# Patient Record
Sex: FEMALE | Race: WHITE | NOT HISPANIC OR LATINO | Employment: OTHER | URBAN - METROPOLITAN AREA
[De-identification: names, ages, dates, MRNs, and addresses within clinical notes are randomized per-mention and may not be internally consistent; named-entity substitution may affect disease eponyms.]

---

## 2018-10-09 ENCOUNTER — CONVERSION ENCOUNTER (OUTPATIENT)
Dept: OTHER | Facility: HOSPITAL | Age: 63
End: 2018-10-09

## 2019-02-11 ENCOUNTER — HOSPITAL ENCOUNTER (OUTPATIENT)
Dept: OTHER | Facility: HOSPITAL | Age: 64
Discharge: HOME | End: 2019-02-11
Attending: FAMILY MEDICINE

## 2020-03-04 ENCOUNTER — OFFICE VISIT CONVERTED (OUTPATIENT)
Dept: SURGERY | Facility: CLINIC | Age: 65
End: 2020-03-04
Attending: NURSE PRACTITIONER

## 2020-03-04 ENCOUNTER — CONVERSION ENCOUNTER (OUTPATIENT)
Dept: SURGERY | Facility: CLINIC | Age: 65
End: 2020-03-04

## 2020-03-13 ENCOUNTER — PROCEDURE VISIT CONVERTED (OUTPATIENT)
Dept: UROLOGY | Facility: CLINIC | Age: 65
End: 2020-03-13
Attending: UROLOGY

## 2020-04-22 ENCOUNTER — OFFICE VISIT CONVERTED (OUTPATIENT)
Dept: PULMONOLOGY | Facility: CLINIC | Age: 65
End: 2020-04-22
Attending: INTERNAL MEDICINE

## 2020-06-03 ENCOUNTER — HOSPITAL ENCOUNTER (OUTPATIENT)
Dept: OTHER | Facility: HOSPITAL | Age: 65
Discharge: HOME | End: 2020-06-03
Attending: INTERNAL MEDICINE

## 2020-06-19 ENCOUNTER — OFFICE VISIT CONVERTED (OUTPATIENT)
Dept: PULMONOLOGY | Facility: CLINIC | Age: 65
End: 2020-06-19
Attending: INTERNAL MEDICINE

## 2020-06-30 ENCOUNTER — HOSPITAL ENCOUNTER (OUTPATIENT)
Dept: PREADMISSION TESTING | Facility: HOSPITAL | Age: 65
Discharge: HOME | End: 2020-06-30
Attending: INTERNAL MEDICINE

## 2020-06-30 ENCOUNTER — HOSPITAL ENCOUNTER (OUTPATIENT)
Dept: OTHER | Facility: HOSPITAL | Age: 65
Discharge: HOME | End: 2020-06-30
Attending: INTERNAL MEDICINE

## 2020-07-01 LAB — SARS-COV-2 RNA SPEC QL NAA+PROBE: NOT DETECTED

## 2020-07-02 ENCOUNTER — HOSPITAL ENCOUNTER (OUTPATIENT)
Dept: CARDIOLOGY | Facility: HOSPITAL | Age: 65
Discharge: HOME | End: 2020-07-02
Attending: INTERNAL MEDICINE

## 2020-07-03 LAB — IGE SERPL-ACNC: 55 K[IU]/ML (ref 0–24)

## 2020-10-27 ENCOUNTER — OFFICE VISIT CONVERTED (OUTPATIENT)
Dept: PULMONOLOGY | Facility: CLINIC | Age: 65
End: 2020-10-27
Attending: NURSE PRACTITIONER

## 2020-11-11 ENCOUNTER — HOSPITAL ENCOUNTER (OUTPATIENT)
Dept: OTHER | Facility: HOSPITAL | Age: 65
Discharge: HOME | End: 2020-11-11
Attending: FAMILY MEDICINE

## 2020-12-17 ENCOUNTER — OFFICE VISIT CONVERTED (OUTPATIENT)
Dept: NEUROSURGERY | Facility: CLINIC | Age: 65
End: 2020-12-17
Attending: PHYSICIAN ASSISTANT

## 2021-01-28 ENCOUNTER — OFFICE VISIT CONVERTED (OUTPATIENT)
Dept: NEUROSURGERY | Facility: CLINIC | Age: 66
End: 2021-01-28
Attending: NEUROLOGICAL SURGERY

## 2021-01-28 ENCOUNTER — CONVERSION ENCOUNTER (OUTPATIENT)
Dept: NEUROLOGY | Facility: CLINIC | Age: 66
End: 2021-01-28

## 2021-02-16 ENCOUNTER — OFFICE VISIT CONVERTED (OUTPATIENT)
Dept: PULMONOLOGY | Facility: CLINIC | Age: 66
End: 2021-02-16
Attending: NURSE PRACTITIONER

## 2021-02-25 ENCOUNTER — HOSPITAL ENCOUNTER (OUTPATIENT)
Dept: MRI IMAGING | Facility: HOSPITAL | Age: 66
Discharge: HOME | End: 2021-02-25
Attending: NEUROLOGICAL SURGERY

## 2021-03-09 ENCOUNTER — HOSPITAL ENCOUNTER (OUTPATIENT)
Dept: PREADMISSION TESTING | Facility: HOSPITAL | Age: 66
Discharge: HOME | End: 2021-03-09
Attending: NURSE PRACTITIONER

## 2021-03-10 ENCOUNTER — OFFICE VISIT CONVERTED (OUTPATIENT)
Dept: ORTHOPEDIC SURGERY | Facility: CLINIC | Age: 66
End: 2021-03-10
Attending: ORTHOPAEDIC SURGERY

## 2021-03-10 LAB — SARS-COV-2 RNA SPEC QL NAA+PROBE: NOT DETECTED

## 2021-05-10 NOTE — H&P
History and Physical      Patient Name: Jennifer Shelton   Patient ID: [id number]   Sex: Female   YOB: 1955    Primary Care Provider: Oswaldo Colorado MD   Referring Provider: Oswaldo Colorado MD    Visit Date: March 10, 2021    Provider: Jet Salinas MD   Location: [facility]   Location Address: [address] [place] [address]  [id number]   Location Phone: [phone]          Chief Complaint  · Left Shoulder Pain      History Of Present Illness  Jennifer Shelton is a 65 year old /White female who presents today to [facility].      Patient presents today for an evaluation of left shoulder pain. She states she has been having on and off shoulder pain for about a year and a half. She states she has some ruptured discs in her neck and that may be the cause of her left shoulder pain. She states she does have some tingling in her fingers. She has a hard time telling what is her neck pain and what is her shoulder pain. She states shoulder pain on the anterior aspect that radiates down her upper arm with no known injury or trauma. She states that the last 2-3 weeks her shoulder pain has improved. Before, when the pain started, she had limited range of motion.       Past Medical History  Allergic rhinitis, chronic; Arthritis; Cancer; Cervical radiculopathy; Cervical spinal stenosis; Cervical spondylosis without myelopathy; Cervicalgia; Chest pain; Chronic Obstructive Pulmonary Disease; Depression; Fibromyalgia; GERD; Hematuria; Herniated disc, C4-5; Herniated disc, C5-6; Herniated disc, C6-7; High cholesterol; Lung nodule; Shortness Of Air         Past Surgical History  Cesarian Section; Cystoscopy; Pilonidal cystectomy         Medication List  albuterol sulfate 90 mcg/actuation inhalation HFA aerosol inhaler; Arnuity Ellipta 50 mcg/actuation inhalation blister with device; azelastine-fluticasone 137-50 mcg/spray nasal spray,non-aerosol; Chantix 1 mg oral tablet;  "ipratropium bromide 0.02 % inhalation solution; prednisone 20 mg oral tablet; Singulair 10 mg oral tablet         Allergy List  ASA-acetamin-caffein-potassium; Keflex; NSAIDS; PENICILLINS       Allergies Reconciled  Family Medical History  Cancer, Unspecified; Heart Attack (MI); Family history of colon cancer; Family history of breast cancer         Social History  Tobacco (Former)         Review of Systems  · Constitutional  o Denies  o : fever, chills, weight loss  · Cardiovascular  o Denies  o : chest pain, shortness of breath  · Gastrointestinal  o Denies  o : liver disease, heartburn, nausea, blood in stools  · Genitourinary  o Denies  o : painful urination, blood in urine  · Integument  o Denies  o : rash, itching  · Neurologic  o Denies  o : headache, weakness, loss of consciousness  · Musculoskeletal  o Denies  o : painful, swollen joints  · Psychiatric  o Denies  o : drug/alcohol addiction, anxiety, depression      Vitals  Date Time BP Position Site L\R Cuff Size HR RR TEMP (F) WT  HT  BMI kg/m2 BSA m2 O2 Sat FR L/min FiO2        03/10/2021 09:37 AM      90 - R   184lbs 0oz 5'  9\" 27.17 2.02 95 %            Physical Examination  · Constitutional  o Appearance  o : well developed, well-nourished, no obvious deformities present  · Head and Face  o Head  o :   § Inspection  § : normocephalic  o Face  o :   § Inspection  § : no facial lesions  · Eyes  o Conjunctivae  o : conjunctivae normal  o Sclerae  o : sclerae white  · Ears, Nose, Mouth and Throat  o Ears  o :   § External Ears  § : appearance within normal limits  § Hearing  § : intact  o Nose  o :   § External Nose  § : appearance normal  · Neck  o Inspection/Palpation  o : normal appearance  o Range of Motion  o : full range of motion  · Respiratory  o Respiratory Effort  o : breathing unlabored  o Inspection of Chest  o : normal appearance  o Auscultation of Lungs  o : no audible wheezing or rales  · Cardiovascular  o Heart  o : regular " rate  · Gastrointestinal  o Abdominal Examination  o : soft and non-tender  · Skin and Subcutaneous Tissue  o General Inspection  o : intact, no rashes  · Psychiatric  o General  o : Alert and oriented x3  o Judgement and Insight  o : judgment and insight intact  o Mood and Affect  o : mood normal, affect appropriate  · Left Shoulder  o Inspection  o : Full forward flexion with pain. 4 out of 5 RTC strength. Abduction to 130. IR to back pocket. Tender biceps tendon. Tender anterior shoulder. No swelling, skin discoloration or atrophy. Skin intact. Sensation grossly intact. Neurovascular intact. Tender AC joint. Good tone of deltoid, biceps, triceps, wrist extensors, and wrist flexors. Non-tender elbow. Full cross body adduction. Pain with empty can testing.   · In Office Procedures  o View  o : AP/LATERAL  o Site  o : left, scapula  o Indication  o : Left Shoulder Pain  o Study  o : X-rays ordered, taken in the office, and reviewed today.  o Xray  o : Mild AC joint arthritis. No fracture or dislocation.   · Imaging  o Imaging  o : 2/5/21 LEFT SHOULDER MRI: 1. Moderate supraspinatus tendinopathy with mild partial-thickness articular sided fraying at the footplate. 2. Mild intra-articular long head biceps tendinopathy. 3. Small glenohumeral joint effusion with mild to moderate chondromalacia, diffuse synovial thickening and/or intra-articular debris, possible body or labral tissue in the axillary recess, and synovial thickening suggesting capsulitis. 4. Minimal age-appropriate DJD at the AC joint with chronic appearing deformity of the clavicle and partial ossification of the cortical clavicular ligament suggesting sequela of remote trauma.           Assessment  · Left shoulder pain, unspecified chronicity     719.41/M25.512  · Left Shoulder Rotator Cuff Tendinitis     726.90/M77.9  · Shoulder impingement, left     726.2/M75.42      Plan  · Orders  o Scapula (Left) [facility] Preferred View ([id number]) - 719.41/M25.512 -  03/10/2021  · Medications  o Medications have been Reconciled  o Transition of Care or Provider Policy  · Instructions  o Dr. Salinas saw and examined the patient and agrees with plan.   o MRI reviewed by Dr. Salinas.  o Reviewed the patient's Past Medical, Social, and Family history as well as the ROS at today's visit, no changes.  o Call or return if worsening symptoms.  o Exercise handout given.  o Follow Up PRN.  o The above service was scribed by Michelle Muniz on my behalf and I attest to the accuracy of the note. [email or web address]   o Discussed diagnosis and treatment plans with the patient. Discussed operative vs non-operative measures. Patient denies any injection at this time since she has noticed improvement in her shoulder pain and range of motion. If her symptoms worsens or doesn't improve she will follow-up with us and request an injection. She was given some at home exercises for RTC tendinitis.            Electronically Signed by: Michelle Muniz-, Other -Author on March 11, 2021 10:29:58 AM  Electronically Co-signed by: Jet Salinas MD -Reviewer on March 14, 2021 05:43:54 PM

## 2021-05-10 NOTE — H&P
History and Physical      Patient Name: Jennifer Shelton   Patient ID: [id number]   Sex: Female   YOB: 1955    Primary Care Provider: Oswaldo Colorado MD   Referring Provider: Oswaldo Colorado MD    Visit Date: December 17, 2020    Provider: Bette Abad PA-C   Location: [facility]   Location Address: [address] [place] [address]  [id number]   Location Phone: [phone]          Chief Complaint  · Neck and left arm pain      History Of Present Illness  The patient is a 65 year old /White female, who presents on referral from Oswaldo Colordao MD, for a neurosurgical evaluation for a history of neck pain and left arm pain.   The left arm pain is currently moderate and localized to the down to the hand distribution. The neck pain is localized to the posterior and left lateral cervical region and has been present for several years but worse over the past few months. It is moderate (3-6/10) in severity and radiates into the left C5 and C6 distribution. The pain is described as being constant and it is generally following no specific pattern. The patient states the pain is aggravated by lifting and head turning. She has not identified any alleviating factors.   The onset of the symptoms was not associated with any specific event or activity.   She also reports left arm feels weak. The patient denies difficulty walking. The patient has no prior history of neck or back surgery.   RECENT INTERVENTIONS:  She reports undergoing failed PT in few years ago for the same problem..   INFORMATION REVIEWED:  The following information was reviewed: radiology reports and radiographic images. The MRI of the cervical spine revealed left C4/5 and C5/6 disc protrusions with small central disc protrusions at C3/4 and C6/7. No signal change in the cord. These were the most notable findings.      She quit smoking 2 weeks ago and taking Chantix.       Past Medical  History  Allergic rhinitis, chronic; Arthritis; Cancer; Cervical radiculopathy; Cervical spinal stenosis; Cervical spondylosis without myelopathy; Cervicalgia; Chest pain; Chronic Obstructive Pulmonary Disease; Depression; Fibromyalgia; GERD; Hematuria; Herniated disc, C4-5; Herniated disc, C5-6; Herniated disc, C6-7; High cholesterol; Lung nodule; Shortness Of Air         Past Surgical History  Cesarian Section; Cystoscopy; Pilonidal cystectomy         Medication List  albuterol sulfate 90 mcg/actuation inhalation HFA aerosol inhaler; Arnuity Ellipta 50 mcg/actuation inhalation blister with device; azelastine-fluticasone 137-50 mcg/spray nasal spray,non-aerosol; Chantix 1 mg oral tablet; ipratropium bromide 0.02 % inhalation solution; prednisone 20 mg oral tablet; Singulair 10 mg oral tablet         Allergy List  ASA-acetamin-caffein-potassium; Keflex; NSAIDS; PENICILLINS       Allergies Reconciled  Family Medical History  Cancer, Unspecified; Heart Attack (MI); Family history of colon cancer; Family history of breast cancer         Social History  Tobacco (Current every day)         Review of Systems  · Constitutional  o Admits  o : fatigue, weight gain  o Denies  o : chills, excessive sweating, fever, sycope/passing out, weight loss  · Eyes  o Denies  o : changes in vision, blurry vision, double vision  · HENT  o Admits  o : ringing in the ears, nasal congestion, seasonal allergies  o Denies  o : loss of hearing, ear aches, sore throat, sinus pain, nose bleeds  · Cardiovascular  o Denies  o : blood clots, swollen legs, anemia, easy burising or bleeding, transfusions  · Respiratory  o Admits  o : shortness of breath, dry cough, productive cough, COPD  o Denies  o : pneumonia  · Gastrointestinal  o Admits  o : reflux  o Denies  o : difficulty swallowing  · Genitourinary  o Denies  o : incontinence  · Neurologic  o Admits  o : headache, dizziness/vertigo, difficulty with sleep, numbness/tingling/paresthesia ,  "weakness  o Denies  o : seizure, stroke, tremor, loss of balance, falls, difficulty with coordination, difficulty with dexterity  · Musculoskeletal  o Admits  o : neck stiffness/pain, muscle aches, joint pain, weakness, spasms, pain radiating in arm, low back pain  o Denies  o : swollen lymph nodes, sciatica, pain radiating in leg  · Endocrine  o Denies  o : diabetes, thyroid disorder  · Psychiatric  o Admits  o : depression  o Denies  o : anxiety  · All Others Negative      Vitals  Date Time BP Position Site L\R Cuff Size HR RR TEMP (F) WT  HT  BMI kg/m2 BSA m2 O2 Sat FR L/min FiO2 HC       12/17/2020 10:08 AM        97.1 183lbs 1oz 5'  9\" 27.03 2.01             Physical Examination  · Constitutional  o Appearance  o : well-nourished, well developed, alert, in no acute distress  · Neck  o Inspection/Palpation  o : normal appearance, no masses or tenderness, trachea midline  o Range of Motion  o : cervical range of motion within normal limits  · Respiratory  o Respiratory Effort  o : breathing unlabored  · Cardiovascular  o Peripheral Vascular System  o :   § Extremities  § : no edema or cyanosis  · Musculoskeletal  o Spine  o :   § Stability  § : no subluxations present  § Muscle Strength/Tone  § : paraspinal muscle strength within normal limits, paraspinal muscle tone within normal limits  o Right Upper Extremity  o :   § Inspection/Palpation  § : no tenderness to palpation  § Joint Stability  § : shoulder, elbow and wrist joint stability normal  § Range of Motion  § : range of motion normal, no joint crepitus or pain with motion present,shoulder negative  o Left Upper Extremity  o :   § Inspection/Palpation  § : no tenderness to palpation  § Joint Stability  § : shoulder, elbow and wrist joint stability normal  § Range of Motion  § : range of motion normal, no joint crepitus present, no pain with joint motion, shoulder negative  · Skin and Subcutaneous Tissue  o Neck  o : no lesions or areas of " discoloration  · Neurologic  o Mental Status Examination  o :   § Orientation  § : alert and oriented to person, place, time and events  o Motor Examination  o :   § RUE Strength  § : strength normal  § RUE Motor Function  § : tone normal, muscle bulk normal  § LUE Strength  § : strength normal except deltoid, , and bicep were 4+/5  § LUE Motor Function  § : tone normal, muscle bulk normal  o Reflexes  o :   § RUE  § : 1/4 in biceps/triceps/brachioradialis, Howard sign negative  § LUE  § : 1/4 in biceps/triceps/brachioradialis, Howard sign negative  o Sensation  o :   § Light Touch  § : sensation intact to light touch in extremities  o Gait and Station  o :   § Gait Screening  § : normal gait, able to stand without difficulty  · Psychiatric  o Mood and Affect  o : mood normal, affect appropriate          Assessment  · Cervicalgia     723.1/M54.2  · Cervical radiculopathy     723.4/M54.12  · Herniated disc, C4-5     722.91  · Herniated disc, C5-6     722.91      Plan  · Medications  o Medications have been Reconciled  o Transition of Care or Provider Policy  · Instructions  o Encouraged to follow-up with Primary Care Provider for preventative care.  o The ROS and the PFSH were reviewed at today's visit.  o I have discussed the risks and benefits of surgery versus physical therapy and other conservative forms of treatment.  o Call or return if symptoms worsen or persist.  o She has a left C4/5 and C5/6 disc protrusions with left arm pain. I will have her f/u with Dr. Ghotra to discuss an ACDF further. She quite smoking two weeks ago and is on Chantix.             Electronically Signed by: Bette Abad PA-C -Author on December 17, 2020 11:19:40 AM

## 2021-05-14 VITALS — BODY MASS INDEX: 27.25 KG/M2 | HEART RATE: 90 BPM | OXYGEN SATURATION: 95 % | WEIGHT: 184 LBS | HEIGHT: 69 IN

## 2021-05-14 VITALS — BODY MASS INDEX: 27.11 KG/M2 | WEIGHT: 183.06 LBS | HEIGHT: 69 IN | TEMPERATURE: 97.1 F

## 2021-05-14 VITALS — HEIGHT: 69 IN | TEMPERATURE: 97.1 F | BODY MASS INDEX: 27.31 KG/M2 | WEIGHT: 184.37 LBS

## 2021-05-14 NOTE — PROGRESS NOTES
Progress Note      Patient Name: Jennifer Shelton   Patient ID: [id number]   Sex: Female   YOB: 1955    Primary Care Provider: Oswaldo Colorado MD   Referring Provider: Oswaldo Colorado MD    Visit Date: January 28, 2021    Provider: Willi Ghotra MD   Location: [facility]   Location Address: [address] [place] [address]  [id number]   Location Phone: [phone]          Chief Complaint  · Follow-up     Here with complaints of neck and left shoulder pain and left arm weakness.       History Of Present Illness  The patient is a 65 year old /White female who is in the office for followup appointment. She has pain in the left shoulder and into the arm. It does go down the whole arm. She has trouble raising the arm on the left. She has C4-5 and C5-6 foraminal stenosis most notably. She has some pain in the rest of the arm. She can't sleep on the left side.       Past Medical History  Allergic rhinitis, chronic; Arthritis; Cancer; Cervical radiculopathy; Cervical spinal stenosis; Cervical spondylosis without myelopathy; Cervicalgia; Chest pain; Chronic Obstructive Pulmonary Disease; Depression; Fibromyalgia; GERD; Hematuria; Herniated disc, C4-5; Herniated disc, C5-6; Herniated disc, C6-7; High cholesterol; Lung nodule; Shortness Of Air         Past Surgical History  Cesarian Section; Cystoscopy; Pilonidal cystectomy         Medication List  albuterol sulfate 90 mcg/actuation inhalation HFA aerosol inhaler; Arnuity Ellipta 50 mcg/actuation inhalation blister with device; azelastine-fluticasone 137-50 mcg/spray nasal spray,non-aerosol; Chantix 1 mg oral tablet; ipratropium bromide 0.02 % inhalation solution; prednisone 20 mg oral tablet; Singulair 10 mg oral tablet         Allergy List  ASA-acetamin-caffein-potassium; Keflex; NSAIDS; PENICILLINS       Allergies Reconciled  Family Medical History  Cancer, Unspecified; Heart Attack (MI); Family history of  "colon cancer; Family history of breast cancer         Social History  Tobacco (Former)         Review of Systems  · Constitutional  o Admits  o : fatigue  o Denies  o : chills, excessive sweating, fever, sycope/passing out, weight gain, weight loss  · Eyes  o Denies  o : changes in vision, blurry vision, double vision  · HENT  o Admits  o : ringing in the ears  o Denies  o : loss of hearing, ear aches, sore throat, nasal congestion, sinus pain, nose bleeds, seasonal allergies  · Cardiovascular  o Denies  o : blood clots, swollen legs, anemia, easy burising or bleeding, transfusions  · Respiratory  o Admits  o : shortness of breath, COPD  o Denies  o : dry cough, productive cough, pneumonia  · Gastrointestinal  o Admits  o : reflux  o Denies  o : difficulty swallowing  · Genitourinary  o Denies  o : incontinence  · Neurologic  o Admits  o : headache, numbness/tingling/paresthesia , difficulty with dexterity, weakness  o Denies  o : seizure, stroke, tremor, loss of balance, falls, dizziness/vertigo, difficulty with sleep, difficulty with coordination  · Musculoskeletal  o Admits  o : neck stiffness/pain, muscle aches, joint pain, weakness, spasms, pain radiating in arm, low back pain  o Denies  o : swollen lymph nodes, sciatica, pain radiating in leg  · Endocrine  o Denies  o : diabetes, thyroid disorder  · Psychiatric  o Admits  o : depression  o Denies  o : anxiety  · All Others Negative      Vitals  Date Time BP Position Site L\R Cuff Size HR RR TEMP (F) WT  HT  BMI kg/m2 BSA m2 O2 Sat FR L/min FiO2        01/28/2021 11:07 AM        97.1 184lbs 6oz 5'  9\" 27.23 2.02             Physical Examination  · Constitutional  o Appearance  o : well-nourished, well developed, alert, in no acute distress  · Respiratory  o Respiratory Effort  o : breathing unlabored  · Cardiovascular  o Peripheral Vascular System  o :   § Extremities  § : no edema or cyanosis  · Skin and Subcutaneous Tissue  o Neck  o : no lesions or areas " of discoloration  · Psychiatric  o Mood and Affect  o : mood normal, affect appropriate     She has reduced ROM of the left shoulder, particularly external rotation               Assessment  · Cervicalgia     723.1/M54.2  · Cervical radiculopathy     723.4/M54.12  · Herniated disc, C4-5     722.91  · Herniated disc, C5-6     722.91  · Shoulder pain, left     719.41/M25.512      Plan  · Orders  o MRI shoulder left wo contrast ([id number]) - - 01/28/2021  · Medications  o Medications have been Reconciled  o Transition of Care or Provider Policy  · Instructions  o The ROS and the PFSH were reviewed at today's visit.  o I am concerned that a notable portion of her pain is from her shoulder. I have recommended an MRI of the left shoulder. If this is not showing any notable abnormality, we will proceed with C4-5 and C5-6 ACDF.            Electronically Signed by: Willi Ghotra MD -Author on January 28, 2021 11:41:00 AM

## 2021-05-15 VITALS — HEIGHT: 68 IN | RESPIRATION RATE: 12 BRPM | WEIGHT: 168.37 LBS | BODY MASS INDEX: 25.52 KG/M2

## 2021-05-28 VITALS
OXYGEN SATURATION: 95 % | HEIGHT: 67 IN | HEART RATE: 98 BPM | TEMPERATURE: 98 F | BODY MASS INDEX: 25.9 KG/M2 | RESPIRATION RATE: 15 BRPM | SYSTOLIC BLOOD PRESSURE: 110 MMHG | WEIGHT: 165 LBS | DIASTOLIC BLOOD PRESSURE: 72 MMHG

## 2021-05-28 VITALS
HEIGHT: 69 IN | BODY MASS INDEX: 27.4 KG/M2 | SYSTOLIC BLOOD PRESSURE: 110 MMHG | OXYGEN SATURATION: 95 % | HEART RATE: 90 BPM | RESPIRATION RATE: 15 BRPM | DIASTOLIC BLOOD PRESSURE: 66 MMHG | TEMPERATURE: 98.7 F | WEIGHT: 185 LBS

## 2021-05-28 NOTE — PROGRESS NOTES
Patient: ZARINA FERREIRA     Acct: [id number]     Report: #[id number]  UNIT #: [id number]     : 1955    Encounter Date:10/27/2020  PRIMARY CARE: EVE LEMOS  ***Signed***  --------------------------------------------------------------------------------------------------------------------  Chief Complaint      Encounter Date      Oct 27, 2020            Primary Care Provider      KE LEMOS devon            Patient Complaint      Patient is complaining of      PT here today for F/U, [facility] F/U, COPD            VITALS      Height 5 ft 9 in / 175.26 cm      Weight 185 lbs  / 83.945330 kg      BSA 2.00 m2      BMI 27.3 kg/m2      Temperature 98.7 F / 37.06 C - Temporal      Pulse 90      Respirations 15      Blood Pressure 110/66 Sitting, Right Arm      Pulse Oximetry 95%, room air            HPI      The patient is a 65 year old female patient of Dr. Singh who was recently     hospitalized at [facility] from 10/09/2020 to 10/12/2020 for    severe worsening dyspnea. The patient had presented to the ED with several days     of ongoing worsening dyspnea.  Exertion was making it worse. It was also ass    ociated with a nonproductive cough. Due to the patient requiring oxygen, she was    admitted to the hospital for further management and care. The patient had     recently stopped smoking that resumed about a month ago.  The patient had     reported that she had been using her home inhalers with diminishing improvement.     The patient does have a history of COPD.  The patient was treated as an i    npatient for COPD exacerbations and steroids with Levaquin and scheduled     breathing treatments.  The patient was weaned off oxygen and was discharged     home. The patient was advised to continue five days of additional prednisone and    Levaquin upon discharge.  The patient states that she did finish it and she is     feeling better since hospital stay. The patient states that she does  have i    ntermittent cough at times with clear sputum and wheezing. The patient states     she is currently taking Stiolto everyday as prescribed and uses albuterol     inhaler as needed. The patient states she is also scheduled to have a follow up     chest CT scan on 12/10/2020 at [facility].  The patient states that she     is still smoking 5-6 cigarettes a day and she does have Chantix which she is     going to be starting. The patient denies any fever, chills, night sweats,     hemoptysis, purulent sputum production, chest pain, chest tightness, swollen     glands in the head and neck, abdominal pain, nausea, vomiting or diarrhea.  The     patient denies any headaches, myalgias, sore throat, changes in sense of taste     and/or smell or any other coronavirus or flu-like symptoms.  The patient did     have a serum IgE level drawn back in 06/2020 that came back elevated at a level     of 55. The patient's PFTs that were completed back in 07/2020 did show mild     obstructive airway disease, likely emphysema.  The patient states she is able to    perform ADLs without difficulty.            I have personally reviewed the review of systems, past family, social, surgical     and medical histories and I agree with those as entered in the chart.      Copies To:   Glenn Kelley      Constitutional:  Denies: Fatigue, Fever, Weight gain, Weight loss, Chills,     Insomnia, Other      Respiratory/Breathing:  Complains of: Shortness of air, Wheezing, Cough; Denies:    Hemoptysis, Pleuritic pain, Other      Endocrine:  Denies: Polydipsia, Polyuria, Heat/cold intolerance, Abnorml     menstrual pattern, Diabetes, Other      Eyes:  Denies: Blurred vision, Vision Changes, Other      Ears, nose, mouth, throat:  Denies: Congestion, Dysphagia, Hearing Changes, Nose    Bleeding, Nasal Discharge, Throat pain, Tinnitus, Other      Cardiovascular:  Denies: Chest Pain, Exertional dyspnea, Peripheral Edema,      Palpitations, Syncope, Wake up Gasping for air, Orthopnea, Tachycardia, Other      Gastrointestinal:  Denies: Abdominal pain/cramping, Bloody stools, Constipation,    Diarrhea, Melena, Nausea, Vomiting, Other      Genitourinary:  Denies: Dysuria, Urinary frequency, Incontinence, Hematuria,     Urgency, Other      Musculoskeletal:  Denies: Joint Pain, Joint Stiffness, Joint Swelling, Myalgias,    Other      Hematologic/lymphatic:  DENIES: Lymphadenopathy, Bruising, Bleeding tendencies,     Other      Neurologic:  Denies: Headache, Numbness, Weakness, Seizures, Other      Psychiatric:  Denies: Anxiety, Appropriate Effect, Depression, Other      Sleep:  No: Excessive daytime sleep, Morning Headache?, Snoring, Insomnia?, Stop    breathing at sleep?, Other      Integumentary:  Denies: Rash, Dry skin, Skin Warm to Touch, Other            FAMILY/SOCIAL/MEDICAL HX      Surgical History:  No: Abdominal Surgery, Appendectomy, Bladder Surgery, Bowel     Surgery, CABG, Cholecystectomy, Head Surgery, Oral Surgery, Orthopedic Surgery,     Vascular Surgery      Smoking status:  Current every day smoker ((.5 ppd started smoking age 23 ) now     smokes 5-6 ciggs per day)      Anticoagulation Therapy:  No      Antibiotic Prophylaxis:  No      Medical History:  Yes: Arthritis, Chemotherapy/Cancer (SKIN CANCER ON CHEST     REMOVED THIS YEAR. SQUAMOUS CELL. ), Chronic Bronchitis/COPD (EMPHYSEMA),     Seizures (2013), Shortness Of Breath; No: Asthma, Blood Disease, Congestive     Heart Failu, Deafness or Ringing Ears, Heart Attack, High Blood Pressure, Sinus     Trouble, Miscellaneous Medical/oth      Psychiatric History      none            PREVENTION      Hx Influenza Vaccination:  Yes      Date Influenza Vaccine Given:  Oct 1, 2020      Influenza Vaccine Declined:  No      2 or More Falls in Past Year?:  No      Fall Past Year with Injury?:  No      Hx Pneumococcal Vaccination:  No      Encouraged to follow-up with:  PCP regarding  preventative exams.      Chart initiated by      Wen Serrano [facility]            ALLERGIES/MEDICATIONS      Allergies:        Coded Allergies:             ASPIRIN (Verified  Allergy, Unknown, 10/27/20)           CEPHALEXIN (Verified  Allergy, Unknown, 10/27/20)           PENICILLINS (Verified  Allergy, Unknown, 10/27/20)      Medications    Last Reconciled on 10/27/20 09:06 by Davin BAH-Fluticasone (Fluticasone 50 mcg) 16 Gm Spray.susp      1 PUFFS NARE EACH QDAY, #1 BOTTLE 4 Refills         Prov: VICKY GUARDADO [facility]         10/27/20       Azelastine Hcl (Azelastine Nasal) 137 Mcg/0.137 Ml Spray.pump      1 PUFFS NARE EACH BID, #1 BOTTLE 5 Refills         Prov: VICKY GUARDADO [facility]         10/27/20       Albuterol/Ipratropium (Duoneb) 3 Ml Ampul.neb      3 ML INH Q4H PRN for SHORTNESS OF BREATH, #120 NEB 5 Refills         Prov: VICKY GUARDADO [facility]         10/27/20       Beclomethasone Dipropionate (Qvar 80 Redihaler 10.6 GM) 10.6 Gm Hfa.aeroba      1 PUFF INH RTBID, #1 INH 5 Refills         Prov: VICKY GUARDADO [facility]         10/27/20       Tiotropium Br/Olodaterol HCl (Stiolto Respimat Inhal Spray) 4 Gm Mist.inhal      2 PUFFS INH BID, #1 INH 0 Refills         Prov: Oberst,[place]         10/12/20       MDI-Albuterol (Ventolin HFA) 8 Gm Hfa.aer.ad      2 PUFFS INH Q4-6H PRN for DYSPNEA/WHEEZING, #1 INH 6 Refills         Prov: Oberst,[place]         10/12/20       Ibuprofen (Motrin) 200 Mg Tablet      800 MG PO Q6H PRN for MILD PAIN(1-3)/TEMP OVER 100.5, #60 TAB 0 Refills         Reported         10/10/20       Montelukast Sodium (Singulair*) 10 Mg Tab      10 MG PO HS, #30 TAB 9 Refills         Prov: Glenn Kelley         6/19/20       Varenicline Tartrate (Chantix) 1 Mg Tablet      1 MG PO BID for 30 Days, #60 TAB 4 Refills         Prov: Glenn Kelley         6/19/20      Current Medications      Current Medications Reviewed 10/27/20            EXAM      Vital Signs Reviewed.      General:  WDWN,  Alert, NAD.      HEENT: PERRL, EOMI.  OP, nares clear, no sinus tenderness.      Neck: Supple, no JVD, no thyromegaly.      Lymph: No axillary, cervical, supraclavicular lymphadenopathy noted bilaterally.      Chest: Lungs clear to auscultation bilaterally, no wheezes, rales or rhonchi,     normal work of breathing noted, patient able to speak full sentences without     difficulty.       CV: RRR, no MGR, pulses 2+, equal.        Abd: Soft, NT, ND, +BS, no HSM.      EXT: No clubbing, no cyanosis, no edema, no joint tenderness.        Neuro:  A  Skin: No rashes or lesions.      Vitals      Vitals:             Height 5 ft 9 in / 175.26 cm           Weight 185 lbs  / 83.590934 kg           BSA 2.00 m2           BMI 27.3 kg/m2           Temperature 98.7 F / 37.06 C - Temporal           Pulse 90           Respirations 15           Blood Pressure 110/66 Sitting, Right Arm           Pulse Oximetry 95%, room air            REVIEW      Results Reviewed      [facility] Results Reviewed?:  Yes Prev Lab Results, Yes Prev Radiology Results, Yes     [facility]      Lab Results      I reviewed patient's discharge summary.  I also reviewed patient's pulmonary     function test dated for 07/02/2020.  The patient's IgE level from 06/30/2020     elevated at 55.  I reviewed Dr. Kelley's last office visit note.            Assessment      Emphysema lung - J43.9            Notes      New Medications      * Beclomethasone Dipropionate (Qvar 80 Redihaler 10.6 GM) 10.6 GM HFA.AEROBA: 1       PUFF INH RTBID #1         Instructions: rinse mouth out after each use      * Albuterol/Ipratropium (Duoneb) 3 ML AMPUL.NEB: 3 ML INH Q4H PRN SHORTNESS OF       BREATH #120         Instructions: J43.9         Dx: Emphysema lung - J43.9      * AZELASTINE HCL (Azelastine Nasal) 137 MCG/0.137 ML SPRAY.PUMP: 1 PUFFS NARE       EACH BID #1      * Davin-Fluticasone (Fluticasone 50 mcg) 16 GM SPRAY.SUSP: 1 PUFFS NARE EACH QDAY       #1      * Tiotropium  Br/Olodaterol HCl (Stiolto Respimat Inhal Spray) 4 GM MIST.INHAL          Sample - Qty 2      Discontinued Medications      * levoFLOXacin 750 MG TABLET: 750 MG PO QDAY 5 Days #5      * predniSONE 20 MG TABLET: 40 MG PO QDAY 5 Days #10      New Office Procedures      * Pneumovax-23, As Soon As Possible         Pneumococcal Vaccine Polyvalen (Pneumovax-23) 25 MCG/0.5 ML VIAL: 25        MICROGRAM INTRAMUSCULARLY Qty 25 MCG      IMPRESSION:      1.  Acute COPD exacerbation with recent hospital stay.      2. CAP, Moraxella in sputum.      3. Noncalcified pulmonary nodules, nodular infiltrate in the right lower lobe, q    uestionable mucus plug.      4. Hypoxia on room air.      5. COVID19 rule out.      6. Tobacco abuse with cigarettes, ongoing.        7.  Elevated IgE level of 55.        8.  Allergic rhinitis/seasonal allergies.              PLAN:      1.  The patient to continue Stiolto everyday as prescribed.      2.  I will start patient on Qvar 80 mcg one puff twice a day. The patient is     advised to rinse her mouth out after each use.        3. The patient to continue Singulair everyday as prescribed.      4. I spent five minutes today counseling the patient on smoking cessation.  I     counseled the patient on the risks of continued smoking including the risk of     lung cancer, head and neck cancer, renal cancer, heart disease, stroke, and     early death. The patient refuses nicotine therapy and pharmacotherapy at this     time.  The patient is advised to decrease the number of cigarettes she is     smoking up to the point to where she can quit.        5. I will start patient on Astelin nasal spray and Flonase nasal spray.      6. I will write patient for a nebulizer machine and prescribe patient DuoNebs to    use up to four times a day as needed.      7. Pneumovax given to patient in the office today.  The patient reports she is     up-to-date with her flu vaccine.      8. Patient is advised to call the  office, 911 or go to the ER with any new or     worsening symptoms.      9.  The patient is already scheduled to have a follow up chest CT scan at     [facility] on 12/01/2020. The patient is advised to have test completed     as scheduled to ensure resolution of pneumonia.      10. Follow up with Dr. Kelley in December after CT scan is completed, sooner if     needed.            Patient Education      Tobacco Cessation Counseling:  for 3 - 10 minutes      Patient Education Provided:  COPD, Smoking Cessation      Time Spent:  > 50% /Coord Care            Electronically signed by VICKY GUARDADO [facility]  10/28/2020 16:31       Disclaimer: Converted document may not contain table formatting or lab diagrams. Please see [email or web address] for the authenticated document.

## 2021-05-28 NOTE — PROGRESS NOTES
Patient: JENNIFER SHELTON     Acct: [id number]     Report: #[id number]  UNIT #: [id number]     : 1955    Encounter Date:2021  PRIMARY CARE: EVE LEMOS  ***Signed***  --------------------------------------------------------------------------------------------------------------------  History of Present Illness      Chief Complaint: [facility] F/U COPD, Flare up, Emphysema             Jennifer Shelton is presenting for evaluation via Video and Audio conferencing.     Verbal consent obtained via Video and Audio before beginning the visit.            The following staff were present during the visit: Mini Orantes MA, Beth Omer             The patient is a 65 year old female patient of Dr. Kelley's recently hospitalized    at [facility] on 21 to 21 for acute hypoxic     respiratory failure requiring noninvasive positive pressure ventilation and     chronic obstructive pulmonary disease exacerbation. Of note, our group was not     consulted during this patient's hospital stay. The patient has a history of     significant for chronic obstructive pulmonary disease and she presented to the     ER with complaints of worsening shortness of breath that began the morning of     presentation. When the patient was evaluated in the ER, she was hypoxic at 86%      on 4 liters of oxygen via nasal cannula. The patient was then transitioned over     to BiPAP and there was improvement in the patient's oxygen saturation and     comfort. The patient had a chest x-ray that did not demonstrate any acute     infiltrates. Hospitalist service was contacted for further evaluation and     management. During the patient's hospital stay she was started on nebulizer,     breathing treatments and steroid therapy. The patient's respiratory status     continued to improve throughout admission. The patient had a walk test performed    for home oxygen evaluation prior to discharge however she  passed and did not     qualify for home oxygen. The patient states she is feeling better since hospital    stay. The patient states at times she does have a hard time coughing up     secretions as they are thick. The patient denies any fever or chills, night     sweats, hemoptysis,  purulent sputum production, swollen glands in head and     neck, unintentional weight loss, chest pain or chest tightness, abdominal pain,     nausea or vomiting or diarrhea. The patient denies  any headaches, myalgias,     sore throat, changes in sense of taste and smell any coronavirus or flu like     symptoms.  The patient denies any leg swelling, orthopnea or paroxysmal     nocturnal dyspnea. The patient states she does have excessive daytime sleepiness    and snoring and would like to be evaluated for sleep apnea she believes she may     have had a  sleep study several years ago but she is not sure if she was     positive for sleep apnea at that time. The patient states she is taking qvar and    Stiolto everyday unfortunately qvar is too expensive and would like a cheaper     inhaler sent to the pharmacy. The patient states she is also needing a prior     authorization for his Singulair to be able to continue that medication. The     patient states she is able to perform her activities of daily living without dif    ficulty.            I reviewed the Review of Systems, medical, surgical and family history and agree    with those as entered.               Exam was done via ZOOM conference and it is entered in the chart.            I personally reviewed the patient's recent discharge summary from recent     hospitalization and the patient's chest x-ray report from 01/31/21.                           Past Med History      Vaccines:       Flu-UTD      Pneum-UTD      Overview of Symptoms      Wheezing, SOA, Very little cough            Allergies and Medications      Allergies:        Coded Allergies:             ASPIRIN (Verified   Allergy, Unknown, 2/16/21)           CEPHALEXIN (Verified  Allergy, Unknown, 2/16/21)           PENICILLINS (Verified  Allergy, Unknown, 2/16/21)      Medications    Last Reconciled on 2/16/21 11:43 by VICKY GUARDADO       Montelukast Sodium (Singulair*) 10 Mg Tab      10 MG PO HS, #30 TAB 11 Refills         Prov: VICKY GUARDADO [facility]         2/16/21       Tiotropium Br/Olodaterol HCl (Stiolto Respimat Inhal Spray) 4 Gm Mist.inhal      2 PUFFS INH BID, #1 INH 11 Refills         Prov: VICKY GUARDADO [facility]         2/16/21       Fluticasone (Flovent HFA) 110 Mcg Inhaler      2 PUFFS INH RTBID, #1 INH 5 Refills         Prov: VICKY GUARDADO [facility]         2/16/21       Neb-NaCl 3% (Sodium Chloride 3% Neb) 4 Ml Vial.neb      4 ML INH RTBID for 30 Days, #60 NEB 3 Refills         Prov: VICKY GUARDADO [facility]         2/16/21       diphenhydrAMINE HCl (BENADRYL) 25 Mg Capsule      25 MG PO HS, #100 TAB         Reported         1/31/21       guaiFENesin LA (Humibid La) 600 Mg Tab.er.12h      600 MG PO QDAY, TAB         Reported         1/31/21       Tiotropium Br/Olodaterol HCl (Stiolto Respimat Inhal Spray) 4 Gm Mist.inhal               Prov: Glenn Kelley         1/5/21       Davin-Fluticasone (Fluticasone 50 mcg) 16 Gm Spray.susp      1 PUFFS NARE EACH QDAY, #1 BOTTLE 4 Refills         Prov: VICKY GUARDADO [facility]         10/27/20       Azelastine Hcl (Azelastine Nasal) 137 Mcg/0.137 Ml Spray.pump      1 PUFFS NARE EACH BID, #1 BOTTLE 5 Refills         Prov: VICKY GUARDADO [facility]         10/27/20       Albuterol/Ipratropium (Duoneb) 3 Ml Ampul.neb      3 ML INH Q4H PRN for SHORTNESS OF BREATH, #120 NEB 5 Refills         Prov: VICKY GUARDADO [facility]         10/27/20       MDI-Albuterol (Ventolin HFA) 8 Gm Hfa.aer.ad      2 PUFFS INH Q4-6H PRN for DYSPNEA/WHEEZING, #1 INH 6 Refills         Prov: Maldonado Anderson         10/12/20            Past Medical,Surg,Family Hx      Past Medical History:  None      Past Surgical History:   None            Social History      Smoking status:  Current every day smoker, Former smoker (quit Nov 2020 ((.5 ppd    started smoking age 23, 5-6 ciggs per day))            Review of Systems      Respiratory:  Admits: Cough, Shortness of Air;          Denies: Wheezing            Exam      Constitutional/Appearance:  Well Nourished, No Acute Distress      Head/Face:  Atraumatic      Eyes:  Extracocular move intact, No Scleral Icterus      Respiratory:  Breathing comfortably, No Cough      Skin: General Appearance:  No Visable Rashes on face, No Lesions on face      Neurologic Orientation:  Grossly orientated to Person, Place, No Facial Drop      Psychiatric:  Normal Mood, Normal Affect            Plan and Patient Instructions      Ambulatory Assessment/Plan:        NICOTINE DEPENDENCE, CIGARETTES, IN REMISSION - F17.211            Excessive daytime sleepiness - G47.19            Snoring - R06.83            Notes      New Medications      * Neb-NaCl 3% (Sodium Chloride 3% Neb) 4 ML VIAL.NEB: 4 ML INH RTBID 30 Days #60      * Fluticasone (Flovent HFA) 110 MCG INHALER: 2 PUFFS INH RTBID #1         Instructions: rinse mouth out after each use      Renewed Medications      * Tiotropium Br/Olodaterol HCl (Stiolto Respimat Inhal [place]) 4 GM MIST.INHAL: 2      PUFFS INH BID #1         Instructions: 2 PUFFS      * Montelukast Sodium (Singulair*) 10 MG TAB: 10 MG PO HS #30      Discontinued Medications      * Beclomethasone Dipropionate (Qvar 80 Redihaler 10.6 GM) 10.6 GM HFA.AEROBA: 1       PUFF INH RTBID #1         Instructions: rinse mouth out after each use      * predniSONE 20 MG TABLET: 40 MG PO QDAY 4 Days #8      New Diagnostics      * LDCT for lung ca screening, 5 Months         Dx: NICOTINE DEPENDENCE, CIGARETTES, IN REMISSION - F17.211      *  Discuss Need Ldct, Routine         Dx: NICOTINE DEPENDENCE, CIGARETTES, IN REMISSION - F17.211      * Split Night Sleep Study, 1 DAY         Dx: Excessive daytime  sleepiness - G47.19      Plan      ASSESSMENT:      1.  Acute hypoxic respiratory failure requiring noninvasive positive pressure     ventilation with recent hospitalization.      2. Chronic obstructive pulmonary disease exacerbation clinically improved.       3. Tobacco abuse of cigarettes in remission. The patient reports she started     smoking at age 23 2 pack per day and quit at age 64 in November 2020.      4. History of community acquired pneumonia with moraxella in sputum.       5. Noncalcified pulmonary nodule, nodular infiltrate in right lower lobe     question of mucous plug.       6. Hypoxia on room air       7. Allergic rhinitis.       8. Seasonal allergies.       9. Excessive daytime sleepiness.      10. Snoring.       11. Morning headaches.       12. Difficulty with airway clearance.              PLAN:      1. The patient is complaining of excessive daytime sleepiness, snoring and     morning headaches. I will order a split night  sleep study to evaluate the patie    nt for obstructive sleep apnea.       2. The patient states at times her secretions are thick and hard to cough up. I     will start the patient on a flutter valve and sodium chloride nebulizer to use     twice daily. The patient is advised to use DuoNeb followed by sodium chloride     nebulizer and then use the flutter valve at least twice daily to assist with     airway clearance.       3. The patient needs a prior authorization for Singulair, I will have our office    work on this.      4. I will check a low dose CT scan of the chest in June 2021. Shared decision     making regarding lung cancer screening was performed via ZOOM visit today. The     patient understands this is a process and if there are any abnormal findings it     needs to be followed with further imaging or invasive procedures.        5. The patient is advised to call the office, call 911 or go to the ER for any     new or worsening symptoms.       6. Qvar is too  expensive per the patient report. I will start the patient on     flovent 110 mcg 2 puffs twice daily. The patient is advised to rinse her mouth     after each use.         7. Continue Stiolto everyday as prescribed.       8. Continue albuterol inhaler and DuoNeb as needed.       9. The patient reports she is up to date with flu and pneumonia vaccines. The     patient is advised to receive the COVID-19 vaccine when available.  The patient     is advised to follow CDC recommendations such as social distancing, wearing a     mask and washing hand for at least 20 seconds.      10. Follow up with Dr. Kelley in 2-3 months, sooner if needed.      Instructions      * Chronic conditions reviewed and taken into consideration for today's treatment      plan.      * Patient instructed to seek medical attention urgently for new or worsening       symptoms.      * Patient was educated/instructed on their diagnosis, treatment and medications       prior to discharge from the Video and Audio visit today.            LDCT      Assessment      Nicotine dependence, cigarettes, in remission V15.82/F17.211            Plan      LDCT Orders:   to discuss lung ca screen, LDCT for lung ca screeing            Determine need to perform LDCT      Determined ne to perform LDC:  Pt between ages of 55-77 years old., Absence of     sign/symptoms of lung ca      Smoking history:  25-50 pack years            Time Spent/Education      Greater than 50% For Edcuation:  Yes      LDCT Patient Education            * Patient was presented with the benefits and harms of screening to include:         The possible need for follow-up diagnostic testing         Over Diagnosis         False Positive Rate         Total Radiation Exposure            * Counseled on the importance of:         Adherence to annual lung cancer LDCT screening         Impact of co-morbidities         The ability or willingness to undergo diagnosis of treatment               *  Counseled on the importance of cigarette cessation.      * Counseled on the importance of maintaining cigarette smoking abstinence.      * Handout provided regarding tobacco cessation interventions.      * Order for lung cancer screening with LDCT was given to the patient.            Electronically signed by VICKY [facility]O [facility]  02/18/2021 16:03       Disclaimer: Converted document may not contain table formatting or lab diagrams. Please see [facility] for the authenticated document.

## 2021-05-28 NOTE — PROGRESS NOTES
Patient: ZARINA FERREIRA     Acct: [id number]     Report: #[id number]  UNIT #: [id number]     : 1955    Encounter Date:2020  PRIMARY CARE: EVE LEMOS  ***Signed***  --------------------------------------------------------------------------------------------------------------------  Chief Complaint      Encounter Date      2020            Primary Care Provider      KE LEMOS            Patient Complaint      Patient is complaining of      Patient here today for F/U            VITALS      Height 5 ft 7 in / 170.18 cm      Weight 165 lbs  / 74.973702 kg      BSA 1.86 m2      BMI 25.8 kg/m2      Temperature 98.0 F / 36.67 C - Temporal      Pulse 98      Respirations 15      Blood Pressure 110/72 Sitting, Left Arm      Pulse Oximetry 95%, room air            HPI      The patient is a 65 year old female with chronic smoking history, chronic     obstructive pulmonary disease, gastroesophageal reflux disease, hiatal hernia     and lung nodules.             Since her last ZOOM visit she had CT scan of the chest done on 2020 and I     reviewed the results with her today. She was written for a script of Stiolto but    the cost was high and she never filled it. She used albuterol for rescue which     she was using it 1-2 times a day but she is still short of breath with exertion.    She has some cough but no significant phlegm. She has no changes in weight or     appetite. She has not done pulmonary function test or 6 minute walk test.  She     has a lot of allergies, she does not have any allergy pills. She is willing to     try chantix, she does not want to try nicotine patches. She has no fever or     chills, no nausea or vomiting. CT scan of the chest shows stable lung nodule and    nodular density in the right lower lobe which is a stable findings.            ROS      Constitutional:  Denies: Fatigue, Fever, Weight gain, Weight loss, Chills,     Insomnia, Other       Respiratory/Breathing:  Complains of: Shortness of air, Wheezing, Cough; Denies:    Hemoptysis, Pleuritic pain, Other      Endocrine:  Denies: Polydipsia, Polyuria, Heat/cold intolerance, Abnorml     menstrual pattern, Diabetes, Other      Eyes:  Denies: Blurred vision, Vision Changes, Other      Ears, nose, mouth, throat:  Denies: Mouth lesions, Thrush, Throat pain,     Hoarseness, Allergies/Hay Fever, Post Nasal Drip, Headaches, Recent Head Injury,    Nose Bleeding, Neck Stiffness, Thyroid Mass, Hearing Loss, Ear Fullness, Dry     Mouth, Nasal or Sinus Pain, Dry Lips, Nasal discharge, Nasal congestion, Other      Cardiovascular:  Denies: Palpitations, Syncope, Claudication, Chest Pain, Wake     up Gasping for air, Leg Swelling, Irregular Heart Rate, Cyanosis, Dyspnea on     Exertion, Other      Gastrointestinal:  Denies: Nausea, Constipation, Diarrhea, Abdominal pain,     Vomiting, Difficulty Swallowing, Reflux/Heartburn, Dysphagia, Jaundice, Bloa    ting, Melena, Bloody stools, Other      Genitourinary:  Denies: Urinary frequency, Incontinence, Hematuria, Urgency,     Nocturia, Dysuria, Testicular problems, Other      Musculoskeletal:  Denies: Joint Pain, Joint Stiffness, Joint Swelling, Myalgias,    Other      Hematologic/lymphatic:  DENIES: Lymphadenopathy, Bruising, Bleeding tendencies,     Other      Neurological:  Denies: Headache, Numbness, Weakness, Seizures, Other      Psychiatric:  Denies: Anxiety, Appropriate Effect, Depression, Other      Sleep:  No: Excessive daytime sleep, Morning Headache?, Snoring, Insomnia?, Stop    breathing at sleep?, Other      Integumentary:  Denies: Rash, Dry skin, Skin Warm to Touch, Other      Immunologic/Allergic:  Denies: Latex allergy, Seasonal allergies, Asthma,     Urticaria, Eczema, Other      Immunization status:  No: Up to date            FAMILY/SOCIAL/MEDICAL HX      Smoking status:  Current every day smoker (.5 ppd started smoking age 23 )      Anticoagulation  Therapy:  No      Antibiotic Prophylaxis:  No      Psychiatric History      Wen Serrano [facility]            PREVENTION      Hx Influenza Vaccination:  Yes      Influenza Vaccine Declined:  Yes      2 or More Falls Past Year?:  No      Fall Past Year with Injury?:  No      Hx Pneumococcal Vaccination:  No      Encouraged to follow-up with:  PCP regarding preventative exams.      Chart initiated by      Wen Serrano CMA            ALLERGIES/MEDICATIONS      Allergies:        Coded Allergies:             ASPIRIN (Verified  Allergy, Unknown, 4/22/20)           CEPHALEXIN (Verified  Allergy, Unknown, 4/22/20)           PENICILLINS (Verified  Allergy, Unknown, 4/22/20)      Medications    Last Reconciled on 6/19/20 13:40 by GLENN KELLEY MD      Montelukast Sodium (Singulair*) 10 Mg Tab      10 MG PO HS, #30 TAB 9 Refills         Prov: Glenn Kelley         6/19/20       Tiotropium Br/Olodaterol HCl (Stiolto Respimat Inhal Spray) 4 Gm Mist.inhal      2 PUFFS INH RTQDAY, #1 INH 9 Refills         Prov: Glenn Kelley         6/19/20       Varenicline Tartrate (Chantix) 1 Mg Tablet      1 MG PO BID for 30 Days, #60 TAB 4 Refills         Prov: Glenn Kelley         6/19/20       MDI-Albuterol (Ventolin HFA) 8 Gm Hfa.aer.ad      2 PUFFS INH Q4-6H PRN for DYSPNEA/WHEEZING, #1 INH 6 Refills         Prov: Glenn Kelley         4/22/20       Tiotropium Br/Olodaterol HCl (Stiolto Respimat Inhal Spray) 4 Gm Mist.inhal      2 PUFFS INH RTQDAY, #1 INH 9 Refills         Prov: Glenn Kelley         4/22/20      Current Medications      Current Medications Reviewed 6/19/20            EXAM      CONSTITUTIONAL: Pleasant female in no acute distress,  normal conversant.       EYES : Pink conjunctive, no ptosis, PERRL.       ENMT : Nose and ears appear normal, normal dentition, mild posterior pharyngeal     wall erythema, no sinus tenderness. Mallampati classification II      Neck: Nontender, no masses, no thyromegaly, no nodules.      Resp : Bilateral  diminished breath sounds, no wheezing, crackles or rhonchi.      Resonant to percussion bilaterally.      CVS  : No carotid bruits, s1s2 nl, RRR, no murmur, rubs or gallop, no peripheral    edema       Chest wall: Normal rise with inspiration, nontender on palpation.      GI   : Abdomen soft, with no masses, no hepatosplenomegaly, no hernias, BS+      MSK  : Normal gait and station, no digital cyanosis or clubbing       Skin : No rashes, ulcerations or lesions, normal turgor and temperature      Neuro: CN II - XII intact, no sensory deficits, DTRs intact and symmetrical, no     motor weakness      Psych: Appropriate affect, A   Vtials      Vitals:             Height 5 ft 7 in / 170.18 cm           Weight 165 lbs  / 74.809532 kg           BSA 1.86 m2           BMI 25.8 kg/m2           Temperature 98.0 F / 36.67 C - Temporal           Pulse 98           Respirations 15           Blood Pressure 110/72 Sitting, Left Arm           Pulse Oximetry 95%, room air            REVIEW      Results Reviewed      PCCS Results Reviewed?:  Yes Prev Lab Results, Yes Prev Radiology Results, Yes     Previous Mecial Records      PFT Results               [facility]                PACS RADIOLOGY REPORT            Patient: ZARINA FERREIRA   Acct: #[id number]   Report: #[id number]            UNIT #: [id number]    DOS: 20 1300      INSURANCE:[employer]   ORDER #:[id number]      LOCATION:[facility]     : 1955            PROVIDERS      ADMITTING:     ATTENDING: Glenn Kelley      FAMILY:  NONE,MD   ORDERING:  Glenn Kelley         OTHER:    DICTATING:  ENZO COUCH MD            REQ #:[id number]   EXAM:WO - CT CHEST without CONTRAST      REASON FOR EXAM:  SOL PULM NODULE      REASON FOR VISIT:  SOL PULM NODULE            *******Signed with Addenda******                  ADDENDUM            This report includes an Addendum and supersedes previous  reports for this exam.             PROCEDURE:   CT CHEST WITHOUT CONTRAST             COMPARISON:   None.             INDICATIONS:   SOITARYL PULMONARY NODULE             TECHNIQUE:   CT images were created without the administration of contrast     material.               PROTOCOL:     Standard imaging protocol performed                RADIATION:     DLP: 363mGy*cm          Automated exposure control was utilized to minimize radiation dose.              FINDINGS:         Mild to moderate centrilobular and paraseptal emphysema.  There are few     scattered areas of       tree-in-bud micro nodularity in the left lower lobe.  There is some branching     nodularity seen       posteriorly in the right lower lobe with a few scattered sub cm nodules     elsewhere in the right       lower lobe.  An anterior right lower lobe nodule measures 6 mm (image 45).  No     adenopathy in the       chest.  No dense consolidation.  No acute findings in the included upper     abdomen.  No aggressive       appearing bone change.             CONCLUSION:   Emphysema.             Scattered micro nodules in the left lower lobe, and branching nodularity in the     right lower lobe.        Findings are favored to reflect infectious or inflammatory change.  Recommend     continued attention       on a short interval follow-up chest CT after therapy to document improvement.              ENZO COUCH MD             Electronically Signed and Approved By: ENZO COUCH MD on 6/03/2020 at 14:33                ADDENDUM:              COMPARISON:   Other, CT, CHEST W/ CONTRAST, 3/19/2020, 0:00.             Outside chest CT performed on 3/19/2020 at [facility] and open MRI is made     available.             Micro nodularity in the left lower lobe is not significantly changed, and     favored to represent       infectious or inflammatory small airways disease.             Branching nodularity in the posterior right lower lobe is stable.  There are few     areas of high       density throughout this opacity, suggesting calcification or inspissated     secretions.  This may       represent chronic endobronchial impaction.  Surrounding micro nodularity, fav    ored to reflect       infectious or inflammatory small airways disease.  There are few other pleural-    based nodules in the       right lower lobe measuring up to 6 mm that are stable.             Overall, benign process is favored.  Consider follow-up chest CT in 6 months to     document persistent       stability.              ENZO COUCH MD             Electronically Signed and Approved By: ENZO COUCH MD on 6/05/2020 at 9:08                              06/05/20 0911            ENZO CORTÉSER:      D:06/05/20 0908      T:              PROCEDURE:   CT CHEST WITHOUT CONTRAST             COMPARISON:   None.             INDICATIONS:   SOITARYL PULMONARY NODULE             TECHNIQUE:   CT images were created without the administration of contrast     material.               PROTOCOL:     Standard imaging protocol performed                RADIATION:     DLP: 363mGy*cm          Automated exposure control was utilized to minimize radiation dose.              FINDINGS:         Mild to moderate centrilobular and paraseptal emphysema.  There are few     scattered areas of       tree-in-bud micro nodularity in the left lower lobe.  There is some branching     nodularity seen       posteriorly in the right lower lobe with a few scattered sub cm nodules     elsewhere in the right       lower lobe.  An anterior right lower lobe nodule measures 6 mm (image 45).  No     adenopathy in the       chest.  No dense consolidation.  No acute findings in the included upper     abdomen.  No aggressive       appearing bone change.             CONCLUSION:   Emphysema.             Scattered micro nodules in the left lower lobe, and branching nodularity in the     right lower lobe.        Findings are favored to  reflect infectious or inflammatory change.  Recommend     continued attention       on a short interval follow-up chest CT after therapy to document improvement.              ENZO COUCH MD             Electronically Signed and Approved By: ENZO COUCH MD on 6/03/2020 at 14:33                        Until signed, this is an unconfirmed preliminary report that may contain      errors and is subject to change.                                              MOOER:      D:06/03/20 1433            Assessment      Lung nodule - R91.1            COPD (chronic obstructive pulmonary disease)         Centrilobular emphysema - J43.2         Emphysema type: centrilobular            Notes      New Medications      * VARENICLINE TARTRATE (Chantix) 1 MG TABLET: 1 MG PO BID 30 Days #60         Instructions: FOR SMOKING CESSATION      * Tiotropium Br/Olodaterol HCl (Stiolto Respimat Inhal [place]) 4 GM MIST.INHAL: 2      PUFFS INH RTQDAY #1      * Montelukast Sodium (Singulair*) 10 MG TAB: 10 MG PO HS #30      * Tiotropium Br/Olodaterol HCl (Stiolto Respimat Inhal Spray) 4 GM MIST.INHAL          Sample - Qty 2      Discontinued Medications      * Nicotine 14 Mg Patch (Nicoderm 14 Mg Patch) 1 EACH PATCH.TD24: 14 MG TRANSDERM      QDAY 30 Days #30      * Nicotine Polacrilex (Nicotine) 2 MG LOZENGE: 2 MG MM Q4-6H PRN SMOKING CESSA      TION #180         Instructions: 1 LOZENGE      New Diagnostics      * PFT-Comp, PrePost,DLCO,BodyBox, Week         Dx: COPD (chronic obstructive pulmonary disease) - J44.9      * 6 Min Walk w O2 Titration Test, Routine         Dx: COPD (chronic obstructive pulmonary disease) - J44.9      * Immunoglobulin  E (I, Week         Dx: COPD (chronic obstructive pulmonary disease) - J44.9      * Chest W/O Cont CT, 6 Months         Dx: Lung nodule - R91.1      * [facility] Pre-Op Covid Screening, Routine         Dx: ENCOUNTER FOR SCREENING FOR OTHER VIRAL DISEASES - Z11.59      PLAN:      The patient is a 65 year old   female with chronic heavy smoking history, has     chronic obstructive pulmonary disease, hiatal hernia and lung nodules.             1. Chronic obstructive pulmonary disease. I will check pulmonary function test     and 6 minute walk test. I will give her a sample of Stiolto and start her on     Stiolto and albuterol as needed. She needs to stop smoking.       2. Smoking cessation counseling was done for more than 5 minutes and she is     willing to try nicotine patches. I have sent a script for  nicotine patches     today.       3. Significant allergy and allergic rhinitis. I will start her on Singulair. I     will check serum IgE level.       4. I will order repeat low dose CT scan of the chest in 6 month.       5. She has nasal spray and I advised her to use it for now.       6. Follow up in 3 months after repeat lung function tests.            Patient Education      Education resources provided:  Yes      Patient Education Provided:  Acute Bronchitis            Patient Education:        Chronic Obstructive Pulmonary Disease            Electronically signed by Glenn Kelley  07/08/2020 15:44       Disclaimer: Converted document may not contain table formatting or lab diagrams. Please see [employer] for the authenticated document.

## 2021-05-28 NOTE — PROGRESS NOTES
Patient: JENNIFER FERREIRA     Acct: [id number]     Report: #[id number]  UNIT #: [id number]     : 1955    Encounter Date:2020  PRIMARY CARE: EVE LEMOS  ***Signed***  --------------------------------------------------------------------------------------------------------------------  History of Present Illness      Chief Complaint: new pt lung nodule)            Jennifer Ferreira is presenting for evaluation via Video and Audio conferencing.     Verbal consent obtained via Video and Audio before beginning the visit.            Provider spent (number of mins) minutes with the patient during telehealth     visit.            The following staff were present during the visit: (matthew/ paola norris / md)            The patient is a 64 year old female with history of chronic smoking up to 1 pack    per day for more than 40 years. She recently had a CT scan of  abdomen and     pelvis for asymptomatic hematuria. CT scan of the  abdomen and pelvis showed     some lung nodules so she subsequently had CT scan of the chest at [facility].    Because of the lung nodules and CT scan abnormality the patient was referred to     us for ZOOM visit today. I discussed the CT scan report with her today. She does    have shortness of breath with activities, she has to pace herself at home. She     is short of breath and not able to move around much. She has cough with mucoid     sputum production intermittent, she has wheezing at times. In the past she had     pulmonary function test and was told she has some component of chronic     obstructive pulmonary disease but does not know how bad it was. She also has     history of gastroesophageal reflux disease, she says she has a diagnosis of it     but is not taking any medications and she is not bothered clinically most of the    time. She has no significant changes in weight or appetite, no family history of    chronic lung disease. She used to have  recurrent bronchitis with need for     antibiotics and steroids almost every year but has not needed it this year. I     reviewed the CT scan findings with her today.             Exam was done via ZOOM conference and it is entered in the chart.  The patient     has normal conversation, she does not look pale, no increased work of breathing     and overall looks stable.             The patient's CT scan of the chest from [facility] was done on 03/19/2020    was reviewed and it showed emphysematous changes, hiatal hernia, gaseous     distention of esophagus. There were peripheral lung nodules measuring 5.5 mm in     right lower lobe, small 3 mm average nodule in right lower lobe, focal opacity     and nodules in clusters in posterior aspect of right lower lobe. Linear opacity     2.6 X 0.6 cm in size encompasses a smaller 6 mm originally in the right lower     lobe. There was peripheral right lower lobe 4.6 mm nodule, mild bronchiectasis     in right middle lobe and both lower lobes.                          Overview of Symptoms      pt had ct scan showed lung nodules/ soa/ seasonal allergies            Allergies and Medications      Medications    Last Reconciled on 4/22/20 11:53 by GLENN KELLEY MD      Nicotine Polacrilex (Nicotine) 2 Mg Lozenge      2 MG MM Q4-6H PRN for SMOKING CESSATION, #180 GODWIN 2 Refills         Prov: Glenn Kelley         4/22/20       Nicotine 14 Mg Patch (Nicoderm 14 Mg Patch) 1 Each Patch.td24      14 MG TRANSDERM QDAY for 30 Days, #30 PATCH 4 Refills         Prov: Glenn Kelley         4/22/20       MDI-Albuterol (Ventolin HFA) 8 Gm Hfa.aer.ad      2 PUFFS INH Q4-6H PRN for DYSPNEA/WHEEZING, #1 INH 6 Refills         Prov: Glenn Kelley         4/22/20       Tiotropium Br/Olodaterol HCl (Stiolto Respimat Inhal Spray) 4 Gm Mist.inhal      2 PUFFS INH RTQDAY, #1 INH 9 Refills         Prov: Glenn Kelley         4/22/20            Past Medical,Surg,Family Hx      Past Medical History:  None  (cervical spine issues)      Past Surgical History:  None      Other Family History      stroke-0      heart disease-father      diabetes-0      cancer- mother/ sister      parents are       pnuemovax-0 flu-0 prevnar-0            Social History      Smoking status:  Current every day smoker (.5 ppd started smoking age 23 )            Review of Systems      General:  Denies: Appetite Change, Fatigue, Fever, Night Sweats, Weight Gain,     Weight Loss      ENT:  Denies Headache, Denies Hearing Loss, Denies Hoarseness, Denies Sore     Throat      Eye:  Denies Blurred Vision, Denies Corrective Lenses, Denies Diplopia, Denies     Vision Changes      Cardiovascular:  Denies Chest Pain, Denies Palpitations      Respiratory:  Admits: Shortness of Air;          Denies: Cough, Coughing Blood, Productive Cough, Wheezing      Gastrointestinal:  Denies Bloody Stools, Denies Constipation, Denies Diarrhea,     Denies Nausea/Vomiting, Denies Problem Swallowing, Denies Unable to Control     Bowels      Genitourinary:  Denies Blood in Urine, Denies Incontinence, Denies Painful     Urination      Musculoskeletal:  Denies Back Pain, Denies Muscle Pain, Denies Painful Joints      Integumentary:  Denies Itching, Denies Lesions, Denies Rash      Neurologic:  Denies Dizziness, Denies Numbness\Tingling, Denies Seizures      Psychiatric:  Denies Anxiety, Denies Depression      Endocrine:  Denies Cold Intolerance, Denies Heat Intolerance      Hematologic/Lymphatic:  Denies Bruising, Denies Bleeding, Denies Enlarged Lymph     Nodes            Plan and Patient Instructions      Plan      PLAN:      The patient is a 64 year old female with chronic heavy smoking, likely has     chronic obstructive pulmonary disease, emphysema, gastroesophageal reflux     disease, hiatal hernia and lung nodules.             1. Lung nodules. She has lung nodules up to 5 mm in size but there was linear     scarring 2.6 X 0.6 cm in the right lower lobe. Given  these findings I will     repeat CT scan of the chest in 6 weeks.  She has multiple less than 5 mm lung     nodules and she is a chronic smoker.       2. Chronic obstructive pulmonary disease. I will check pulmonary function test     and 6 minute walk test after her next office visit. I will start her on Stiolto     and albuterol inhalers. She needs to quit smoking.       3. Smoking cessation counseling was done for more than 5 minutes. She is willing    to try to go down on cigarettes, she is willing to try nicotine patches and     lozenges. I have sent a script for those today.       4. I will follow up with her in 6-8 weeks earlier if needed.      Instructions      * Chronic conditions reviewed and taken into consideration for today's treatment      plan.      * Patient instructed to seek medical attention urgently for new or worsening       symptoms.      * Patient was educated/instructed on their diagnosis, treatment and medications       prior to discharge from the Video and Audio visit today.            Electronically signed by Glenn Kelley  05/01/2020 12:07       Disclaimer: Converted document may not contain table formatting or lab diagrams. Please see [employer] for the authenticated document.

## 2021-07-16 RX ORDER — FLUTICASONE PROPIONATE 50 MCG
2 SPRAY, SUSPENSION (ML) NASAL DAILY
Qty: 1 G | Refills: 11 | Status: SHIPPED | OUTPATIENT
Start: 2021-07-16 | End: 2021-12-09 | Stop reason: SDUPTHER

## 2021-08-24 ENCOUNTER — TELEPHONE (OUTPATIENT)
Dept: PULMONOLOGY | Facility: CLINIC | Age: 66
End: 2021-08-24

## 2021-08-24 NOTE — TELEPHONE ENCOUNTER
FYI-I called this patient she is having cough, congestion and soa. I informed her that you recommended that she needs to see her PCP or urgent care first to be evaluated for covid. If that is negative we can get her an appointment with you. Patient understood.

## 2021-10-01 ENCOUNTER — OFFICE VISIT (OUTPATIENT)
Dept: FAMILY MEDICINE CLINIC | Facility: CLINIC | Age: 66
End: 2021-10-01

## 2021-10-01 VITALS
HEART RATE: 83 BPM | WEIGHT: 180 LBS | BODY MASS INDEX: 26.66 KG/M2 | OXYGEN SATURATION: 96 % | TEMPERATURE: 96.6 F | HEIGHT: 69 IN | DIASTOLIC BLOOD PRESSURE: 82 MMHG | SYSTOLIC BLOOD PRESSURE: 118 MMHG

## 2021-10-01 DIAGNOSIS — J44.9 CHRONIC OBSTRUCTIVE PULMONARY DISEASE, UNSPECIFIED COPD TYPE (HCC): ICD-10-CM

## 2021-10-01 DIAGNOSIS — M54.12 CERVICAL RADICULOPATHY: ICD-10-CM

## 2021-10-01 DIAGNOSIS — E78.5 HYPERLIPIDEMIA, UNSPECIFIED HYPERLIPIDEMIA TYPE: ICD-10-CM

## 2021-10-01 DIAGNOSIS — M47.812 CERVICAL SPONDYLOSIS WITHOUT MYELOPATHY: ICD-10-CM

## 2021-10-01 DIAGNOSIS — C44.92 SQUAMOUS CELL SKIN CANCER: ICD-10-CM

## 2021-10-01 DIAGNOSIS — R91.1 PULMONARY NODULE: ICD-10-CM

## 2021-10-01 DIAGNOSIS — R31.9 HEMATURIA, UNSPECIFIED TYPE: ICD-10-CM

## 2021-10-01 DIAGNOSIS — K21.9 GASTROESOPHAGEAL REFLUX DISEASE, UNSPECIFIED WHETHER ESOPHAGITIS PRESENT: ICD-10-CM

## 2021-10-01 DIAGNOSIS — M25.512 LEFT SHOULDER PAIN, UNSPECIFIED CHRONICITY: ICD-10-CM

## 2021-10-01 DIAGNOSIS — M79.7 FIBROMYALGIA: ICD-10-CM

## 2021-10-01 DIAGNOSIS — F33.1 MAJOR DEPRESSIVE DISORDER, RECURRENT EPISODE, MODERATE DEGREE (HCC): Primary | ICD-10-CM

## 2021-10-01 PROCEDURE — 99213 OFFICE O/P EST LOW 20 MIN: CPT | Performed by: PHYSICIAN ASSISTANT

## 2021-10-01 RX ORDER — DEXTROMETHORPHAN HYDROBROMIDE AND PROMETHAZINE HYDROCHLORIDE 15; 6.25 MG/5ML; MG/5ML
SYRUP ORAL
COMMUNITY
Start: 2021-07-21 | End: 2021-10-01

## 2021-10-01 RX ORDER — SODIUM CHLORIDE FOR INHALATION 3 %
VIAL, NEBULIZER (ML) INHALATION
COMMUNITY
Start: 2021-07-15

## 2021-10-01 RX ORDER — AMITRIPTYLINE HYDROCHLORIDE 100 MG/1
TABLET, FILM COATED ORAL
COMMUNITY
End: 2021-10-01

## 2021-10-01 RX ORDER — ATORVASTATIN CALCIUM 20 MG/1
TABLET, FILM COATED ORAL
COMMUNITY
End: 2022-01-03 | Stop reason: SDUPTHER

## 2021-10-01 RX ORDER — DULOXETIN HYDROCHLORIDE 60 MG/1
CAPSULE, DELAYED RELEASE ORAL
COMMUNITY
End: 2021-10-01

## 2021-10-01 RX ORDER — FLUTICASONE FUROATE 50 UG/1
POWDER RESPIRATORY (INHALATION)
COMMUNITY
End: 2021-10-26

## 2021-10-01 RX ORDER — MONTELUKAST SODIUM 10 MG/1
10 TABLET ORAL
COMMUNITY
Start: 2021-09-21 | End: 2022-03-11 | Stop reason: SDUPTHER

## 2021-10-01 RX ORDER — GUAIFENESIN 600 MG/1
1200 TABLET, EXTENDED RELEASE ORAL DAILY
COMMUNITY
End: 2022-08-31

## 2021-10-01 RX ORDER — IBUPROFEN 800 MG/1
TABLET ORAL
COMMUNITY
End: 2021-10-01

## 2021-10-01 RX ORDER — PREDNISONE 20 MG/1
TABLET ORAL
COMMUNITY
Start: 2021-09-07 | End: 2021-10-01

## 2021-10-01 RX ORDER — MONTELUKAST SODIUM 10 MG/1
TABLET ORAL
COMMUNITY
End: 2021-10-01

## 2021-10-01 RX ORDER — METOCLOPRAMIDE 10 MG/1
TABLET ORAL
COMMUNITY
End: 2021-10-01

## 2021-10-01 RX ORDER — BECLOMETHASONE DIPROPIONATE HFA 80 UG/1
AEROSOL, METERED RESPIRATORY (INHALATION)
COMMUNITY
Start: 2021-09-21 | End: 2021-12-09 | Stop reason: SDUPTHER

## 2021-10-01 RX ORDER — VARENICLINE TARTRATE 1 MG/1
TABLET, FILM COATED ORAL
COMMUNITY
End: 2021-10-01

## 2021-10-01 RX ORDER — TIOTROPIUM BROMIDE AND OLODATEROL 3.124; 2.736 UG/1; UG/1
SPRAY, METERED RESPIRATORY (INHALATION)
COMMUNITY
Start: 2021-09-21 | End: 2022-01-03 | Stop reason: SDUPTHER

## 2021-10-01 RX ORDER — ALBUTEROL SULFATE 90 UG/1
AEROSOL, METERED RESPIRATORY (INHALATION)
COMMUNITY
Start: 2021-09-07 | End: 2022-04-08 | Stop reason: SDUPTHER

## 2021-10-01 RX ORDER — FLUOXETINE HYDROCHLORIDE 20 MG/1
20 CAPSULE ORAL DAILY
Qty: 30 CAPSULE | Refills: 0 | Status: SHIPPED | OUTPATIENT
Start: 2021-10-01 | End: 2021-10-25 | Stop reason: SDUPTHER

## 2021-10-01 RX ORDER — AZELASTINE HYDROCHLORIDE, FLUTICASONE PROPIONATE 137; 50 UG/1; UG/1
SPRAY, METERED NASAL
COMMUNITY
End: 2021-10-01

## 2021-10-01 RX ORDER — IPRATROPIUM BROMIDE AND ALBUTEROL SULFATE 2.5; .5 MG/3ML; MG/3ML
SOLUTION RESPIRATORY (INHALATION)
COMMUNITY
Start: 2021-07-15 | End: 2022-04-08 | Stop reason: SDUPTHER

## 2021-10-01 RX ORDER — PREDNISONE 20 MG/1
0.5 TABLET ORAL
COMMUNITY
End: 2021-10-01

## 2021-10-01 RX ORDER — DIPHENHYDRAMINE HCL 25 MG
50 CAPSULE ORAL NIGHTLY PRN
COMMUNITY

## 2021-10-01 RX ORDER — FAMOTIDINE 20 MG/1
20 TABLET, FILM COATED ORAL 2 TIMES DAILY PRN
COMMUNITY
End: 2022-04-08 | Stop reason: SDUPTHER

## 2021-10-01 NOTE — PROGRESS NOTES
Subjective   Jennifer Shelton is a 66 y.o. female who presents today as a new patient to establish care and follow up of moods, COPD, pulmonary nodule, GERD, fibromyalgia, Cervical spine DDD, and history of squamous cell skin cancer.     History of Present Illness     Previous PCP Dr Colorado in [place].     Stopped all medication years ago. Last year was full of specialists and let go of routine things.     Moods- Prozac in the past- she was up to 80 mg once daily. She had depression since childhood and started treatment in 30s. Increased depression following her son's death. She stopped all medications following his death.   · Son's suicide 3/11/2021- she has not been to the doctor for CT chest, scans, other testing and treatment. She has not been doing anything since her son's death.  · Previous medications- Cymbalta- helped but Prozac helped more. Last Cymbalta 2016.  Taking benadryl to sleep but not helping much. Elavil- helped with depression but made her very tired. She also would forget things when she was taking it and talking to people.     COPD- Still using inhalers. Only taking nebulizer as needed- was to take twice daily but she has taken only as needed. Taking her daily inhaler and Singulair. Medications- Qvar, Stiolto, Monteleukast, Flonase, Mucinex, and has duoneb  As needed.   Pulmonary nodule- Nodule on right lung- they were scanning and following with Dr Kelley.   Pulmonology- RESHMA Taylor- missed appt for follow up of pulmonary nodule. Hospitalized 2/2/21 and 10/2021- she had been coughing for 2 months then went to doctor and was given steroid. She also had poison ivy and that helped too. Seen by someone in [place]- urgent care.     GERD- RESHMA Sharma- She has pain in her chest- if she does not take Pepcid, she has pain in her chest and cannot eat. When she takes the medication, she does ok. She passes blood in stool that is bright red at times. She has appt with GI this month.      Fibromyalgia- has been on no medication in a while. Previously Cymbalta and Elavil.   DDD cervical spine-     Squamous cell skin cancer- patient had SCC 1/2021.    Mother and sister with pancreatic cancer. Mother with history of colon and breast cancer. MAunt and MGM with colon cancer.     Patient's Specialists:  Cardiology- Dr Lb Boyer- last appt 4/2020 for preop exam, CP, SOA, abnormal EKG. Advised smoking cessation, follow up with pulmonology, take Lipitor, hold off on colonoscopy pending cardiac workup- stress test, echo, and holter monitor. Advised AAA screening. Negative cardiac testing. Advised follow up 1 year. She did not return for follow up.   General surgery- RESHMA Arias- last appt 3/2020 for hematuria. CT abd/pelvis, cystoscopy, and UA.   Neurosurgery- Dr Willi Ghotra- last appt 1/2021 for cervicalgia, cervical radiculopathy, herniated C4-5, C5-6, left shoulder pain. Ordered MRI left shoulder. If no abnormality, recommended C4-5, C5-6 ACDF.   Orthopedic surgery- Dr Jet Salinas- last appt 3/2021 for left shoulder pain. Xray, MRI 2/2021 with moderate supraspinatus tendonopathy, partial thickness fraying, intra-articular long head biceps tendinopathy, small glenohummeral effusion, mild to moderate chondromalacia, synovial thickening, or intr-articular debris vs labral tissue in axillary recess- suggested capsulitis. DJD AC joint with chronic deformity of clavicle. Exercises for tendonitis given and advised to return for consideration of injection if no improvement.   Pulmonology- RESHMA Taylor- last appt 2/2021 in follow up of hospitalization for acute hypoxic respiratory failure requiring noninvasince positive pressure ventilation and COPD exacerbation. Hypoxia to 86% on 4 L O2. Transitioned to BIPAP with improvement. She did not need home oxygen. Treated with steroid, nebulizer. Advised Stiolto, Qvar, and Singulair. Ordered sleep study, started flutter valve, DuoNeb followed by  sodium chloride nebulizer then flutter valve twice daily. Low dose CT 2021. Had CT abd/pelvis with pulm nodule. Continued Stiolto and PA Singulair. Changed Qvar to Flovent due to cost. Follow up in 2-3 months.   Urology- Dr Chelsea Lundy- last appt 3/2020 for cystoscopy for hematuria. Negative and negative CT abd/pelvis. Follow up in 1 year.     Past Medical History:   Diagnosis Date   • Cervical radiculopathy 2015   • Cervicalgia 2015   • Herniated disc, cervical 2015    C4-5, C5-6, C6-7   • Cervical spinal stenosis 2016   • Hematuria 2020   • Lung nodule 2020   • Arthritis    • Cancer (HCC)    • Cervical spondylosis without myelopathy    • Chronic allergic rhinitis    • COPD (chronic obstructive pulmonary disease) (HCC)    • Depression    • Fibromyalgia    • Gastroesophageal reflux    • GERD (gastroesophageal reflux disease)    • High cholesterol    • Hyperlipemia    • Rectal bleeding    • Seasonal allergies    • Shortness of breath      Past Surgical History:   Procedure Laterality Date   •  SECTION     • COLONOSCOPY     • CYSTOSCOPY  2020    Cystoscopy w/ Dr. Lundy   • PILONIDAL CYSTECTOMY     • SQUAMOUS CELL CARCINOMA EXCISION      chest     Social History     Socioeconomic History   • Marital status:    Tobacco Use   • Smoking status: Former Smoker     Packs/day: 1.50     Quit date: 2020     Years since quittin.9   • Smokeless tobacco: Never Used   • Tobacco comment: Quit x 2 months   Substance and Sexual Activity   • Alcohol use: Never   • Drug use: Never      Family History   Problem Relation Age of Onset   • Cancer Mother    • Diabetes Mother    • Colon cancer Mother         70s   • Breast cancer Mother         60s   • Arthritis Mother    • Heart disease Father    • Heart attack Father    • Arthritis Father    • Cancer Sister    • Colon cancer Maternal Grandmother    • Colon cancer Other         60s        The following portions of  the patient's history were reviewed and updated as appropriate: allergies, current medications, past family history, past medical history, past social history, past surgical history and problem list.    Review of Systems    Objective   Vitals:    10/01/21 1034   BP: 118/82   Pulse: 83   Temp: 96.6 °F (35.9 °C)   SpO2: 96%     Body mass index is 26.66 kg/m².    Physical Exam  Vitals and nursing note reviewed.   Constitutional:       General: She is not in acute distress.     Appearance: Normal appearance. She is well-developed.   HENT:      Head: Normocephalic and atraumatic.      Right Ear: External ear normal.      Left Ear: External ear normal.      Nose: Nose normal.   Eyes:      General: Lids are normal.      Conjunctiva/sclera: Conjunctivae normal.   Neck:      Vascular: No carotid bruit.   Cardiovascular:      Rate and Rhythm: Normal rate and regular rhythm.      Pulses: Normal pulses.      Heart sounds: Normal heart sounds. No murmur heard.  No friction rub. No gallop.    Pulmonary:      Effort: Pulmonary effort is normal. No respiratory distress.      Breath sounds: Normal breath sounds. No wheezing, rhonchi or rales.   Musculoskeletal:         General: No deformity.      Cervical back: Neck supple.   Skin:     General: Skin is warm and dry.   Neurological:      Mental Status: She is alert and oriented to person, place, and time.      Gait: Gait normal.   Psychiatric:         Mood and Affect: Mood is depressed.         Speech: Speech normal.         Behavior: Behavior normal.         Thought Content: Thought content normal.         Judgment: Judgment normal.         Assessment/Plan   Diagnoses and all orders for this visit:    1. Major depressive disorder, recurrent episode, moderate degree (HCC) (Primary)  -     FLUoxetine (PROzac) 20 MG capsule; Take 1 capsule by mouth Daily.  Dispense: 30 capsule; Refill: 0  -     Vitamin D 25 Hydroxy  -     TSH  -     T4, free  -     T3, Free    2. Chronic obstructive  pulmonary disease, unspecified COPD type (HCC)  -     CBC & Differential    3. Pulmonary nodule  -     CT Chest Without Contrast; Future    4. Gastroesophageal reflux disease, unspecified whether esophagitis present    5. Fibromyalgia    6. Cervical radiculopathy    7. Cervical spondylosis without myelopathy    8. Left shoulder pain, unspecified chronicity    9. Squamous cell skin cancer    10. Hyperlipidemia, unspecified hyperlipidemia type  -     Comprehensive Metabolic Panel  -     CK  -     Lipid Panel With LDL / HDL Ratio  -     Vitamin D 25 Hydroxy  -     TSH  -     T4, free  -     T3, Free    11. Hematuria, unspecified type  -     Urinalysis With Culture If Indicated -        Assessment and Plan  Patient has not been following with PCP or taking medication in a couple years. She has not kept up with medical issues following the death of her son. She reports last year she went to numerous specialists then stopped taking everything. She will need updated fasting labs. I asked that she sign a release for medical records and will restart Prozac. Follow up in 1 month.     · Moods- She has had longstanding depression that has worsened with her son's death. She previously did well on Prozac. I will restart Prozac 20 mg once daily. If no improvement but no AE with medication, she can increase to 40 mg once daily in 2 weeks. Follow up in 4 weeks for re-evaluation of moods. She should be seen ASAP if worsening moods or 9-1-1 to ER if she develops SI/HI. No SI/HI today. She should also ensure regular counseling.   · COPD- Continue Qvar or Flovent, Stiolto, Monteleukast, Flonase, Mucinex, and DuoNeb as needed. Ensure she follows up with pulmonology as directed by them.    · Pulmonary nodule- I will order CT chest, as this was not performed 6/2021 as noted in pulmonology note. She should follow up with pulmonology after CT for review and recommendations. .   · GERD- Continue Pepcid 20 mg up to twice daily and follow up  with GI this month.   · Fibromyalgia- Patient was previously on Cymbalta and Elavil for pain. However, moods were better controlled on Prozac. We will re-evaluated at follow up. We could consider Elavil in addition to Prozac if needed or change back to Cymbalta if moods or pain is not well controlled.    · DDD cervical spine- Follow up with neurosurgery if persistent pain.   · Squamous cell skin cancer- Patient to ensure she follows with dermatology regularly.     She does have family history of breast and colon cancer as well as pancreatic cancer. She needs to ensure she stays up to date on colonoscopy and mammogram. Consideration of genetics consultation to determine most appropriate screening recommendations. I will discuss this further at follow up.     I spent 35 minutes caring for Jennifer Shelton on this date of service. This time includes time spent by me in the following activities as necessary: preparing for the visit, reviewing tests, specialists records and previous visits, obtaining and/or reviewing a separately obtained history, performing a medically appropriate exam and/or evaluation, counseling and educating the patient, family, caregiver, referring and/or communicating with other healthcare professionals, documenting information in the medical record, independently interpreting results and communicating that information with the patient, family, caregiver, and developing a medically appropriate treatment plan with consideration of other conditions, medications, and treatments.

## 2021-10-16 PROBLEM — J44.9 CHRONIC OBSTRUCTIVE PULMONARY DISEASE (HCC): Status: ACTIVE | Noted: 2021-10-16

## 2021-10-16 PROBLEM — J30.2 SEASONAL ALLERGIC RHINITIS: Status: ACTIVE | Noted: 2021-10-16

## 2021-10-16 PROBLEM — I49.8 ACCELERATED JUNCTIONAL RHYTHM: Status: ACTIVE | Noted: 2021-10-16

## 2021-10-16 PROBLEM — M79.7 FIBROMYALGIA: Status: ACTIVE | Noted: 2021-10-16

## 2021-10-16 PROBLEM — M19.90 ARTHRITIS: Status: ACTIVE | Noted: 2021-10-16

## 2021-10-16 PROBLEM — Z88.8 ALLERGIC TO ASPIRIN: Status: ACTIVE | Noted: 2021-10-16

## 2021-10-16 PROBLEM — R06.02 SHORTNESS OF BREATH: Status: ACTIVE | Noted: 2021-10-16

## 2021-10-16 PROBLEM — E78.5 HYPERLIPEMIA: Status: ACTIVE | Noted: 2021-10-16

## 2021-10-16 PROBLEM — R31.9 HEMATURIA: Status: ACTIVE | Noted: 2020-03-13

## 2021-10-16 PROBLEM — R07.9 CHEST PAIN: Status: ACTIVE | Noted: 2021-10-16

## 2021-10-16 PROBLEM — R91.8 LUNG MASS: Status: ACTIVE | Noted: 2020-03-13

## 2021-10-16 PROBLEM — I49.1 ECTOPIC ATRIAL RHYTHM: Status: ACTIVE | Noted: 2021-10-16

## 2021-10-16 PROBLEM — F32.A DEPRESSION: Status: ACTIVE | Noted: 2021-10-16

## 2021-10-16 PROBLEM — M47.812 CERVICAL SPONDYLOSIS WITHOUT MYELOPATHY: Status: ACTIVE | Noted: 2021-10-16

## 2021-10-16 PROBLEM — K62.5 RECTAL BLEEDING: Status: ACTIVE | Noted: 2021-10-16

## 2021-10-17 PROBLEM — C44.92 SQUAMOUS CELL SKIN CANCER: Status: ACTIVE | Noted: 2021-10-17

## 2021-10-25 DIAGNOSIS — F33.1 MAJOR DEPRESSIVE DISORDER, RECURRENT EPISODE, MODERATE DEGREE (HCC): ICD-10-CM

## 2021-10-25 RX ORDER — FLUOXETINE HYDROCHLORIDE 20 MG/1
20 CAPSULE ORAL DAILY
Qty: 30 CAPSULE | Refills: 0 | Status: SHIPPED | OUTPATIENT
Start: 2021-10-25 | End: 2021-12-09 | Stop reason: SDUPTHER

## 2021-10-25 NOTE — TELEPHONE ENCOUNTER
Caller: Jennifer Shelton    Relationship: Self    Requested Prescriptions:   Requested Prescriptions     Pending Prescriptions Disp Refills   • FLUoxetine (PROzac) 20 MG capsule 30 capsule 0     Sig: Take 1 capsule by mouth Daily.        Pharmacy where request should be sent: [facility] - [place], [place] - [address] - [phone]  - [phone] FX        Additional details provided by patient: PATIENT IS NEEDING A REFILL BEFORE SHE GOES OUT OF TOWN NEXT WEEK. PATIENT MENTIONED INCREASING HER FLUOXETINE TO 40 MG, AS DISCUSSED BY JUAN MERCADO. PLEASE ADJUST NEW DOSAGE AND REFILL.     Best call back number: [phone]    Does the patient have less than a 3 day supply:  [] Yes  [x] No    Jeramy Duff Rep   10/25/21 13:00 EDT

## 2021-10-25 NOTE — PROGRESS NOTES
Chief Complaint        Blood in stool, irritable bowel syndrome, and heart burn    History of Present Illness      Jennifer Shelton is a 66 y.o. female who presents to [facility] as a new patient with a history of IBS predominant constipation, hematochezia, GERD and hiatal hernia.  Patient reports that she has had IBS flares occasionally she reports 1 back in February and one 2 months ago where she had abdominal pain and cramps with multiple bowel movements a day.  She reports bowel movements 6-7 times during those times in the pain last about 2 hours.  She reports the blood has since resolved since 2 months ago.  She has a strong family history of colon cancer including her mother in her 60s, her grandmother in her 70s and her aunt in her 60s.  Patient reports that in 2017 she lost 53 pounds and her heartburn resolved.  She does experience reflux and is taking famotidine 2-3 times a day for burning in her chest, acidic taste and heartburn.  She reports she has gained 25 pounds back since 2017 and her reflux has returned.  She reports constipation and not hydrating well.  She reports 2 to 3 days between her bowel movements that are firm and difficult to pass.  Patient denies fever, nausea, vomiting, weight loss, night sweats, melena, hematemesis.    Patient states she had a colonoscopy in [place] in June 2020, results were normal per patient.   Labs performed on 10/22/2021    Results       Result Review :   The following data was reviewed by: Brianna Martinez NP on 10/26/2021     CMP    CMP 1/30/21 2/1/21 10/22/21   Glucose 115 (A) 112 (A) 88   BUN 17 22 17   Creatinine 0.73 0.50 0.63   eGFR Non  Am   95   eGFR African Am   110   Sodium 135 135 138   Potassium 4.1 4.3 5.1   Chloride 100 102 101   Calcium 9.2 8.9 9.1   Total Protein   6.7   Albumin 3.9  3.9   Globulin   2.8   Total Bilirubin 0.32  0.4   Alkaline Phosphatase 73  91   AST (SGOT) 13 (A)  14   ALT (SGPT) 9 (A)  7    (A) Abnormal value       Comments are available for some flowsheets but are not being displayed.           CBC    CBC 1/30/21 2/1/21 10/22/21   WBC 8.91 11.13 (A) 6.1   RBC 4.34 4.05 (A) 4.57   Hemoglobin 13.0 12.1 13.4   Hematocrit 38.3 36.9 (A) 40.9   MCV 88.2 91.1 90   MCH 30.0 29.9 29.3   MCHC 33.9 32.8 (A) 32.8   RDW 12.3 13.0 12.9   Platelets 393 382 476 (A)   (A) Abnormal value                   Past Medical History       Past Medical History:   Diagnosis Date   • Arthritis    • Cancer (Formerly [facility])    • Cervical radiculopathy 2015   • Cervical spinal stenosis 2016   • Cervical spondylosis without myelopathy    • Cervicalgia 2015   • Chronic allergic rhinitis    • COPD (chronic obstructive pulmonary disease) (Former[facility])    • Depression    • Fibromyalgia    • Gastroesophageal reflux    • GERD (gastroesophageal reflux disease)    • Hematuria 2020   • Herniated disc, cervical 2015    C4-5, C5-6, C6-7   • High cholesterol    • Hyperlipemia    • Lung nodule 2020   • Rectal bleeding    • Seasonal allergies    • Shortness of breath        Past Surgical History:   Procedure Laterality Date   •  SECTION     • COLONOSCOPY     • CYSTOSCOPY  2020    Cystoscopy w/ Dr. Lundy   • PILONIDAL CYSTECTOMY     • SQUAMOUS CELL CARCINOMA EXCISION      chest         Current Outpatient Medications:   •  albuterol sulfate  (90 Base) MCG/ACT inhaler, INHALE 1 TO 2 PUFFS INTO LUNGS EVERY 4 TO 6 HOURS, Disp: , Rfl:   •  atorvastatin (LIPITOR) 20 MG tablet, atorvastatin 20 mg oral tablet take 1 tablet (20 mg) by oral route once daily   Suspended, Disp: , Rfl:   •  diphenhydrAMINE (BENADRYL) 25 mg capsule, Take 50 mg by mouth At Night As Needed., Disp: , Rfl:   •  famotidine (PEPCID) 20 MG tablet, Take 20 mg by mouth 2 (Two) Times a Day As Needed for Heartburn., Disp: , Rfl:   •  FLUoxetine (PROzac) 20 MG capsule, Take 1 capsule by mouth Daily. Patient due for follow up  for future  refills, Disp: 30 capsule, Rfl: 0  •  fluticasone (Flonase) 50 MCG/ACT nasal spray, 2 sprays into the nostril(s) as directed by provider Daily., Disp: 1 g, Rfl: 11  •  guaiFENesin (MUCINEX) 600 MG 12 hr tablet, Take 1,200 mg by mouth 2 (Two) Times a Day., Disp: , Rfl:   •  ipratropium (ATROVENT) 0.02 % nebulizer solution, 1.25 mL., Disp: , Rfl:   •  ipratropium-albuterol (DUO-NEB) 0.5-2.5 mg/3 ml nebulizer, INHALE CONTENTS OF 1 VIAL VIA NEBULIZER EVERY 4 HOURS AS NEEDED FOR SHORTNESS OF BREATH, Disp: , Rfl:   •  montelukast (SINGULAIR) 10 MG tablet, Take 10 mg by mouth every night at bedtime., Disp: , Rfl:   •  Qvar RediHaler 80 MCG/ACT inhaler, INHALE 1 PUFF TWICE DAILY RINSE MOUTH OUT AFTER EACH USE, Disp: , Rfl:   •  sodium chloride 3 % nebulizer solution, inhale contents of 1 vial via nebulizer TWICE DAILY, Disp: , Rfl:   •  Stiolto Respimat 2.5-2.5 MCG/ACT aerosol solution inhaler, inhale 2 puffs once daily, Disp: , Rfl:   •  pantoprazole (PROTONIX) 40 MG EC tablet, Take 1 tablet by mouth Daily., Disp: 30 tablet, Rfl: 5  •  polyethylene glycol (GoLYTELY) 236 g solution, Instructions provided by the office. Colon prep., Disp: 4000 mL, Rfl: 0  •  polyethylene glycol (MiraLax) 17 g packet, Take 17 g by mouth Daily., Disp: 30 each, Rfl: 6     Allergies   Allergen Reactions   • Aspirin Hives and Arrhythmia     Chest pain   • Cephalexin Hives   • Nsaids Hives   • Penicillins Hives   • Contrast Dye Rash       Family History   Problem Relation Age of Onset   • Cancer Mother    • Diabetes Mother    • Colon cancer Mother         70s   • Breast cancer Mother         60s   • Arthritis Mother    • Pancreatic cancer Mother    • Heart disease Father    • Heart attack Father    • Arthritis Father    • Cancer Sister    • Pancreatic cancer Sister    • Colon cancer Maternal Grandmother    • Colon cancer Other         60s        Social History     Social History Narrative   • Not on file       Objective       Objective     Vital  "Signs:   /80 (BP Location: Right arm, Patient Position: Sitting, Cuff Size: Adult)   Pulse 88   Ht 172.7 cm (68\")   Wt 80.2 kg (176 lb 12.8 oz)   SpO2 99%   BMI 26.88 kg/m²     Body mass index is 26.88 kg/m².    Physical Exam  Constitutional:       General: She is not in acute distress.     Appearance: Normal appearance. She is well-developed and normal weight.   Eyes:      Conjunctiva/sclera: Conjunctivae normal.      Pupils: Pupils are equal, round, and reactive to light.      Visual Fields: Right eye visual fields normal and left eye visual fields normal.   Cardiovascular:      Rate and Rhythm: Normal rate and regular rhythm.      Heart sounds: Normal heart sounds.   Pulmonary:      Effort: Pulmonary effort is normal. No retractions.      Breath sounds: Normal breath sounds and air entry.      Comments: Inspection of chest: normal appearance  Abdominal:      General: Bowel sounds are normal.      Palpations: Abdomen is soft.      Tenderness: There is no abdominal tenderness.      Comments: No appreciable hepatosplenomegaly   Musculoskeletal:      Cervical back: Neck supple.      Right lower leg: No edema.      Left lower leg: No edema.   Lymphadenopathy:      Cervical: No cervical adenopathy.   Skin:     Findings: No lesion.      Comments: Turgor normal   Neurological:      Mental Status: She is alert and oriented to person, place, and time.   Psychiatric:         Mood and Affect: Mood and affect normal.              Assessment & Plan          Assessment and Plan    Diagnoses and all orders for this visit:    1. Irritable bowel syndrome with constipation (Primary)    2. Hematochezia    3. Gastroesophageal reflux disease, unspecified whether esophagitis present    4. Hiatal hernia    Other orders  -     polyethylene glycol (MiraLax) 17 g packet; Take 17 g by mouth Daily.  Dispense: 30 each; Refill: 6  -     pantoprazole (PROTONIX) 40 MG EC tablet; Take 1 tablet by mouth Daily.  Dispense: 30 tablet; " Refill: 5    66-year-old female presenting the office today as a new patient with a history of IBS constipation, hematochezia, GERD, family history of colon cancer in her mother, grandmother and aunt, and hiatal hernia.  I have recommended that the patient undergo further evaluation with an EGD and colonoscopy.  I have discussed this procedure in detail with the patient.  I have discussed the risks, benefits and alternatives.  I have discussed the risk of anesthesia, bleeding and perforation.  Patient understands these risks, benefits and alternatives and wishes to proceed.  I will schedule her at her earliest convenience.  I have started the patient on Protonix 40 mg a day and she will discontinue famotidine.  I have educated patient to take MiraLAX daily 1 scoop and hydrate with plenty of fluids.  Patient is agreeable to this plan will follow up in the office after endoscopy.Patient to call with any questions or concerns.              Follow Up       Follow Up   Return for Follow up after endoscopy in office.  Patient was given instructions and counseling regarding her condition or for health maintenance advice. Please see specific information pulled into the AVS if appropriate.

## 2021-10-26 ENCOUNTER — PREP FOR SURGERY (OUTPATIENT)
Dept: OTHER | Facility: HOSPITAL | Age: 66
End: 2021-10-26

## 2021-10-26 ENCOUNTER — OFFICE VISIT (OUTPATIENT)
Dept: GASTROENTEROLOGY | Facility: CLINIC | Age: 66
End: 2021-10-26

## 2021-10-26 VITALS
HEIGHT: 68 IN | HEART RATE: 88 BPM | WEIGHT: 176.8 LBS | SYSTOLIC BLOOD PRESSURE: 151 MMHG | DIASTOLIC BLOOD PRESSURE: 80 MMHG | OXYGEN SATURATION: 99 % | BODY MASS INDEX: 26.8 KG/M2

## 2021-10-26 DIAGNOSIS — K21.9 GASTROESOPHAGEAL REFLUX DISEASE, UNSPECIFIED WHETHER ESOPHAGITIS PRESENT: ICD-10-CM

## 2021-10-26 DIAGNOSIS — K44.9 HIATAL HERNIA: ICD-10-CM

## 2021-10-26 DIAGNOSIS — K58.1 IRRITABLE BOWEL SYNDROME WITH CONSTIPATION: Primary | ICD-10-CM

## 2021-10-26 DIAGNOSIS — K92.1 HEMATOCHEZIA: ICD-10-CM

## 2021-10-26 PROCEDURE — 99214 OFFICE O/P EST MOD 30 MIN: CPT | Performed by: NURSE PRACTITIONER

## 2021-10-26 RX ORDER — PANTOPRAZOLE SODIUM 40 MG/1
40 TABLET, DELAYED RELEASE ORAL DAILY
Qty: 30 TABLET | Refills: 5 | Status: SHIPPED | OUTPATIENT
Start: 2021-10-26 | End: 2022-04-08 | Stop reason: SDUPTHER

## 2021-10-26 RX ORDER — POLYETHYLENE GLYCOL 3350 17 G/17G
17 POWDER, FOR SOLUTION ORAL DAILY
Qty: 30 EACH | Refills: 6 | Status: SHIPPED | OUTPATIENT
Start: 2021-10-26 | End: 2022-08-05

## 2021-10-26 RX ORDER — PANTOPRAZOLE SODIUM 40 MG/1
40 TABLET, DELAYED RELEASE ORAL DAILY
Qty: 30 TABLET | Refills: 5 | Status: SHIPPED | OUTPATIENT
Start: 2021-10-26 | End: 2021-10-26

## 2021-10-26 RX ORDER — POLYETHYLENE GLYCOL 3350 17 G/17G
17 POWDER, FOR SOLUTION ORAL DAILY
Qty: 30 EACH | Refills: 6 | Status: SHIPPED | OUTPATIENT
Start: 2021-10-26 | End: 2021-10-26

## 2021-10-26 NOTE — PATIENT INSTRUCTIONS
Food Choices for Gastroesophageal Reflux Disease, Adult  When you have gastroesophageal reflux disease (GERD), the foods you eat and your eating habits are very important. Choosing the right foods can help ease your discomfort. Think about working with a food expert (dietitian) to help you make good choices.  What are tips for following this plan?  Reading food labels  · Look for foods that are low in saturated fat. Foods that may help with your symptoms include:  ? Foods that have less than 5% of daily value (DV) of fat.  ? Foods that have 0 grams of trans fat.  Cooking  · Do not burroughs your food.  · Cook your food by baking, steaming, grilling, or broiling. These are all methods that do not need a lot of fat for cooking.  · To add flavor, try to use herbs that are low in spice and acidity.  Meal planning    · Choose healthy foods that are low in fat, such as:  ? Fruits and vegetables.  ? Whole grains.  ? Low-fat dairy products.  ? Lean meats, fish, and poultry.  · Eat small meals often instead of eating 3 large meals each day. Eat your meals slowly in a place where you are relaxed. Avoid bending over or lying down until 2-3 hours after eating.  · Limit high-fat foods such as fatty meats or fried foods.  · Limit your intake of fatty foods, such as oils, butter, and shortening.  · Avoid the following as told by your doctor:  ? Foods that cause symptoms. These may be different for different people. Keep a food diary to keep track of foods that cause symptoms.  ? Alcohol.  ? Drinking a lot of liquid with meals.  ? Eating meals during the 2-3 hours before bed.    Lifestyle  · Stay at a healthy weight. Ask your doctor what weight is healthy for you. If you need to lose weight, work with your doctor to do so safely.  · Exercise for at least 30 minutes on 5 or more days each week, or as told by your doctor.  · Wear loose-fitting clothes.  · Do not smoke or use any products that contain nicotine or tobacco. If you need help  quitting, ask your doctor.  · Sleep with the head of your bed higher than your feet. Use a wedge under the mattress or blocks under the bed frame to raise the head of the bed.  · Chew sugar-free gum after meals.  What foods should eat?    Eat a healthy, well-balanced diet of fruits, vegetables, whole grains, low-fat dairy products, lean meats, fish, and poultry. Each person is different.  Foods that may cause symptoms in one person may not cause any symptoms in another person. Work with your doctor to find foods that are safe for you.  The items listed above may not be a complete list of what you can eat and drink. Contact a food expert for more options.  What foods should I avoid?  Limiting some of these foods may help in managing the symptoms of GERD. Everyone is different. Talk with a food expert or your doctor to help you find the exact foods to avoid, if any.  Fruits  Any fruits prepared with added fat. Any fruits that cause symptoms. For some people, this may include citrus fruits, such as oranges, grapefruit, pineapple, and bridgett.  Vegetables  Deep-fried vegetables. French fries. Any vegetables prepared with added fat. Any vegetables that cause symptoms. For some people, this may include tomatoes and tomato products, chili peppers, onions and garlic, and horseradish.  Grains  Pastries or quick breads with added fat.  Meats and other proteins  High-fat meats, such as fatty beef or pork, hot dogs, ribs, ham, sausage, salami, and smart. Fried meat or protein, including fried fish and fried chicken. Nuts and nut butters, in large amounts.  Dairy  Whole milk and chocolate milk. Sour cream. Cream. Ice cream. Cream cheese. Milkshakes.  Fats and oils  Butter. Margarine. Shortening. Ghee.  Beverages  Coffee and tea, with or without caffeine. Carbonated beverages. Sodas. Energy drinks. Fruit juice made with acidic fruits, such as orange or grapefruit. Tomato juice. Alcoholic drinks.  Sweets and desserts  Chocolate and  cocoa. Donuts.  Seasonings and condiments  Pepper. Peppermint and spearmint. Added salt. Any condiments, herbs, or seasonings that cause symptoms. For some people, this may include garcia, hot sauce, or vinegar-based salad dressings.  The items listed above may not be a complete list of what you should not eat and drink. Contact a food expert for more options.  Questions to ask your doctor  Diet and lifestyle changes are often the first steps that are taken to manage symptoms of GERD. If diet and lifestyle changes do not help, talk with your doctor about taking medicines.  Where to find more information  · International [employer] Gastrointestinal Disorders: aboutgerd.org  Summary  · When you have GERD, food and lifestyle choices are very important in easing your symptoms.  · Eat small meals often instead of 3 large meals a day. Eat your meals slowly and in a place where you are relaxed.  · Avoid bending over or lying down until 2-3 hours after eating.  · Limit high-fat foods such as fatty meats or fried foods.  This information is not intended to replace advice given to you by your health care provider. Make sure you discuss any questions you have with your health care provider.  Document Revised: 06/28/2021 Document Reviewed: 06/28/2021  [employer] Patient Education © 2021 [employer]      Constipation, Adult  Constipation is when a person has fewer than three bowel movements in a week, has difficulty having a bowel movement, or has stools (feces) that are dry, hard, or larger than normal. Constipation may be caused by an underlying condition. It may become worse with age if a person takes certain medicines and does not take in enough fluids.  Follow these instructions at home:  Eating and drinking    · Eat foods that have a lot of fiber, such as beans, whole grains, and fresh fruits and vegetables.  · Limit foods that are low in fiber and high in fat and processed sugars, such as fried or sweet foods. These  include french fries, hamburgers, cookies, candies, and soda.  · Drink enough fluid to keep your urine pale yellow.    General instructions  · Exercise regularly or as told by your health care provider. Try to do 150 minutes of moderate exercise each week.  · Use the bathroom when you have the urge to go. Do not hold it in.  · Take over-the-counter and prescription medicines only as told by your health care provider. This includes any fiber supplements.  · During bowel movements:  ? Practice deep breathing while relaxing the lower abdomen.  ? Practice pelvic floor relaxation.  · Watch your condition for any changes. Let your health care provider know about them.  · Keep all follow-up visits as told by your health care provider. This is important.  Contact a health care provider if:  · You have pain that gets worse.  · You have a fever.  · You do not have a bowel movement after 4 days.  · You vomit.  · You are not hungry or you lose weight.  · You are bleeding from the opening between the buttocks (anus).  · You have thin, pencil-like stools.  Get help right away if:  · You have a fever and your symptoms suddenly get worse.  · You leak stool or have blood in your stool.  · Your abdomen is bloated.  · You have severe pain in your abdomen.  · You feel dizzy or you faint.  Summary  · Constipation is when a person has fewer than three bowel movements in a week, has difficulty having a bowel movement, or has stools (feces) that are dry, hard, or larger than normal.  · Eat foods that have a lot of fiber, such as beans, whole grains, and fresh fruits and vegetables.  · Drink enough fluid to keep your urine pale yellow.  · Take over-the-counter and prescription medicines only as told by your health care provider. This includes any fiber supplements.  This information is not intended to replace advice given to you by your health care provider. Make sure you discuss any questions you have with your health care  provider.  Document Revised: 11/04/2020 Document Reviewed: 11/04/2020  [employer] Patient Education © 2021 [employer]

## 2021-11-02 ENCOUNTER — PATIENT ROUNDING (BHMG ONLY) (OUTPATIENT)
Dept: GASTROENTEROLOGY | Facility: CLINIC | Age: 66
End: 2021-11-02

## 2021-11-02 NOTE — PROGRESS NOTES
November 2, 2021    Hello, may I speak with Jennifer Shelton?    My name is Angelica Stephens    I am  with [facility]  [address] [place] [address] [phone].    Before we get started may I verify your date of birth? 1955    I am calling to officially welcome you to our practice and ask about your recent visit. Is this a good time to talk? Yes    Tell me about your visit with us. What things went well?  Everything went well       We're always looking for ways to make our patients' experiences even better. Do you have recommendations on ways we may improve?  No I was very satisfied    Overall were you satisfied with your first visit to our practice? Yes       I appreciate you taking the time to speak with me today. Is there anything else I can do for you? No       Thank you, and have a great day.

## 2021-11-08 ENCOUNTER — HOSPITAL ENCOUNTER (OUTPATIENT)
Dept: CT IMAGING | Facility: HOSPITAL | Age: 66
Discharge: HOME | End: 2021-11-08
Admitting: PHYSICIAN ASSISTANT

## 2021-11-08 DIAGNOSIS — R91.1 PULMONARY NODULE: ICD-10-CM

## 2021-11-08 PROCEDURE — 71250 CT THORAX DX C-: CPT

## 2021-12-06 ENCOUNTER — PATIENT MESSAGE (OUTPATIENT)
Dept: FAMILY MEDICINE CLINIC | Facility: CLINIC | Age: 66
End: 2021-12-06

## 2021-12-06 DIAGNOSIS — F33.1 MAJOR DEPRESSIVE DISORDER, RECURRENT EPISODE, MODERATE DEGREE (HCC): ICD-10-CM

## 2021-12-09 DIAGNOSIS — F33.1 MAJOR DEPRESSIVE DISORDER, RECURRENT EPISODE, MODERATE DEGREE (HCC): ICD-10-CM

## 2021-12-09 RX ORDER — FLUTICASONE PROPIONATE 50 MCG
2 SPRAY, SUSPENSION (ML) NASAL DAILY
Qty: 1 G | Refills: 1 | Status: CANCELLED | OUTPATIENT
Start: 2021-12-09

## 2021-12-09 RX ORDER — FLUOXETINE HYDROCHLORIDE 20 MG/1
20 CAPSULE ORAL DAILY
Qty: 30 CAPSULE | Refills: 1 | Status: CANCELLED | OUTPATIENT
Start: 2021-12-09

## 2021-12-09 RX ORDER — FLUTICASONE PROPIONATE 50 MCG
2 SPRAY, SUSPENSION (ML) NASAL DAILY
Qty: 1 G | Refills: 2 | Status: SHIPPED | OUTPATIENT
Start: 2021-12-09 | End: 2021-12-10 | Stop reason: SDUPTHER

## 2021-12-09 RX ORDER — BECLOMETHASONE DIPROPIONATE HFA 80 UG/1
2 AEROSOL, METERED RESPIRATORY (INHALATION)
Qty: 10.6 G | Refills: 1 | Status: CANCELLED | OUTPATIENT
Start: 2021-12-09

## 2021-12-09 RX ORDER — BECLOMETHASONE DIPROPIONATE HFA 80 UG/1
1 AEROSOL, METERED RESPIRATORY (INHALATION)
Qty: 10.6 G | Refills: 2 | Status: SHIPPED | OUTPATIENT
Start: 2021-12-09 | End: 2021-12-10 | Stop reason: SDUPTHER

## 2021-12-09 RX ORDER — FLUOXETINE HYDROCHLORIDE 20 MG/1
20 CAPSULE ORAL DAILY
Qty: 90 CAPSULE | Refills: 0 | Status: SHIPPED | OUTPATIENT
Start: 2021-12-09 | End: 2021-12-10 | Stop reason: SDUPTHER

## 2021-12-09 NOTE — TELEPHONE ENCOUNTER
Dr. Serrano, will you be able to call these prescriptions in this week? I am almost out of the inhaler and I will be in trouble without it. Thank you.

## 2021-12-10 ENCOUNTER — TELEPHONE (OUTPATIENT)
Dept: FAMILY MEDICINE CLINIC | Facility: CLINIC | Age: 66
End: 2021-12-10

## 2021-12-10 DIAGNOSIS — F33.1 MAJOR DEPRESSIVE DISORDER, RECURRENT EPISODE, MODERATE DEGREE (HCC): ICD-10-CM

## 2021-12-10 RX ORDER — BECLOMETHASONE DIPROPIONATE HFA 80 UG/1
1 AEROSOL, METERED RESPIRATORY (INHALATION)
Qty: 10.6 G | Refills: 2 | Status: SHIPPED | OUTPATIENT
Start: 2021-12-10 | End: 2022-03-14 | Stop reason: SDUPTHER

## 2021-12-10 RX ORDER — FLUOXETINE HYDROCHLORIDE 20 MG/1
20 CAPSULE ORAL DAILY
Qty: 90 CAPSULE | Refills: 0 | Status: SHIPPED | OUTPATIENT
Start: 2021-12-10 | End: 2022-01-03 | Stop reason: SDUPTHER

## 2021-12-10 RX ORDER — FLUTICASONE PROPIONATE 50 MCG
2 SPRAY, SUSPENSION (ML) NASAL DAILY
Qty: 1 G | Refills: 2 | Status: SHIPPED | OUTPATIENT
Start: 2021-12-10 | End: 2022-03-21

## 2021-12-10 NOTE — TELEPHONE ENCOUNTER
Pt called about her meds being sent to wrong pharmacy,they need to [facility] [place], [place] please     Please call pt when meds are resent        Thank you    Tessy

## 2022-01-03 ENCOUNTER — OFFICE VISIT (OUTPATIENT)
Dept: FAMILY MEDICINE CLINIC | Facility: CLINIC | Age: 67
End: 2022-01-03

## 2022-01-03 VITALS
RESPIRATION RATE: 16 BRPM | WEIGHT: 176 LBS | TEMPERATURE: 97.1 F | SYSTOLIC BLOOD PRESSURE: 104 MMHG | OXYGEN SATURATION: 97 % | HEART RATE: 80 BPM | HEIGHT: 68 IN | DIASTOLIC BLOOD PRESSURE: 62 MMHG | BODY MASS INDEX: 26.67 KG/M2

## 2022-01-03 DIAGNOSIS — Z23 FLU VACCINE NEED: ICD-10-CM

## 2022-01-03 DIAGNOSIS — Z12.31 ENCOUNTER FOR SCREENING MAMMOGRAM FOR MALIGNANT NEOPLASM OF BREAST: ICD-10-CM

## 2022-01-03 DIAGNOSIS — E55.9 VITAMIN D DEFICIENCY: ICD-10-CM

## 2022-01-03 DIAGNOSIS — J44.9 CHRONIC OBSTRUCTIVE PULMONARY DISEASE, UNSPECIFIED COPD TYPE: ICD-10-CM

## 2022-01-03 DIAGNOSIS — E78.5 HYPERLIPIDEMIA, UNSPECIFIED HYPERLIPIDEMIA TYPE: Primary | ICD-10-CM

## 2022-01-03 DIAGNOSIS — D75.839 THROMBOCYTOSIS: ICD-10-CM

## 2022-01-03 DIAGNOSIS — F33.1 MAJOR DEPRESSIVE DISORDER, RECURRENT EPISODE, MODERATE DEGREE: ICD-10-CM

## 2022-01-03 DIAGNOSIS — M85.89 OSTEOPENIA OF MULTIPLE SITES: ICD-10-CM

## 2022-01-03 DIAGNOSIS — Z78.0 POSTMENOPAUSAL: ICD-10-CM

## 2022-01-03 PROCEDURE — 90686 IIV4 VACC NO PRSV 0.5 ML IM: CPT | Performed by: PHYSICIAN ASSISTANT

## 2022-01-03 PROCEDURE — G0008 ADMIN INFLUENZA VIRUS VAC: HCPCS | Performed by: PHYSICIAN ASSISTANT

## 2022-01-03 PROCEDURE — 99214 OFFICE O/P EST MOD 30 MIN: CPT | Performed by: PHYSICIAN ASSISTANT

## 2022-01-03 RX ORDER — ATORVASTATIN CALCIUM 20 MG/1
20 TABLET, FILM COATED ORAL NIGHTLY
Qty: 90 TABLET | Refills: 0 | Status: SHIPPED | OUTPATIENT
Start: 2022-01-03 | End: 2022-04-08 | Stop reason: SDUPTHER

## 2022-01-03 RX ORDER — TIOTROPIUM BROMIDE AND OLODATEROL 3.124; 2.736 UG/1; UG/1
1 SPRAY, METERED RESPIRATORY (INHALATION) 2 TIMES DAILY
Qty: 4 G | Refills: 5 | Status: SHIPPED | OUTPATIENT
Start: 2022-01-03 | End: 2022-02-28 | Stop reason: SDUPTHER

## 2022-01-03 RX ORDER — FLUOXETINE HYDROCHLORIDE 40 MG/1
40 CAPSULE ORAL DAILY
Qty: 90 CAPSULE | Refills: 0 | Status: SHIPPED | OUTPATIENT
Start: 2022-01-03 | End: 2022-04-08 | Stop reason: SDUPTHER

## 2022-01-03 RX ORDER — CHOLECALCIFEROL (VITAMIN D3) 50 MCG
2000 TABLET ORAL DAILY
Qty: 90 EACH | Refills: 0 | Status: SHIPPED | OUTPATIENT
Start: 2022-01-03 | End: 2022-04-08 | Stop reason: SDUPTHER

## 2022-01-03 NOTE — PROGRESS NOTES
Subjective   Jennifer Shelton is a 66 y.o. female who presents today in follow up of initial appt, moods, COPD, pulmonary nodule, GERD, fibromyalgia, Cervical spine DDD, and history of squamous cell skin cancer.     History of Present Illness     Previous PCP Dr Colorado in [place].     Hyperlipidemia- last medication 2016- Lipitor- did well on medication. She has not been on other medications in the past.   Stopped all medication years ago. Last year was full of specialists and let go of routine things.   Vitamin D deficiency- not taking vitamin D.   Thrombocytosis- no signs of clotting or any concerns.     Moods- she has noticed improvement in moods but not as good as it was when she initially restarted. Decreased motivation, not wanting to get up and do anything. She has had mind racing and depressed feeling.   Prozac in the past- she was up to 80 mg once daily. She had depression since childhood and started treatment in 30s. Increased depression following her son's death. She stopped all medications following his death.   · Son's suicide 3/11/2021- she has not been to the doctor for CT chest, scans, other testing and treatment. She has not been doing anything since her son's death.  · Previous medications- Cymbalta- helped but Prozac helped more. Last Cymbalta 2016.  Taking benadryl to sleep but not helping much. Elavil- helped with depression but made her very tired. She also would forget things when she was taking it and talking to people.     COPD- Still using inhalers. Only taking nebulizer as needed- was to take twice daily but she has taken only as needed. Taking her daily inhaler and Singulair. Medications- Qvar, Stiolto, Monteleukast, Flonase, Mucinex, and has duoneb  As needed.   Pulmonary nodule-CT chest 11/8/2021- Bilateral apical pleural thickening, mild emphysema, calcified pulmonary nodules right lower lobe, other subpleural nodules and areas of scarring with nodularity-all reported without change.   Small hiatal hernia.  Per radiology, no suspicious pulmonary nodules.  Recheck in 1 year.  · Nodule on right lung- they were scanning and following with Dr Kleley.   · Pulmonology- RESHMA Taylor- missed appt for follow up of pulmonary nodule. Hospitalized 2/2/21 and 10/2021- she had been coughing for 2 months then went to doctor and was given steroid. She also had poison ivy and that helped too. Seen by someone in [place]- urgent care.     GERD- RESHMA Sharma- She has pain in her chest- if she does not take Pepcid, she has pain in her chest and cannot eat. When she takes the medication, she does ok. She passes blood in stool that is bright red at times. She has appt with GI this month.     Fibromyalgia- has been on no medication in a while. Previously Cymbalta and Elavil.   DDD cervical spine-     Squamous cell skin cancer- patient had SCC 1/2021.    Mother and sister with pancreatic cancer. Mother with history of colon and breast cancer. MAunt and MGM with colon cancer.     No Hx abnormal PAP- last PAP 3 years ago.     Patient's Specialists:  Cardiology- Dr Lb Boyer- last appt 4/2020 for preop exam, CP, SOA, abnormal EKG. Advised smoking cessation, follow up with pulmonology, take Lipitor, hold off on colonoscopy pending cardiac workup- stress test, echo, and holter monitor. Advised AAA screening. Negative cardiac testing. Advised follow up 1 year. She did not return for follow up.   General surgery- RSEHMA Arias- last appt 3/2020 for hematuria. CT abd/pelvis, cystoscopy, and UA.   Neurosurgery- Dr Willi Ghotra- last appt 1/2021 for cervicalgia, cervical radiculopathy, herniated C4-5, C5-6, left shoulder pain. Ordered MRI left shoulder. If no abnormality, recommended C4-5, C5-6 ACDF.   Orthopedic surgery- Dr Jet Salinas- last appt 3/2021 for left shoulder pain. Xray, MRI 2/2021 with moderate supraspinatus tendonopathy, partial thickness fraying, intra-articular long head biceps  tendinopathy, small glenohummeral effusion, mild to moderate chondromalacia, synovial thickening, or intr-articular debris vs labral tissue in axillary recess- suggested capsulitis. DJD AC joint with chronic deformity of clavicle. Exercises for tendonitis given and advised to return for consideration of injection if no improvement.   Pulmonology- RESHMA Taylor- last appt 2/2021 in follow up of hospitalization for acute hypoxic respiratory failure requiring noninvasince positive pressure ventilation and COPD exacerbation. Hypoxia to 86% on 4 L O2. Transitioned to BIPAP with improvement. She did not need home oxygen. Treated with steroid, nebulizer. Advised Stiolto, Qvar, and Singulair. Ordered sleep study, started flutter valve, DuoNeb followed by sodium chloride nebulizer then flutter valve twice daily. Low dose CT 6/2021. Had CT abd/pelvis with pulm nodule. Continued Stiolto and PA Singulair. Changed Qvar to Flovent due to cost. Follow up in 2-3 months.   Urology- Dr Chelsea Lundy- last appt 3/2020 for cystoscopy for hematuria. Negative and negative CT abd/pelvis. Follow up in 1 year.                 The following portions of the patient's history were reviewed and updated as appropriate: allergies, current medications, past family history, past medical history, past social history, past surgical history and problem list.    Review of Systems   Constitutional: Positive for fatigue. Negative for fever.   Respiratory: Positive for shortness of breath.    Cardiovascular: Positive for chest pain.   Gastrointestinal: Positive for abdominal pain.   Musculoskeletal: Positive for arthralgias, back pain, myalgias and neck stiffness.   Neurological: Positive for weakness and numbness.   Psychiatric/Behavioral: Positive for agitation and dysphoric mood. The patient is nervous/anxious.        Objective   Vitals:    01/03/22 1528   BP: 104/62   Pulse: 80   Resp: 16   Temp: 97.1 °F (36.2 °C)   SpO2: 97%     Body mass  index is 26.76 kg/m².    Physical Exam  Vitals and nursing note reviewed.   Constitutional:       General: She is not in acute distress.     Appearance: Normal appearance. She is well-developed.   HENT:      Head: Normocephalic and atraumatic.      Right Ear: External ear normal.      Left Ear: External ear normal.      Nose: Nose normal.   Eyes:      General: Lids are normal.      Conjunctiva/sclera: Conjunctivae normal.   Neck:      Vascular: No carotid bruit.   Cardiovascular:      Rate and Rhythm: Normal rate and regular rhythm.      Pulses: Normal pulses.      Heart sounds: Normal heart sounds. No murmur heard.  No friction rub. No gallop.    Pulmonary:      Effort: Pulmonary effort is normal. No respiratory distress.      Breath sounds: Normal breath sounds. No wheezing, rhonchi or rales.   Musculoskeletal:         General: No deformity.      Cervical back: Neck supple.   Skin:     General: Skin is warm and dry.   Neurological:      Mental Status: She is alert and oriented to person, place, and time. Mental status is at baseline.      Gait: Gait normal.   Psychiatric:         Mood and Affect: Mood is depressed.         Speech: Speech normal.         Behavior: Behavior normal.         Thought Content: Thought content normal.         Judgment: Judgment normal.         Assessment/Plan   Diagnoses and all orders for this visit:    1. Hyperlipidemia, unspecified hyperlipidemia type (Primary)  -     atorvastatin (LIPITOR) 20 MG tablet; Take 1 tablet by mouth Every Night.  Dispense: 90 tablet; Refill: 0    2. Vitamin D deficiency  -     Cholecalciferol (Vitamin D3 Super Strength) 50 MCG (2000 UT) tablet; Take 1 tablet by mouth Daily.  Dispense: 90 each; Refill: 0    3. Thrombocytosis    4. Major depressive disorder, recurrent episode, moderate degree (HCC)  -     FLUoxetine (PROzac) 40 MG capsule; Take 1 capsule by mouth Daily. Patient due for follow up Nov 1 for future refills  Dispense: 90 capsule; Refill: 0    5.  Chronic obstructive pulmonary disease, unspecified COPD type (HCC)  -     Stiolto Respimat 2.5-2.5 MCG/ACT aerosol solution inhaler; Inhale 1 puff 2 (Two) Times a Day.  Dispense: 4 g; Refill: 5    6. Osteopenia of multiple sites  -     DEXA Bone Density Axial    7. Postmenopausal  -     DEXA Bone Density Axial    8. Encounter for screening mammogram for malignant neoplasm of breast  -     Mammo screening digital tomosynthesis bilateral w CAD    9. Flu vaccine need  -     FluLaval/Fluarix/Fluzone >6 Months ([id number])        Assessment and Plan  Patient had not been following with PCP or taking medication in a couple years following the death of her son. She went to numerous specialists last year then stopped taking everything. I reviewed all labs from 10/2021 today. Follow up in 3 months, fasting.     · Moods- She has had longstanding depression that has worsened with her son's death. She previously did well on Prozac. I restarted Prozac 20 mg once daily, and she has had some improvement but continues with significant depressive symptoms. Increase Prozac to 40 mg once daily. We can continue to increase if needed. If no improvement but no AE with medication, she can increase to 40 mg once daily in 2 weeks. Follow up in 4 weeks for re-evaluation of moods- can do through Mychart, video visit, or in person. Follow up in 3 months. She should be seen ASAP if worsening moods or 9-1-1 to ER if she develops SI/HI. No SI/HI today. She should also ensure regular counseling.   · COPD- Continue Qvar or Flovent, Stiolto, Monteleukast, Flonase, Mucinex, and DuoNeb as needed. Ensure she follows up with pulmonology as directed by them.    · Pulmonary nodule- CT chest 11/2021 with bilateral apical pleural thickening, mild emphysema, calcified pulmonary nodules right lower lobe, other subpleural nodules and areas of scarring with nodularity-all reported without change.  Small hiatal hernia.  Per radiology, no suspicious pulmonary  nodules.  Recheck in 1 year..   · GERD- Continue Pepcid 20 mg up to twice daily and follow up with GI this month.   · Fibromyalgia- Patient was previously on Cymbalta and Elavil for pain. However, moods were better controlled on Prozac. We will re-evaluated at follow up. We could consider Elavil in addition to Prozac if needed or change back to Cymbalta if moods or pain is not well controlled.    · DDD cervical spine- Follow up with neurosurgery if persistent pain.   · Squamous cell skin cancer- Patient to ensure she follows with dermatology regularly.     She does have family history of breast and colon cancer as well as pancreatic cancer. She needs to ensure she stays up to date on colonoscopy and mammogram. I will refer to mammogram and DEXA scan today. Consideration of genetics consultation to determine most appropriate screening recommendations. I will discuss this further at follow up.     I spent 30 minutes caring for Jennifer Shelton on this date of service. This time includes time spent by me in the following activities as necessary: preparing for the visit, reviewing tests, specialists records and previous visits, obtaining and/or reviewing a separately obtained history, performing a medically appropriate exam and/or evaluation, counseling and educating the patient, family, caregiver, referring and/or communicating with other healthcare professionals, documenting information in the medical record, independently interpreting results and communicating that information with the patient, family, caregiver, and developing a medically appropriate treatment plan with consideration of other conditions, medications, and treatments.

## 2022-02-11 NOTE — PRE-PROCEDURE INSTRUCTIONS
Pt. Instructed on NPO after midnight, pre-op meds. Ok to take all meds except Vitamin D, a.m. of procedure.     Fully vaccinated

## 2022-02-14 ENCOUNTER — ANESTHESIA (OUTPATIENT)
Dept: GASTROENTEROLOGY | Facility: HOSPITAL | Age: 67
End: 2022-02-14

## 2022-02-14 ENCOUNTER — ANESTHESIA EVENT (OUTPATIENT)
Dept: GASTROENTEROLOGY | Facility: HOSPITAL | Age: 67
End: 2022-02-14

## 2022-02-14 ENCOUNTER — HOSPITAL ENCOUNTER (OUTPATIENT)
Facility: HOSPITAL | Age: 67
Setting detail: HOSPITAL OUTPATIENT SURGERY
Discharge: HOME | End: 2022-02-14
Attending: INTERNAL MEDICINE | Admitting: INTERNAL MEDICINE

## 2022-02-14 VITALS
WEIGHT: 191.36 LBS | BODY MASS INDEX: 29 KG/M2 | SYSTOLIC BLOOD PRESSURE: 123 MMHG | OXYGEN SATURATION: 97 % | HEIGHT: 68 IN | RESPIRATION RATE: 16 BRPM | HEART RATE: 77 BPM | DIASTOLIC BLOOD PRESSURE: 73 MMHG | TEMPERATURE: 97.3 F

## 2022-02-14 DIAGNOSIS — K21.9 GASTROESOPHAGEAL REFLUX DISEASE, UNSPECIFIED WHETHER ESOPHAGITIS PRESENT: ICD-10-CM

## 2022-02-14 DIAGNOSIS — K92.1 HEMATOCHEZIA: ICD-10-CM

## 2022-02-14 DIAGNOSIS — K44.9 HIATAL HERNIA: ICD-10-CM

## 2022-02-14 DIAGNOSIS — K58.1 IRRITABLE BOWEL SYNDROME WITH CONSTIPATION: ICD-10-CM

## 2022-02-14 PROCEDURE — 88305 TISSUE EXAM BY PATHOLOGIST: CPT | Performed by: INTERNAL MEDICINE

## 2022-02-14 PROCEDURE — 25010000002 PROPOFOL 10 MG/ML EMULSION: Performed by: NURSE ANESTHETIST, CERTIFIED REGISTERED

## 2022-02-14 PROCEDURE — 43239 EGD BIOPSY SINGLE/MULTIPLE: CPT | Performed by: INTERNAL MEDICINE

## 2022-02-14 RX ORDER — PROPOFOL 10 MG/ML
VIAL (ML) INTRAVENOUS AS NEEDED
Status: DISCONTINUED | OUTPATIENT
Start: 2022-02-14 | End: 2022-02-14 | Stop reason: SURG

## 2022-02-14 RX ORDER — LIDOCAINE HYDROCHLORIDE 20 MG/ML
INJECTION, SOLUTION INFILTRATION; PERINEURAL AS NEEDED
Status: DISCONTINUED | OUTPATIENT
Start: 2022-02-14 | End: 2022-02-14 | Stop reason: SURG

## 2022-02-14 RX ORDER — SODIUM CHLORIDE, SODIUM LACTATE, POTASSIUM CHLORIDE, CALCIUM CHLORIDE 600; 310; 30; 20 MG/100ML; MG/100ML; MG/100ML; MG/100ML
INJECTION, SOLUTION INTRAVENOUS CONTINUOUS PRN
Status: DISCONTINUED | OUTPATIENT
Start: 2022-02-14 | End: 2022-02-14 | Stop reason: SURG

## 2022-02-14 RX ADMIN — PROPOFOL 200 MCG/KG/MIN: 10 INJECTION, EMULSION INTRAVENOUS at 11:33

## 2022-02-14 RX ADMIN — SODIUM CHLORIDE, POTASSIUM CHLORIDE, SODIUM LACTATE AND CALCIUM CHLORIDE: 600; 310; 30; 20 INJECTION, SOLUTION INTRAVENOUS at 11:31

## 2022-02-14 RX ADMIN — LIDOCAINE HYDROCHLORIDE 100 MG: 20 INJECTION, SOLUTION INFILTRATION; PERINEURAL at 11:32

## 2022-02-14 RX ADMIN — PROPOFOL 100 MG: 10 INJECTION, EMULSION INTRAVENOUS at 11:33

## 2022-02-14 NOTE — ANESTHESIA POSTPROCEDURE EVALUATION
Patient: Jennifer Shelton    Procedure Summary     Date: 02/14/22 Room / Location: formerly [facility]COPY 2 / formerly [facility]    Anesthesia Start: 1133 Anesthesia Stop: 1141    Procedure: ESOPHAGOGASTRODUODENOSCOPY WITH BIOPSIES (N/A ) Diagnosis:       Irritable bowel syndrome with constipation      Hematochezia      Gastroesophageal reflux disease, unspecified whether esophagitis present      Hiatal hernia      (Irritable bowel syndrome with constipation [K58.1])      (Hematochezia [K92.1])      (Gastroesophageal reflux disease, unspecified whether esophagitis present [K21.9])      (Hiatal hernia [K44.9])    Surgeons: Oneal Raymond MD Provider: Bryant Ramirez MD    Anesthesia Type: general ASA Status: 2          Anesthesia Type: general    Vitals  Vitals Value Taken Time   /72 02/14/22 1155   Temp 36.2 °C (97.1 °F) 02/14/22 1145   Pulse 79 02/14/22 1155   Resp 19 02/14/22 1155   SpO2 96 % 02/14/22 1155           Post Anesthesia Care and Evaluation    Patient location during evaluation: bedside  Patient participation: complete - patient participated  Level of consciousness: awake  Pain management: adequate  Airway patency: patent  Anesthetic complications: No anesthetic complications  PONV Status: none  Cardiovascular status: acceptable and stable  Respiratory status: acceptable  Hydration status: acceptable    Comments: An Anesthesiologist personally participated in the most demanding procedures (including induction and emergence if applicable) in the anesthesia plan, monitored the course of anesthesia administration at frequent intervals and remained physically present and available for immediate diagnosis and treatment of emergencies.

## 2022-02-14 NOTE — ANESTHESIA PREPROCEDURE EVALUATION
Anesthesia Evaluation     Patient summary reviewed and Nursing notes reviewed   no history of anesthetic complications:  NPO Solid Status: > 8 hours  NPO Liquid Status: > 2 hours           Airway   Mallampati: II  TM distance: >3 FB  Neck ROM: limited  No difficulty expected  Dental      Pulmonary - normal exam    breath sounds clear to auscultation  (+) a smoker Former, COPD, shortness of breath, sleep apnea,   Cardiovascular - normal exam  Exercise tolerance: good (4-7 METS)    Rhythm: regular  Rate: normal    (+) hyperlipidemia,       Neuro/Psych- negative ROS  GI/Hepatic/Renal/Endo    (+)  hiatal hernia, GERD,      Musculoskeletal     (+) neck pain,   Abdominal    Substance History - negative use     OB/GYN negative ob/gyn ROS         Other   arthritis,                      Anesthesia Plan    ASA 2     general   total IV anesthesia    Anesthetic plan, all risks, benefits, and alternatives have been provided, discussed and informed consent has been obtained with: patient.        CODE STATUS:

## 2022-02-14 NOTE — H&P
Pre Procedure History & Physical    Chief Complaint:   gerd  IBS    Subjective     HPI:   gerd  IBS    Past Medical History:   Past Medical History:   Diagnosis Date   • Anesthesia complication     heart rate slowed   • Arthritis    • Cancer (HCC)     skin cancer   • Cervical radiculopathy 2015   • Cervical spinal stenosis 2016   • Cervical spondylosis without myelopathy    • Cervicalgia 2015   • COPD (chronic obstructive pulmonary disease) (Formerly [facility])    • Depression    • Fibromyalgia    • Gastroesophageal reflux    • GERD (gastroesophageal reflux disease)    • Hematuria 2020   • Herniated disc, cervical 2015    C4-5, C5-6, C6-7   • High cholesterol    • Hyperlipemia    • Lung nodule 2020   • Rectal bleeding    • Seasonal allergies    • Shortness of breath    • Sleep apnea        Past Surgical History:  Past Surgical History:   Procedure Laterality Date   •  SECTION     • COLONOSCOPY     • CYSTOSCOPY  2020    Cystoscopy w/ Dr. Lundy   • PILONIDAL CYSTECTOMY     • SQUAMOUS CELL CARCINOMA EXCISION      chest       Family History:  Family History   Problem Relation Age of Onset   • Cancer Mother    • Diabetes Mother    • Colon cancer Mother         70s   • Breast cancer Mother         60s   • Arthritis Mother    • Pancreatic cancer Mother    • Heart disease Father    • Heart attack Father    • Arthritis Father    • Cancer Sister    • Pancreatic cancer Sister    • Colon cancer Maternal Grandmother    • Colon cancer Other         60s   • Malig Hyperthermia Neg Hx        Social History:   reports that she quit smoking about 15 months ago. She smoked 1.50 packs per day. She has never used smokeless tobacco. She reports that she does not drink alcohol and does not use drugs.    Medications:   Medications Prior to Admission   Medication Sig Dispense Refill Last Dose   • albuterol sulfate  (90 Base) MCG/ACT inhaler INHALE 1 TO 2 PUFFS INTO LUNGS EVERY 4 TO 6 HOURS    2/14/2022 at 0600   • atorvastatin (LIPITOR) 20 MG tablet Take 1 tablet by mouth Every Night. 90 tablet 0 2/13/2022   • Cholecalciferol (Vitamin D3 Super Strength) 50 MCG (2000 UT) tablet Take 1 tablet by mouth Daily. 90 each 0 2/13/2022   • diphenhydrAMINE (BENADRYL) 25 mg capsule Take 50 mg by mouth At Night As Needed.   2/13/2022   • famotidine (PEPCID) 20 MG tablet Take 20 mg by mouth 2 (Two) Times a Day As Needed for Heartburn.   2/13/2022   • FLUoxetine (PROzac) 40 MG capsule Take 1 capsule by mouth Daily. Patient due for follow up Nov 1 for future refills 90 capsule 0 2/14/2022 at 0600   • fluticasone (Flonase) 50 MCG/ACT nasal spray 2 sprays into the nostril(s) as directed by provider Daily. 1 g 2 2/13/2022   • guaiFENesin (MUCINEX) 600 MG 12 hr tablet Take 1,200 mg by mouth 2 (Two) Times a Day.   2/13/2022   • ipratropium-albuterol (DUO-NEB) 0.5-2.5 mg/3 ml nebulizer INHALE CONTENTS OF 1 VIAL VIA NEBULIZER EVERY 4 HOURS AS NEEDED FOR SHORTNESS OF BREATH   2/13/2022   • montelukast (SINGULAIR) 10 MG tablet Take 10 mg by mouth every night at bedtime.   Past Month at Unknown time   • pantoprazole (PROTONIX) 40 MG EC tablet Take 1 tablet by mouth Daily. 30 tablet 5 2/14/2022 at 0600   • Qvar RediHaler 80 MCG/ACT inhaler Inhale 1 puff 2 (Two) Times a Day. Then rinse mouth 10.6 g 2 2/13/2022   • sodium chloride 3 % nebulizer solution inhale contents of 1 vial via nebulizer TWICE DAILY   2/14/2022 at 060   • Stiolto Respimat 2.5-2.5 MCG/ACT aerosol solution inhaler Inhale 1 puff 2 (Two) Times a Day. 4 g 5 2/14/2022 at 0600   • polyethylene glycol (GoLYTELY) 236 g solution Instructions provided by the office. Colon prep. 4000 mL 0 Unknown at Unknown time   • polyethylene glycol (MiraLax) 17 g packet Take 17 g by mouth Daily. 30 each 6 Unknown at Unknown time       Allergies:  Aspirin, Cephalexin, Nsaids, Penicillins, and Contrast dye        Objective     Blood pressure 123/82, pulse 87, temperature 97.2 °F (36.2  "°C), temperature source Temporal, resp. rate 16, height 172.7 cm (67.99\"), weight 86.8 kg (191 lb 5.8 oz), SpO2 95 %, not currently breastfeeding.    Physical Exam   Constitutional: Pt is oriented to person, place, and time and well-developed, well-nourished, and in no distress.   Mouth/Throat: Oropharynx is clear and moist.   Neck: Normal range of motion.   Cardiovascular: Normal rate, regular rhythm and normal heart sounds.    Pulmonary/Chest: Effort normal and breath sounds normal.   Abdominal: Soft. Nontender  Skin: Skin is warm and dry.   Psychiatric: Mood, memory, affect and judgment normal.     Assessment/Plan     Diagnosis:  gerd    IBS    Anticipated Surgical Procedure:  egd    The risks, benefits, and alternatives of this procedure have been discussed with the patient or the responsible party- the patient understands and agrees to proceed.            "

## 2022-02-15 LAB
CYTO UR: NORMAL
LAB AP CASE REPORT: NORMAL
LAB AP CLINICAL INFORMATION: NORMAL
PATH REPORT.FINAL DX SPEC: NORMAL
PATH REPORT.GROSS SPEC: NORMAL

## 2022-02-26 ENCOUNTER — PATIENT MESSAGE (OUTPATIENT)
Dept: FAMILY MEDICINE CLINIC | Facility: CLINIC | Age: 67
End: 2022-02-26

## 2022-02-28 DIAGNOSIS — J44.9 CHRONIC OBSTRUCTIVE PULMONARY DISEASE, UNSPECIFIED COPD TYPE: ICD-10-CM

## 2022-02-28 RX ORDER — TIOTROPIUM BROMIDE AND OLODATEROL 3.124; 2.736 UG/1; UG/1
2 SPRAY, METERED RESPIRATORY (INHALATION) DAILY
Qty: 4 G | Refills: 5 | Status: SHIPPED | OUTPATIENT
Start: 2022-02-28 | End: 2022-04-08 | Stop reason: SDUPTHER

## 2022-03-11 DIAGNOSIS — J30.2 SEASONAL ALLERGIC RHINITIS, UNSPECIFIED TRIGGER: Primary | ICD-10-CM

## 2022-03-11 RX ORDER — MONTELUKAST SODIUM 10 MG/1
10 TABLET ORAL
Qty: 30 TABLET | Refills: 5 | Status: SHIPPED | OUTPATIENT
Start: 2022-03-11 | End: 2022-04-08 | Stop reason: SDUPTHER

## 2022-03-14 RX ORDER — BECLOMETHASONE DIPROPIONATE HFA 80 UG/1
2 AEROSOL, METERED RESPIRATORY (INHALATION)
Qty: 10.6 G | Refills: 2 | Status: SHIPPED | OUTPATIENT
Start: 2022-03-14 | End: 2022-03-16 | Stop reason: SDUPTHER

## 2022-03-14 NOTE — TELEPHONE ENCOUNTER
I changed the directions on the inhaler prescription.  Please see if patient is able to get the prescription.

## 2022-03-16 NOTE — TELEPHONE ENCOUNTER
Caller: Jennifer Shelton    Relationship: Self    Best call back number: [phone]    Requested Prescriptions:   Requested Prescriptions     Pending Prescriptions Disp Refills   • Qvar RediHaler 80 MCG/ACT inhaler 10.6 g 2     Sig: Inhale 2 puffs 2 (Two) Times a Day. Then rinse mouth        Pharmacy where request should be sent: [facility] [address] - [phone]  - [phone] FX     Additional details provided by patient: PATIENT IS COMPLETELY OUT OF THIS MEDICATION     Does the patient have less than a 3 day supply:  [x] Yes  [] No    Jeramy GU Rep   03/16/22 16:04 EDT

## 2022-03-17 RX ORDER — BECLOMETHASONE DIPROPIONATE HFA 80 UG/1
2 AEROSOL, METERED RESPIRATORY (INHALATION)
Qty: 10.6 G | Refills: 2 | Status: SHIPPED | OUTPATIENT
Start: 2022-03-17 | End: 2022-04-08 | Stop reason: SDUPTHER

## 2022-03-21 RX ORDER — FLUTICASONE PROPIONATE 50 MCG
SPRAY, SUSPENSION (ML) NASAL
Qty: 16 G | Refills: 2 | Status: SHIPPED | OUTPATIENT
Start: 2022-03-21 | End: 2022-08-17 | Stop reason: SDUPTHER

## 2022-03-30 LAB
25(OH)D3+25(OH)D2 SERPL-MCNC: 30.9 NG/ML (ref 30–100)
ALBUMIN SERPL-MCNC: 3.8 G/DL (ref 3.8–4.8)
ALBUMIN/GLOB SERPL: 1.5 {RATIO} (ref 1.2–2.2)
ALP SERPL-CCNC: 78 IU/L (ref 44–121)
ALT SERPL-CCNC: 11 IU/L (ref 0–32)
APPEARANCE UR: CLEAR
AST SERPL-CCNC: 16 IU/L (ref 0–40)
BACTERIA #/AREA URNS HPF: ABNORMAL /[HPF]
BACTERIA UR CULT: ABNORMAL
BACTERIA UR CULT: ABNORMAL
BASOPHILS # BLD AUTO: 0.1 X10E3/UL (ref 0–0.2)
BASOPHILS NFR BLD AUTO: 1 %
BILIRUB SERPL-MCNC: 0.4 MG/DL (ref 0–1.2)
BILIRUB UR QL STRIP: NEGATIVE
BUN SERPL-MCNC: 17 MG/DL (ref 8–27)
BUN/CREAT SERPL: 23 (ref 12–28)
CALCIUM SERPL-MCNC: 8.8 MG/DL (ref 8.7–10.3)
CASTS URNS QL MICRO: ABNORMAL /LPF
CHLORIDE SERPL-SCNC: 101 MMOL/L (ref 96–106)
CHOLEST SERPL-MCNC: 187 MG/DL (ref 100–199)
CK SERPL-CCNC: 64 U/L (ref 32–182)
CO2 SERPL-SCNC: 24 MMOL/L (ref 20–29)
COLOR UR: YELLOW
CREAT SERPL-MCNC: 0.73 MG/DL (ref 0.57–1)
EGFRCR SERPLBLD CKD-EPI 2021: 92 ML/MIN/1.73
EOSINOPHIL # BLD AUTO: 0.2 X10E3/UL (ref 0–0.4)
EOSINOPHIL NFR BLD AUTO: 5 %
EPI CELLS #/AREA URNS HPF: ABNORMAL /HPF (ref 0–10)
ERYTHROCYTE [DISTWIDTH] IN BLOOD BY AUTOMATED COUNT: 13 % (ref 11.7–15.4)
GLOBULIN SER CALC-MCNC: 2.6 G/DL (ref 1.5–4.5)
GLUCOSE SERPL-MCNC: 88 MG/DL (ref 65–99)
GLUCOSE UR QL STRIP: NEGATIVE
HCT VFR BLD AUTO: 37.7 % (ref 34–46.6)
HDLC SERPL-MCNC: 66 MG/DL
HGB BLD-MCNC: 12.6 G/DL (ref 11.1–15.9)
HGB UR QL STRIP: ABNORMAL
IMM GRANULOCYTES # BLD AUTO: 0 X10E3/UL (ref 0–0.1)
IMM GRANULOCYTES NFR BLD AUTO: 0 %
KETONES UR QL STRIP: NEGATIVE
LDLC SERPL CALC-MCNC: 109 MG/DL (ref 0–99)
LDLC/HDLC SERPL: 1.7 RATIO (ref 0–3.2)
LEUKOCYTE ESTERASE UR QL STRIP: ABNORMAL
LYMPHOCYTES # BLD AUTO: 1.4 X10E3/UL (ref 0.7–3.1)
LYMPHOCYTES NFR BLD AUTO: 31 %
MCH RBC QN AUTO: 29.3 PG (ref 26.6–33)
MCHC RBC AUTO-ENTMCNC: 33.4 G/DL (ref 31.5–35.7)
MCV RBC AUTO: 88 FL (ref 79–97)
MICRO URNS: ABNORMAL
MONOCYTES # BLD AUTO: 0.5 X10E3/UL (ref 0.1–0.9)
MONOCYTES NFR BLD AUTO: 10 %
NEUTROPHILS # BLD AUTO: 2.5 X10E3/UL (ref 1.4–7)
NEUTROPHILS NFR BLD AUTO: 53 %
NITRITE UR QL STRIP: NEGATIVE
PH UR STRIP: 7 [PH] (ref 5–7.5)
PLATELET # BLD AUTO: 359 X10E3/UL (ref 150–450)
POTASSIUM SERPL-SCNC: 4.3 MMOL/L (ref 3.5–5.2)
PROT SERPL-MCNC: 6.4 G/DL (ref 6–8.5)
PROT UR QL STRIP: ABNORMAL
RBC # BLD AUTO: 4.3 X10E6/UL (ref 3.77–5.28)
RBC #/AREA URNS HPF: ABNORMAL /HPF (ref 0–2)
SODIUM SERPL-SCNC: 138 MMOL/L (ref 134–144)
SP GR UR STRIP: 1.02 (ref 1–1.03)
T3FREE SERPL-MCNC: 3.1 PG/ML (ref 2–4.4)
T4 FREE SERPL-MCNC: 1.2 NG/DL (ref 0.82–1.77)
TRIGL SERPL-MCNC: 67 MG/DL (ref 0–149)
TSH SERPL DL<=0.005 MIU/L-ACNC: 1.52 UIU/ML (ref 0.45–4.5)
URINALYSIS REFLEX: ABNORMAL
UROBILINOGEN UR STRIP-MCNC: 0.2 MG/DL (ref 0.2–1)
VLDLC SERPL CALC-MCNC: 12 MG/DL (ref 5–40)
WBC # BLD AUTO: 4.7 X10E3/UL (ref 3.4–10.8)
WBC #/AREA URNS HPF: ABNORMAL /HPF (ref 0–5)

## 2022-04-08 ENCOUNTER — OFFICE VISIT (OUTPATIENT)
Dept: FAMILY MEDICINE CLINIC | Facility: CLINIC | Age: 67
End: 2022-04-08

## 2022-04-08 VITALS
HEIGHT: 68 IN | TEMPERATURE: 98 F | OXYGEN SATURATION: 98 % | WEIGHT: 198 LBS | SYSTOLIC BLOOD PRESSURE: 138 MMHG | HEART RATE: 95 BPM | BODY MASS INDEX: 30.01 KG/M2 | DIASTOLIC BLOOD PRESSURE: 80 MMHG

## 2022-04-08 DIAGNOSIS — M79.7 FIBROMYALGIA: ICD-10-CM

## 2022-04-08 DIAGNOSIS — E78.5 HYPERLIPIDEMIA, UNSPECIFIED HYPERLIPIDEMIA TYPE: Primary | ICD-10-CM

## 2022-04-08 DIAGNOSIS — M47.812 CERVICAL SPONDYLOSIS WITHOUT MYELOPATHY: ICD-10-CM

## 2022-04-08 DIAGNOSIS — J44.9 CHRONIC OBSTRUCTIVE PULMONARY DISEASE, UNSPECIFIED COPD TYPE: ICD-10-CM

## 2022-04-08 DIAGNOSIS — M25.512 LEFT SHOULDER PAIN, UNSPECIFIED CHRONICITY: ICD-10-CM

## 2022-04-08 DIAGNOSIS — K44.9 HIATAL HERNIA: ICD-10-CM

## 2022-04-08 DIAGNOSIS — J30.2 SEASONAL ALLERGIC RHINITIS, UNSPECIFIED TRIGGER: ICD-10-CM

## 2022-04-08 DIAGNOSIS — K21.9 GASTROESOPHAGEAL REFLUX DISEASE, UNSPECIFIED WHETHER ESOPHAGITIS PRESENT: ICD-10-CM

## 2022-04-08 DIAGNOSIS — R91.1 PULMONARY NODULE: ICD-10-CM

## 2022-04-08 DIAGNOSIS — F33.1 MAJOR DEPRESSIVE DISORDER, RECURRENT EPISODE, MODERATE DEGREE: ICD-10-CM

## 2022-04-08 DIAGNOSIS — M54.12 CERVICAL RADICULOPATHY: ICD-10-CM

## 2022-04-08 DIAGNOSIS — D75.839 THROMBOCYTOSIS: ICD-10-CM

## 2022-04-08 DIAGNOSIS — M85.89 OSTEOPENIA OF MULTIPLE SITES: ICD-10-CM

## 2022-04-08 DIAGNOSIS — E55.9 VITAMIN D DEFICIENCY: ICD-10-CM

## 2022-04-08 PROCEDURE — 99214 OFFICE O/P EST MOD 30 MIN: CPT | Performed by: PHYSICIAN ASSISTANT

## 2022-04-08 RX ORDER — TIOTROPIUM BROMIDE AND OLODATEROL 3.124; 2.736 UG/1; UG/1
2 SPRAY, METERED RESPIRATORY (INHALATION) DAILY
Qty: 4 G | Refills: 5 | Status: SHIPPED | OUTPATIENT
Start: 2022-04-08 | End: 2022-08-30 | Stop reason: SDUPTHER

## 2022-04-08 RX ORDER — IPRATROPIUM BROMIDE AND ALBUTEROL SULFATE 2.5; .5 MG/3ML; MG/3ML
3 SOLUTION RESPIRATORY (INHALATION)
Qty: 360 ML | Refills: 1 | Status: SHIPPED | OUTPATIENT
Start: 2022-04-08

## 2022-04-08 RX ORDER — FLUOXETINE HYDROCHLORIDE 40 MG/1
80 CAPSULE ORAL DAILY
Qty: 180 CAPSULE | Refills: 1 | Status: SHIPPED | OUTPATIENT
Start: 2022-04-08 | End: 2022-12-21 | Stop reason: SDUPTHER

## 2022-04-08 RX ORDER — CHOLECALCIFEROL (VITAMIN D3) 50 MCG
2000 TABLET ORAL DAILY
Qty: 90 EACH | Refills: 0 | Status: SHIPPED | OUTPATIENT
Start: 2022-04-08 | End: 2022-08-05

## 2022-04-08 RX ORDER — ALBUTEROL SULFATE 90 UG/1
2 AEROSOL, METERED RESPIRATORY (INHALATION) EVERY 6 HOURS PRN
Qty: 18 G | Refills: 3 | Status: SHIPPED | OUTPATIENT
Start: 2022-04-08 | End: 2022-10-07 | Stop reason: SDUPTHER

## 2022-04-08 RX ORDER — FAMOTIDINE 20 MG/1
20 TABLET, FILM COATED ORAL 2 TIMES DAILY PRN
Qty: 180 TABLET | Refills: 1 | Status: SHIPPED | OUTPATIENT
Start: 2022-04-08 | End: 2022-07-19 | Stop reason: SDUPTHER

## 2022-04-08 RX ORDER — MONTELUKAST SODIUM 10 MG/1
10 TABLET ORAL
Qty: 30 TABLET | Refills: 5 | Status: SHIPPED | OUTPATIENT
Start: 2022-04-08 | End: 2022-12-21 | Stop reason: SDUPTHER

## 2022-04-08 RX ORDER — PANTOPRAZOLE SODIUM 40 MG/1
40 TABLET, DELAYED RELEASE ORAL DAILY
Qty: 90 TABLET | Refills: 1 | Status: SHIPPED | OUTPATIENT
Start: 2022-04-08 | End: 2022-07-19 | Stop reason: SDUPTHER

## 2022-04-08 RX ORDER — ATORVASTATIN CALCIUM 20 MG/1
20 TABLET, FILM COATED ORAL NIGHTLY
Qty: 90 TABLET | Refills: 1 | Status: SHIPPED | OUTPATIENT
Start: 2022-04-08 | End: 2022-12-21

## 2022-04-08 RX ORDER — BECLOMETHASONE DIPROPIONATE HFA 80 UG/1
2 AEROSOL, METERED RESPIRATORY (INHALATION)
Qty: 10.6 G | Refills: 2 | Status: SHIPPED | OUTPATIENT
Start: 2022-04-08 | End: 2022-12-13

## 2022-07-08 ENCOUNTER — TELEMEDICINE (OUTPATIENT)
Dept: FAMILY MEDICINE CLINIC | Facility: CLINIC | Age: 67
End: 2022-07-08

## 2022-07-08 DIAGNOSIS — R91.1 PULMONARY NODULE: ICD-10-CM

## 2022-07-08 DIAGNOSIS — E78.5 HYPERLIPIDEMIA, UNSPECIFIED HYPERLIPIDEMIA TYPE: Primary | ICD-10-CM

## 2022-07-08 DIAGNOSIS — M79.7 FIBROMYALGIA: ICD-10-CM

## 2022-07-08 DIAGNOSIS — J30.2 SEASONAL ALLERGIC RHINITIS, UNSPECIFIED TRIGGER: ICD-10-CM

## 2022-07-08 DIAGNOSIS — E55.9 VITAMIN D DEFICIENCY: ICD-10-CM

## 2022-07-08 DIAGNOSIS — M54.12 CERVICAL RADICULOPATHY: ICD-10-CM

## 2022-07-08 DIAGNOSIS — K21.9 GASTROESOPHAGEAL REFLUX DISEASE, UNSPECIFIED WHETHER ESOPHAGITIS PRESENT: ICD-10-CM

## 2022-07-08 DIAGNOSIS — M85.89 OSTEOPENIA OF MULTIPLE SITES: ICD-10-CM

## 2022-07-08 DIAGNOSIS — M47.812 CERVICAL SPONDYLOSIS WITHOUT MYELOPATHY: ICD-10-CM

## 2022-07-08 DIAGNOSIS — K44.9 HIATAL HERNIA: ICD-10-CM

## 2022-07-08 DIAGNOSIS — F33.1 MAJOR DEPRESSIVE DISORDER, RECURRENT EPISODE, MODERATE DEGREE: ICD-10-CM

## 2022-07-08 DIAGNOSIS — D75.839 THROMBOCYTOSIS: ICD-10-CM

## 2022-07-08 DIAGNOSIS — J44.9 CHRONIC OBSTRUCTIVE PULMONARY DISEASE, UNSPECIFIED COPD TYPE: ICD-10-CM

## 2022-07-08 DIAGNOSIS — F43.81 GRIEF REACTION WITH PROLONGED BEREAVEMENT: ICD-10-CM

## 2022-07-08 PROCEDURE — 99214 OFFICE O/P EST MOD 30 MIN: CPT | Performed by: PHYSICIAN ASSISTANT

## 2022-07-08 NOTE — PROGRESS NOTES
Subjective   Jennifer Shelton is a 67 y.o. female who is being evaluated by video visit today in follow up of hyperlipidemia, vitamin D deficiency, thrombocytosis, moods, COPD, pulmonary nodule, GERD, fibromyalgia, Cervical spine DDD, and history of squamous cell skin cancer.     HPI  You have chosen to receive care through a telehealth visit.  Do you consent to use a video/audio connection for your medical care today? Yes    Hyperlipidemia- taking Lipitor 20 mg nightly  · Last medication 2016- Lipitor- did well on medication. She has not been on other medications in the past.   · Stopped all medication years ago. Last year was full of specialists and let go of routine things.   Vitamin D deficiency-taking vitamin D 2000 IU daily.   Thrombocytosis- no signs of clotting or any concerns.     Moods- Prozac 40 mg twice daily. Moods have not improved. She has had continued depression. Thinks it may be time to see specialist.   · Prozac in the past- she was up to 80 mg once daily. She had depression since childhood and started treatment in 30s. Increased depression following her son's death. She stopped all medications following his death.   · Son's suicide 3/11/2021- she has not been to the doctor for CT chest, scans, other testing and treatment. She has not been doing anything since her son's death.  · She noticed improvement in moods with restarting Prozac 20 mg once daily but not as good as it was when she initially restarted. Decreased motivation, not wanting to get up and do anything. She has had mind racing and depressed feeling.   · On Prozac 40 mg daily, she had no motivation. When she was out, she was ok but came back home and did not want to do anything.   · Previous medications- Cymbalta- helped but Prozac helped more. Last Cymbalta 2016.  Taking benadryl to sleep but not helping much. Elavil- helped with depression but made her very tired. She also would forget things when she was taking it and talking to  people.     She is tired all the time- reports she will sleep a few hours at night wake up and go back to sleep. She is always tired and does not feel rested. Reports she was to have sleep study right after her son's death but cancelled. Has not followed up with pulmonology in some time.   COPD- Still using inhalers. Only taking nebulizer as needed- was to take twice daily but she has taken only as needed. Taking her daily inhaler and Singulair. Medications- Qvar, Stiolto, Monteleukast, Flonase, Mucinex, and has duoneb  As needed.   Pulmonary nodule-CT chest 11/8/2021- Bilateral apical pleural thickening, mild emphysema, calcified pulmonary nodules right lower lobe, other subpleural nodules and areas of scarring with nodularity-all reported without change.  Small hiatal hernia.  Per radiology, no suspicious pulmonary nodules.  Recheck in 1 year.  · Nodule on right lung- they were scanning and following with Dr Kelley.   · Pulmonology- RES[facility]MA Taylor- missed appt for follow up of pulmonary nodule. Hospitalized 2/2/21 and 10/2021- she had been coughing for 2 months then went to doctor and was given steroid. She also had poison ivy and that helped too. Seen by someone in [place]- urgent care.     GERD, hiatal hernia, IBS- Dr Oneal Raymond, [facility] Sharma-  Taking Protonix 40 mg once daily and Pepcid 20 mg 2-3 x daily. She is still having heartburn symptoms- sometimes wakes up with it and sometimes has after taking the medication in the day. She is also having increased IBS symptoms. Patient with frequent, urgent BM. Has never taken anything for it but she has continued with symptoms. She would like to consider medication.   · She had pain in her chest- if she does not take Pepcid, she has pain in her chest and cannot eat. When she takes the medication, she does ok. She passes blood in stool that is bright red at times. Seeing GI.   · EGD 2/2022 with large hiatal hernia, esophagitis.  Fibromyalgia- has  been on no medication in a while. Previously Cymbalta and Elavil.   DDD cervical spine- always hurting- having headaches. Thinks from neck pain. 1/2021 for cervicalgia, cervical radiculopathy, herniated C4-5, C5-6, left shoulder pain. Ordered MRI left shoulder. If no abnormality, recommended C4-5, C5-6 ACDF  · Went to orthopedist for rotator cuff as well 3/2021- Xray, MRI 2/2021 with moderate supraspinatus tendonopathy, partial thickness fraying, intra-articular long head biceps tendinopathy, small glenohummeral effusion, mild to moderate chondromalacia, synovial thickening, or intr-articular debris vs labral tissue in axillary recess- suggested capsulitis. DJD AC joint with chronic deformity of clavicle. Exercises for tendonitis given and advised to return for consideration of injection if no improvement    Squamous cell skin cancer- patient had SCC 1/2021.    Mother and sister with pancreatic cancer. Mother with history of colon and breast cancer. MAunt and MGM with colon cancer.     No Hx abnormal PAP- last PAP 3 years ago.     Patient's Specialists:  Cardiology- Dr Lb Boyer- last appt 4/2020 for preop exam, CP, SOA, abnormal EKG. Advised smoking cessation, follow up with pulmonology, take Lipitor, hold off on colonoscopy pending cardiac workup- stress test, echo, and holter monitor. Advised AAA screening. Negative cardiac testing. Advised follow up 1 year. She did not return for follow up.   GI- Brianna Deal, APRN- last appt 10/2021 for blood in stool, IBS, and GERD. Advised Miralax, Protonix 40 mg once daily, and EGD, Colonoscopy. Endoscopy 2/2022 with large hitala hernia. No colonoscopy performed. Benign pathology.   General surgery- RESHMA Arias- last appt 3/2020 for hematuria. CT abd/pelvis, cystoscopy, and UA.   Neurosurgery- Dr Willi Ghotra- last appt 1/2021 for cervicalgia, cervical radiculopathy, herniated C4-5, C5-6, left shoulder pain. Ordered MRI left shoulder. If no abnormality,  recommended C4-5, C5-6 ACDF.   Orthopedic surgery- Dr Jet Salinas- last appt 3/2021 for left shoulder pain. Xray, MRI 2/2021 with moderate supraspinatus tendonopathy, partial thickness fraying, intra-articular long head biceps tendinopathy, small glenohummeral effusion, mild to moderate chondromalacia, synovial thickening, or intr-articular debris vs labral tissue in axillary recess- suggested capsulitis. DJD AC joint with chronic deformity of clavicle. Exercises for tendonitis given and advised to return for consideration of injection if no improvement.   Pulmonology- RESHMA Taylor- last appt 2/2021 in follow up of hospitalization for acute hypoxic respiratory failure requiring noninvasince positive pressure ventilation and COPD exacerbation. Hypoxia to 86% on 4 L O2. Transitioned to BIPAP with improvement. She did not need home oxygen. Treated with steroid, nebulizer. Advised Stiolto, Qvar, and Singulair. Ordered sleep study, started flutter valve, DuoNeb followed by sodium chloride nebulizer then flutter valve twice daily. Low dose CT 6/2021. Had CT abd/pelvis with pulm nodule. Continued Stiolto and PA Singulair. Changed Qvar to Flovent due to cost. Follow up in 2-3 months.   Urology- Dr Chelsea Lundy- last appt 3/2020 for cystoscopy for hematuria. Negative and negative CT abd/pelvis. Follow up in 1 year.     The following portions of the patient's history were reviewed and updated as appropriate: allergies, current medications, past family history, past medical history, past social history, past surgical history and problem list.    Review of Systems   Constitutional: Positive for fatigue. Negative for fever.   Respiratory: Positive for shortness of breath.    Cardiovascular: Positive for chest pain.   Gastrointestinal: Positive for abdominal pain.        GERD, heartburn   Musculoskeletal: Positive for arthralgias, back pain, myalgias and neck stiffness.   Neurological: Positive for weakness and  numbness.   Psychiatric/Behavioral: Positive for agitation, decreased concentration, dysphoric mood and sleep disturbance. The patient is nervous/anxious.        Objective   There were no vitals filed for this visit.  There is no height or weight on file to calculate BMI.    Physical Exam  Constitutional:       General: She is not in acute distress.     Appearance: She is well-developed.   HENT:      Head: Normocephalic and atraumatic.   Eyes:      General:         Right eye: No discharge.         Left eye: No discharge.   Pulmonary:      Effort: Pulmonary effort is normal. No respiratory distress.   Skin:     General: Skin is dry.   Neurological:      Mental Status: She is alert.   Psychiatric:         Attention and Perception: She is attentive.         Mood and Affect: Mood is depressed.         Speech: Speech normal.         Behavior: Behavior normal.         Assessment & Plan   Diagnoses and all orders for this visit:    1. Hyperlipidemia, unspecified hyperlipidemia type (Primary)  -     Comprehensive Metabolic Panel  -     CK  -     Lipid Panel With LDL / HDL Ratio  -     Urinalysis With Culture If Indicated -    2. Vitamin D deficiency  -     Comprehensive Metabolic Panel  -     Vitamin D 25 Hydroxy  -     Urinalysis With Culture If Indicated -    3. Osteopenia of multiple sites  -     Comprehensive Metabolic Panel  -     Vitamin D 25 Hydroxy  -     Urinalysis With Culture If Indicated -    4. Thrombocytosis  -     CBC & Differential  -     Urinalysis With Culture If Indicated -    5. Major depressive disorder, recurrent episode, moderate degree (HCC)  -     Ambulatory Referral to Psychiatry    6. Grief reaction with prolonged bereavement  -     Ambulatory Referral to Psychiatry    7. Chronic obstructive pulmonary disease, unspecified COPD type (HCC)    8. Pulmonary nodule    9. Gastroesophageal reflux disease, unspecified whether esophagitis present    10. Hiatal hernia    11. Seasonal allergic rhinitis,  unspecified trigger    12. Fibromyalgia    13. Cervical radiculopathy    14. Cervical spondylosis without myelopathy        Assessment and Plan  Patient will have fasting labs. Call if no results in 1 week. Stability of conditions, plan, follow up, and further recommendations pending labs. Follow up in 3 months.     · Hyperlipidemia- Lipids improved but LDL remained uncontrolled. Continue Lipitor 20 mg once daily and continue to work on diet and exercise. Recheck labs at follow up.   · Vitamin D deficiency- Improved vitamin D. Continue Vitamin D 2000 IU daily. Dosing recommendations pending labs.   · Osteopenia- Patient to have DEXA scan as referred.   · Thrombocytosis- I will continue to monitor and make further recommendations.   · Moods- She has had longstanding depression that has worsened with her son's death. She previously did well on Prozac. I restarted Prozac 20 mg once daily with some improvement but continued with significant depressive symptoms. Increased Prozac to 40 mg once daily then twice daily with improvement but persistent symptoms. She has complicated mood symptoms with grief reaction. She is now willing to see psychiatry. I will refer today. She should be seen ASAP if worsening moods or 9-1-1 to ER if she develops SI/HI. No SI/HI today. She should also ensure regular counseling.   · COPD- Continue Qvar or Flovent, Stiolto, Monteleukast, Flonase, Mucinex, and DuoNeb as needed. Ensure she follows up with pulmonology as directed by them.    · Pulmonary nodule- CT chest 11/2021 with bilateral apical pleural thickening, mild emphysema, calcified pulmonary nodules right lower lobe, other subpleural nodules and areas of scarring with nodularity-all reported without change.  Small hiatal hernia.  Per radiology, no suspicious pulmonary nodules.  Recheck in 1 year.  · GERD, hiatal hernia- Last EGD 2/2022 with large hiatal hernia and reflux esophagitis with benign pathology. Continue Protonix 40 mg once  daily and Pepcid 20 mg up to twice daily. She need follow up with GI with consideration of colonoscopy, as this was not performed with EGD. She should make appt with uncontrolled GERD symptoms.   · Fibromyalgia- Patient was previously on Cymbalta and Elavil for pain. However, moods were better controlled on Prozac. We will re-evaluated at follow up. We could consider Elavil in addition to Prozac if needed or change back to Cymbalta if moods or pain is not well controlled pending evaluation and treatment per psychiatry.    · DDD cervical spine- Follow up with neurosurgery if persistent pain.   · Squamous cell skin cancer- Patient to ensure she follows with dermatology regularly.     She does have family history of breast and colon cancer as well as pancreatic cancer. She needs to ensure she stays up to date on colonoscopy and mammogram. She has been referred for mammogram and DEXA scan and needs to schedule. Consideration of genetics consultation to determine most appropriate screening recommendations. I will discuss this further at follow up.     I spent 30 minutes for video visit for Jennifer Shelton on this date of service. This time includes time spent by me in the following activities as necessary: preparing for the visit, reviewing tests, specialists records and previous visits, obtaining and/or reviewing a separately obtained history, counseling and educating the patient, referring and/or communicating with other healthcare professionals, documenting information in the medical record, independently interpreting results and communicating that information with the patient, family, caregiver, and developing a medically appropriate treatment plan with consideration of other conditions, medications, and treatments.

## 2022-07-18 ENCOUNTER — TELEPHONE (OUTPATIENT)
Dept: FAMILY MEDICINE CLINIC | Facility: CLINIC | Age: 67
End: 2022-07-18

## 2022-07-19 ENCOUNTER — OFFICE VISIT (OUTPATIENT)
Dept: GASTROENTEROLOGY | Facility: CLINIC | Age: 67
End: 2022-07-19

## 2022-07-19 ENCOUNTER — PREP FOR SURGERY (OUTPATIENT)
Dept: OTHER | Facility: HOSPITAL | Age: 67
End: 2022-07-19

## 2022-07-19 VITALS
WEIGHT: 195 LBS | HEIGHT: 68 IN | HEART RATE: 89 BPM | SYSTOLIC BLOOD PRESSURE: 123 MMHG | BODY MASS INDEX: 29.55 KG/M2 | DIASTOLIC BLOOD PRESSURE: 75 MMHG | OXYGEN SATURATION: 95 %

## 2022-07-19 DIAGNOSIS — K44.9 HIATAL HERNIA: ICD-10-CM

## 2022-07-19 DIAGNOSIS — K92.1 HEMATOCHEZIA: ICD-10-CM

## 2022-07-19 DIAGNOSIS — Z86.010 HISTORY OF COLON POLYPS: ICD-10-CM

## 2022-07-19 DIAGNOSIS — Z80.0 FAMILY HISTORY OF PANCREATIC CANCER: ICD-10-CM

## 2022-07-19 DIAGNOSIS — K58.1 IRRITABLE BOWEL SYNDROME WITH CONSTIPATION: ICD-10-CM

## 2022-07-19 DIAGNOSIS — Z80.3 FAMILY HISTORY OF BREAST CANCER: ICD-10-CM

## 2022-07-19 DIAGNOSIS — Z80.0 FAMILY HISTORY OF COLON CANCER: Primary | ICD-10-CM

## 2022-07-19 DIAGNOSIS — Z80.0 FAMILY HISTORY OF COLON CANCER: ICD-10-CM

## 2022-07-19 DIAGNOSIS — K21.9 GASTROESOPHAGEAL REFLUX DISEASE, UNSPECIFIED WHETHER ESOPHAGITIS PRESENT: Primary | ICD-10-CM

## 2022-07-19 PROCEDURE — 99214 OFFICE O/P EST MOD 30 MIN: CPT

## 2022-07-19 RX ORDER — POLYETHYLENE GLYCOL 3350, SODIUM SULFATE ANHYDROUS, SODIUM BICARBONATE, SODIUM CHLORIDE, POTASSIUM CHLORIDE 227.1; 21.5; 6.36; 5.53; .754 G/L; G/L; G/L; G/L; G/L
4000 POWDER, FOR SOLUTION ORAL ONCE
Qty: 1 EACH | Refills: 0 | Status: SHIPPED | OUTPATIENT
Start: 2022-07-19 | End: 2022-07-19

## 2022-07-19 RX ORDER — SUCRALFATE 1 G/1
1 TABLET ORAL 4 TIMES DAILY
Qty: 120 TABLET | Refills: 2 | Status: SHIPPED | OUTPATIENT
Start: 2022-07-19 | End: 2022-08-18

## 2022-07-19 RX ORDER — FAMOTIDINE 20 MG/1
20 TABLET, FILM COATED ORAL 2 TIMES DAILY PRN
Qty: 180 TABLET | Refills: 1 | Status: SHIPPED | OUTPATIENT
Start: 2022-07-19 | End: 2022-12-03 | Stop reason: HOSPADM

## 2022-07-19 RX ORDER — PANTOPRAZOLE SODIUM 40 MG/1
40 TABLET, DELAYED RELEASE ORAL DAILY
Qty: 90 TABLET | Refills: 1 | Status: SHIPPED | OUTPATIENT
Start: 2022-07-19 | End: 2022-10-07 | Stop reason: SDUPTHER

## 2022-07-19 NOTE — PROGRESS NOTES
"Chief Complaint  Heartburn    Jennifer Shelton is a 67 y.o. female who presents to [facility] for increased heartburn.    History of present Illness  Patient presents to the office for increased heartburn. At previous appointment in October 2021 patient reported increased heartburn, hematochezia, and IBS symptoms. She was scheduled for EGD and colonoscopy at that time but had to cancel colonoscopy. EGD showed a large hiatal hernia and reflux esophagitis. She takes pantoprazole 40 mg daily in the morning and Pepcid BID. She still has breakthrough heartburn every day, all day long. She has gained 25 pounds since stopping smoking in Novemeber 2020 and reports that is when her reflux has gotten worse. She has struggled with IBS - constipation for many years. She has a bowel movement every 3 days, admits to straining and feels constantly bloated. She notices blood in her stool intermittently. After severe constipation episodes she can have an \"IBS flare\" with abdominal pain and diarrhea.She has a strong family history of colon cancer including her mother in her 60s, her grandmother in her 70s and her maternal aunt in her 60s. Pancreatic cancer in your mother at age 81 and sister at age 54. Breast cancer in her mother in 60s.     EGD 2/14/2022 by Dr. Raymond large hiatal hernia, grade a esophagitis, normal stomach, and normal duodenum.  Biopsies consistent with reflux esophagitis, negative for H. pylori, intestinal metaplasia, dysplasia, and malignancy.     Past Medical History:   Diagnosis Date   • Anesthesia complication     heart rate slowed   • Arthritis    • Cancer (HCC)     skin cancer   • Cervical radiculopathy 09/22/2015   • Cervical spinal stenosis 09/22/2016   • Cervical spondylosis without myelopathy    • Cervicalgia 09/22/2015   • COPD (chronic obstructive pulmonary disease) (HCC)    • Depression    • Fibromyalgia    • Gastroesophageal reflux    • GERD " (gastroesophageal reflux disease)    • Hematuria 2020   • Herniated disc, cervical 2015    C4-5, C5-6, C6-7   • High cholesterol    • Hyperlipemia    • Lung nodule 2020   • Rectal bleeding    • Seasonal allergies    • Shortness of breath    • Sleep apnea        Past Surgical History:   Procedure Laterality Date   • ABDOMINAL SURGERY  ,     C section   •  SECTION     • COLONOSCOPY     • CYSTOSCOPY  2020    Cystoscopy w/ Dr. Lundy   • ENDOSCOPY N/A 2022    Procedure: ESOPHAGOGASTRODUODENOSCOPY WITH BIOPSIES;  Surgeon: Oneal Raymond MD;  [facility] ENDOSCOPY;  Service: Gastroenterology;  Laterality: N/A;  HIATAL HERNIA, ESOPHAGITIS   • PILONIDAL CYSTECTOMY     • SQUAMOUS CELL CARCINOMA EXCISION      chest   • TUBAL ABDOMINAL LIGATION     • UPPER GASTROINTESTINAL ENDOSCOPY  2021         Current Outpatient Medications:   •  albuterol sulfate  (90 Base) MCG/ACT inhaler, Inhale 2 puffs Every 6 (Six) Hours As Needed for Wheezing., Disp: 18 g, Rfl: 3  •  atorvastatin (LIPITOR) 20 MG tablet, Take 1 tablet by mouth Every Night., Disp: 90 tablet, Rfl: 1  •  Cholecalciferol (Vitamin D3 Super Strength) 50 MCG (2000 UT) tablet, Take 1 tablet by mouth Daily., Disp: 90 each, Rfl: 0  •  diphenhydrAMINE (BENADRYL) 25 mg capsule, Take 50 mg by mouth At Night As Needed., Disp: , Rfl:   •  famotidine (PEPCID) 20 MG tablet, Take 1 tablet by mouth 2 (Two) Times a Day As Needed for Heartburn., Disp: 180 tablet, Rfl: 1  •  FLUoxetine (PROzac) 40 MG capsule, Take 2 capsules by mouth Daily. Patient due for follow up Nov  for future refills, Disp: 180 capsule, Rfl: 1  •  fluticasone (FLONASE) 50 MCG/ACT nasal spray, use 2 sprays in each nostril once daily, Disp: 16 g, Rfl: 2  •  guaiFENesin (MUCINEX) 600 MG 12 hr tablet, Take 1,200 mg by mouth 2 (Two) Times a Day., Disp: , Rfl:   •  ipratropium-albuterol (DUO-NEB) 0.5-2.5 mg/3 ml nebulizer, Take 3 mL by  nebulization 4 (Four) Times a Day., Disp: 360 mL, Rfl: 1  •  montelukast (SINGULAIR) 10 MG tablet, Take 1 tablet by mouth every night at bedtime., Disp: 30 tablet, Rfl: 5  •  pantoprazole (PROTONIX) 40 MG EC tablet, Take 1 tablet by mouth Daily., Disp: 90 tablet, Rfl: 1  •  Qvar RediHaler 80 MCG/ACT inhaler, Inhale 2 puffs 2 (Two) Times a Day. Then rinse mouth, Disp: 10.6 g, Rfl: 2  •  sodium chloride 3 % nebulizer solution, inhale contents of 1 vial via nebulizer TWICE DAILY, Disp: , Rfl:   •  Stiolto Respimat 2.5-2.5 MCG/ACT aerosol solution inhaler, Inhale 2 puffs Daily., Disp: 4 g, Rfl: 5  •  linaclotide (Linzess) 72 MCG capsule capsule, Take 1 capsule by mouth Every Morning Before Breakfast for 90 days., Disp: 90 capsule, Rfl: 1  •  PEG 3350-KCl-NaBcb-NaCl-NaSulf (Golytely) 227.1 g pack, Take 4,000 mL by mouth 1 (One) Time for 1 dose. Take as directed by the office for colon prep, Disp: 1 each, Rfl: 0  •  polyethylene glycol (MiraLax) 17 g packet, Take 17 g by mouth Daily., Disp: 30 each, Rfl: 6  •  sucralfate (Carafate) 1 g tablet, Take 1 tablet by mouth 4 (Four) Times a Day for 30 days., Disp: 120 tablet, Rfl: 2     Allergies   Allergen Reactions   • Aspirin Hives and Arrhythmia     Chest pain   • Cephalexin Hives   • Nsaids Hives   • Penicillins Hives   • Contrast Dye Rash       Family History   Problem Relation Age of Onset   • Colon cancer Mother 60        Pancreatic, breast   • Cancer Mother    • Diabetes Mother    • Breast cancer Mother         60s   • Arthritis Mother    • Pancreatic cancer Mother    • Irritable bowel syndrome Mother    • Heart disease Father    • Heart attack Father    • Arthritis Father    • Ulcerative colitis Father    • Cancer Sister    • Pancreatic cancer Sister    • Irritable bowel syndrome Sister    • Colon cancer Maternal Grandmother 70   • Colon cancer Other         60s   • Malig Hyperthermia Neg Hx         Social History     Social History Narrative   • Not on file  "      Objective       Vital Signs:   /75 (BP Location: Left arm, Patient Position: Sitting, Cuff Size: Adult)   Pulse 89   Ht 172.7 cm (67.99\")   Wt 88.5 kg (195 lb)   SpO2 95%   BMI 29.66 kg/m²       Physical Exam  Constitutional:       Appearance: Normal appearance.   HENT:      Head: Normocephalic.   Cardiovascular:      Rate and Rhythm: Normal rate and regular rhythm.      Heart sounds: Normal heart sounds.   Pulmonary:      Effort: Pulmonary effort is normal.      Breath sounds: Normal breath sounds.   Abdominal:      General: Bowel sounds are normal.      Palpations: Abdomen is soft.   Skin:     General: Skin is warm and dry.   Neurological:      Mental Status: She is alert and oriented to person, place, and time. Mental status is at baseline.   Psychiatric:         Mood and Affect: Mood normal.         Behavior: Behavior normal.         Thought Content: Thought content normal.         Judgment: Judgment normal.         Result Review :       CBC w/diff    CBC w/Diff 3/28/22   WBC 4.7   RBC 4.30   Hemoglobin 12.6   Hematocrit 37.7   MCV 88   MCH 29.3   MCHC 33.4   RDW 13.0   Platelets 359   Neutrophil Rel % 53   Lymphocyte Rel % 31   Monocyte Rel % 10   Eosinophil Rel % 5   Basophil Rel % 1           CMP    CMP 3/28/22   Glucose 88   BUN 17   Creatinine 0.73   Sodium 138   Potassium 4.3   Chloride 101   Calcium 8.8   Total Protein 6.4   Albumin 3.8   Globulin 2.6   Total Bilirubin 0.4   Alkaline Phosphatase 78   AST (SGOT) 16   ALT (SGPT) 11                   Assessment and Plan    Diagnoses and all orders for this visit:    1. Gastroesophageal reflux disease, unspecified whether esophagitis present (Primary)  -     famotidine (PEPCID) 20 MG tablet; Take 1 tablet by mouth 2 (Two) Times a Day As Needed for Heartburn.  Dispense: 180 tablet; Refill: 1  -     pantoprazole (PROTONIX) 40 MG EC tablet; Take 1 tablet by mouth Daily.  Dispense: 90 tablet; Refill: 1    2. Hiatal hernia  Comments:  Large hiatal " hernia noted on EGD 2/14/2022    3. Hematochezia    4. History of colon polyps    5. Family history of colon cancer  Comments:  mother in her 60s, maternal grandmother 70s, and maternal aunt 60s  Orders:  -     Genetic Testing - Blood,; Future    6. Family history of pancreatic cancer  Comments:  mother at 81 and sister at 54  Orders:  -     Genetic Testing - Blood,; Future    7. Family history of breast cancer  Comments:  mother in her 60s  Orders:  -     Genetic Testing - Blood,; Future    Other orders  -     sucralfate (Carafate) 1 g tablet; Take 1 tablet by mouth 4 (Four) Times a Day for 30 days.  Dispense: 120 tablet; Refill: 2  -     linaclotide (Linzess) 72 MCG capsule capsule; Take 1 capsule by mouth Every Morning Before Breakfast for 90 days.  Dispense: 90 capsule; Refill: 1  -     PEG 3350-KCl-NaBcb-NaCl-NaSulf (Golytely) 227.1 g pack; Take 4,000 mL by mouth 1 (One) Time for 1 dose. Take as directed by the office for colon prep  Dispense: 1 each; Refill: 0    67-year-old patient presents to the office for endoscopy follow-up regarding worsening heartburn.  Recent EGD showed large hiatal hernia and reflux esophagitis.  Patient reported daily heartburn even when taking pantoprazole 40 mg and Pepcid 20 mg twice daily.  Discussed Nissen fundoplication option, patient wishes to try weight loss and Carafate first before considering surgery.  Prescription for Carafate sent to pharmacy. Patient also reports significant constipation and blood in her stool.  Linzess 72 trial sent to pharmacy.  Advised patient proceed with colonoscopy, we will schedule at her earliest convenience.  Patient has a strong family history of colon cancer, pancreatic cancer, and breast cancer (ages and relatives listed above).  We discussed genetic testing and patient wishes to proceed, order for Ambry testing placed.  Patient is agreeable plan will call the office any questions or concerns.  I will follow-up with the patient in 2 months  to see how heartburn is managed if still persistent may consider referral for Nissen fundoplication.    COLONOSCOPY Surgical Risk and Benefits: Possible risk/complications, benefits, and alternatives to surgical or invasive procedure have been explained to patient and/or legal guardian. Risks include bleeding, infection, and perforation. Patient has been evaluated and can tolerate anesthesia and/or sedation. Risk, benefits, and alternatives to anesthesia and sedation have been explained to patient and/or legal guardian.     Follow Up   Return in about 2 months (around 9/19/2022) for GERD.  Patient was given instructions and counseling regarding her condition or for health maintenance advice. Please see specific information pulled into the AVS if appropriate.

## 2022-07-22 LAB
25(OH)D3+25(OH)D2 SERPL-MCNC: 33.6 NG/ML (ref 30–100)
ALBUMIN SERPL-MCNC: 4.2 G/DL (ref 3.8–4.8)
ALBUMIN/GLOB SERPL: 1.7 {RATIO} (ref 1.2–2.2)
ALP SERPL-CCNC: 86 IU/L (ref 44–121)
ALT SERPL-CCNC: 11 IU/L (ref 0–32)
APPEARANCE UR: CLEAR
AST SERPL-CCNC: 19 IU/L (ref 0–40)
BACTERIA #/AREA URNS HPF: NORMAL /[HPF]
BACTERIA UR CULT: ABNORMAL
BASOPHILS # BLD AUTO: 0.1 X10E3/UL (ref 0–0.2)
BASOPHILS NFR BLD AUTO: 1 %
BILIRUB SERPL-MCNC: 0.3 MG/DL (ref 0–1.2)
BILIRUB UR QL STRIP: NEGATIVE
BUN SERPL-MCNC: 16 MG/DL (ref 8–27)
BUN/CREAT SERPL: 21 (ref 12–28)
CALCIUM SERPL-MCNC: 9.2 MG/DL (ref 8.7–10.3)
CASTS URNS QL MICRO: NORMAL /LPF
CHLORIDE SERPL-SCNC: 100 MMOL/L (ref 96–106)
CHOLEST SERPL-MCNC: 187 MG/DL (ref 100–199)
CK SERPL-CCNC: 66 U/L (ref 32–182)
CO2 SERPL-SCNC: 26 MMOL/L (ref 20–29)
COLOR UR: YELLOW
CREAT SERPL-MCNC: 0.76 MG/DL (ref 0.57–1)
EGFRCR SERPLBLD CKD-EPI 2021: 87 ML/MIN/1.73
EOSINOPHIL # BLD AUTO: 0.3 X10E3/UL (ref 0–0.4)
EOSINOPHIL NFR BLD AUTO: 6 %
EPI CELLS #/AREA URNS HPF: NORMAL /HPF (ref 0–10)
ERYTHROCYTE [DISTWIDTH] IN BLOOD BY AUTOMATED COUNT: 12.3 % (ref 11.7–15.4)
GLOBULIN SER CALC-MCNC: 2.5 G/DL (ref 1.5–4.5)
GLUCOSE SERPL-MCNC: 89 MG/DL (ref 65–99)
GLUCOSE UR QL STRIP: NEGATIVE
HCT VFR BLD AUTO: 39.7 % (ref 34–46.6)
HDLC SERPL-MCNC: 62 MG/DL
HGB BLD-MCNC: 12.9 G/DL (ref 11.1–15.9)
HGB UR QL STRIP: NEGATIVE
IMM GRANULOCYTES # BLD AUTO: 0 X10E3/UL (ref 0–0.1)
IMM GRANULOCYTES NFR BLD AUTO: 0 %
KETONES UR QL STRIP: NEGATIVE
LDLC SERPL CALC-MCNC: 113 MG/DL (ref 0–99)
LDLC/HDLC SERPL: 1.8 RATIO (ref 0–3.2)
LEUKOCYTE ESTERASE UR QL STRIP: ABNORMAL
LYMPHOCYTES # BLD AUTO: 1.3 X10E3/UL (ref 0.7–3.1)
LYMPHOCYTES NFR BLD AUTO: 30 %
MCH RBC QN AUTO: 28.7 PG (ref 26.6–33)
MCHC RBC AUTO-ENTMCNC: 32.5 G/DL (ref 31.5–35.7)
MCV RBC AUTO: 88 FL (ref 79–97)
MICRO URNS: ABNORMAL
MONOCYTES # BLD AUTO: 0.5 X10E3/UL (ref 0.1–0.9)
MONOCYTES NFR BLD AUTO: 11 %
NEUTROPHILS # BLD AUTO: 2.4 X10E3/UL (ref 1.4–7)
NEUTROPHILS NFR BLD AUTO: 52 %
NITRITE UR QL STRIP: NEGATIVE
OTHER ANTIBIOTIC SUSC ISLT: ABNORMAL
PH UR STRIP: 6.5 [PH] (ref 5–7.5)
PLATELET # BLD AUTO: 398 X10E3/UL (ref 150–450)
POTASSIUM SERPL-SCNC: 4.5 MMOL/L (ref 3.5–5.2)
PROT SERPL-MCNC: 6.7 G/DL (ref 6–8.5)
PROT UR QL STRIP: NEGATIVE
RBC # BLD AUTO: 4.49 X10E6/UL (ref 3.77–5.28)
RBC #/AREA URNS HPF: NORMAL /HPF (ref 0–2)
SODIUM SERPL-SCNC: 139 MMOL/L (ref 134–144)
SP GR UR STRIP: 1.01 (ref 1–1.03)
TRIGL SERPL-MCNC: 65 MG/DL (ref 0–149)
URINALYSIS REFLEX: ABNORMAL
UROBILINOGEN UR STRIP-MCNC: 0.2 MG/DL (ref 0.2–1)
VLDLC SERPL CALC-MCNC: 12 MG/DL (ref 5–40)
WBC # BLD AUTO: 4.5 X10E3/UL (ref 3.4–10.8)
WBC #/AREA URNS HPF: NORMAL /HPF (ref 0–5)

## 2022-08-04 DIAGNOSIS — E55.9 VITAMIN D DEFICIENCY: ICD-10-CM

## 2022-08-05 ENCOUNTER — OFFICE VISIT (OUTPATIENT)
Dept: BEHAVIORAL HEALTH | Facility: CLINIC | Age: 67
End: 2022-08-05

## 2022-08-05 VITALS
OXYGEN SATURATION: 98 % | DIASTOLIC BLOOD PRESSURE: 60 MMHG | HEIGHT: 68 IN | WEIGHT: 193.4 LBS | BODY MASS INDEX: 29.31 KG/M2 | SYSTOLIC BLOOD PRESSURE: 110 MMHG | HEART RATE: 92 BPM

## 2022-08-05 DIAGNOSIS — F41.1 GENERALIZED ANXIETY DISORDER: ICD-10-CM

## 2022-08-05 DIAGNOSIS — F33.1 MAJOR DEPRESSIVE DISORDER, RECURRENT EPISODE, MODERATE: Primary | ICD-10-CM

## 2022-08-05 DIAGNOSIS — F43.21 GRIEF AT LOSS OF CHILD: ICD-10-CM

## 2022-08-05 DIAGNOSIS — Z63.4 GRIEF AT LOSS OF CHILD: ICD-10-CM

## 2022-08-05 PROCEDURE — 90792 PSYCH DIAG EVAL W/MED SRVCS: CPT | Performed by: NURSE PRACTITIONER

## 2022-08-05 RX ORDER — CHOLECALCIFEROL (VITAMIN D3) 125 MCG
CAPSULE ORAL
Qty: 30 TABLET | Refills: 0 | Status: SHIPPED | OUTPATIENT
Start: 2022-08-05 | End: 2022-10-07 | Stop reason: SDUPTHER

## 2022-08-05 SDOH — SOCIAL STABILITY - SOCIAL INSECURITY: DISSAPEARANCE AND DEATH OF FAMILY MEMBER: Z63.4

## 2022-08-05 NOTE — PROGRESS NOTES
"Subjective   Jennifer Shelton is a 67 y.o. female who presents today for initial evaluation     Referring Provider:  Tessy Serrano, PA  [address] [place] [address]    Chief Complaint:  Depression, prolonged grief    History of Present Illness: Patient presents today in office with a history of depression and anxiety. Patient began treatment in her 30's.  Patient suffers from several comorbidity's including Fibromyalgia, COPD, C-spine stenosis, DDD, and heart arrhythmias.  Patient son committed suicide 3/11/21 and patient has not been doing well mentally since death.  Patient stopped medications for a few years due to lose of job in 2016 and filed for disability which was approved quickly, and due to not having insurance for 2 yrs while awaiting medicare did not take medications.  Patient has been taking Prozac 80 mg for the last 3-4 months, as patient had been on dose prior to 2016 and took Prozac for at least 20 yrs, although restarted Prozac last year after son's death.  Patient reports losing 55 lbs from 2211-1710 and is slowing regaining weight.  Patient has now picked up 40 lbs.     Patient admits to having fleeting suicidal thoughts and denies further action plan nor rumination of thoughts.     Depression: Patient complains of depression. She complains of anhedonia, depressed mood, fatigue, feelings of worthlessness/guilt, hopelessness, insomnia and suicidal thoughts without plan     Patient endorses to \"I don't care about anything anymore\", difficulty with motivation, unable to complete some tasks due to back pain, no longer is concerned of how she looks.  Patient does get out of bed every day, will sit on couch, and take a 2 hr nap in the bed, then return to couch, and goes to bed early. \"I always feel at peace when I am asleep.\"  Patient support system includes daughter though has been ill with hospital care, and spouse.   Patient is dependent upon Benadryl 2-3 caps (50-75 mg) which is " taken nightly, minimal night time awakenings.  Admits to taking for several years. Denies other trials of sleep medications.      Patient currently taking 40 mg of Prozac every morning and night, does not take 80 mg together. Patient denies taking 80 mg all at once since dose increased a few months ago.  Admits to having sleep issues for many years.   Reports the 80 mg was working initially though has now plateau.  Confirmed patient was switched from 40 to 80 mg, did not increase to 60 mg.  Patient uncertain for reasoning of splitting the 80 mg dose.     Patient does have a large hiatal hernia, and believes chest discomfort is due to this and is currently trying to lose weight to avoid surgery.  Reports heart problems were ruled out in the past.         PHQ-9 Depression Screening  PHQ-9 Total Score: 17    Little interest or pleasure in doing things? 3-->nearly every day   Feeling down, depressed, or hopeless? 3-->nearly every day   Trouble falling or staying asleep, or sleeping too much? 3-->nearly every day   Feeling tired or having little energy? 3-->nearly every day   Poor appetite or overeating? 1-->several days   Feeling bad about yourself - or that you are a failure or have let yourself or your family down? 2-->more than half the days   Trouble concentrating on things, such as reading the newspaper or watching television? 1-->several days   Moving or speaking so slowly that other people could have noticed? Or the opposite - being so fidgety or restless that you have been moving around a lot more than usual? 0-->not at all   Thoughts that you would be better off dead, or of hurting yourself in some way? 1-->several days   PHQ-9 Total Score 17     JOVANNI-7  Feeling nervous, anxious or on edge: More than half the days  Not being able to stop or control worrying: Nearly every day  Worrying too much about different things: Nearly every day  Trouble Relaxing: Nearly every day  Being so restless that it is hard to sit  still: Several days  Feeling afraid as if something awful might happen: Several days  Becoming easily annoyed or irritable: Several days  JOVANNI 7 Total Score: 14  If you checked any problems, how difficult have these problems made it for you to do your work, take care of things at home, or get along with other people: Somewhat difficult    Past Surgical History:  Past Surgical History:   Procedure Laterality Date   • ABDOMINAL SURGERY  ,     C section   •  SECTION     • COLONOSCOPY     • CYSTOSCOPY  2020    Cystoscopy w/ Dr. Lundy   • ENDOSCOPY N/A 2022    Procedure: ESOPHAGOGASTRODUODENOSCOPY WITH BIOPSIES;  Surgeon: Oneal Raymond MD;  Location: [facility] ENDOSCOPY;  Service: Gastroenterology;  Laterality: N/A;  HIATAL HERNIA, ESOPHAGITIS   • PILONIDAL CYSTECTOMY     • SQUAMOUS CELL CARCINOMA EXCISION      chest   • TUBAL ABDOMINAL LIGATION     • UPPER GASTROINTESTINAL ENDOSCOPY  2021       Problem List:  Patient Active Problem List   Diagnosis   • Accelerated junctional rhythm   • Allergic to aspirin   • Arthritis   • Cervical spondylosis without myelopathy   • Chest pain   • Chronic obstructive pulmonary disease (HCC)   • Depression   • Ectopic atrial rhythm   • Hematuria   • Hyperlipemia   • Neck pain   • Fibromyalgia   • Other and unspecified disc disorder of cervical region   • Cervical radiculopathy   • Cervical spinal stenosis   • Other dyspnea and respiratory abnormality   • Rectal bleeding   • Seasonal allergic rhinitis   • Shortness of breath   • Lung mass   • Squamous cell skin cancer   • Irritable bowel syndrome with constipation   • Hematochezia   • Gastroesophageal reflux disease   • Hiatal hernia   • Family history of colon cancer       Allergy:   Allergies   Allergen Reactions   • Aspirin Hives and Arrhythmia     Chest pain   • Cephalexin Hives   • Nsaids Hives   • Penicillins Hives   • Contrast Dye Rash        Discontinued Medications:  There are no  discontinued medications.    Current Medications:   Current Outpatient Medications   Medication Sig Dispense Refill   • albuterol sulfate  (90 Base) MCG/ACT inhaler Inhale 2 puffs Every 6 (Six) Hours As Needed for Wheezing. 18 g 3   • atorvastatin (LIPITOR) 20 MG tablet Take 1 tablet by mouth Every Night. 90 tablet 1   • Cholecalciferol (Vitamin D3) 50 MCG (2000 UT) tablet TAKE 1 TABLET BY MOUTH DAILY. 30 tablet 0   • diphenhydrAMINE (BENADRYL) 25 mg capsule Take 50 mg by mouth At Night As Needed.     • famotidine (PEPCID) 20 MG tablet Take 1 tablet by mouth 2 (Two) Times a Day As Needed for Heartburn. 180 tablet 1   • FLUoxetine (PROzac) 40 MG capsule Take 2 capsules by mouth Daily. Patient due for follow up Nov 1 for future refills 180 capsule 1   • fluticasone (FLONASE) 50 MCG/ACT nasal spray use 2 sprays in each nostril once daily 16 g 2   • guaiFENesin (MUCINEX) 600 MG 12 hr tablet Take 1,200 mg by mouth Daily.     • ipratropium-albuterol (DUO-NEB) 0.5-2.5 mg/3 ml nebulizer Take 3 mL by nebulization 4 (Four) Times a Day. 360 mL 1   • linaclotide (Linzess) 72 MCG capsule capsule Take 1 capsule by mouth Every Morning Before Breakfast for 90 days. 90 capsule 1   • montelukast (SINGULAIR) 10 MG tablet Take 1 tablet by mouth every night at bedtime. 30 tablet 5   • pantoprazole (PROTONIX) 40 MG EC tablet Take 1 tablet by mouth Daily. 90 tablet 1   • Qvar RediHaler 80 MCG/ACT inhaler Inhale 2 puffs 2 (Two) Times a Day. Then rinse mouth 10.6 g 2   • sodium chloride 3 % nebulizer solution inhale contents of 1 vial via nebulizer TWICE DAILY     • Stiolto Respimat 2.5-2.5 MCG/ACT aerosol solution inhaler Inhale 2 puffs Daily. 4 g 5   • sucralfate (Carafate) 1 g tablet Take 1 tablet by mouth 4 (Four) Times a Day for 30 days. 120 tablet 2     No current facility-administered medications for this visit.       Past Medical History:  Past Medical History:   Diagnosis Date   • Anesthesia complication     heart rate  slowed   • Anxiety    • Arthritis    • Cancer ([facility])     skin cancer   • Cervical spinal stenosis 2016   • Cervical spondylosis without myelopathy    • Cervicalgia 2015   • Chronic pain disorder    • COPD (chronic obstructive pulmonary disease) ([facility])    • Depression    • Fibromyalgia    • Gastroesophageal reflux    • GERD (gastroesophageal reflux disease)    • Hematuria 2020   • Herniated disc, cervical 2015    C4-5, C5-6, C6-7   • High cholesterol    • Hyperlipemia    • Lung nodule 2020   • Rectal bleeding    • Seasonal allergies    • Seizures ([facility])    • Shortness of breath    • Sleep apnea        Past Psychiatric History:  Began Treatment:while in her 30's  Diagnoses:Depression and Anxiety  Psychiatrist:not since early on while in   Therapist:not in years  Admission History:Denies  Medication Trials:Amitriptyline 100 qhs-stopped 10/2021 helped with depression,tired,forgetful (for fibro),Cymbalta (for fibro) 60 qd,stopped 10/2021 helped but Prozac was better & copayment high on Cymbalta; years ago took Buspar-unable to recall why stopped taking; Zoloft before start of Prozac (taking Prozac for 20 yrs with a few yr of no medications due to loss of insurance, restarted )   Self Harm: Denies  Suicide Attempts:Denies   Psychosis, Anxiety, Depression: Denies    Substance Abuse History:   Types:Denies all, including illicit  Withdrawal Symptoms:Denies  Longest Period Sober:Not Applicable   AA: Not applicable     Social History:  Martial Status:  Employed:No retired, was disabled  Kids:Yes or If so, how many SonOlivier,  age 24(SA 3/11/21); and younger son Brian age 24, older dtr [address] [place]  House:Lives in a house   History: Denies  Access to Guns:  Yes, not locked    Social History     Socioeconomic History   • Marital status:    Tobacco Use   • Smoking status: Former Smoker     Packs/day: 1.50     Quit date: 2020     Years since quitting:  1.7   • Smokeless tobacco: Never Used   • Tobacco comment: Quit 2020   Vaping Use   • Vaping Use: Never used   Substance and Sexual Activity   • Alcohol use: Not Currently   • Drug use: Never   • Sexual activity: Yes     Partners: Male       Family History:   Suicide Attempts: son 3/11/21  Suicide Completions:son 3/11/21      Family History   Problem Relation Age of Onset   • Colon cancer Mother 60        Pancreatic, breast   • Cancer Mother    • Diabetes Mother    • Breast cancer Mother         60s   • Arthritis Mother    • Pancreatic cancer Mother    • Irritable bowel syndrome Mother    • Heart disease Father    • Heart attack Father    • Arthritis Father    • Ulcerative colitis Father    • Depression Sister    • Anxiety disorder Sister    • Cancer Sister    • Pancreatic cancer Sister    • Irritable bowel syndrome Sister    • Bipolar disorder Maternal Aunt    • Colon cancer Maternal Grandmother 70   • Seizures Son    • Depression Son    • Suicide Attempts Son    • Depression Son    • ADD / ADHD Son    • Colon cancer Other         60s   • Depression Daughter    • Malig Hyperthermia Neg Hx        Developmental History:   Born: [place]  Siblings:1 sister ()  Childhood: Denies Abuse  High School:Completed  College:some college    Mental Status Exam:   Hygiene:   good  Cooperation:  Cooperative  Eye Contact:  Good  Psychomotor Behavior:  Appropriate  Affect:  Appropriate  Mood: depressed and anxious  Speech:  Normal  Thought Process:  Goal directed  Thought Content:  Mood congruent  Suicidal:  None  Homicidal:  None  Hallucinations:  None  Delusion:  None  Memory:  Intact  Orientation:  Grossly intact  Reliability:  good  Insight:  Good  Judgement:  Good  Impulse Control:  Good  Physical/Medical Issues:  Yes seasonal allergies,HLD,Heart arrthymias,COPD,GERD,Hiatal Hernia lg., Fibromyalgia, C-spine stenosis, DDD, Arthritis     Review of Systems:  Review of Systems   Constitutional: Positive for fatigue. Negative  "for diaphoresis.   HENT: Negative for drooling.    Eyes: Negative for visual disturbance.   Respiratory: Negative for cough and shortness of breath.    Cardiovascular: Negative for chest pain, palpitations and leg swelling.   Gastrointestinal: Negative for nausea and vomiting.   Endocrine: Negative for cold intolerance and heat intolerance.   Genitourinary: Negative for difficulty urinating.   Musculoskeletal: Negative for joint swelling.   Allergic/Immunologic: Negative for immunocompromised state.   Neurological: Negative for dizziness, seizures, speech difficulty and numbness.   Psychiatric/Behavioral: Positive for decreased concentration and sleep disturbance. Negative for hallucinations, self-injury and suicidal ideas. The patient is nervous/anxious. The patient is not hyperactive.          Physical Exam:  Physical Exam  Psychiatric:         Attention and Perception: Attention and perception normal.         Mood and Affect: Mood is anxious and depressed. Affect is tearful.         Speech: Speech normal.         Behavior: Behavior normal. Behavior is cooperative.         Thought Content: Thought content normal. Thought content does not include suicidal ideation. Thought content does not include suicidal plan.         Cognition and Memory: Cognition and memory normal.         Judgment: Judgment normal.         Vital Signs:   /60   Pulse 92   Ht 172.7 cm (67.99\")   Wt 87.7 kg (193 lb 6.4 oz)   SpO2 98%   BMI 29.42 kg/m²      Lab Results:   Telemedicine on 07/08/2022   Component Date Value Ref Range Status   • WBC 07/18/2022 4.5  3.4 - 10.8 x10E3/uL Final   • RBC 07/18/2022 4.49  3.77 - 5.28 x10E6/uL Final   • Hemoglobin 07/18/2022 12.9  11.1 - 15.9 g/dL Final   • Hematocrit 07/18/2022 39.7  34.0 - 46.6 % Final   • MCV 07/18/2022 88  79 - 97 fL Final   • MCH 07/18/2022 28.7  26.6 - 33.0 pg Final   • MCHC 07/18/2022 32.5  31.5 - 35.7 g/dL Final   • RDW 07/18/2022 12.3  11.7 - 15.4 % Final   • Platelets " 07/18/2022 398  150 - 450 x10E3/uL Final   • Neutrophil Rel % 07/18/2022 52  Not Estab. % Final   • Lymphocyte Rel % 07/18/2022 30  Not Estab. % Final   • Monocyte Rel % 07/18/2022 11  Not Estab. % Final   • Eosinophil Rel % 07/18/2022 6  Not Estab. % Final   • Basophil Rel % 07/18/2022 1  Not Estab. % Final   • Neutrophils Absolute 07/18/2022 2.4  1.4 - 7.0 x10E3/uL Final   • Lymphocytes Absolute 07/18/2022 1.3  0.7 - 3.1 x10E3/uL Final   • Monocytes Absolute 07/18/2022 0.5  0.1 - 0.9 x10E3/uL Final   • Eosinophils Absolute 07/18/2022 0.3  0.0 - 0.4 x10E3/uL Final   • Basophils Absolute 07/18/2022 0.1  0.0 - 0.2 x10E3/uL Final   • Immature Granulocyte Rel % 07/18/2022 0  Not Estab. % Final   • Immature Grans Absolute 07/18/2022 0.0  0.0 - 0.1 x10E3/uL Final   • Glucose 07/18/2022 89  65 - 99 mg/dL Final   • BUN 07/18/2022 16  8 - 27 mg/dL Final   • Creatinine 07/18/2022 0.76  0.57 - 1.00 mg/dL Final   • EGFR Result 07/18/2022 87  >59 mL/min/1.73 Final   • BUN/Creatinine Ratio 07/18/2022 21  12 - 28 Final   • Sodium 07/18/2022 139  134 - 144 mmol/L Final   • Potassium 07/18/2022 4.5  3.5 - 5.2 mmol/L Final   • Chloride 07/18/2022 100  96 - 106 mmol/L Final   • Total CO2 07/18/2022 26  20 - 29 mmol/L Final   • Calcium 07/18/2022 9.2  8.7 - 10.3 mg/dL Final   • Total Protein 07/18/2022 6.7  6.0 - 8.5 g/dL Final   • Albumin 07/18/2022 4.2  3.8 - 4.8 g/dL Final   • Globulin 07/18/2022 2.5  1.5 - 4.5 g/dL Final   • A/G Ratio 07/18/2022 1.7  1.2 - 2.2 Final   • Total Bilirubin 07/18/2022 0.3  0.0 - 1.2 mg/dL Final   • Alkaline Phosphatase 07/18/2022 86  44 - 121 IU/L Final   • AST (SGOT) 07/18/2022 19  0 - 40 IU/L Final   • ALT (SGPT) 07/18/2022 11  0 - 32 IU/L Final   • Creatine Kinase 07/18/2022 66  32 - 182 U/L Final   • Total Cholesterol 07/18/2022 187  100 - 199 mg/dL Final   • Triglycerides 07/18/2022 65  0 - 149 mg/dL Final   • HDL Cholesterol 07/18/2022 62  >39 mg/dL Final   • VLDL Cholesterol Ryan 07/18/2022 12  5  - 40 mg/dL Final   • LDL Chol Calc ([facility]) 07/18/2022 113 (A) 0 - 99 mg/dL Final   • LDL/HDL RATIO 07/18/2022 1.8  0.0 - 3.2 ratio Final    Comment:                                     LDL/HDL Ratio                                              Men  Women                                1/2 Avg.Risk  1.0    1.5                                    Avg.Risk  3.6    3.2                                 2X Avg.Risk  6.2    5.0                                 3X Avg.Risk  8.0    6.1     • 25 Hydroxy, Vitamin D 07/18/2022 33.6  30.0 - 100.0 ng/mL Final    Comment: Vitamin D deficiency has been defined by the [place] of  Medicine and an Endocrine Society practice guideline as a  level of serum 25-OH vitamin D less than 20 ng/mL (1,2).  The Endocrine Society went on to further define vitamin D  insufficiency as a level between 21 and 29 ng/mL (2).  1. IOM ([place] of Medicine). 2010. Dietary reference     intakes for calcium and D. [place]: The     National Academies Press.  2. Edgardo MF, Boy KRISHNA, Keon ERWIN, et al.     Evaluation, treatment, and prevention of vitamin D     deficiency: an Endocrine Society clinical practice     guideline. JCEM. 2011 Jul; 96(7):1911-30.     • Specific [place], UA 07/18/2022 1.015  1.005 - 1.030 Final   • pH, UA 07/18/2022 6.5  5.0 - 7.5 Final   • Color, UA 07/18/2022 Yellow  Yellow Final   • Appearance, UA 07/18/2022 Clear  Clear Final   • Leukocytes, UA 07/18/2022 2+ (A) Negative Final   • Protein 07/18/2022 Negative  Negative/Trace Final   • Glucose, UA 07/18/2022 Negative  Negative Final   • Ketones 07/18/2022 Negative  Negative Final   • Blood, UA 07/18/2022 Negative  Negative Final   • Bilirubin, UA 07/18/2022 Negative  Negative Final   • Urobilinogen, UA 07/18/2022 0.2  0.2 - 1.0 mg/dL Final   • Nitrite, UA 07/18/2022 Negative  Negative Final   • Microscopic Examination 07/18/2022 See below:   Final    Microscopic was indicated and was performed.   • Urinalysis Reflex  07/18/2022 Comment   Final    This specimen has reflexed to a Urine Culture.   • WBC, UA 07/18/2022 0-5  0 - 5 /hpf Final   • RBC, UA 07/18/2022 0-2  0 - 2 /hpf Final   • Epithelial Cells (non renal) 07/18/2022 0-10  0 - 10 /hpf Final   • Casts 07/18/2022 None seen  None seen /lpf Final   • Bacteria, UA 07/18/2022 Few  None seen/Few Final   • Urine Culture 07/18/2022 Final report (A)  Final   • Result 1 07/18/2022 Raoultella planticola (A)  Final    600  Colonies/mL   • Result 2 07/18/2022 Comment   Final    Comment: Mixed urogenital norma  25,000-50,000 colony forming units per mL     • Susceptibility Testing 07/18/2022 Comment   Final    Comment:       ** S = Susceptible; I = Intermediate; R = Resistant **                     P = Positive; N = Negative              MICS are expressed in micrograms per mL     Antibiotic                 RSLT#1    RSLT#2    RSLT#3    RSLT#4  Amoxicillin/Clavulanic Acid    S<=2  Ampicillin                     R>=32  Cefepime                       S<=0.12  Ceftriaxone                    S<=0.25  Cefuroxime                     S<=1  Ciprofloxacin                  S<=0.25  Gentamicin                     S<=1  Imipenem                       S<=0.25  Meropenem                      S<=0.25  Nitrofurantoin                 S<=16  Tetracycline                   S<=1  Tobramycin                     S<=1  Trimethoprim/Sulfa             S<=20     Admission on 02/14/2022, Discharged on 02/14/2022   Component Date Value Ref Range Status   • Case Report 02/14/2022    Final                    Value:Surgical Pathology Report                         Case: [id number]                                  Authorizing Provider:  Oneal Raymond MD  Collected:           02/14/2022 11:37 AM          Ordering Location:     [facility] ANGY Received:            02/14/2022 12:01 PM                                 SUITES                                                                        Pathologist:           Jalen Fernandez MD                                                            Specimens:   1) - Gastric, Antrum, ANTRUM BX                                                                     2) - GE Junction, GE JUNCTION BX                                                          • Clinical Information 02/14/2022    Final    This result contains rich text formatting which cannot be displayed here.   • Final Diagnosis 02/14/2022    Final                    Value:This result contains rich text formatting which cannot be displayed here.   • Gross Description 02/14/2022    Final                    Value:This result contains rich text formatting which cannot be displayed here.   • Microscopic Description 02/14/2022    Final    This result contains rich text formatting which cannot be displayed here.       EKG Results:  No orders to display       Imaging Results:  No Images in the past 120 days found..      Assessment & Plan   Diagnoses and all orders for this visit:    1. Major depressive disorder, recurrent episode, moderate (HCC) (Primary)    2. Generalized anxiety disorder    3. Grief at loss of child        Visit Diagnoses:    ICD-10-CM ICD-9-CM   1. Major depressive disorder, recurrent episode, moderate (HCC)  F33.1 296.32   2. Generalized anxiety disorder  F41.1 300.02   3. Grief at loss of child  F43.21 309.0    Z63.4        PLAN:  1. Safety: No acute safety concerns  2. Therapy: agreeable, however, due to insurance and not wishing to travel to [place] or wait several months; advised pt to contact insurance company for therapist in area that accept insurance, pt to contact provider if a referral order is needed with the name of facility or therapist   3. Risk Assessment: Risk of self-harm acutely is moderate.  Risk factors include anxiety disorder, mood disorder, family history, access to guns/weaponsand recent psychosocial stressors (pandemic). Protective factors include no present  SI, no history of suicide attempts or self-harm in the past, minimal AODA, healthcare seeking, future orientation, willingness to engage in care.  Risk of self-harm chronically is also moderate, but could be further elevated in the event of treatment noncompliance and/or AODA.  4. Meds: Change Prozac from 40 mg twice daily (patient reported taking, not prescribed for split dosing) TO 80 mg by mouth daily in the morning to target anxiety and depression.  Discussed all risks, benefits, alternatives, and side effects of Fluoxetine including but not limited to GI upset, decreased appetite, sexual dysfunction, bleeding risk, seizure risk, insomnia, anxiety, drowsiness, headache, tremor, nervousness, activation of lizz or hypomania, increased fragility fracture risk, hyponatremia, ocular effects, serotonin syndrome, hypersensitivity reaction, and activation of suicidal ideation and behavior.  Patient educated on the need to practice safe sex while taking this medication. Discussed the need for patient to immediately call the office for any new or worsening symptoms, such as worsening depression; feeling nervous or restless; suicidal thoughts or actions; or other changes in mood or behavior, and all other concerns. Patient educated on medication compliance.  Patient verbalized understanding and is agreeable to taking Fluoxetine. Addressed all questions and concerns. Advised patient to contact provider if experiences worsen mood, increased heart rate, irritability, and provider will change to 60 mg.    5. Labs: n/a      Patient presentation seems most consistent with depression and anxiety with prolonged grief from death of son last year in 3/2022 per suicide.  Patient was increased to 80 mg of Prozac a few months ago, which had been effective.  Discussed various options for treatment, though due to potential D2D interactions and failure of 2 SSRI's including Prozac if changed to another medication the insurance may  "require step therapy, which patient was advised to check while speaking with representative from insurance or online site. Patient instructed to check coverage for Trintellix and Viibryd and ask if \"step therapy\" was a requirement.  Will see patient back in 4 weeks.   Patient to contact provider if symptoms worsen or fail to improve.     Patient screened positive for depression based on a PHQ-9 score of 17 on 8/5/2022. Follow-up recommendations include: Prescribed antidepressant medication treatment and Suicide Risk Assessment performed.          TREATMENT PLAN/GOALS: Continue supportive psychotherapy efforts and medications as indicated. Treatment and medication options discussed during today's visit. Patient acknowledged and verbally consented to continue with current treatment plan and was educated on the importance of compliance with treatment and follow-up appointments.    MEDICATION ISSUES:  EMILIA reviewed as expected. MANUAL EMILIA 8/5/22 AT 0735  #[id number] RECORDS FOUND.   Discussed medication options and treatment plan of prescribed medication as well as the risks, benefits, and side effects including potential falls, possible impaired driving and metabolic adversities among others. Patient is agreeable to call the office with any worsening of symptoms or onset of side effects. Patient is agreeable to call 911 or go to the nearest ER should he/she begin having SI/HI. No medication side effects or related complaints today.     MEDS ORDERED DURING VISIT:  No orders of the defined types were placed in this encounter.      Return in about 4 weeks (around 9/2/2022) for Next scheduled follow up.         I spent 75 minutes caring for Jennifer on this date of service. This time includes time spent by me in the following activities: preparing for the visit, reviewing tests, obtaining and/or reviewing a separately obtained history, performing a medically appropriate examination and/or evaluation, counseling and " educating the patient/family/caregiver, referring and communicating with other health care professionals, documenting information in the medical record and care coordination.      This document has been electronically signed by RESHMA Farias  August 5, 2022 16:50 EDT      Part of this note may be an electronic transcription/translation of spoken language to printed text using the Dragon Dictation System.

## 2022-08-05 NOTE — PATIENT INSTRUCTIONS
"1.  Please return to clinic at your next scheduled visit.  Contact the clinic ([phone]) at least 24 hours prior in the event you need to cancel.  2.  Do no harm to yourself or others.    3.  Avoid alcohol and drugs.    4.  Take all medications as prescribed.  Please contact the clinic with any concerns. If you are in need of medication refills, please call the clinic at [phone].    5. Should you want to get in touch with your provider, RESHMA Farias, please utilize [employer] or contact the office ([phone]), and staff will be able to page the provider on call directly.  6.  In the event you have personal crisis, contact the following crisis numbers: Suicide Prevention Hotline 1-274.445.6527; [facility] Helpline 1-113-603-RESZ; [facility] Emergency Room [phone]; text HELLO to 647764; or 660.  7.  Please feel free to contact my Medical Assistant, Ting, directly at [phone], please leave a voice mail if you do not get an answer, and she will return your call within 24 hrs.      SPECIFIC RECOMMENDATIONS:     1.      Medications discussed at this encounter:                   - Change Prozac to 80 mg by mouth every morning, if you experience increased irritabilty, heart racing, feeling shaky, please contact me on Monday and then we will plan on switching to only 60 mg     Look on insurance website for coverage of other medications such as: Trintellix, Viibryd, also see if there is a \"step therapy\" requirement for any of these medications.      2.      Psychotherapy recommendations: Contact insurance company to determine list of therapist in area that accept your insurance, then call to set up appointment. If a referral is needed please call Ting, my medical assistant, to inform and to include name of therapist so we can send.      3.     Return to clinic: 4 weeks    Please arrive at least 15 minutes before your scheduled appointment time to complete check in process.      "

## 2022-08-10 ENCOUNTER — PATIENT ROUNDING (BHMG ONLY) (OUTPATIENT)
Dept: PSYCHIATRY | Facility: CLINIC | Age: 67
End: 2022-08-10

## 2022-08-10 NOTE — PROGRESS NOTES
"August 10, 2022    Hello, may I speak with Jennifer Shelton?    My name is Ting Funes      I am  with [facility]  [facility]  [address]  [place] [address] [phone].    Before we get started may I verify your date of birth? 1955    I am calling to officially welcome you to our practice and ask about your recent visit. Is this a good time to talk? yes    Tell me about your visit with us. What things went well?  \"everything\"       We're always looking for ways to make our patients' experiences even better. Do you have recommendations on ways we may improve? no    Overall were you satisfied with your first visit to our practice? yes       I appreciate you taking the time to speak with me today. Is there anything else I can do for you? no      Thank you, and have a great day.      "

## 2022-08-18 RX ORDER — FLUTICASONE PROPIONATE 50 MCG
2 SPRAY, SUSPENSION (ML) NASAL DAILY
Qty: 16 G | Refills: 2 | Status: SHIPPED | OUTPATIENT
Start: 2022-08-18 | End: 2022-08-31 | Stop reason: SDUPTHER

## 2022-08-29 DIAGNOSIS — J44.9 CHRONIC OBSTRUCTIVE PULMONARY DISEASE, UNSPECIFIED COPD TYPE: ICD-10-CM

## 2022-08-29 RX ORDER — TIOTROPIUM BROMIDE AND OLODATEROL 3.124; 2.736 UG/1; UG/1
2 SPRAY, METERED RESPIRATORY (INHALATION) DAILY
Qty: 4 G | Refills: 5 | Status: CANCELLED | OUTPATIENT
Start: 2022-08-29

## 2022-08-30 ENCOUNTER — TELEPHONE (OUTPATIENT)
Dept: FAMILY MEDICINE CLINIC | Facility: CLINIC | Age: 67
End: 2022-08-30

## 2022-08-30 ENCOUNTER — OFFICE VISIT (OUTPATIENT)
Dept: FAMILY MEDICINE CLINIC | Facility: CLINIC | Age: 67
End: 2022-08-30

## 2022-08-30 VITALS
TEMPERATURE: 98 F | DIASTOLIC BLOOD PRESSURE: 80 MMHG | SYSTOLIC BLOOD PRESSURE: 130 MMHG | HEART RATE: 78 BPM | OXYGEN SATURATION: 95 % | RESPIRATION RATE: 18 BRPM

## 2022-08-30 DIAGNOSIS — U07.1 COVID-19 VIRUS INFECTION: ICD-10-CM

## 2022-08-30 DIAGNOSIS — R82.90 ABNORMAL URINALYSIS: ICD-10-CM

## 2022-08-30 DIAGNOSIS — J44.9 CHRONIC OBSTRUCTIVE PULMONARY DISEASE, UNSPECIFIED COPD TYPE: ICD-10-CM

## 2022-08-30 DIAGNOSIS — R31.9 HEMATURIA, UNSPECIFIED TYPE: Primary | ICD-10-CM

## 2022-08-30 LAB
BILIRUB BLD-MCNC: NEGATIVE MG/DL
CLARITY, POC: ABNORMAL
COLOR UR: ABNORMAL
EXPIRATION DATE: ABNORMAL
GLUCOSE UR STRIP-MCNC: NEGATIVE MG/DL
KETONES UR QL: NEGATIVE
LEUKOCYTE EST, POC: ABNORMAL
Lab: ABNORMAL
NITRITE UR-MCNC: POSITIVE MG/ML
PH UR: 6 [PH] (ref 5–8)
PROT UR STRIP-MCNC: ABNORMAL MG/DL
RBC # UR STRIP: ABNORMAL /UL
SP GR UR: 1.03 (ref 1–1.03)
UROBILINOGEN UR QL: ABNORMAL

## 2022-08-30 PROCEDURE — 81003 URINALYSIS AUTO W/O SCOPE: CPT | Performed by: PHYSICIAN ASSISTANT

## 2022-08-30 PROCEDURE — 99214 OFFICE O/P EST MOD 30 MIN: CPT | Performed by: PHYSICIAN ASSISTANT

## 2022-08-30 NOTE — TELEPHONE ENCOUNTER
----- Message from Jovana Liang MA sent at 8/29/2022 12:12 PM EDT -----  Regarding: UTI and sinus infection   Please advise     ----- Message -----  From: Jennifer Shelton  Sent: 8/29/2022   2:05 AM EDT  To: Pako Chaudhari [facility]  Subject: UTI and sinus infection                          Dr. Serrano, can you please prescribe me something for my UTI and sinus infection? If not, when can I come in?    Thank you

## 2022-08-31 ENCOUNTER — PATIENT MESSAGE (OUTPATIENT)
Dept: FAMILY MEDICINE CLINIC | Facility: CLINIC | Age: 67
End: 2022-08-31

## 2022-08-31 ENCOUNTER — TELEPHONE (OUTPATIENT)
Dept: FAMILY MEDICINE CLINIC | Facility: CLINIC | Age: 67
End: 2022-08-31

## 2022-08-31 DIAGNOSIS — J44.9 CHRONIC OBSTRUCTIVE PULMONARY DISEASE, UNSPECIFIED COPD TYPE: ICD-10-CM

## 2022-08-31 DIAGNOSIS — U07.1 COVID-19 VIRUS INFECTION: Primary | ICD-10-CM

## 2022-08-31 RX ORDER — FLUTICASONE PROPIONATE 50 MCG
2 SPRAY, SUSPENSION (ML) NASAL DAILY
Qty: 16 G | Refills: 2 | Status: SHIPPED | OUTPATIENT
Start: 2022-08-31

## 2022-08-31 RX ORDER — LORATADINE 10 MG/1
10 TABLET ORAL DAILY
Qty: 30 TABLET | Refills: 2 | Status: SHIPPED | OUTPATIENT
Start: 2022-08-31 | End: 2023-01-11 | Stop reason: SDUPTHER

## 2022-08-31 RX ORDER — DOXYCYCLINE HYCLATE 100 MG/1
100 CAPSULE ORAL 2 TIMES DAILY
Qty: 20 CAPSULE | Refills: 0 | Status: SHIPPED | OUTPATIENT
Start: 2022-08-31 | End: 2022-10-12

## 2022-08-31 RX ORDER — TIOTROPIUM BROMIDE AND OLODATEROL 3.124; 2.736 UG/1; UG/1
2 SPRAY, METERED RESPIRATORY (INHALATION) 2 TIMES DAILY
Qty: 1 EACH | Refills: 3 | Status: SHIPPED | OUTPATIENT
Start: 2022-08-31 | End: 2023-03-10

## 2022-08-31 RX ORDER — GUAIFENESIN AND DEXTROMETHORPHAN HYDROBROMIDE 600; 30 MG/1; MG/1
1 TABLET, EXTENDED RELEASE ORAL 2 TIMES DAILY
Qty: 45 TABLET | Refills: 0 | Status: SHIPPED | OUTPATIENT
Start: 2022-08-31 | End: 2022-12-13

## 2022-08-31 NOTE — TELEPHONE ENCOUNTER
Caller: Zar[place] Shelton    Relationship: Self    Best call back number: [phone]       What is the best time to reach you:     Who are you requesting to speak with (clinical staff, provider,  specific staff member): MARA MERCADO    Do you know the name of the person who called: ZARINA    What was the call regarding: PATIENT HAD AN APPOINTMENT YESTERDAY. SHE WAS TOLD BY JUAN THAT SHE WOULD SEND OVER MEDICATION TO THE PHARMACY FOR HER COVID SYMPTOM AND FOR UTI.   [facility] - [address] - [phone]  - [phone]      Do you require a callback: YES

## 2022-09-01 NOTE — TELEPHONE ENCOUNTER
Medications were sent. I have communicated with patient through [employer] with Paxlovid medications interactions to determine if she will be eligible for medication.

## 2022-09-03 LAB
APPEARANCE UR: ABNORMAL
BACTERIA #/AREA URNS HPF: ABNORMAL /[HPF]
BACTERIA UR CULT: ABNORMAL
BACTERIA UR CULT: ABNORMAL
BILIRUB UR QL STRIP: NEGATIVE
CASTS URNS QL MICRO: ABNORMAL /LPF
COLOR UR: YELLOW
EPI CELLS #/AREA URNS HPF: ABNORMAL /HPF (ref 0–10)
GLUCOSE UR QL STRIP: NEGATIVE
HGB UR QL STRIP: ABNORMAL
KETONES UR QL STRIP: NEGATIVE
LEUKOCYTE ESTERASE UR QL STRIP: ABNORMAL
MICRO URNS: ABNORMAL
NITRITE UR QL STRIP: POSITIVE
OTHER ANTIBIOTIC SUSC ISLT: ABNORMAL
PH UR STRIP: 5.5 [PH] (ref 5–7.5)
PROT UR QL STRIP: ABNORMAL
RBC #/AREA URNS HPF: ABNORMAL /HPF (ref 0–2)
SP GR UR STRIP: 1.03 (ref 1–1.03)
UROBILINOGEN UR STRIP-MCNC: 0.2 MG/DL (ref 0.2–1)
WBC #/AREA URNS HPF: ABNORMAL /HPF (ref 0–5)

## 2022-09-29 ENCOUNTER — TELEPHONE (OUTPATIENT)
Dept: GASTROENTEROLOGY | Facility: CLINIC | Age: 67
End: 2022-09-29

## 2022-09-29 NOTE — TELEPHONE ENCOUNTER
I spoke with Ms Shelton, confirmed colonoscopy on 10.14.22, arrival time of 9:30am. Reminded of liquid diet the day prior. Reminded of bowel prep and instructions. Voiced understanding. el

## 2022-10-04 NOTE — PROGRESS NOTES
"Chief Complaint  GERD     Jennifer Shelton is a 67 y.o. female who presents to [facility] for GERD    History of present Illness  Patient presents to the office for GERD. Patient still reports persistent heartburn despite Protonix 40 mg, Pepcid 40 mg, and Carafate. She has tried to lose weight but has been unsuccessful, weight is stable from last appointment. Patient has alternating bowel habits between constipation and diarrhea still. She trailed Linzess without much relief, patient reports Linzess worked at first but then eventually stopped providing regular bowel movement. She has a bowel movement about every 3 days then she has an IBS \"flare\" of significant abdominal pain follow by diarrhea. Denies melena and hematochezia. She has genetic testing kit at home but just has not completed it yet.     EGD 02/14/2022 by Dr. Raymond - large hiatal hernia, grade A esophagitis, normal stomach, and normal duodenum.  Biopsies consistent with reflux esophagitis, negative for H. pylori, intestinal metaplasia, dysplasia, and malignancy.     Past Medical History:   Diagnosis Date   • Anesthesia complication     heart rate slowed   • Anxiety    • Arthritis    • Cancer (HCC)     skin cancer   • Cervical spinal stenosis 09/22/2016   • Cervical spondylosis without myelopathy    • Cervicalgia 09/22/2015   • Chronic pain disorder    • Colon polyp 2008   • COPD (chronic obstructive pulmonary disease) (HCC)    • Depression    • Fibromyalgia    • Gastroesophageal reflux    • GERD (gastroesophageal reflux disease)    • Hematuria 03/13/2020   • Hernia 2012?    Hiatal hernia   • Herniated disc, cervical 09/22/2015    C4-5, C5-6, C6-7   • High cholesterol    • Hyperlipemia    • Irritable bowel syndrome 1986   • Lung nodule 03/13/2020   • Rectal bleeding    • Seasonal allergies    • Seizures (HCC)    • Shortness of breath    • Sleep apnea        Past Surgical History:   Procedure Laterality Date "   • ABDOMINAL SURGERY  1998    C section   •  SECTION     • COLONOSCOPY     • CYSTOSCOPY  2020    Cystoscopy w/ Dr. Lundy   • ENDOSCOPY N/A 2022    Procedure: ESOPHAGOGASTRODUODENOSCOPY WITH BIOPSIES;  Surgeon: Oneal Raymond MD;  Location: [facility] END[facility]COPY;  Service: Gastroenterology;  Laterality: N/A;  HIATAL HERNIA, ESOPHAGITIS   • PILONIDAL CYSTECTOMY     • SQUAMOUS CELL CARCINOMA EXCISION      chest   • TUBAL ABDOMINAL LIGATION     • UPPER GASTROINTESTINAL ENDOSCOPY  2021         Current Outpatient Medications:   •  albuterol sulfate  (90 Base) MCG/ACT inhaler, Inhale 2 puffs Every 6 (Six) Hours As Needed for Wheezing., Disp: 18 g, Rfl: 3  •  atorvastatin (LIPITOR) 20 MG tablet, Take 1 tablet by mouth Every Night., Disp: 90 tablet, Rfl: 1  •  Cholecalciferol (Vitamin D3) 50 MCG (2000 UT) tablet, TAKE 1 TABLET BY MOUTH DAILY., Disp: 30 tablet, Rfl: 0  •  diphenhydrAMINE (BENADRYL) 25 mg capsule, Take 50 mg by mouth At Night As Needed., Disp: , Rfl:   •  famotidine (PEPCID) 20 MG tablet, Take 1 tablet by mouth 2 (Two) Times a Day As Needed for Heartburn., Disp: 180 tablet, Rfl: 1  •  FLUoxetine (PROzac) 40 MG capsule, Take 2 capsules by mouth Daily. Patient due for follow up  for future refills, Disp: 180 capsule, Rfl: 1  •  fluticasone (FLONASE) 50 MCG/ACT nasal spray, 2 sprays by Each Nare route Daily., Disp: 16 g, Rfl: 2  •  guaifenesin-dextromethorphan (MUCINEX DM)  MG tablet sustained-release 12 hour tablet, Take 1 tablet by mouth 2 (Two) Times a Day., Disp: 45 tablet, Rfl: 0  •  ipratropium-albuterol (DUO-NEB) 0.5-2.5 mg/3 ml nebulizer, Take 3 mL by nebulization 4 (Four) Times a Day., Disp: 360 mL, Rfl: 1  •  loratadine (Claritin) 10 MG tablet, Take 1 tablet by mouth Daily., Disp: 30 tablet, Rfl: 2  •  montelukast (SINGULAIR) 10 MG tablet, Take 1 tablet by mouth every night at bedtime., Disp: 30 tablet, Rfl: 5  •  pantoprazole (PROTONIX) 40 MG  EC tablet, Take 1 tablet by mouth Daily., Disp: 90 tablet, Rfl: 1  •  Qvar RediHaler 80 MCG/ACT inhaler, Inhale 2 puffs 2 (Two) Times a Day. Then rinse mouth, Disp: 10.6 g, Rfl: 2  •  sodium chloride 3 % nebulizer solution, inhale contents of 1 vial via nebulizer TWICE DAILY, Disp: , Rfl:   •  Stiolto Respimat 2.5-2.5 MCG/ACT aerosol solution inhaler, Inhale 2 puffs 2 (Two) Times a Day., Disp: 1 each, Rfl: 3  •  doxycycline (VIBRAMYCIN) 100 MG capsule, Take 1 capsule by mouth 2 (Two) Times a Day., Disp: 20 capsule, Rfl: 0  •  GaviLyte-G 236 g solution, , Disp: , Rfl:   •  hyoscyamine (ANASPAZ,LEVSIN) 0.125 MG tablet, Take 1 tablet by mouth 4 (Four) Times a Day With Meals & at Bedtime., Disp: 360 tablet, Rfl: 3  •  sucralfate (CARAFATE) 1 g tablet, Take 1 g by mouth 4 (Four) Times a Day., Disp: , Rfl:      Allergies   Allergen Reactions   • Aspirin Hives and Arrhythmia     Chest pain   • Cephalexin Hives   • Nsaids Hives   • Penicillins Hives   • Contrast Dye Rash       Family History   Problem Relation Age of Onset   • Colon cancer Mother 60        Pancreatic, breast   • Cancer Mother    • Diabetes Mother    • Breast cancer Mother         60s   • Arthritis Mother    • Pancreatic cancer Mother    • Irritable bowel syndrome Mother    • Heart disease Father    • Heart attack Father    • Arthritis Father    • Ulcerative colitis Father    • Depression Sister    • Anxiety disorder Sister    • Cancer Sister    • Pancreatic cancer Sister    • Irritable bowel syndrome Sister    • Bipolar disorder Maternal Aunt    • Colon cancer Maternal Grandmother 70   • Seizures Son    • Depression Son    • Suicide Attempts Son    • Depression Son    • ADD / ADHD Son    • Colon cancer Other         60s   • Depression Daughter    • Malig Hyperthermia Neg Hx         Social History     Social History Narrative   • Not on file       Objective       Vital Signs:   /78 (BP Location: Right arm, Patient Position: Sitting, Cuff Size: Adult)   " Pulse 102   Ht 172.7 cm (67.99\")   Wt 89 kg (196 lb 4.8 oz)   SpO2 97%   BMI 29.85 kg/m²       Physical Exam  Constitutional:       Appearance: Normal appearance.   HENT:      Head: Normocephalic.   Cardiovascular:      Rate and Rhythm: Normal rate and regular rhythm.      Heart sounds: Normal heart sounds.   Pulmonary:      Effort: Pulmonary effort is normal.      Breath sounds: Normal breath sounds.   Abdominal:      General: Bowel sounds are normal.      Palpations: Abdomen is soft.   Skin:     General: Skin is warm and dry.   Neurological:      Mental Status: She is alert and oriented to person, place, and time. Mental status is at baseline.   Psychiatric:         Mood and Affect: Mood normal.         Behavior: Behavior normal.         Thought Content: Thought content normal.         Judgment: Judgment normal.       Result Review :       CBC w/diff    CBC w/Diff 3/28/22 7/18/22   WBC 4.7 4.5   RBC 4.30 4.49   Hemoglobin 12.6 12.9   Hematocrit 37.7 39.7   MCV 88 88   MCH 29.3 28.7   MCHC 33.4 32.5   RDW 13.0 12.3   Platelets 359 398   Neutrophil Rel % 53 52   Lymphocyte Rel % 31 30   Monocyte Rel % 10 11   Eosinophil Rel % 5 6   Basophil Rel % 1 1           CMP    CMP 3/28/22 7/18/22   Glucose 88 89   BUN 17 16   Creatinine 0.73 0.76   Sodium 138 139   Potassium 4.3 4.5   Chloride 101 100   Calcium 8.8 9.2   Total Protein 6.4 6.7   Albumin 3.8 4.2   Globulin 2.6 2.5   Total Bilirubin 0.4 0.3   Alkaline Phosphatase 78 86   AST (SGOT) 16 19   ALT (SGPT) 11 11                   Assessment and Plan    Diagnoses and all orders for this visit:    1. Gastroesophageal reflux disease, unspecified whether esophagitis present (Primary)  -     NM Gastric Emptying; Future    2. Hiatal hernia  -     NM Gastric Emptying; Future    3. Irritable bowel syndrome with constipation    Other orders  -     hyoscyamine (ANASPAZ,LEVSIN) 0.125 MG tablet; Take 1 tablet by mouth 4 (Four) Times a Day With Meals & at Bedtime.  Dispense: " 360 tablet; Refill: 3    67-year-old patient presents to the office for follow-up regarding GERD, large hiatal hernia, IBS-constipation.  Patient will proceed with already scheduled colonoscopy with Dr. Raymond on 10/14/2022.  Patient has persistent daily heartburn despite Carafate, Protonix 40 mg, and Pepcid 40 mg daily.  I have ordered a gastric emptying study.  Patient would like to proceed with scheduled colonoscopy and then will consider referral for Nissen fundoplication. Discussed possible food triggers. Patient's constipation was not adequately relieved with Linzess 72 and she does not wish to trial a stronger dose due to cost. Patient will utilize miralax and stool softeners as needed.  I sent in prescription for Levsin to help with abdominal cramping.  She will complete [facility] genetic testing at her earliest convenience.  Patient is agreeable to plan will call the office for any questions or concerns.      Follow Up   No follow-ups on file.  Patient was given instructions and counseling regarding her condition or for health maintenance advice. Please see specific information pulled into the AVS if appropriate.

## 2022-10-05 ENCOUNTER — OFFICE VISIT (OUTPATIENT)
Dept: GASTROENTEROLOGY | Facility: CLINIC | Age: 67
End: 2022-10-05

## 2022-10-05 VITALS
HEART RATE: 102 BPM | BODY MASS INDEX: 29.75 KG/M2 | DIASTOLIC BLOOD PRESSURE: 78 MMHG | OXYGEN SATURATION: 97 % | WEIGHT: 196.3 LBS | HEIGHT: 68 IN | SYSTOLIC BLOOD PRESSURE: 130 MMHG

## 2022-10-05 DIAGNOSIS — K44.9 HIATAL HERNIA: ICD-10-CM

## 2022-10-05 DIAGNOSIS — K58.1 IRRITABLE BOWEL SYNDROME WITH CONSTIPATION: ICD-10-CM

## 2022-10-05 DIAGNOSIS — K21.9 GASTROESOPHAGEAL REFLUX DISEASE, UNSPECIFIED WHETHER ESOPHAGITIS PRESENT: Primary | ICD-10-CM

## 2022-10-05 PROCEDURE — 99214 OFFICE O/P EST MOD 30 MIN: CPT

## 2022-10-05 RX ORDER — SUCRALFATE 1 G/1
1 TABLET ORAL 4 TIMES DAILY
COMMUNITY
End: 2022-12-13

## 2022-10-05 RX ORDER — HYOSCYAMINE SULFATE 0.125 MG
0.12 TABLET ORAL
Qty: 360 TABLET | Refills: 3 | Status: SHIPPED | OUTPATIENT
Start: 2022-10-05

## 2022-10-05 RX ORDER — POLYETHYLENE GLYCOL-3350 AND ELECTROLYTES 236; 6.74; 5.86; 2.97; 22.74 G/274.31G; G/274.31G; G/274.31G; G/274.31G; G/274.31G
POWDER, FOR SOLUTION ORAL
COMMUNITY
Start: 2022-07-19 | End: 2022-12-21

## 2022-10-07 DIAGNOSIS — E55.9 VITAMIN D DEFICIENCY: ICD-10-CM

## 2022-10-07 DIAGNOSIS — K21.9 GASTROESOPHAGEAL REFLUX DISEASE, UNSPECIFIED WHETHER ESOPHAGITIS PRESENT: ICD-10-CM

## 2022-10-07 DIAGNOSIS — J44.9 CHRONIC OBSTRUCTIVE PULMONARY DISEASE, UNSPECIFIED COPD TYPE: ICD-10-CM

## 2022-10-11 RX ORDER — PANTOPRAZOLE SODIUM 40 MG/1
40 TABLET, DELAYED RELEASE ORAL DAILY
Qty: 90 TABLET | Refills: 1 | Status: SHIPPED | OUTPATIENT
Start: 2022-10-11 | End: 2022-12-21 | Stop reason: SDUPTHER

## 2022-10-12 ENCOUNTER — OFFICE VISIT (OUTPATIENT)
Dept: FAMILY MEDICINE CLINIC | Facility: CLINIC | Age: 67
End: 2022-10-12

## 2022-10-12 VITALS
OXYGEN SATURATION: 93 % | DIASTOLIC BLOOD PRESSURE: 58 MMHG | TEMPERATURE: 97.3 F | BODY MASS INDEX: 29.7 KG/M2 | WEIGHT: 196 LBS | HEART RATE: 111 BPM | SYSTOLIC BLOOD PRESSURE: 132 MMHG | HEIGHT: 68 IN

## 2022-10-12 DIAGNOSIS — R09.02 HYPOXIA: ICD-10-CM

## 2022-10-12 DIAGNOSIS — J44.9 CHRONIC OBSTRUCTIVE PULMONARY DISEASE, UNSPECIFIED COPD TYPE: Primary | ICD-10-CM

## 2022-10-12 DIAGNOSIS — R05.1 ACUTE COUGH: ICD-10-CM

## 2022-10-12 PROCEDURE — 71046 X-RAY EXAM CHEST 2 VIEWS: CPT | Performed by: PHYSICIAN ASSISTANT

## 2022-10-12 PROCEDURE — 99213 OFFICE O/P EST LOW 20 MIN: CPT | Performed by: PHYSICIAN ASSISTANT

## 2022-10-12 RX ORDER — PREDNISONE 50 MG/1
TABLET ORAL
Qty: 22 TABLET | Refills: 0 | Status: ON HOLD | OUTPATIENT
Start: 2022-10-12 | End: 2022-11-29

## 2022-10-12 RX ORDER — ALBUTEROL SULFATE 90 UG/1
2 AEROSOL, METERED RESPIRATORY (INHALATION) EVERY 6 HOURS PRN
Qty: 18 G | Refills: 3 | Status: SHIPPED | OUTPATIENT
Start: 2022-10-12 | End: 2022-12-13

## 2022-10-12 RX ORDER — DOXYCYCLINE HYCLATE 100 MG/1
100 CAPSULE ORAL 2 TIMES DAILY
Qty: 20 CAPSULE | Refills: 0 | Status: ON HOLD | OUTPATIENT
Start: 2022-10-12 | End: 2022-11-29

## 2022-10-12 RX ORDER — CHOLECALCIFEROL (VITAMIN D3) 125 MCG
1 CAPSULE ORAL DAILY
Qty: 30 TABLET | Refills: 0 | Status: SHIPPED | OUTPATIENT
Start: 2022-10-12 | End: 2022-12-21 | Stop reason: SDUPTHER

## 2022-10-12 NOTE — PROGRESS NOTES
Subjective   Jennifer Shelton is a 67 y.o. female who presents today for evaluation of cough and SOA.     History of Present Illness     She reports complete resolution of symptoms from Covid 19 infection. She reports she started 10/6/2022 with coughing and SOA. She has had to use nebulizer 6 x daily. She reports helps initially but wears off quickly. Today, she had oxygen sat down to 85% then up to 88% then up to 91-92%.  Pulse has been 60s to 80s and up to 1 teens after breathing treatments.  She had a coughing spell in the middle of the night. Cough is loosening up some with some production today.  Taking Stiolto, Qvar, Singulair, nasal spray, antihistamine.    No fever, V, D, congestion, sore throat, ear pain, headache, body aches.     The following portions of the patient's history were reviewed and updated as appropriate: allergies, current medications, past family history, past medical history, past social history, past surgical history and problem list.    Review of Systems   Constitutional: Negative for fever.   HENT: Negative.    Respiratory: Positive for cough, shortness of breath and wheezing.    Gastrointestinal: Negative.    Musculoskeletal: Negative.    Neurological: Negative.        Objective   Vitals:    10/12/22 1435   BP: 132/58   Pulse: 111   Temp: 97.3 °F (36.3 °C)   SpO2: 93%     Body mass index is 29.81 kg/m².    Physical Exam  Vitals and nursing note reviewed.   Constitutional:       General: She is not in acute distress.     Appearance: Normal appearance. She is well-developed.   HENT:      Head: Normocephalic and atraumatic.      Right Ear: Tympanic membrane, ear canal and external ear normal.      Left Ear: Tympanic membrane, ear canal and external ear normal.      Nose: Nose normal.      Mouth/Throat:      Pharynx: Uvula midline.   Eyes:      General: Lids are normal.      Conjunctiva/sclera: Conjunctivae normal.   Neck:      Vascular: No carotid bruit.   Cardiovascular:      Rate and  Rhythm: Normal rate and regular rhythm.      Heart sounds: Normal heart sounds. No murmur heard.    No friction rub. No gallop.   Pulmonary:      Effort: Pulmonary effort is normal. No respiratory distress.      Breath sounds: Decreased breath sounds, wheezing and rhonchi present. No rales.   Musculoskeletal:         General: No deformity.      Cervical back: Neck supple.   Skin:     General: Skin is warm and dry.   Neurological:      Mental Status: She is alert and oriented to person, place, and time.      Gait: Gait normal.   Psychiatric:         Speech: Speech normal.         Behavior: Behavior normal.         Thought Content: Thought content normal.         Assessment & Plan   Diagnoses and all orders for this visit:    1. Chronic obstructive pulmonary disease, unspecified COPD type (HCC) (Primary)  -     XR Chest PA & Lateral  -     predniSONE (DELTASONE) 50 MG tablet; Take 3 tab po qs x 3 days then 2 tab po qd x 3 days then 1 tab po qd x 3 days then 1/2 tab po qd x 3 days  Dispense: 22 tablet; Refill: 0  -     doxycycline (VIBRAMYCIN) 100 MG capsule; Take 1 capsule by mouth 2 (Two) Times a Day.  Dispense: 20 capsule; Refill: 0    2. Acute cough  -     XR Chest PA & Lateral  -     predniSONE (DELTASONE) 50 MG tablet; Take 3 tab po qs x 3 days then 2 tab po qd x 3 days then 1 tab po qd x 3 days then 1/2 tab po qd x 3 days  Dispense: 22 tablet; Refill: 0  -     doxycycline (VIBRAMYCIN) 100 MG capsule; Take 1 capsule by mouth 2 (Two) Times a Day.  Dispense: 20 capsule; Refill: 0    3. Hypoxia  -     XR Chest PA & Lateral  -     predniSONE (DELTASONE) 50 MG tablet; Take 3 tab po qs x 3 days then 2 tab po qd x 3 days then 1 tab po qd x 3 days then 1/2 tab po qd x 3 days  Dispense: 22 tablet; Refill: 0  -     doxycycline (VIBRAMYCIN) 100 MG capsule; Take 1 capsule by mouth 2 (Two) Times a Day.  Dispense: 20 capsule; Refill: 0        Assessment and Plan      I spent 20 minutes caring for Jennifer Shelton on this date  of service. This time includes time spent by me in the following activities as necessary: preparing for the visit, reviewing tests, specialists records and previous visits, obtaining and/or reviewing a separately obtained history, performing a medically appropriate exam and/or evaluation, counseling and educating the patient, family, caregiver, referring and/or communicating with other healthcare professionals, documenting information in the medical record, independently interpreting results and communicating that information with the patient, family, caregiver, and developing a medically appropriate treatment plan with consideration of other conditions, medications, and treatments.

## 2022-10-17 ENCOUNTER — TELEPHONE (OUTPATIENT)
Dept: GASTROENTEROLOGY | Facility: CLINIC | Age: 67
End: 2022-10-17

## 2022-10-20 ENCOUNTER — TELEPHONE (OUTPATIENT)
Dept: GASTROENTEROLOGY | Facility: CLINIC | Age: 67
End: 2022-10-20

## 2022-11-06 ENCOUNTER — HOSPITAL ENCOUNTER (EMERGENCY)
Facility: HOSPITAL | Age: 67
Discharge: HOME | End: 2022-11-06
Attending: EMERGENCY MEDICINE | Admitting: EMERGENCY MEDICINE

## 2022-11-06 ENCOUNTER — APPOINTMENT (OUTPATIENT)
Dept: GENERAL RADIOLOGY | Facility: HOSPITAL | Age: 67
End: 2022-11-06

## 2022-11-06 VITALS
WEIGHT: 197.09 LBS | SYSTOLIC BLOOD PRESSURE: 117 MMHG | TEMPERATURE: 98.6 F | OXYGEN SATURATION: 95 % | BODY MASS INDEX: 29.87 KG/M2 | HEIGHT: 68 IN | HEART RATE: 95 BPM | RESPIRATION RATE: 16 BRPM | DIASTOLIC BLOOD PRESSURE: 78 MMHG

## 2022-11-06 DIAGNOSIS — J20.9 ACUTE BRONCHITIS, UNSPECIFIED ORGANISM: Primary | ICD-10-CM

## 2022-11-06 LAB
ALBUMIN SERPL-MCNC: 4.4 G/DL (ref 3.5–5.2)
ALBUMIN/GLOB SERPL: 1.6 G/DL
ALP SERPL-CCNC: 78 U/L (ref 39–117)
ALT SERPL W P-5'-P-CCNC: 12 U/L (ref 1–33)
ANION GAP SERPL CALCULATED.3IONS-SCNC: 9.7 MMOL/L (ref 5–15)
AST SERPL-CCNC: 17 U/L (ref 1–32)
BASOPHILS # BLD AUTO: 0.05 10*3/MM3 (ref 0–0.2)
BASOPHILS NFR BLD AUTO: 0.9 % (ref 0–1.5)
BILIRUB SERPL-MCNC: 0.3 MG/DL (ref 0–1.2)
BUN SERPL-MCNC: 14 MG/DL (ref 8–23)
BUN/CREAT SERPL: 21.2 (ref 7–25)
CALCIUM SPEC-SCNC: 9.3 MG/DL (ref 8.6–10.5)
CHLORIDE SERPL-SCNC: 102 MMOL/L (ref 98–107)
CO2 SERPL-SCNC: 26.3 MMOL/L (ref 22–29)
CREAT SERPL-MCNC: 0.66 MG/DL (ref 0.57–1)
DEPRECATED RDW RBC AUTO: 44.4 FL (ref 37–54)
EGFRCR SERPLBLD CKD-EPI 2021: 97.5 ML/MIN/1.73
EOSINOPHIL # BLD AUTO: 0.91 10*3/MM3 (ref 0–0.4)
EOSINOPHIL NFR BLD AUTO: 16.2 % (ref 0.3–6.2)
ERYTHROCYTE [DISTWIDTH] IN BLOOD BY AUTOMATED COUNT: 13.4 % (ref 12.3–15.4)
GLOBULIN UR ELPH-MCNC: 2.8 GM/DL
GLUCOSE SERPL-MCNC: 103 MG/DL (ref 65–99)
HCT VFR BLD AUTO: 37.1 % (ref 34–46.6)
HGB BLD-MCNC: 12.2 G/DL (ref 12–15.9)
HOLD SPECIMEN: NORMAL
HOLD SPECIMEN: NORMAL
IMM GRANULOCYTES # BLD AUTO: 0.01 10*3/MM3 (ref 0–0.05)
IMM GRANULOCYTES NFR BLD AUTO: 0.2 % (ref 0–0.5)
LYMPHOCYTES # BLD AUTO: 1.33 10*3/MM3 (ref 0.7–3.1)
LYMPHOCYTES NFR BLD AUTO: 23.6 % (ref 19.6–45.3)
MCH RBC QN AUTO: 29.8 PG (ref 26.6–33)
MCHC RBC AUTO-ENTMCNC: 32.9 G/DL (ref 31.5–35.7)
MCV RBC AUTO: 90.5 FL (ref 79–97)
MONOCYTES # BLD AUTO: 0.51 10*3/MM3 (ref 0.1–0.9)
MONOCYTES NFR BLD AUTO: 9.1 % (ref 5–12)
NEUTROPHILS NFR BLD AUTO: 2.82 10*3/MM3 (ref 1.7–7)
NEUTROPHILS NFR BLD AUTO: 50 % (ref 42.7–76)
NRBC BLD AUTO-RTO: 0 /100 WBC (ref 0–0.2)
NT-PROBNP SERPL-MCNC: 100.5 PG/ML (ref 0–900)
PLATELET # BLD AUTO: 375 10*3/MM3 (ref 140–450)
PMV BLD AUTO: 8.9 FL (ref 6–12)
POTASSIUM SERPL-SCNC: 3.7 MMOL/L (ref 3.5–5.2)
PROT SERPL-MCNC: 7.2 G/DL (ref 6–8.5)
RBC # BLD AUTO: 4.1 10*6/MM3 (ref 3.77–5.28)
SODIUM SERPL-SCNC: 138 MMOL/L (ref 136–145)
TROPONIN T SERPL-MCNC: <0.01 NG/ML (ref 0–0.03)
WBC NRBC COR # BLD: 5.63 10*3/MM3 (ref 3.4–10.8)
WHOLE BLOOD HOLD COAG: NORMAL
WHOLE BLOOD HOLD SPECIMEN: NORMAL

## 2022-11-06 PROCEDURE — 25010000002 METHYLPREDNISOLONE PER 125 MG: Performed by: EMERGENCY MEDICINE

## 2022-11-06 PROCEDURE — 71045 X-RAY EXAM CHEST 1 VIEW: CPT

## 2022-11-06 PROCEDURE — 94799 UNLISTED PULMONARY SVC/PX: CPT

## 2022-11-06 PROCEDURE — 84484 ASSAY OF TROPONIN QUANT: CPT | Performed by: EMERGENCY MEDICINE

## 2022-11-06 PROCEDURE — 94760 N-INVAS EAR/PLS OXIMETRY 1: CPT

## 2022-11-06 PROCEDURE — 85025 COMPLETE CBC W/AUTO DIFF WBC: CPT | Performed by: EMERGENCY MEDICINE

## 2022-11-06 PROCEDURE — 93005 ELECTROCARDIOGRAM TRACING: CPT | Performed by: EMERGENCY MEDICINE

## 2022-11-06 PROCEDURE — 80053 COMPREHEN METABOLIC PANEL: CPT | Performed by: EMERGENCY MEDICINE

## 2022-11-06 PROCEDURE — 94640 AIRWAY INHALATION TREATMENT: CPT

## 2022-11-06 PROCEDURE — 94761 N-INVAS EAR/PLS OXIMETRY MLT: CPT

## 2022-11-06 PROCEDURE — 93010 ELECTROCARDIOGRAM REPORT: CPT | Performed by: INTERNAL MEDICINE

## 2022-11-06 PROCEDURE — 99284 EMERGENCY DEPT VISIT MOD MDM: CPT

## 2022-11-06 PROCEDURE — 83880 ASSAY OF NATRIURETIC PEPTIDE: CPT | Performed by: EMERGENCY MEDICINE

## 2022-11-06 PROCEDURE — 96374 THER/PROPH/DIAG INJ IV PUSH: CPT

## 2022-11-06 PROCEDURE — 94664 DEMO&/EVAL PT USE INHALER: CPT

## 2022-11-06 RX ORDER — ALBUTEROL SULFATE 2.5 MG/3ML
5 SOLUTION RESPIRATORY (INHALATION) CONTINUOUS
Status: DISCONTINUED | OUTPATIENT
Start: 2022-11-06 | End: 2022-11-06 | Stop reason: HOSPADM

## 2022-11-06 RX ORDER — METHYLPREDNISOLONE SODIUM SUCCINATE 125 MG/2ML
125 INJECTION, POWDER, LYOPHILIZED, FOR SOLUTION INTRAMUSCULAR; INTRAVENOUS ONCE
Status: COMPLETED | OUTPATIENT
Start: 2022-11-06 | End: 2022-11-06

## 2022-11-06 RX ORDER — BENZONATATE 200 MG/1
200 CAPSULE ORAL 3 TIMES DAILY PRN
Qty: 20 CAPSULE | Refills: 0 | Status: SHIPPED | OUTPATIENT
Start: 2022-11-06 | End: 2022-12-13

## 2022-11-06 RX ORDER — SODIUM CHLORIDE 0.9 % (FLUSH) 0.9 %
10 SYRINGE (ML) INJECTION AS NEEDED
Status: DISCONTINUED | OUTPATIENT
Start: 2022-11-06 | End: 2022-11-06 | Stop reason: HOSPADM

## 2022-11-06 RX ORDER — ALBUTEROL SULFATE 90 UG/1
2 AEROSOL, METERED RESPIRATORY (INHALATION) EVERY 4 HOURS PRN
Qty: 90 G | Refills: 0 | Status: SHIPPED | OUTPATIENT
Start: 2022-11-06

## 2022-11-06 RX ORDER — PREDNISONE 50 MG/1
50 TABLET ORAL DAILY
Qty: 5 TABLET | Refills: 0 | Status: ON HOLD | OUTPATIENT
Start: 2022-11-06 | End: 2022-11-29

## 2022-11-06 RX ADMIN — METHYLPREDNISOLONE SODIUM SUCCINATE 125 MG: 125 INJECTION, POWDER, FOR SOLUTION INTRAMUSCULAR; INTRAVENOUS at 10:04

## 2022-11-06 RX ADMIN — ALBUTEROL SULFATE 5 MG: 2.5 SOLUTION RESPIRATORY (INHALATION) at 09:50

## 2022-11-06 NOTE — ED PROVIDER NOTES
Time: 9:33 AM EST    Chief Complaint: cough, congestion, chest tightness  History provided by: Pt  History is limited by: N/A     History of Present Illness:  Patient is a 67 y.o. year old female who presents to the emergency department with cough, congestion, and chest tightness. Pt reports her symptoms started today but she previously had similar symptoms on 10/20/22. Reports she went to her PCP then and got medication which helped her symptoms but she is feeling sick again now.  denies he is feeling sick. Reports chest pain along with the tightness.       Cough  Cough characteristics:  Productive  Sputum characteristics:  Clear  Severity:  Mild  Onset quality:  Gradual  Duration:  3 days  Timing:  Constant  Progression:  Partially resolved  Chronicity:  New  Context: upper respiratory infection    Relieved by:  Nothing (PCP prescribed medication)  Worsened by:  Nothing  Associated symptoms: chest pain (and tightness) and sinus congestion    Associated symptoms: no chills, no myalgias and no rhinorrhea        Similar Symptoms Previously: yes  Recently seen: no      Patient Care Team  Primary Care Provider: Tessy Serrano PA    Past Medical History:     Allergies   Allergen Reactions   • Aspirin Hives and Arrhythmia     Chest pain   • Cephalexin Hives   • Nsaids Hives   • Penicillins Hives   • Contrast Dye Rash     Past Medical History:   Diagnosis Date   • Anesthesia complication     heart rate slowed   • Anxiety    • Arthritis    • Cancer ([facility])     skin cancer   • Cervical spinal stenosis 09/22/2016   • Cervical spondylosis without myelopathy    • Cervicalgia 09/22/2015   • Chronic pain disorder    • Colon polyp 2008   • COPD (chronic obstructive pulmonary disease) ([facility])    • Depression    • Fibromyalgia    • Gastroesophageal reflux    • GERD (gastroesophageal reflux disease)    • Hematuria 03/13/2020   • Hernia 2012?    Hiatal hernia   • Herniated disc, cervical 09/22/2015    C4-5, C5-6, C6-7   • High  cholesterol    • Hyperlipemia    • Irritable bowel syndrome    • Lung nodule 2020   • Rectal bleeding    • Seasonal allergies    • Seizures (HCC)    • Shortness of breath    • Sleep apnea      Past Surgical History:   Procedure Laterality Date   • ABDOMINAL SURGERY  ,     C section   •  SECTION     • COLONOSCOPY     • CYSTOSCOPY  2020    Cystoscopy w/ Dr. Lundy   • ENDOSCOPY N/A 2022    Procedure: ESOPHAGOGASTRODUODENOSCOPY WITH BIOPSIES;  Surgeon: Oneal Raymond MD;  Location: [facility] ENDOSCOPY;  Service: Gastroenterology;  Laterality: N/A;  HIATAL HERNIA, ESOPHAGITIS   • PILONIDAL CYSTECTOMY     • SQUAMOUS CELL CARCINOMA EXCISION      chest   • TUBAL ABDOMINAL LIGATION     • UPPER GASTROINTESTINAL ENDOSCOPY  2021     Family History   Problem Relation Age of Onset   • Colon cancer Mother 60        Pancreatic, breast   • Cancer Mother    • Diabetes Mother    • Breast cancer Mother         60s   • Arthritis Mother    • Pancreatic cancer Mother    • Irritable bowel syndrome Mother    • Heart disease Father    • Heart attack Father    • Arthritis Father    • Ulcerative colitis Father    • Depression Sister    • Anxiety disorder Sister    • Cancer Sister    • Pancreatic cancer Sister    • Irritable bowel syndrome Sister    • Bipolar disorder Maternal Aunt    • Colon cancer Maternal Grandmother 70   • Seizures Son    • Depression Son    • Suicide Attempts Son    • Depression Son    • ADD / ADHD Son    • Colon cancer Other         60s   • Depression Daughter    • Malig Hyperthermia Neg Hx        Home Medications:  Prior to Admission medications    Medication Sig Start Date End Date Taking? Authorizing Provider   albuterol sulfate  (90 Base) MCG/ACT inhaler Inhale 2 puffs Every 6 (Six) Hours As Needed for Wheezing. 10/12/22   Tessy Serrano PA   atorvastatin (LIPITOR) 20 MG tablet Take 1 tablet by mouth Every Night. 22   Tessy Serrano PA    Cholecalciferol (Vitamin D3) 50 MCG (2000 UT) tablet Take 1 tablet by mouth Daily. 10/12/22   Tessy Serrano PA   diphenhydrAMINE (BENADRYL) 25 mg capsule Take 50 mg by mouth At Night As Needed.    Provider, MD Verenice   doxycycline (VIBRAMYCIN) 100 MG capsule Take 1 capsule by mouth 2 (Two) Times a Day. 10/12/22   Tessy Serrano PA   famotidine (PEPCID) 20 MG tablet Take 1 tablet by mouth 2 (Two) Times a Day As Needed for Heartburn. 7/19/22   Stephanie Moran APRN   FLUoxetine (PROzac) 40 MG capsule Take 2 capsules by mouth Daily. Patient due for follow up Nov 1 for future refills 4/8/22   Tessy Serrano PA   fluticasone (FLONASE) 50 MCG/ACT nasal spray 2 sprays by Each Nare route Daily. 8/31/22   Tessy Serrano PA   GaviLyte-G 236 g solution  7/19/22   Provider, MD Verenice   guaifenesin-dextromethorphan (MUCINEX DM)  MG tablet sustained-release 12 hour tablet Take 1 tablet by mouth 2 (Two) Times a Day. 8/31/22   Tessy Serrano PA   hyoscyamine (ANASPAZ,LEVSIN) 0.125 MG tablet Take 1 tablet by mouth 4 (Four) Times a Day With Meals & at Bedtime. 10/5/22   Stephanie Moran APRN   ipratropium-albuterol (DUO-NEB) 0.5-2.5 mg/3 ml nebulizer Take 3 mL by nebulization 4 (Four) Times a Day. 4/8/22   Tessy Serrano PA   loratadine (Claritin) 10 MG tablet Take 1 tablet by mouth Daily. 8/31/22   Tessy Serrano PA   montelukast (SINGULAIR) 10 MG tablet Take 1 tablet by mouth every night at bedtime. 4/8/22   Tessy Serrano PA   pantoprazole (PROTONIX) 40 MG EC tablet Take 1 tablet by mouth Daily. 10/11/22   Stephanie Moran APRN   predniSONE (DELTASONE) 50 MG tablet Take 3 tab po qs x 3 days then 2 tab po qd x 3 days then 1 tab po qd x 3 days then 1/2 tab po qd x 3 days 10/12/22   Tessy Serrano PA   Qvar RediHaler 80 MCG/ACT inhaler Inhale 2 puffs 2 (Two) Times a Day. Then rinse mouth 4/8/22   Tessy Serrano PA   sodium chloride 3 % nebulizer solution inhale contents of 1 vial via  "nebulizer TWICE DAILY 7/15/21   Provider, MD Wes Caldera Respimat 2.5-2.5 MCG/ACT aerosol solution inhaler Inhale 2 puffs 2 (Two) Times a Day. 22   Tessy Serrano PA   sucralfate (CARAFATE) 1 g tablet Take 1 g by mouth 4 (Four) Times a Day.    Provider, MD Verenice        Social History:   Social History     Tobacco Use   • Smoking status: Former     Packs/day: 1.50     Years: 40.00     Pack years: 60.00     Types: Cigarettes     Start date: 1980     Quit date: 2020     Years since quittin.0   • Smokeless tobacco: Never   • Tobacco comments:     Quit 2020   Vaping Use   • Vaping Use: Never used   Substance Use Topics   • Alcohol use: Never   • Drug use: Never         Review of Systems:  Review of Systems   Constitutional: Negative for chills.   HENT: Positive for congestion. Negative for rhinorrhea.    Eyes: Negative for pain and visual disturbance.   Respiratory: Positive for cough and chest tightness. Negative for apnea.    Cardiovascular: Positive for chest pain (and tightness). Negative for palpitations.   Gastrointestinal: Negative for diarrhea and nausea.   Genitourinary: Negative for difficulty urinating and dysuria.   Musculoskeletal: Negative for joint swelling and myalgias.   Skin: Negative for color change.   Neurological: Negative for seizures.   Psychiatric/Behavioral: Negative.    All other systems reviewed and are negative.       Physical Exam:  /78 (BP Location: Left arm, Patient Position: Sitting)   Pulse 96   Temp 98.6 °F (37 °C) (Oral)   Resp 16   Ht 172.7 cm (68\")   Wt 89.4 kg (197 lb 1.5 oz)   SpO2 97%   BMI 29.97 kg/m²     Physical Exam  Vitals and nursing note reviewed.   Constitutional:       General: She is not in acute distress.     Appearance: Normal appearance. She is not toxic-appearing.   HENT:      Head: Normocephalic and atraumatic.      Jaw: There is normal jaw occlusion.   Eyes:      General: Lids are normal.      Extraocular " Movements: Extraocular movements intact.      Conjunctiva/sclera: Conjunctivae normal.      Pupils: Pupils are equal, round, and reactive to light.   Cardiovascular:      Rate and Rhythm: Normal rate and regular rhythm.      Pulses: Normal pulses.      Heart sounds: Normal heart sounds.   Pulmonary:      Effort: Pulmonary effort is normal. No respiratory distress.      Breath sounds: Wheezing (bilateral) present. No rhonchi.   Abdominal:      General: Abdomen is flat.      Palpations: Abdomen is soft.      Tenderness: There is no abdominal tenderness. There is no guarding or rebound.   Musculoskeletal:         General: Normal range of motion.      Cervical back: Normal range of motion and neck supple.      Right lower leg: No edema.      Left lower leg: No edema.   Skin:     General: Skin is warm and dry.   Neurological:      Mental Status: She is alert and oriented to person, place, and time. Mental status is at baseline.   Psychiatric:         Mood and Affect: Mood normal.                Medications in the Emergency Department:  Medications   sodium chloride 0.9 % flush 10 mL (has no administration in time range)   albuterol (PROVENTIL) nebulizer solution 0.083% 2.5 mg/3mL (5 mg Nebulization New Bag 11/6/22 0950)   methylPREDNISolone sodium succinate (SOLU-Medrol) injection 125 mg (125 mg Intravenous Given 11/6/22 1004)        Labs  Lab Results (last 24 hours)     Procedure Component Value Units Date/Time    CBC & Differential [[id number]]  (Abnormal) Collected: 11/06/22 0856    Specimen: Blood Updated: 11/06/22 0915    Narrative:      The following orders were created for panel order CBC & Differential.  Procedure                               Abnormality         Status                     ---------                               -----------         ------                     CBC Auto Differential[[id number]]        Abnormal            Final result                 Please view results for these tests on the  individual orders.    Comprehensive Metabolic Panel [[id number]]  (Abnormal) Collected: 11/06/22 0856    Specimen: Blood Updated: 11/06/22 0936     Glucose 103 mg/dL      BUN 14 mg/dL      Creatinine 0.66 mg/dL      Sodium 138 mmol/L      Potassium 3.7 mmol/L      Chloride 102 mmol/L      CO2 26.3 mmol/L      Calcium 9.3 mg/dL      Total Protein 7.2 g/dL      Albumin 4.40 g/dL      ALT (SGPT) 12 U/L      AST (SGOT) 17 U/L      Alkaline Phosphatase 78 U/L      Total Bilirubin 0.3 mg/dL      Globulin 2.8 gm/dL      A/G Ratio 1.6 g/dL      BUN/Creatinine Ratio 21.2     Anion Gap 9.7 mmol/L      eGFR 97.5 mL/min/1.73      Comment: National Kidney Foundation and American Society of Nephrology (ASN) Task Force recommended calculation based on the Chronic Kidney Disease Epidemiology Collaboration (CKD-EPI) equation refit without adjustment for race.       Narrative:      GFR Normal >60  Chronic Kidney Disease <60  Kidney Failure <15      BNP [[id number]]  (Normal) Collected: 11/06/22 0856    Specimen: Blood Updated: 11/06/22 0934     proBNP 100.5 pg/mL     Narrative:      Among patients with dyspnea, NT-proBNP is highly sensitive for the detection of acute congestive heart failure. In addition NT-proBNP of <300 pg/ml effectively rules out acute congestive heart failure with 99% negative predictive value.      Troponin [[id number]]  (Normal) Collected: 11/06/22 0856    Specimen: Blood Updated: 11/06/22 0936     Troponin T <0.010 ng/mL     Narrative:      Troponin T Reference Range:  <= 0.03 ng/mL-   Negative for AMI  >0.03 ng/mL-     Abnormal for myocardial necrosis.  Clinicians would have to utilize clinical acumen, EKG, Troponin and serial changes to determine if it is an Acute Myocardial Infarction or myocardial injury due to an underlying chronic condition.       Results may be falsely decreased if patient taking Biotin.      CBC Auto Differential [[id number]]  (Abnormal) Collected: 11/06/22 0856    Specimen: Blood Updated:  11/06/22 0915     WBC 5.63 10*3/mm3      RBC 4.10 10*6/mm3      Hemoglobin 12.2 g/dL      Hematocrit 37.1 %      MCV 90.5 fL      MCH 29.8 pg      MCHC 32.9 g/dL      RDW 13.4 %      RDW-SD 44.4 fl      MPV 8.9 fL      Platelets 375 10*3/mm3      Neutrophil % 50.0 %      Lymphocyte % 23.6 %      Monocyte % 9.1 %      Eosinophil % 16.2 %      Basophil % 0.9 %      Immature Grans % 0.2 %      Neutrophils, Absolute 2.82 10*3/mm3      Lymphocytes, Absolute 1.33 10*3/mm3      Monocytes, Absolute 0.51 10*3/mm3      Eosinophils, Absolute 0.91 10*3/mm3      Basophils, Absolute 0.05 10*3/mm3      Immature Grans, Absolute 0.01 10*3/mm3      nRBC 0.0 /100 WBC            Imaging:  XR Chest 1 View    Result Date: 11/6/2022  PROCEDURE: XR CHEST 1 VW  COMPARISON: [facility], , CHEST AP/PA 1 VIEW, 1/30/2021, 22:24.  INDICATIONS: SOA Triage Protocol  FINDINGS:   The lungs are well-expanded. The heart and pulmonary vasculature are within normal limits. No pleural effusions are identified. There are no active appearing infiltrates.  IMPRESSION: No active disease.  SABAS CISNEROS MD       Electronically Signed and Approved By: SABAS CISNEROS MD on 11/06/2022 at 9:10               Procedures:  Procedures    Progress  ED Course as of 11/06/22 1053   Sun Nov 06, 2022   1052 EKG:    Rhythm: sinus  Rate: 94  Axis: normal  Intervals: normal  ST Segment: no elevations    EKG Comparison: unchanged    Interpreted by me   [BN]      ED Course User Index  [BN] Brooklyn Portillo MD                            Medical Decision Making:  MDM  Number of Diagnoses or Management Options  Acute bronchitis, unspecified organism  Diagnosis management comments: The patient presented with a productive cough. The patient is well-appearing and in no respiratory distress.  The patient´s CBC that was reviewed and interpreted by me shows no abnormalities of critical concern. Of note, there is no anemia requiring a blood transfusion and the  platelet count is acceptable.  The patient´s CMP that was reviewed and interpretted by me shows no abnormalities of critical concern. Of note, the patient´s sodium and potassium are acceptable. The patient´s liver enzymes are unremarkable. The patient´s renal function (creatinine) is preserved. The patient has a normal anion gap.  BNP is 100.  Troponin is negative.  Patient was given an hour-long breathing treatment and Solu-Medrol in the ED.  Patient does report significantly for her symptoms.  The patient has a normal mental status and is neurologically intact. The patient appears well and is able to tolerate food for fluid by mouth and there's no significant dehydration. There is no respiratory distress and no signs of systemic toxicity. The history, exam, diagnostic testing and current condition do not demonstrate an infectious process such as meningitis, severe pneumonia, retropharyngeal abscess, epiglottitis, sepsis or other serious bacterial infection requiring further testing, treatment, consultation, or admission at this time. The patient has no additional oxygen requirement nor are there any signs of respiratory distress. The patient has a chest x-ray interpreted by me that is negative for pneumonia. Asthma, URI, GERD are also considered. The vital signs have been stable and the patient has had no hypoxia. The patient's condition is stable and appropriate for discharge. The patient will pursue further outpatient evaluation with the primary care physician, or designated physician. The patient will expressed a clear and thorough understanding and agreed to follow-up as instructed.       Amount and/or Complexity of Data Reviewed  Clinical lab tests: reviewed  Tests in the radiology section of CPT®: reviewed  Tests in the medicine section of CPT®: reviewed  Independent visualization of images, tracings, or specimens: yes    Risk of Complications, Morbidity, and/or Mortality  Presenting problems:  moderate  Management options: moderate    Patient Progress  Patient progress: stable       Final diagnoses:   Acute bronchitis, unspecified organism        Disposition:  ED Disposition     ED Disposition   Discharge    Condition   Stable    Comment   --             This medical record created using voice recognition software.             Collin Sahu  11/06/22 0949       Brooklyn Portillo MD  11/06/22 1053       Brooklyn Portillo MD  11/06/22 1053

## 2022-11-07 LAB — QT INTERVAL: 338 MS

## 2022-11-08 ENCOUNTER — TELEPHONE (OUTPATIENT)
Dept: GASTROENTEROLOGY | Facility: CLINIC | Age: 67
End: 2022-11-08

## 2022-11-11 NOTE — TELEPHONE ENCOUNTER
Called pt on gentic testing will cancel order and left message for pt to [facility] office back on this.

## 2022-11-28 ENCOUNTER — HOSPITAL ENCOUNTER (OUTPATIENT)
Dept: NUCLEAR MEDICINE | Facility: HOSPITAL | Age: 67
Discharge: HOME | End: 2022-11-28

## 2022-11-28 ENCOUNTER — APPOINTMENT (OUTPATIENT)
Dept: GENERAL RADIOLOGY | Facility: HOSPITAL | Age: 67
End: 2022-11-28

## 2022-11-28 DIAGNOSIS — K44.9 HIATAL HERNIA: ICD-10-CM

## 2022-11-28 DIAGNOSIS — K21.9 GASTROESOPHAGEAL REFLUX DISEASE, UNSPECIFIED WHETHER ESOPHAGITIS PRESENT: ICD-10-CM

## 2022-11-28 PROCEDURE — A9541 TC99M SULFUR COLLOID: HCPCS

## 2022-11-28 PROCEDURE — 36415 COLL VENOUS BLD VENIPUNCTURE: CPT

## 2022-11-28 PROCEDURE — 93005 ELECTROCARDIOGRAM TRACING: CPT | Performed by: EMERGENCY MEDICINE

## 2022-11-28 PROCEDURE — 99285 EMERGENCY DEPT VISIT HI MDM: CPT

## 2022-11-28 PROCEDURE — 0 TECHNETIUM SULFUR COLLOID

## 2022-11-28 PROCEDURE — 78264 GASTRIC EMPTYING IMG STUDY: CPT

## 2022-11-28 PROCEDURE — 71046 X-RAY EXAM CHEST 2 VIEWS: CPT

## 2022-11-28 RX ORDER — SODIUM CHLORIDE 0.9 % (FLUSH) 0.9 %
10 SYRINGE (ML) INJECTION AS NEEDED
Status: DISCONTINUED | OUTPATIENT
Start: 2022-11-28 | End: 2022-12-03 | Stop reason: HOSPADM

## 2022-11-28 RX ADMIN — TECHNETIUM TC 99M SULFUR COLLOID 1 DOSE: KIT at 07:33

## 2022-11-29 ENCOUNTER — HOSPITAL ENCOUNTER (OUTPATIENT)
Facility: HOSPITAL | Age: 67
Setting detail: OBSERVATION
Discharge: HOME | End: 2022-12-03
Attending: EMERGENCY MEDICINE | Admitting: INTERNAL MEDICINE

## 2022-11-29 ENCOUNTER — TELEPHONE (OUTPATIENT)
Dept: GASTROENTEROLOGY | Facility: CLINIC | Age: 67
End: 2022-11-29

## 2022-11-29 DIAGNOSIS — J44.1 DECOMPENSATED COPD WITH EXACERBATION (CHRONIC OBSTRUCTIVE PULMONARY DISEASE): ICD-10-CM

## 2022-11-29 DIAGNOSIS — J44.1 COPD EXACERBATION: ICD-10-CM

## 2022-11-29 DIAGNOSIS — J96.01 ACUTE HYPOXEMIC RESPIRATORY FAILURE: Primary | ICD-10-CM

## 2022-11-29 LAB
ALBUMIN SERPL-MCNC: 4 G/DL (ref 3.5–5.2)
ALBUMIN/GLOB SERPL: 1.2 G/DL
ALP SERPL-CCNC: 69 U/L (ref 39–117)
ALT SERPL W P-5'-P-CCNC: 10 U/L (ref 1–33)
ANION GAP SERPL CALCULATED.3IONS-SCNC: 12.7 MMOL/L (ref 5–15)
AST SERPL-CCNC: 11 U/L (ref 1–32)
B PARAPERT DNA SPEC QL NAA+PROBE: NOT DETECTED
B PERT DNA SPEC QL NAA+PROBE: NOT DETECTED
BASOPHILS # BLD AUTO: 0.08 10*3/MM3 (ref 0–0.2)
BASOPHILS NFR BLD AUTO: 1 % (ref 0–1.5)
BILIRUB SERPL-MCNC: 0.3 MG/DL (ref 0–1.2)
BUN SERPL-MCNC: 15 MG/DL (ref 8–23)
BUN/CREAT SERPL: 20.5 (ref 7–25)
C PNEUM DNA NPH QL NAA+NON-PROBE: NOT DETECTED
CALCIUM SPEC-SCNC: 9.7 MG/DL (ref 8.6–10.5)
CHLORIDE SERPL-SCNC: 105 MMOL/L (ref 98–107)
CO2 SERPL-SCNC: 23.3 MMOL/L (ref 22–29)
CREAT SERPL-MCNC: 0.73 MG/DL (ref 0.57–1)
DEPRECATED RDW RBC AUTO: 41.6 FL (ref 37–54)
EGFRCR SERPLBLD CKD-EPI 2021: 91.4 ML/MIN/1.73
EOSINOPHIL # BLD AUTO: 1.56 10*3/MM3 (ref 0–0.4)
EOSINOPHIL NFR BLD AUTO: 19.1 % (ref 0.3–6.2)
ERYTHROCYTE [DISTWIDTH] IN BLOOD BY AUTOMATED COUNT: 13 % (ref 12.3–15.4)
FLUAV SUBTYP SPEC NAA+PROBE: NOT DETECTED
FLUBV RNA ISLT QL NAA+PROBE: NOT DETECTED
GLOBULIN UR ELPH-MCNC: 3.3 GM/DL
GLUCOSE SERPL-MCNC: 116 MG/DL (ref 65–99)
HADV DNA SPEC NAA+PROBE: NOT DETECTED
HCOV 229E RNA SPEC QL NAA+PROBE: NOT DETECTED
HCOV HKU1 RNA SPEC QL NAA+PROBE: NOT DETECTED
HCOV NL63 RNA SPEC QL NAA+PROBE: NOT DETECTED
HCOV OC43 RNA SPEC QL NAA+PROBE: NOT DETECTED
HCT VFR BLD AUTO: 37.5 % (ref 34–46.6)
HGB BLD-MCNC: 12.4 G/DL (ref 12–15.9)
HMPV RNA NPH QL NAA+NON-PROBE: NOT DETECTED
HOLD SPECIMEN: NORMAL
HOLD SPECIMEN: NORMAL
HPIV1 RNA ISLT QL NAA+PROBE: NOT DETECTED
HPIV2 RNA SPEC QL NAA+PROBE: NOT DETECTED
HPIV3 RNA NPH QL NAA+PROBE: NOT DETECTED
HPIV4 P GENE NPH QL NAA+PROBE: NOT DETECTED
IMM GRANULOCYTES # BLD AUTO: 0.02 10*3/MM3 (ref 0–0.05)
IMM GRANULOCYTES NFR BLD AUTO: 0.2 % (ref 0–0.5)
LYMPHOCYTES # BLD AUTO: 1.11 10*3/MM3 (ref 0.7–3.1)
LYMPHOCYTES NFR BLD AUTO: 13.6 % (ref 19.6–45.3)
M PNEUMO IGG SER IA-ACNC: NOT DETECTED
MCH RBC QN AUTO: 29.5 PG (ref 26.6–33)
MCHC RBC AUTO-ENTMCNC: 33.1 G/DL (ref 31.5–35.7)
MCV RBC AUTO: 89.1 FL (ref 79–97)
MONOCYTES # BLD AUTO: 0.54 10*3/MM3 (ref 0.1–0.9)
MONOCYTES NFR BLD AUTO: 6.6 % (ref 5–12)
NEUTROPHILS NFR BLD AUTO: 4.86 10*3/MM3 (ref 1.7–7)
NEUTROPHILS NFR BLD AUTO: 59.5 % (ref 42.7–76)
NRBC BLD AUTO-RTO: 0 /100 WBC (ref 0–0.2)
NT-PROBNP SERPL-MCNC: 64.7 PG/ML (ref 0–900)
PLATELET # BLD AUTO: 335 10*3/MM3 (ref 140–450)
PMV BLD AUTO: 9 FL (ref 6–12)
POTASSIUM SERPL-SCNC: 3.8 MMOL/L (ref 3.5–5.2)
PROT SERPL-MCNC: 7.3 G/DL (ref 6–8.5)
QT INTERVAL: 363 MS
RBC # BLD AUTO: 4.21 10*6/MM3 (ref 3.77–5.28)
RHINOVIRUS RNA SPEC NAA+PROBE: NOT DETECTED
RSV RNA NPH QL NAA+NON-PROBE: NOT DETECTED
SARS-COV-2 RNA NPH QL NAA+NON-PROBE: NOT DETECTED
SODIUM SERPL-SCNC: 141 MMOL/L (ref 136–145)
TROPONIN T SERPL-MCNC: <0.01 NG/ML (ref 0–0.03)
WBC NRBC COR # BLD: 8.17 10*3/MM3 (ref 3.4–10.8)
WHOLE BLOOD HOLD COAG: NORMAL
WHOLE BLOOD HOLD SPECIMEN: NORMAL

## 2022-11-29 PROCEDURE — 94799 UNLISTED PULMONARY SVC/PX: CPT

## 2022-11-29 PROCEDURE — 85025 COMPLETE CBC W/AUTO DIFF WBC: CPT

## 2022-11-29 PROCEDURE — 94640 AIRWAY INHALATION TREATMENT: CPT

## 2022-11-29 PROCEDURE — 96365 THER/PROPH/DIAG IV INF INIT: CPT

## 2022-11-29 PROCEDURE — 93010 ELECTROCARDIOGRAM REPORT: CPT | Performed by: INTERNAL MEDICINE

## 2022-11-29 PROCEDURE — 84484 ASSAY OF TROPONIN QUANT: CPT

## 2022-11-29 PROCEDURE — 96376 TX/PRO/DX INJ SAME DRUG ADON: CPT

## 2022-11-29 PROCEDURE — G0378 HOSPITAL OBSERVATION PER HR: HCPCS

## 2022-11-29 PROCEDURE — 0202U NFCT DS 22 TRGT SARS-COV-2: CPT | Performed by: PHYSICIAN ASSISTANT

## 2022-11-29 PROCEDURE — 83880 ASSAY OF NATRIURETIC PEPTIDE: CPT

## 2022-11-29 PROCEDURE — 94761 N-INVAS EAR/PLS OXIMETRY MLT: CPT

## 2022-11-29 PROCEDURE — 96375 TX/PRO/DX INJ NEW DRUG ADDON: CPT

## 2022-11-29 PROCEDURE — 25010000002 METHYLPREDNISOLONE PER 125 MG: Performed by: INTERNAL MEDICINE

## 2022-11-29 PROCEDURE — 96366 THER/PROPH/DIAG IV INF ADDON: CPT

## 2022-11-29 PROCEDURE — 80053 COMPREHEN METABOLIC PANEL: CPT

## 2022-11-29 PROCEDURE — 36415 COLL VENOUS BLD VENIPUNCTURE: CPT

## 2022-11-29 PROCEDURE — 25010000002 MAGNESIUM SULFATE 2 GM/50ML SOLUTION: Performed by: PHYSICIAN ASSISTANT

## 2022-11-29 PROCEDURE — 25010000002 METHYLPREDNISOLONE PER 125 MG: Performed by: PHYSICIAN ASSISTANT

## 2022-11-29 RX ORDER — PANTOPRAZOLE SODIUM 40 MG/1
40 TABLET, DELAYED RELEASE ORAL DAILY
Status: DISCONTINUED | OUTPATIENT
Start: 2022-11-29 | End: 2022-12-03 | Stop reason: HOSPADM

## 2022-11-29 RX ORDER — IPRATROPIUM BROMIDE AND ALBUTEROL SULFATE 2.5; .5 MG/3ML; MG/3ML
3 SOLUTION RESPIRATORY (INHALATION)
Status: DISCONTINUED | OUTPATIENT
Start: 2022-11-29 | End: 2022-12-03 | Stop reason: HOSPADM

## 2022-11-29 RX ORDER — CETIRIZINE HYDROCHLORIDE 10 MG/1
10 TABLET ORAL DAILY
Status: DISCONTINUED | OUTPATIENT
Start: 2022-11-29 | End: 2022-12-03 | Stop reason: HOSPADM

## 2022-11-29 RX ORDER — IPRATROPIUM BROMIDE AND ALBUTEROL SULFATE 2.5; .5 MG/3ML; MG/3ML
3 SOLUTION RESPIRATORY (INHALATION) ONCE
Status: COMPLETED | OUTPATIENT
Start: 2022-11-29 | End: 2022-11-29

## 2022-11-29 RX ORDER — BENZONATATE 100 MG/1
200 CAPSULE ORAL 3 TIMES DAILY PRN
Status: DISCONTINUED | OUTPATIENT
Start: 2022-11-29 | End: 2022-12-03 | Stop reason: HOSPADM

## 2022-11-29 RX ORDER — MELATONIN
2000 DAILY
Status: DISCONTINUED | OUTPATIENT
Start: 2022-11-29 | End: 2022-12-03 | Stop reason: HOSPADM

## 2022-11-29 RX ORDER — ATORVASTATIN CALCIUM 20 MG/1
20 TABLET, FILM COATED ORAL NIGHTLY
Status: DISCONTINUED | OUTPATIENT
Start: 2022-11-29 | End: 2022-12-03 | Stop reason: HOSPADM

## 2022-11-29 RX ORDER — GUAIFENESIN AND DEXTROMETHORPHAN HYDROBROMIDE 600; 30 MG/1; MG/1
1 TABLET, EXTENDED RELEASE ORAL 2 TIMES DAILY
Status: DISCONTINUED | OUTPATIENT
Start: 2022-11-29 | End: 2022-12-03 | Stop reason: HOSPADM

## 2022-11-29 RX ORDER — MONTELUKAST SODIUM 10 MG/1
10 TABLET ORAL NIGHTLY
Status: DISCONTINUED | OUTPATIENT
Start: 2022-11-29 | End: 2022-12-03 | Stop reason: HOSPADM

## 2022-11-29 RX ORDER — HYOSCYAMINE SULFATE EXTENDED-RELEASE 0.38 MG/1
375 TABLET ORAL EVERY 12 HOURS PRN
Status: DISCONTINUED | OUTPATIENT
Start: 2022-11-29 | End: 2022-12-03 | Stop reason: HOSPADM

## 2022-11-29 RX ORDER — MAGNESIUM SULFATE HEPTAHYDRATE 40 MG/ML
2 INJECTION, SOLUTION INTRAVENOUS ONCE
Status: COMPLETED | OUTPATIENT
Start: 2022-11-29 | End: 2022-11-29

## 2022-11-29 RX ORDER — METHYLPREDNISOLONE SODIUM SUCCINATE 125 MG/2ML
125 INJECTION, POWDER, LYOPHILIZED, FOR SOLUTION INTRAMUSCULAR; INTRAVENOUS ONCE
Status: COMPLETED | OUTPATIENT
Start: 2022-11-29 | End: 2022-11-29

## 2022-11-29 RX ORDER — FLUOXETINE HYDROCHLORIDE 20 MG/1
80 CAPSULE ORAL DAILY
Status: DISCONTINUED | OUTPATIENT
Start: 2022-11-29 | End: 2022-12-03 | Stop reason: HOSPADM

## 2022-11-29 RX ORDER — METHYLPREDNISOLONE SODIUM SUCCINATE 125 MG/2ML
60 INJECTION, POWDER, LYOPHILIZED, FOR SOLUTION INTRAMUSCULAR; INTRAVENOUS EVERY 8 HOURS
Status: DISCONTINUED | OUTPATIENT
Start: 2022-11-29 | End: 2022-11-30

## 2022-11-29 RX ORDER — FLUTICASONE PROPIONATE 50 MCG
2 SPRAY, SUSPENSION (ML) NASAL DAILY
Status: DISCONTINUED | OUTPATIENT
Start: 2022-11-29 | End: 2022-12-03 | Stop reason: HOSPADM

## 2022-11-29 RX ORDER — SUCRALFATE 1 G/1
1 TABLET ORAL 4 TIMES DAILY
Status: DISCONTINUED | OUTPATIENT
Start: 2022-11-29 | End: 2022-12-03 | Stop reason: HOSPADM

## 2022-11-29 RX ADMIN — MONTELUKAST SODIUM 10 MG: 10 TABLET, FILM COATED ORAL at 21:30

## 2022-11-29 RX ADMIN — Medication 2000 UNITS: at 19:26

## 2022-11-29 RX ADMIN — IPRATROPIUM BROMIDE AND ALBUTEROL SULFATE 3 ML: 2.5; .5 SOLUTION RESPIRATORY (INHALATION) at 11:25

## 2022-11-29 RX ADMIN — FLUTICASONE PROPIONATE 2 SPRAY: 50 SPRAY, METERED NASAL at 21:30

## 2022-11-29 RX ADMIN — METHYLPREDNISOLONE SODIUM SUCCINATE 125 MG: 125 INJECTION, POWDER, FOR SOLUTION INTRAMUSCULAR; INTRAVENOUS at 10:45

## 2022-11-29 RX ADMIN — IPRATROPIUM BROMIDE AND ALBUTEROL SULFATE 3 ML: 2.5; .5 SOLUTION RESPIRATORY (INHALATION) at 18:50

## 2022-11-29 RX ADMIN — CETIRIZINE HYDROCHLORIDE 10 MG: 10 TABLET ORAL at 19:26

## 2022-11-29 RX ADMIN — PANTOPRAZOLE SODIUM 40 MG: 40 TABLET, DELAYED RELEASE ORAL at 18:26

## 2022-11-29 RX ADMIN — Medication 10 ML: at 18:26

## 2022-11-29 RX ADMIN — GUAIFENESIN AND DEXTROMETHORPHAN HYDROBROMIDE 1 TABLET: 600; 30 TABLET, EXTENDED RELEASE ORAL at 21:30

## 2022-11-29 RX ADMIN — ATORVASTATIN CALCIUM 20 MG: 20 TABLET, FILM COATED ORAL at 21:30

## 2022-11-29 RX ADMIN — BENZONATATE 200 MG: 100 CAPSULE ORAL at 19:26

## 2022-11-29 RX ADMIN — MAGNESIUM SULFATE HEPTAHYDRATE 2 G: 2 INJECTION, SOLUTION INTRAVENOUS at 13:59

## 2022-11-29 RX ADMIN — METHYLPREDNISOLONE SODIUM SUCCINATE 60 MG: 125 INJECTION, POWDER, FOR SOLUTION INTRAMUSCULAR; INTRAVENOUS at 18:26

## 2022-11-29 RX ADMIN — IPRATROPIUM BROMIDE AND ALBUTEROL SULFATE 3 ML: 2.5; .5 SOLUTION RESPIRATORY (INHALATION) at 12:28

## 2022-11-29 RX ADMIN — FLUOXETINE HYDROCHLORIDE 80 MG: 20 CAPSULE ORAL at 19:26

## 2022-11-29 RX ADMIN — IPRATROPIUM BROMIDE AND ALBUTEROL SULFATE 3 ML: 2.5; .5 SOLUTION RESPIRATORY (INHALATION) at 23:36

## 2022-11-29 NOTE — TELEPHONE ENCOUNTER
----- Message from RESHMA Molina sent at 11/29/2022 11:55 AM EST -----  Normal gastric emptying study.

## 2022-11-30 ENCOUNTER — APPOINTMENT (OUTPATIENT)
Dept: CARDIOLOGY | Facility: HOSPITAL | Age: 67
End: 2022-11-30

## 2022-11-30 LAB
ALBUMIN SERPL-MCNC: 4 G/DL (ref 3.5–5.2)
ALBUMIN/GLOB SERPL: 1.5 G/DL
ALP SERPL-CCNC: 65 U/L (ref 39–117)
ALT SERPL W P-5'-P-CCNC: 7 U/L (ref 1–33)
ANION GAP SERPL CALCULATED.3IONS-SCNC: 10 MMOL/L (ref 5–15)
AORTIC DIMENSIONLESS INDEX: 0.9 (DI)
ASCENDING AORTA: 2.8 CM
AST SERPL-CCNC: 13 U/L (ref 1–32)
BASOPHILS # BLD AUTO: 0.01 10*3/MM3 (ref 0–0.2)
BASOPHILS NFR BLD AUTO: 0.2 % (ref 0–1.5)
BH CV ECHO MEAS - ACS: 1.29 CM
BH CV ECHO MEAS - AO MAX PG: 6.5 MMHG
BH CV ECHO MEAS - AO MEAN PG: 3.1 MMHG
BH CV ECHO MEAS - AO ROOT DIAM: 2.34 CM
BH CV ECHO MEAS - AO V2 MAX: 127 CM/SEC
BH CV ECHO MEAS - AO V2 VTI: 26.6 CM
BH CV ECHO MEAS - AVA(I,D): 3.1 CM2
BH CV ECHO MEAS - EDV(CUBED): 42.5 ML
BH CV ECHO MEAS - EDV(MOD-SP2): 60 ML
BH CV ECHO MEAS - EDV(MOD-SP4): 71 ML
BH CV ECHO MEAS - EF(MOD-BP): 67.3 %
BH CV ECHO MEAS - EF(MOD-SP2): 68.3 %
BH CV ECHO MEAS - EF(MOD-SP4): 67.6 %
BH CV ECHO MEAS - ESV(CUBED): 15.6 ML
BH CV ECHO MEAS - ESV(MOD-SP2): 19 ML
BH CV ECHO MEAS - ESV(MOD-SP4): 23 ML
BH CV ECHO MEAS - FS: 28.4 %
BH CV ECHO MEAS - IVS/LVPW: 0.93 CM
BH CV ECHO MEAS - IVSD: 0.88 CM
BH CV ECHO MEAS - LAT PEAK E' VEL: 9 CM/SEC
BH CV ECHO MEAS - LV DIASTOLIC VOL/BSA (35-75): 34.7 CM2
BH CV ECHO MEAS - LV MASS(C)D: 90.3 GRAMS
BH CV ECHO MEAS - LV MAX PG: 7 MMHG
BH CV ECHO MEAS - LV MEAN PG: 3.3 MMHG
BH CV ECHO MEAS - LV SYSTOLIC VOL/BSA (12-30): 11.2 CM2
BH CV ECHO MEAS - LV V1 MAX: 132.2 CM/SEC
BH CV ECHO MEAS - LV V1 VTI: 25.5 CM
BH CV ECHO MEAS - LVIDD: 3.5 CM
BH CV ECHO MEAS - LVIDS: 2.5 CM
BH CV ECHO MEAS - LVOT AREA: 3.3 CM2
BH CV ECHO MEAS - LVOT DIAM: 2.04 CM
BH CV ECHO MEAS - LVPWD: 0.95 CM
BH CV ECHO MEAS - MED PEAK E' VEL: 8.1 CM/SEC
BH CV ECHO MEAS - MV A DUR: 0.13 SEC
BH CV ECHO MEAS - MV A MAX VEL: 86.5 CM/SEC
BH CV ECHO MEAS - MV DEC SLOPE: 709.3 CM/SEC2
BH CV ECHO MEAS - MV DEC TIME: 0.15 MSEC
BH CV ECHO MEAS - MV E MAX VEL: 64.7 CM/SEC
BH CV ECHO MEAS - MV E/A: 0.75
BH CV ECHO MEAS - MV MAX PG: 6.7 MMHG
BH CV ECHO MEAS - MV MEAN PG: 2.7 MMHG
BH CV ECHO MEAS - MV P1/2T: 33.6 MSEC
BH CV ECHO MEAS - MV V2 VTI: 18.3 CM
BH CV ECHO MEAS - MVA(P1/2T): 6.5 CM2
BH CV ECHO MEAS - MVA(VTI): 4.6 CM2
BH CV ECHO MEAS - PA ACC TIME: 0.1 SEC
BH CV ECHO MEAS - PA PR(ACCEL): 36.2 MMHG
BH CV ECHO MEAS - PA V2 MAX: 59.1 CM/SEC
BH CV ECHO MEAS - PULM A REVS DUR: 0.1 SEC
BH CV ECHO MEAS - PULM A REVS VEL: 59.2 CM/SEC
BH CV ECHO MEAS - PULM DIAS VEL: 28.6 CM/SEC
BH CV ECHO MEAS - PULM S/D: 1.66
BH CV ECHO MEAS - PULM SYS VEL: 47.5 CM/SEC
BH CV ECHO MEAS - QP/QS: 0.63
BH CV ECHO MEAS - RV MAX PG: 3.8 MMHG
BH CV ECHO MEAS - RV V1 MAX: 97.9 CM/SEC
BH CV ECHO MEAS - RV V1 VTI: 14.5 CM
BH CV ECHO MEAS - RVOT DIAM: 2.14 CM
BH CV ECHO MEAS - SI(MOD-SP2): 20 ML/M2
BH CV ECHO MEAS - SI(MOD-SP4): 23.4 ML/M2
BH CV ECHO MEAS - SV(LVOT): 83.4 ML
BH CV ECHO MEAS - SV(MOD-SP2): 41 ML
BH CV ECHO MEAS - SV(MOD-SP4): 48 ML
BH CV ECHO MEAS - SV(RVOT): 52.2 ML
BH CV ECHO MEAS - TAPSE (>1.6): 2.11 CM
BH CV ECHO MEASUREMENTS AVERAGE E/E' RATIO: 7.57
BH CV XLRA - RV BASE: 2.6 CM
BH CV XLRA - RV LENGTH: 7.2 CM
BH CV XLRA - RV MID: 2.7 CM
BH CV XLRA - TDI S': 14.9 CM/SEC
BILIRUB SERPL-MCNC: 0.2 MG/DL (ref 0–1.2)
BUN SERPL-MCNC: 21 MG/DL (ref 8–23)
BUN/CREAT SERPL: 33.3 (ref 7–25)
CALCIUM SPEC-SCNC: 8.8 MG/DL (ref 8.6–10.5)
CHLORIDE SERPL-SCNC: 103 MMOL/L (ref 98–107)
CO2 SERPL-SCNC: 25 MMOL/L (ref 22–29)
CREAT SERPL-MCNC: 0.63 MG/DL (ref 0.57–1)
DEPRECATED RDW RBC AUTO: 41 FL (ref 37–54)
EGFRCR SERPLBLD CKD-EPI 2021: 98.6 ML/MIN/1.73
EOSINOPHIL # BLD AUTO: 0 10*3/MM3 (ref 0–0.4)
EOSINOPHIL NFR BLD AUTO: 0 % (ref 0.3–6.2)
ERYTHROCYTE [DISTWIDTH] IN BLOOD BY AUTOMATED COUNT: 12.8 % (ref 12.3–15.4)
GLOBULIN UR ELPH-MCNC: 2.7 GM/DL
GLUCOSE SERPL-MCNC: 131 MG/DL (ref 65–99)
HCT VFR BLD AUTO: 36.6 % (ref 34–46.6)
HGB BLD-MCNC: 12 G/DL (ref 12–15.9)
IMM GRANULOCYTES # BLD AUTO: 0.03 10*3/MM3 (ref 0–0.05)
IMM GRANULOCYTES NFR BLD AUTO: 0.5 % (ref 0–0.5)
LEFT ATRIUM VOLUME INDEX: 21.8 ML/M2
LYMPHOCYTES # BLD AUTO: 0.61 10*3/MM3 (ref 0.7–3.1)
LYMPHOCYTES NFR BLD AUTO: 9.9 % (ref 19.6–45.3)
MAXIMAL PREDICTED HEART RATE: 155 BPM
MCH RBC QN AUTO: 28.8 PG (ref 26.6–33)
MCHC RBC AUTO-ENTMCNC: 32.8 G/DL (ref 31.5–35.7)
MCV RBC AUTO: 87.8 FL (ref 79–97)
MONOCYTES # BLD AUTO: 0.44 10*3/MM3 (ref 0.1–0.9)
MONOCYTES NFR BLD AUTO: 7.1 % (ref 5–12)
NEUTROPHILS NFR BLD AUTO: 5.1 10*3/MM3 (ref 1.7–7)
NEUTROPHILS NFR BLD AUTO: 82.3 % (ref 42.7–76)
NRBC BLD AUTO-RTO: 0 /100 WBC (ref 0–0.2)
PLATELET # BLD AUTO: 341 10*3/MM3 (ref 140–450)
PMV BLD AUTO: 9 FL (ref 6–12)
POTASSIUM SERPL-SCNC: 4 MMOL/L (ref 3.5–5.2)
PROCALCITONIN SERPL-MCNC: 0.04 NG/ML (ref 0–0.25)
PROT SERPL-MCNC: 6.7 G/DL (ref 6–8.5)
RBC # BLD AUTO: 4.17 10*6/MM3 (ref 3.77–5.28)
SINUS: 2.8 CM
SODIUM SERPL-SCNC: 138 MMOL/L (ref 136–145)
STJ: 1.93 CM
STRESS TARGET HR: 132 BPM
WBC NRBC COR # BLD: 6.19 10*3/MM3 (ref 3.4–10.8)

## 2022-11-30 PROCEDURE — 25010000002 METHYLPREDNISOLONE PER 40 MG: Performed by: INTERNAL MEDICINE

## 2022-11-30 PROCEDURE — 94761 N-INVAS EAR/PLS OXIMETRY MLT: CPT

## 2022-11-30 PROCEDURE — G0378 HOSPITAL OBSERVATION PER HR: HCPCS

## 2022-11-30 PROCEDURE — 25010000002 PERFLUTREN (DEFINITY) 8.476 MG IN SODIUM CHLORIDE (PF) 0.9 % 10 ML INJECTION: Performed by: HOSPITALIST

## 2022-11-30 PROCEDURE — 94799 UNLISTED PULMONARY SVC/PX: CPT

## 2022-11-30 PROCEDURE — 96376 TX/PRO/DX INJ SAME DRUG ADON: CPT

## 2022-11-30 PROCEDURE — 94664 DEMO&/EVAL PT USE INHALER: CPT

## 2022-11-30 PROCEDURE — 93306 TTE W/DOPPLER COMPLETE: CPT

## 2022-11-30 PROCEDURE — 84145 PROCALCITONIN (PCT): CPT | Performed by: INTERNAL MEDICINE

## 2022-11-30 PROCEDURE — 80053 COMPREHEN METABOLIC PANEL: CPT | Performed by: INTERNAL MEDICINE

## 2022-11-30 PROCEDURE — 93306 TTE W/DOPPLER COMPLETE: CPT | Performed by: INTERNAL MEDICINE

## 2022-11-30 PROCEDURE — 85025 COMPLETE CBC W/AUTO DIFF WBC: CPT | Performed by: INTERNAL MEDICINE

## 2022-11-30 PROCEDURE — 25010000002 METHYLPREDNISOLONE PER 125 MG: Performed by: INTERNAL MEDICINE

## 2022-11-30 RX ORDER — METHYLPREDNISOLONE SODIUM SUCCINATE 40 MG/ML
40 INJECTION, POWDER, LYOPHILIZED, FOR SOLUTION INTRAMUSCULAR; INTRAVENOUS EVERY 12 HOURS
Status: DISCONTINUED | OUTPATIENT
Start: 2022-11-30 | End: 2022-12-01

## 2022-11-30 RX ADMIN — IPRATROPIUM BROMIDE AND ALBUTEROL SULFATE 3 ML: 2.5; .5 SOLUTION RESPIRATORY (INHALATION) at 19:59

## 2022-11-30 RX ADMIN — IPRATROPIUM BROMIDE AND ALBUTEROL SULFATE 3 ML: 2.5; .5 SOLUTION RESPIRATORY (INHALATION) at 14:52

## 2022-11-30 RX ADMIN — METHYLPREDNISOLONE SODIUM SUCCINATE 60 MG: 125 INJECTION, POWDER, FOR SOLUTION INTRAMUSCULAR; INTRAVENOUS at 04:35

## 2022-11-30 RX ADMIN — METHYLPREDNISOLONE SODIUM SUCCINATE 40 MG: 40 INJECTION, POWDER, FOR SOLUTION INTRAMUSCULAR; INTRAVENOUS at 23:36

## 2022-11-30 RX ADMIN — FLUTICASONE PROPIONATE 2 SPRAY: 50 SPRAY, METERED NASAL at 08:14

## 2022-11-30 RX ADMIN — FLUOXETINE HYDROCHLORIDE 80 MG: 20 CAPSULE ORAL at 17:50

## 2022-11-30 RX ADMIN — GUAIFENESIN AND DEXTROMETHORPHAN HYDROBROMIDE 1 TABLET: 600; 30 TABLET, EXTENDED RELEASE ORAL at 20:15

## 2022-11-30 RX ADMIN — IPRATROPIUM BROMIDE AND ALBUTEROL SULFATE 3 ML: 2.5; .5 SOLUTION RESPIRATORY (INHALATION) at 07:12

## 2022-11-30 RX ADMIN — GUAIFENESIN AND DEXTROMETHORPHAN HYDROBROMIDE 1 TABLET: 600; 30 TABLET, EXTENDED RELEASE ORAL at 08:14

## 2022-11-30 RX ADMIN — PERFLUTREN 2 ML: 6.52 INJECTION, SUSPENSION INTRAVENOUS at 10:45

## 2022-11-30 RX ADMIN — CETIRIZINE HYDROCHLORIDE 10 MG: 10 TABLET ORAL at 08:14

## 2022-11-30 RX ADMIN — Medication 2000 UNITS: at 08:19

## 2022-11-30 RX ADMIN — METHYLPREDNISOLONE SODIUM SUCCINATE 60 MG: 125 INJECTION, POWDER, FOR SOLUTION INTRAMUSCULAR; INTRAVENOUS at 11:03

## 2022-11-30 RX ADMIN — ATORVASTATIN CALCIUM 20 MG: 20 TABLET, FILM COATED ORAL at 20:15

## 2022-11-30 RX ADMIN — MONTELUKAST SODIUM 10 MG: 10 TABLET, FILM COATED ORAL at 20:15

## 2022-11-30 RX ADMIN — IPRATROPIUM BROMIDE AND ALBUTEROL SULFATE 3 ML: 2.5; .5 SOLUTION RESPIRATORY (INHALATION) at 23:15

## 2022-11-30 RX ADMIN — BENZONATATE 200 MG: 100 CAPSULE ORAL at 01:40

## 2022-11-30 RX ADMIN — HYOSCYAMINE SULFATE 375 MCG: 0.38 TABLET, EXTENDED RELEASE ORAL at 08:19

## 2022-11-30 RX ADMIN — PANTOPRAZOLE SODIUM 40 MG: 40 TABLET, DELAYED RELEASE ORAL at 08:14

## 2022-12-01 LAB
ANION GAP SERPL CALCULATED.3IONS-SCNC: 9.4 MMOL/L (ref 5–15)
BASOPHILS # BLD AUTO: 0.01 10*3/MM3 (ref 0–0.2)
BASOPHILS NFR BLD AUTO: 0.1 % (ref 0–1.5)
BUN SERPL-MCNC: 21 MG/DL (ref 8–23)
BUN/CREAT SERPL: 35 (ref 7–25)
CALCIUM SPEC-SCNC: 8.8 MG/DL (ref 8.6–10.5)
CHLORIDE SERPL-SCNC: 103 MMOL/L (ref 98–107)
CO2 SERPL-SCNC: 25.6 MMOL/L (ref 22–29)
CREAT SERPL-MCNC: 0.6 MG/DL (ref 0.57–1)
DEPRECATED RDW RBC AUTO: 41.5 FL (ref 37–54)
EGFRCR SERPLBLD CKD-EPI 2021: 99.8 ML/MIN/1.73
EOSINOPHIL # BLD AUTO: 0 10*3/MM3 (ref 0–0.4)
EOSINOPHIL NFR BLD AUTO: 0 % (ref 0.3–6.2)
ERYTHROCYTE [DISTWIDTH] IN BLOOD BY AUTOMATED COUNT: 13 % (ref 12.3–15.4)
GLUCOSE SERPL-MCNC: 137 MG/DL (ref 65–99)
HCT VFR BLD AUTO: 35.8 % (ref 34–46.6)
HGB BLD-MCNC: 12 G/DL (ref 12–15.9)
IMM GRANULOCYTES # BLD AUTO: 0.04 10*3/MM3 (ref 0–0.05)
IMM GRANULOCYTES NFR BLD AUTO: 0.5 % (ref 0–0.5)
LYMPHOCYTES # BLD AUTO: 0.57 10*3/MM3 (ref 0.7–3.1)
LYMPHOCYTES NFR BLD AUTO: 6.6 % (ref 19.6–45.3)
MCH RBC QN AUTO: 29.6 PG (ref 26.6–33)
MCHC RBC AUTO-ENTMCNC: 33.5 G/DL (ref 31.5–35.7)
MCV RBC AUTO: 88.2 FL (ref 79–97)
MONOCYTES # BLD AUTO: 0.38 10*3/MM3 (ref 0.1–0.9)
MONOCYTES NFR BLD AUTO: 4.4 % (ref 5–12)
NEUTROPHILS NFR BLD AUTO: 7.62 10*3/MM3 (ref 1.7–7)
NEUTROPHILS NFR BLD AUTO: 88.4 % (ref 42.7–76)
NRBC BLD AUTO-RTO: 0 /100 WBC (ref 0–0.2)
PLATELET # BLD AUTO: 330 10*3/MM3 (ref 140–450)
PMV BLD AUTO: 8.9 FL (ref 6–12)
POTASSIUM SERPL-SCNC: 4.1 MMOL/L (ref 3.5–5.2)
RBC # BLD AUTO: 4.06 10*6/MM3 (ref 3.77–5.28)
SODIUM SERPL-SCNC: 138 MMOL/L (ref 136–145)
WBC NRBC COR # BLD: 8.62 10*3/MM3 (ref 3.4–10.8)

## 2022-12-01 PROCEDURE — 63710000001 PREDNISONE PER 1 MG: Performed by: INTERNAL MEDICINE

## 2022-12-01 PROCEDURE — 94761 N-INVAS EAR/PLS OXIMETRY MLT: CPT

## 2022-12-01 PROCEDURE — G0378 HOSPITAL OBSERVATION PER HR: HCPCS

## 2022-12-01 PROCEDURE — 80048 BASIC METABOLIC PNL TOTAL CA: CPT | Performed by: HOSPITALIST

## 2022-12-01 PROCEDURE — 94799 UNLISTED PULMONARY SVC/PX: CPT

## 2022-12-01 PROCEDURE — 85025 COMPLETE CBC W/AUTO DIFF WBC: CPT | Performed by: HOSPITALIST

## 2022-12-01 PROCEDURE — 94664 DEMO&/EVAL PT USE INHALER: CPT

## 2022-12-01 RX ORDER — PROMETHAZINE HYDROCHLORIDE AND CODEINE PHOSPHATE 6.25; 1 MG/5ML; MG/5ML
5 SYRUP ORAL EVERY 4 HOURS PRN
Status: DISCONTINUED | OUTPATIENT
Start: 2022-12-01 | End: 2022-12-03 | Stop reason: HOSPADM

## 2022-12-01 RX ORDER — CALCIUM CARBONATE 200(500)MG
2 TABLET,CHEWABLE ORAL 3 TIMES DAILY PRN
Status: DISCONTINUED | OUTPATIENT
Start: 2022-12-01 | End: 2022-12-03 | Stop reason: HOSPADM

## 2022-12-01 RX ORDER — ACETAMINOPHEN 325 MG/1
650 TABLET ORAL EVERY 6 HOURS PRN
Status: DISCONTINUED | OUTPATIENT
Start: 2022-12-01 | End: 2022-12-03 | Stop reason: HOSPADM

## 2022-12-01 RX ORDER — PREDNISONE 20 MG/1
20 TABLET ORAL
Status: DISCONTINUED | OUTPATIENT
Start: 2022-12-01 | End: 2022-12-03 | Stop reason: HOSPADM

## 2022-12-01 RX ORDER — ENOXAPARIN SODIUM 100 MG/ML
40 INJECTION SUBCUTANEOUS NIGHTLY
Status: DISCONTINUED | OUTPATIENT
Start: 2022-12-01 | End: 2022-12-03 | Stop reason: HOSPADM

## 2022-12-01 RX ADMIN — PROMETHAZINE HYDROCHLORIDE AND CODEINE PHOSPHATE 5 ML: 10; 6.25 SOLUTION ORAL at 20:45

## 2022-12-01 RX ADMIN — FLUOXETINE HYDROCHLORIDE 80 MG: 20 CAPSULE ORAL at 08:44

## 2022-12-01 RX ADMIN — FLUTICASONE PROPIONATE 2 SPRAY: 50 SPRAY, METERED NASAL at 08:44

## 2022-12-01 RX ADMIN — IPRATROPIUM BROMIDE AND ALBUTEROL SULFATE 3 ML: 2.5; .5 SOLUTION RESPIRATORY (INHALATION) at 20:10

## 2022-12-01 RX ADMIN — ATORVASTATIN CALCIUM 20 MG: 20 TABLET, FILM COATED ORAL at 20:45

## 2022-12-01 RX ADMIN — BENZONATATE 200 MG: 100 CAPSULE ORAL at 08:45

## 2022-12-01 RX ADMIN — ANTACID TABLETS 2 TABLET: 500 TABLET, CHEWABLE ORAL at 00:43

## 2022-12-01 RX ADMIN — PREDNISONE 20 MG: 20 TABLET ORAL at 12:08

## 2022-12-01 RX ADMIN — PANTOPRAZOLE SODIUM 40 MG: 40 TABLET, DELAYED RELEASE ORAL at 08:44

## 2022-12-01 RX ADMIN — IPRATROPIUM BROMIDE AND ALBUTEROL SULFATE 3 ML: 2.5; .5 SOLUTION RESPIRATORY (INHALATION) at 03:22

## 2022-12-01 RX ADMIN — ACETAMINOPHEN 650 MG: 325 TABLET, FILM COATED ORAL at 10:17

## 2022-12-01 RX ADMIN — Medication 2000 UNITS: at 08:44

## 2022-12-01 RX ADMIN — PREDNISONE 20 MG: 20 TABLET ORAL at 18:13

## 2022-12-01 RX ADMIN — IPRATROPIUM BROMIDE AND ALBUTEROL SULFATE 3 ML: 2.5; .5 SOLUTION RESPIRATORY (INHALATION) at 07:08

## 2022-12-01 RX ADMIN — CETIRIZINE HYDROCHLORIDE 10 MG: 10 TABLET ORAL at 08:44

## 2022-12-01 RX ADMIN — IPRATROPIUM BROMIDE AND ALBUTEROL SULFATE 3 ML: 2.5; .5 SOLUTION RESPIRATORY (INHALATION) at 10:51

## 2022-12-01 RX ADMIN — GUAIFENESIN AND DEXTROMETHORPHAN HYDROBROMIDE 1 TABLET: 600; 30 TABLET, EXTENDED RELEASE ORAL at 20:45

## 2022-12-01 RX ADMIN — ANTACID TABLETS 2 TABLET: 500 TABLET, CHEWABLE ORAL at 12:08

## 2022-12-01 RX ADMIN — IPRATROPIUM BROMIDE AND ALBUTEROL SULFATE 3 ML: 2.5; .5 SOLUTION RESPIRATORY (INHALATION) at 16:04

## 2022-12-01 RX ADMIN — PROMETHAZINE HYDROCHLORIDE AND CODEINE PHOSPHATE 5 ML: 10; 6.25 SOLUTION ORAL at 12:03

## 2022-12-01 RX ADMIN — MONTELUKAST SODIUM 10 MG: 10 TABLET, FILM COATED ORAL at 20:45

## 2022-12-01 RX ADMIN — GUAIFENESIN AND DEXTROMETHORPHAN HYDROBROMIDE 1 TABLET: 600; 30 TABLET, EXTENDED RELEASE ORAL at 08:44

## 2022-12-01 NOTE — PROGRESS NOTES
Dedicated to Hospital Care    [phone]   LOS: 0 days     Name: Jennifer Shelton  Age/Sex: 67 y.o. female  :  1955        PCP: Tessy Serrano PA  Chief Complaint   Patient presents with   • Shortness of Breath   • Cough      Subjective   Still coughing and still short of breath today.  Denies new issues today.  No fevers overnight.  Does not feel like her symptoms are really all that improved today.  She did receive some cough medicine earlier and is doing a lot better following that.  General: No Fever or Chills, Cardiac: No Chest Pain or Palpitations, GI: No Nausea, Vomiting, or Diarrhea and Other: No bleeding    atorvastatin, 20 mg, Oral, Nightly  cetirizine, 10 mg, Oral, Daily  cholecalciferol, 2,000 Units, Oral, Daily  enoxaparin, 40 mg, Subcutaneous, Nightly  FLUoxetine, 80 mg, Oral, Daily  fluticasone, 2 spray, Each Nare, Daily  guaifenesin-dextromethorphan, 1 tablet, Oral, BID  ipratropium-albuterol, 3 mL, Nebulization, Q4H - RT  montelukast, 10 mg, Oral, Nightly  pantoprazole, 40 mg, Oral, Daily  predniSONE, 20 mg, Oral, TID With Meals  sucralfate, 1 g, Oral, 4x Daily           Objective   Vital Signs  Temp:  [98.1 °F (36.7 °C)-98.4 °F (36.9 °C)] 98.2 °F (36.8 °C)  Heart Rate:  [] 94  Resp:  [16-20] 18  BP: (137-165)/(78-94) 148/84  Body mass index is 30.41 kg/m².    Intake/Output Summary (Last 24 hours) at 2022 1619  Last data filed at 2022 1351  Gross per 24 hour   Intake 720 ml   Output --   Net 720 ml       Physical Exam  Vitals and nursing note reviewed.   Constitutional:       Appearance: She is ill-appearing.   Cardiovascular:      Rate and Rhythm: Normal rate and regular rhythm.   Pulmonary:      Effort: No respiratory distress.      Breath sounds: Normal breath sounds.   Abdominal:      General: Bowel sounds are normal.      Palpations: Abdomen is soft.   Skin:     General: Skin is warm and dry.   Neurological:      Mental Status: She is alert.           Results  Review:       I reviewed the patient's new clinical results.  Results from last 7 days   Lab Units 12/01/22  0348 11/30/22  0446 11/29/22  0049   WBC 10*3/mm3 8.62 6.19 8.17   HEMOGLOBIN g/dL 12.0 12.0 12.4   PLATELETS 10*3/mm3 330 341 335     Results from last 7 days   Lab Units 12/01/22  0348 11/30/22 0446 11/29/22  0049   SODIUM mmol/L 138 138 141   POTASSIUM mmol/L 4.1 4.0 3.8   CHLORIDE mmol/L 103 103 105   CO2 mmol/L 25.6 25.0 23.3   BUN mg/dL 21 21 15   CREATININE mg/dL 0.60 0.63 0.73   CALCIUM mg/dL 8.8 8.8 9.7   Estimated Creatinine Clearance: 110.5 mL/min (by C-G formula based on SCr of 0.6 mg/dL).      Assessment & Plan   Active Hospital Problems    Diagnosis  POA   • **Acute hypoxemic respiratory failure (HCC) [J96.01]  Yes   • COPD exacerbation (HCC) [J44.1]  Yes   • Seizures (HCC) [R56.9]  Unknown   • Sleep apnea [G47.30]  Unknown   • Gastroesophageal reflux disease [K21.9]  Yes   • Irritable bowel syndrome with constipation [K58.1]  Yes   • Chronic obstructive pulmonary disease (HCC) [J44.9]  Yes      Resolved Hospital Problems   No resolved problems to display.       PLAN  This is a 67-year-old female with a history of COPD sleep apnea seizures gastroesophageal reflux and irritable bowel syndrome who presents to the hospital with shortness of breath and cough is found to have acute exacerbation of COPD and acute hypoxic respiratory failure  -Titrate oxygen as appropriate keep O2 saturations above 88%.  Avoid hyperoxia  -Continue steroids and breathing treatments.  She is wheezing more today and coughing more.  Transition to oral steroids  -No fevers or chills no recent illness and no evidence of bacterial infection we will hold off on antibiotics for now.  -Appreciate pulmonary's assistance and they will see her in the outpatient setting following discharge  -Mechanical DVT prophylaxis, encourage early ambulation  -Full code      Disposition  Hopefully able to titrate off oxygen and potential home  Friday based on progress      Gasper Castellanos MD  [facility]  12/01/22  16:19 EST

## 2022-12-01 NOTE — PLAN OF CARE
Goal Outcome Evaluation:               VSS. Cough improved after PRNs. On room air. Lovenox added. Possible discharge tomorrow.

## 2022-12-01 NOTE — PROGRESS NOTES
"  PROGRESS NOTE  Patient Name: Jennifer Shelton  Age/Sex: 67 y.o. female  : 1955  MRN: [id number]    Date of Admission: 2022  Date of Encounter Visit: 22   LOS: 0 days   Patient Care Team:  Tessy Serrano PA as PCP - General (Family Medicine)  Deja Chadwick APRN as Nurse Practitioner (Behavioral Health)  Oneal Raymond MD as Consulting Physician (Gastroenterology)  Stephanie Moran APRN as Nurse Practitioner (Gastroenterology)    Chief Complaint: Acute COPD exacerbation    Hospital course: Patient is doing better, she still having some coughing spells and with presyncope triggered by the cough.  She is afebrile, her viral panel came back negative and she was ruled out for the influenza and COVID-19.  No suspected bacterial infection and no antibiotics needed  She is currently on systemic steroid and bronchodilator as  Home regimen includes Stiolto and Qvar      REVIEW OF SYSTEMS:   CONSTITUTIONAL: no fever or chills  CARDIOVASCULAR: No chest pain, chest pressure or chest discomfort. No palpitations or edema.   RESPIRATORY: Improved shortness of breath with persistent but less frequent coughing spells with presyncope with a coughing episode.   GASTROINTESTINAL: No anorexia, nausea, vomiting or diarrhea. No abdominal pain or blood.   HEMATOLOGIC: No bleeding or bruising.     Ventilator/Non-Invasive Ventilation Settings (From admission, onward)     on room air            Vital Signs  Temp:  [98.1 °F (36.7 °C)-98.4 °F (36.9 °C)] 98.4 °F (36.9 °C)  Heart Rate:  [] 102  Resp:  [16-20] 20  BP: (128-147)/(75-86) 147/83  SpO2:  [93 %-98 %] 98 %  on  Flow (L/min):  [2] 2 Device (Oxygen Therapy): room air    Intake/Output Summary (Last 24 hours) at 2022 0839  Last data filed at 2022 0503  Gross per 24 hour   Intake 240 ml   Output --   Net 240 ml     Flowsheet Rows    Flowsheet Row First Filed Value   Admission Height 172.7 cm (68\") Documented at 2022 2229   Admission " Weight 87.5 kg (193 lb) Documented at 11/29/2022 1032        Body mass index is 30.41 kg/m².      11/29/22  1719 11/29/22  1800 11/30/22  1044   Weight: 90.9 kg (200 lb 6.4 oz) 90.9 kg (200 lb 6.4 oz) 91 kg (200 lb 9.9 oz)       Physical Exam:  GEN:  No acute distress, alert, cooperative, well developed   EYES:   Sclerae clear. No icterus. PERRL. Normal EOM  ENT:   External ears/nose normal, no oral lesions, no thrush, mucous membranes moist  NECK:  Supple, midline trachea, no JVD  LUNGS: Normal chest on inspection, positive wheezing, no significant prolongation of the expiratory phase, no crackles. Respirations regular, even and unlabored.   CV:  Regular rhythm and rate. Normal S1/S2. No murmurs, gallops, or rubs noted.  ABD:  Soft, nontender and nondistended. Normal bowel sounds. No guarding  EXT:  Moves all extremities well. No cyanosis. No redness. No edema.   Skin: Dry, intact, no bleeding    Results Review:        Results from last 7 days   Lab Units 12/01/22  0348 11/30/22  0446 11/29/22  0049   SODIUM mmol/L 138 138 141   POTASSIUM mmol/L 4.1 4.0 3.8   CHLORIDE mmol/L 103 103 105   CO2 mmol/L 25.6 25.0 23.3   BUN mg/dL 21 21 15   CREATININE mg/dL 0.60 0.63 0.73   CALCIUM mg/dL 8.8 8.8 9.7   AST (SGOT) U/L  --  13 11   ALT (SGPT) U/L  --  7 10   ANION GAP mmol/L 9.4 10.0 12.7   ALBUMIN g/dL  --  4.00 4.00     Results from last 7 days   Lab Units 11/29/22  0049   TROPONIN T ng/mL <0.010         Results from last 7 days   Lab Units 11/29/22  0049   PROBNP pg/mL 64.7     Results from last 7 days   Lab Units 12/01/22  0348 11/30/22  0446 11/29/22  0049   WBC 10*3/mm3 8.62 6.19 8.17   HEMOGLOBIN g/dL 12.0 12.0 12.4   HEMATOCRIT % 35.8 36.6 37.5   PLATELETS 10*3/mm3 330 341 335   MCV fL 88.2 87.8 89.1   NEUTROPHIL % % 88.4* 82.3* 59.5   LYMPHOCYTE % % 6.6* 9.9* 13.6*   MONOCYTES % % 4.4* 7.1 6.6   EOSINOPHIL % % 0.0* 0.0* 19.1*   BASOPHIL % % 0.1 0.2 1.0   IMM GRAN % % 0.5 0.5 0.2                   Invalid  input(s): LDLCALC          No results found for: POCGLU  Results from last 7 days   Lab Units 11/30/22  0446   PROCALCITONIN ng/mL 0.04             Results from last 7 days   Lab Units 11/29/22  1047   COVID19  Not Detected   ADENOVIRUS DETECTION BY PCR  Not Detected   CORONAVIRUS 229E  Not Detected   CORONAVIRUS HKU1  Not Detected   CORONAVIRUS NL63  Not Detected   CORONAVIRUS OC43  Not Detected   HUMAN METAPNEUMOVIRUS  Not Detected   HUMAN RHINOVIRUS/ENTEROVIRUS  Not Detected   INFLUENZA B PCR  Not Detected   PARAINFLUENZA 1  Not Detected   PARAINFLUENZA VIRUS 2  Not Detected   PARAINFLUENZA VIRUS 3  Not Detected   PARAINFLUENZA VIRUS 4  Not Detected   BORDETELLA PERTUSSIS PCR  Not Detected   BORDETELLA PARAPERTUSSIS PCR  Not Detected   CHLAMYDOPHILA PNEUMONIAE PCR  Not Detected   MYCOPLAMA PNEUMO PCR  Not Detected   RSV, PCR  Not Detected               Imaging:   Imaging Results (All)      I reviewed the patient's new clinical results.  I personally viewed and interpreted the patient's imaging results:        Medication Review:   atorvastatin, 20 mg, Oral, Nightly  cetirizine, 10 mg, Oral, Daily  cholecalciferol, 2,000 Units, Oral, Daily  FLUoxetine, 80 mg, Oral, Daily  fluticasone, 2 spray, Each Nare, Daily  guaifenesin-dextromethorphan, 1 tablet, Oral, BID  ipratropium-albuterol, 3 mL, Nebulization, Q4H - RT  methylPREDNISolone sodium succinate, 40 mg, Intravenous, Q12H  montelukast, 10 mg, Oral, Nightly  pantoprazole, 40 mg, Oral, Daily  sucralfate, 1 g, Oral, 4x Daily             ASSESSMENT:   1. Acute hypoxemic respiratory failure  2. Acute COPD exacerbation  3. History of tobacco abuse quit couple of years ago    PLAN:  Patient is doing better but she still having some coughing spells with presyncope, she is on systemic steroids, she is not requiring  any antibiotics.  She is on room air  Recommendation is to continue with the steroid but transition to p.o., we will add antitussive medication given her  sensitivity to the cough causing presyncope and sometimes full syncope by history.  She may be cleared for discharge home tomorrow if she continues to improve on the oral regimen  She can go home on her home regimen with the Stiolto and Qvar    Discussed with patient    Disposition:     Andrea Moeller MD  12/01/22  08:57 EST        Dictated utilizing Dragon dictation

## 2022-12-01 NOTE — PLAN OF CARE
Goal Outcome Evaluation:  Plan of Care Reviewed With: patient        Progress: improving  Outcome Evaluation: vss, no complaints of pain. pt has been on room air during the shift. soumedro given. pt rested well during shift. labs in am. will continue to monitor.

## 2022-12-02 PROCEDURE — 94664 DEMO&/EVAL PT USE INHALER: CPT

## 2022-12-02 PROCEDURE — 94761 N-INVAS EAR/PLS OXIMETRY MLT: CPT

## 2022-12-02 PROCEDURE — 94799 UNLISTED PULMONARY SVC/PX: CPT

## 2022-12-02 PROCEDURE — G0378 HOSPITAL OBSERVATION PER HR: HCPCS

## 2022-12-02 PROCEDURE — 63710000001 PREDNISONE PER 1 MG: Performed by: INTERNAL MEDICINE

## 2022-12-02 RX ORDER — PREDNISONE 10 MG/1
TABLET ORAL
Qty: 32 TABLET | Refills: 0 | Status: SHIPPED | OUTPATIENT
Start: 2022-12-02 | End: 2022-12-18

## 2022-12-02 RX ORDER — PROMETHAZINE HYDROCHLORIDE AND CODEINE PHOSPHATE 6.25; 1 MG/5ML; MG/5ML
5 SYRUP ORAL EVERY 6 HOURS PRN
Qty: 118 ML | Refills: 0 | Status: SHIPPED | OUTPATIENT
Start: 2022-12-02 | End: 2023-02-15

## 2022-12-02 RX ADMIN — IPRATROPIUM BROMIDE AND ALBUTEROL SULFATE 3 ML: 2.5; .5 SOLUTION RESPIRATORY (INHALATION) at 23:55

## 2022-12-02 RX ADMIN — CETIRIZINE HYDROCHLORIDE 10 MG: 10 TABLET ORAL at 08:57

## 2022-12-02 RX ADMIN — PREDNISONE 20 MG: 20 TABLET ORAL at 08:57

## 2022-12-02 RX ADMIN — IPRATROPIUM BROMIDE AND ALBUTEROL SULFATE 3 ML: 2.5; .5 SOLUTION RESPIRATORY (INHALATION) at 04:24

## 2022-12-02 RX ADMIN — SUCRALFATE 1 G: 1 TABLET ORAL at 08:57

## 2022-12-02 RX ADMIN — IPRATROPIUM BROMIDE AND ALBUTEROL SULFATE 3 ML: 2.5; .5 SOLUTION RESPIRATORY (INHALATION) at 06:40

## 2022-12-02 RX ADMIN — PREDNISONE 20 MG: 20 TABLET ORAL at 18:23

## 2022-12-02 RX ADMIN — FLUOXETINE HYDROCHLORIDE 80 MG: 20 CAPSULE ORAL at 08:57

## 2022-12-02 RX ADMIN — Medication 10 ML: at 21:19

## 2022-12-02 RX ADMIN — IPRATROPIUM BROMIDE AND ALBUTEROL SULFATE 3 ML: 2.5; .5 SOLUTION RESPIRATORY (INHALATION) at 00:33

## 2022-12-02 RX ADMIN — PROMETHAZINE HYDROCHLORIDE AND CODEINE PHOSPHATE 5 ML: 10; 6.25 SOLUTION ORAL at 14:40

## 2022-12-02 RX ADMIN — GUAIFENESIN AND DEXTROMETHORPHAN HYDROBROMIDE 1 TABLET: 600; 30 TABLET, EXTENDED RELEASE ORAL at 23:44

## 2022-12-02 RX ADMIN — PROMETHAZINE HYDROCHLORIDE AND CODEINE PHOSPHATE 5 ML: 10; 6.25 SOLUTION ORAL at 10:18

## 2022-12-02 RX ADMIN — MONTELUKAST SODIUM 10 MG: 10 TABLET, FILM COATED ORAL at 21:19

## 2022-12-02 RX ADMIN — ANTACID TABLETS 1 TABLET: 500 TABLET, CHEWABLE ORAL at 12:53

## 2022-12-02 RX ADMIN — PREDNISONE 20 MG: 20 TABLET ORAL at 12:20

## 2022-12-02 RX ADMIN — IPRATROPIUM BROMIDE AND ALBUTEROL SULFATE 3 ML: 2.5; .5 SOLUTION RESPIRATORY (INHALATION) at 14:50

## 2022-12-02 RX ADMIN — GUAIFENESIN AND DEXTROMETHORPHAN HYDROBROMIDE 1 TABLET: 600; 30 TABLET, EXTENDED RELEASE ORAL at 08:57

## 2022-12-02 RX ADMIN — IPRATROPIUM BROMIDE AND ALBUTEROL SULFATE 3 ML: 2.5; .5 SOLUTION RESPIRATORY (INHALATION) at 20:08

## 2022-12-02 RX ADMIN — PANTOPRAZOLE SODIUM 40 MG: 40 TABLET, DELAYED RELEASE ORAL at 08:57

## 2022-12-02 RX ADMIN — PROMETHAZINE HYDROCHLORIDE AND CODEINE PHOSPHATE 5 ML: 10; 6.25 SOLUTION ORAL at 21:19

## 2022-12-02 RX ADMIN — Medication 2000 UNITS: at 08:57

## 2022-12-02 RX ADMIN — ATORVASTATIN CALCIUM 20 MG: 20 TABLET, FILM COATED ORAL at 21:19

## 2022-12-02 RX ADMIN — FLUTICASONE PROPIONATE 2 SPRAY: 50 SPRAY, METERED NASAL at 08:57

## 2022-12-02 RX ADMIN — PROMETHAZINE HYDROCHLORIDE AND CODEINE PHOSPHATE 5 ML: 10; 6.25 SOLUTION ORAL at 01:27

## 2022-12-02 RX ADMIN — IPRATROPIUM BROMIDE AND ALBUTEROL SULFATE 3 ML: 2.5; .5 SOLUTION RESPIRATORY (INHALATION) at 09:52

## 2022-12-02 NOTE — PROGRESS NOTES
"  PROGRESS NOTE  Patient Name: Jennifer Shelton  Age/Sex: 67 y.o. female  : 1955  MRN: [id number]    Date of Admission: 2022  Date of Encounter Visit: 22   LOS: 0 days   Patient Care Team:  Tessy Serrano PA as PCP - General (Family Medicine)  Deja Chadwick APRN as Nurse Practitioner (Behavioral Health)  Oneal Raymond MD as Consulting Physician (Gastroenterology)  Stephanie Moran APRN as Nurse Practitioner (Gastroenterology)    Chief Complaint: Acute COPD exacerbation    Hospital course: Patient is having more wheezing this morning, she did have some desaturation after she walked and she feels worse this morning after she was feeling better all day yesterday.  She is on the steroid taper, she did not require any systemic antibiotics.  She is on the bronchodilator therapy, she is afebrile, her labs from yesterday showed no leukocytosis with normal electrolytes.      REVIEW OF SYSTEMS:   CONSTITUTIONAL: no fever or chills  CARDIOVASCULAR: No chest pain, chest pressure or chest discomfort. No palpitations or edema.   RESPIRATORY: Was doing fine until she had an episode of wheezing and some postexercise hypoxemia this morning  GASTROINTESTINAL: No anorexia, nausea, vomiting or diarrhea. No abdominal pain or blood.   HEMATOLOGIC: No bleeding or bruising.     Ventilator/Non-Invasive Ventilation Settings (From admission, onward)     on room air            Vital Signs  Temp:  [97.1 °F (36.2 °C)-98.3 °F (36.8 °C)] 97.1 °F (36.2 °C)  Heart Rate:  [] 100  Resp:  [16-22] 18  BP: (124-148)/(65-85) 125/68  SpO2:  [90 %-95 %] 90 %  on  Flow (L/min):  [3] 3 Device (Oxygen Therapy): nasal cannula    Intake/Output Summary (Last 24 hours) at 2022 1255  Last data filed at 2022 0958  Gross per 24 hour   Intake 940 ml   Output --   Net 940 ml     Flowsheet Rows    Flowsheet Row First Filed Value   Admission Height 172.7 cm (68\") Documented at 2022 2229   Admission Weight 87.5 kg " (193 lb) Documented at 11/29/2022 1032        Body mass index is 29.83 kg/m².      11/29/22  1800 11/30/22  1044 12/01/22  2309   Weight: 90.9 kg (200 lb 6.4 oz) 91 kg (200 lb 9.9 oz) 89 kg (196 lb 3.4 oz)       Physical Exam:  GEN:  No acute distress, alert, cooperative, well developed, on nasal cannula oxygen  EYES:   Sclerae clear. No icterus. PERRL. Normal EOM  ENT:   External ears/nose normal, no oral lesions, no thrush, mucous membranes moist  NECK:  Supple, midline trachea, no JVD  LUNGS: Normal chest on inspection, positive for wheezing, minimal prolongation of the expiratory phase.  No crackles s. Respirations regular, even and unlabored.   CV:  Regular rhythm and rate. Normal S1/S2. No murmurs, gallops, or rubs noted.  ABD:  Soft, nontender and nondistended. Normal bowel sounds. No guarding  EXT:  Moves all extremities well. No cyanosis. No redness. No edema.   Skin: Dry, intact, no bleeding    Results Review:        Results from last 7 days   Lab Units 12/01/22  0348 11/30/22  0446 11/29/22  0049   SODIUM mmol/L 138 138 141   POTASSIUM mmol/L 4.1 4.0 3.8   CHLORIDE mmol/L 103 103 105   CO2 mmol/L 25.6 25.0 23.3   BUN mg/dL 21 21 15   CREATININE mg/dL 0.60 0.63 0.73   CALCIUM mg/dL 8.8 8.8 9.7   AST (SGOT) U/L  --  13 11   ALT (SGPT) U/L  --  7 10   ANION GAP mmol/L 9.4 10.0 12.7   ALBUMIN g/dL  --  4.00 4.00     Results from last 7 days   Lab Units 11/29/22  0049   TROPONIN T ng/mL <0.010         Results from last 7 days   Lab Units 11/29/22  0049   PROBNP pg/mL 64.7     Results from last 7 days   Lab Units 12/01/22  0348 11/30/22  0446 11/29/22  0049   WBC 10*3/mm3 8.62 6.19 8.17   HEMOGLOBIN g/dL 12.0 12.0 12.4   HEMATOCRIT % 35.8 36.6 37.5   PLATELETS 10*3/mm3 330 341 335   MCV fL 88.2 87.8 89.1   NEUTROPHIL % % 88.4* 82.3* 59.5   LYMPHOCYTE % % 6.6* 9.9* 13.6*   MONOCYTES % % 4.4* 7.1 6.6   EOSINOPHIL % % 0.0* 0.0* 19.1*   BASOPHIL % % 0.1 0.2 1.0   IMM GRAN % % 0.5 0.5 0.2                   Invalid  input(s): LDLCALC          No results found for: POCGLU  Results from last 7 days   Lab Units 11/30/22  0446   PROCALCITONIN ng/mL 0.04             Results from last 7 days   Lab Units 11/29/22  1047   COVID19  Not Detected   ADENOVIRUS DETECTION BY PCR  Not Detected   CORONAVIRUS 229E  Not Detected   CORONAVIRUS HKU1  Not Detected   CORONAVIRUS NL63  Not Detected   CORONAVIRUS OC43  Not Detected   HUMAN METAPNEUMOVIRUS  Not Detected   HUMAN RHINOVIRUS/ENTEROVIRUS  Not Detected   INFLUENZA B PCR  Not Detected   PARAINFLUENZA 1  Not Detected   PARAINFLUENZA VIRUS 2  Not Detected   PARAINFLUENZA VIRUS 3  Not Detected   PARAINFLUENZA VIRUS 4  Not Detected   BORDETELLA PERTUSSIS PCR  Not Detected   BORDETELLA PARAPERTUSSIS PCR  Not Detected   CHLAMYDOPHILA PNEUMONIAE PCR  Not Detected   MYCOPLAMA PNEUMO PCR  Not Detected   RSV, PCR  Not Detected               Imaging:   Imaging Results (All)      I reviewed the patient's new clinical results.  I personally viewed and interpreted the patient's imaging results:        Medication Review:   atorvastatin, 20 mg, Oral, Nightly  cetirizine, 10 mg, Oral, Daily  cholecalciferol, 2,000 Units, Oral, Daily  enoxaparin, 40 mg, Subcutaneous, Nightly  FLUoxetine, 80 mg, Oral, Daily  fluticasone, 2 spray, Each Nare, Daily  guaifenesin-dextromethorphan, 1 tablet, Oral, BID  ipratropium-albuterol, 3 mL, Nebulization, Q4H - RT  montelukast, 10 mg, Oral, Nightly  pantoprazole, 40 mg, Oral, Daily  predniSONE, 20 mg, Oral, TID With Meals  sucralfate, 1 g, Oral, 4x Daily             ASSESSMENT:   1. Acute hypoxemic respiratory failure  2. Acute COPD exacerbation  3. History of tobacco abuse quit couple of years ago    PLAN:  Patient was doing better, she is having some fluctuation which is not unusual in people with asthma during the recovery from severe exacerbation.  She is on the steroids p.o. and we will continue with the same dose and needs to be tapered little bit slower  No need for  antibiotics till  Bronchodilator therapy and reassess.  If she is doing better by later this afternoon she should still be cleared for discharge home with a stop steroid taper over the next 8 to 10 days    Discussed with patient and with primary team  We will follow-up as needed please call if there is any question over the weekend    Disposition:     Andrea Moeller MD  12/02/22  12:55 EST        Dictated utilizing Dragon dictation

## 2022-12-02 NOTE — DISCHARGE SUMMARY
Patient Name: Jennifer Shelton  : 1955  MRN: [id number]    Date of Admission: 2022  Date of Discharge:  2022  Primary Care Physician: Tessy Serrano PA      Chief Complaint:   Shortness of Breath and Cough      Discharge Diagnoses     Active Hospital Problems    Diagnosis  POA   • **Acute hypoxemic respiratory failure (HCC) [J96.01]  Yes   • COPD exacerbation (HCC) [J44.1]  Yes   • Seizures (HCC) [R56.9]  Unknown   • Sleep apnea [G47.30]  Unknown   • Gastroesophageal reflux disease [K21.9]  Yes   • Irritable bowel syndrome with constipation [K58.1]  Yes   • Chronic obstructive pulmonary disease (HCC) [J44.9]  Yes      Resolved Hospital Problems   No resolved problems to display.        Hospital Course     Ms. Shelton is a 67 y.o. female with a history of COPD, obstructive sleep apnea, seizures, gastroesophageal reflux and irritable bowel syndrome who presented to [facility] initially complaining of coughing.  Please see the admitting history and physical for further details.  She was found to have acute exacerbation of COPD and hypoxic respiratory failure and was admitted to the hospital for further evaluation and treatment.  She was admitted to the hospital started on IV steroids and breathing treatments.  She was treated aggressively and supportively throughout the hospitalization with no evidence of active infection so antibiotics were held.  She responded well to IV steroids and was transition to oral steroids and continued on breathing treatments throughout her stay.  Pulmonary was consulted for assistance agree with management.  At this point her air movement is improved she still requiring some supplemental oxygen this will be arranged for home.  She remains stable to discharge today and can follow-up in the outpatient setting with pulmonary or her primary care physician within the next 2 weeks.  Discussed with Dr. Moeller who is agreeable with discharge and plan.    15:31  EST Addendum: I seen this patient now 3 times today.  All 3 times have seen her she said she wants to go home if able.  All 3 times her lungs have sounded clear but I received multiple messages from the nursing staff that the patient does not feel comfortable going home today.  Discharge has been held can reevaluate tomorrow.        Day of Discharge     Subjective:  She feels better denies other new issues currently.    Physical Exam:  Temp:  [97.2 °F (36.2 °C)-98.4 °F (36.9 °C)] 97.5 °F (36.4 °C)  Heart Rate:  [] 90  Resp:  [16-20] 20  BP: (124-165)/(65-94) 131/83  Body mass index is 29.83 kg/m².  Physical Exam  Vitals and nursing note reviewed.   Constitutional:       General: She is not in acute distress.     Appearance: She is ill-appearing.   Cardiovascular:      Rate and Rhythm: Normal rate and regular rhythm.   Pulmonary:      Effort: Pulmonary effort is normal.      Comments: Faint expiratory wheezes good air movement today  Abdominal:      General: There is no distension.      Palpations: Abdomen is soft.   Neurological:      General: No focal deficit present.      Mental Status: She is alert and oriented to person, place, and time. Mental status is at baseline.         Consultants     Consult Orders (all) (From admission, onward)     Start     Ordered    11/30/22 0824  Inpatient Pulmonology Consult  Once        Specialty:  Pulmonary Disease  Provider:  Andrea Moeller MD    11/30/22 0823    11/29/22 1351  [facility] (on-call MD unless specified) Details  Once        Specialty:  Hospitalist  Provider:  (Not yet assigned)    11/29/22 1350              Procedures     * Surgery not found *      Imaging Results (All)     Procedure Component Value Units Date/Time    XR Chest 2 View [[id number]] Collected: 11/28/22 2315     Updated: 11/28/22 2319    Narrative:      PA AND LATERAL CHEST RADIOGRAPH     HISTORY: Shortness of air     COMPARISON: None available.     FINDINGS:  Heart size is within normal limits. No  pneumothorax, pleural effusion,  or acute infiltrate is seen. There is mild biapical scarring. There are  background changes of COPD.       Impression:      No acute findings.     This report was finalized on 11/28/2022 11:16 PM by Dr. Esmer Roldan M.D.             Results for orders placed during the hospital encounter of 11/29/22    Adult Transthoracic Echo Complete W/ Cont if Necessary Per Protocol    Interpretation Summary  •  Left ventricular systolic function is normal. Calculated left ventricular EF = 67.3% Left ventricular ejection fraction appears to be 61 - 65%.  •  Left ventricular diastolic function is consistent with (grade I) impaired relaxation.  •  Normal valvular structure and function    Pertinent Labs     Results from last 7 days   Lab Units 12/01/22  0348 11/30/22 0446 11/29/22  0049   WBC 10*3/mm3 8.62 6.19 8.17   HEMOGLOBIN g/dL 12.0 12.0 12.4   PLATELETS 10*3/mm3 330 341 335     Results from last 7 days   Lab Units 12/01/22  0348 11/30/22 0446 11/29/22  0049   SODIUM mmol/L 138 138 141   POTASSIUM mmol/L 4.1 4.0 3.8   CHLORIDE mmol/L 103 103 105   CO2 mmol/L 25.6 25.0 23.3   BUN mg/dL 21 21 15   CREATININE mg/dL 0.60 0.63 0.73   GLUCOSE mg/dL 137* 131* 116*   EGFR mL/min/1.73 99.8 98.6 91.4     Results from last 7 days   Lab Units 11/30/22  0446 11/29/22  0049   ALBUMIN g/dL 4.00 4.00   BILIRUBIN mg/dL 0.2 0.3   ALK PHOS U/L 65 69   AST (SGOT) U/L 13 11   ALT (SGPT) U/L 7 10     Results from last 7 days   Lab Units 12/01/22  0348 11/30/22 0446 11/29/22  0049   CALCIUM mg/dL 8.8 8.8 9.7   ALBUMIN g/dL  --  4.00 4.00       Results from last 7 days   Lab Units 11/29/22  0049   TROPONIN T ng/mL <0.010   PROBNP pg/mL 64.7           Invalid input(s): LDLCALC      Results from last 7 days   Lab Units 11/29/22  1047   COVID19  Not Detected       Test Results Pending at Discharge       Discharge Details        Discharge Medications      New Medications      Instructions Start Date   predniSONE  10 MG tablet  Commonly known as: DELTASONE   Take 4 tablets by mouth Daily for 3 days, THEN 3 tablets Daily for 3 days, THEN 2 tablets Daily for 3 days, THEN 1 tablet Daily for 3 days, THEN 0.5 tablets Daily for 4 days.   Start Date: December 2, 2022     promethazine-codeine 6.25-10 MG/5ML syrup  Commonly known as: PHENERGAN with CODEINE   5 mL, Oral, Every 6 Hours PRN         Continue These Medications      Instructions Start Date   albuterol sulfate  (90 Base) MCG/ACT inhaler  Commonly known as: PROVENTIL HFA;VENTOLIN HFA;PROAIR HFA   2 puffs, Inhalation, Every 6 Hours PRN      albuterol sulfate  (90 Base) MCG/ACT inhaler  Commonly known as: PROVENTIL HFA;VENTOLIN HFA;PROAIR HFA   2 puffs, Inhalation, Every 4 Hours PRN      atorvastatin 20 MG tablet  Commonly known as: LIPITOR   20 mg, Oral, Nightly      benzonatate 200 MG capsule  Commonly known as: TESSALON   200 mg, Oral, 3 Times Daily PRN      diphenhydrAMINE 25 mg capsule  Commonly known as: BENADRYL   50 mg, Oral, Nightly PRN      FLUoxetine 40 MG capsule  Commonly known as: PROzac   80 mg, Oral, Daily, Patient due for follow up Nov 1 for future refills      fluticasone 50 MCG/ACT nasal spray  Commonly known as: FLONASE   2 sprays, Each Nare, Daily      GaviLyte-G 236 g solution  Generic drug: polyethylene glycol   No dose, route, or frequency recorded.      guaifenesin-dextromethorphan  MG tablet sustained-release 12 hour tablet   1 tablet, Oral, 2 Times Daily      hyoscyamine 0.125 MG tablet  Commonly known as: ANASPAZ,LEVSIN   0.125 mg, Oral, 4 Times Daily With Meals & Nightly      ipratropium-albuterol 0.5-2.5 mg/3 ml nebulizer  Commonly known as: DUO-NEB   3 mL, Nebulization, 4 Times Daily - RT      loratadine 10 MG tablet  Commonly known as: Claritin   10 mg, Oral, Daily      montelukast 10 MG tablet  Commonly known as: SINGULAIR   10 mg, Oral, Every Night at Bedtime      pantoprazole 40 MG EC tablet  Commonly known as: PROTONIX    40 mg, Oral, Daily      Qvar RediHaler 80 MCG/ACT inhaler  Generic drug: Beclomethasone Diprop HFA   2 puffs, Inhalation, 2 Times Daily - RT, Then rinse mouth      sodium chloride 3 % nebulizer solution   inhale contents of 1 vial via nebulizer TWICE DAILY      Stiolto Respimat 2.5-2.5 MCG/ACT aerosol solution inhaler  Generic drug: tiotropium bromide-olodaterol   2 puffs, Inhalation, 2 Times Daily      sucralfate 1 g tablet  Commonly known as: CARAFATE   1 g, Oral, 4 Times Daily      Vitamin D3 50 MCG (2000 UT) tablet   50 mcg, Oral, Daily         Stop These Medications    famotidine 20 MG tablet  Commonly known as: PEPCID            Allergies   Allergen Reactions   • Aspirin Hives and Arrhythmia     Chest pain   • Cephalexin Hives   • Nsaids Hives   • Penicillins Hives   • Contrast Dye Rash       Discharge Disposition:  Home or Self Care      Discharge Diet:  Diet Order   Procedures   • Diet: Regular/House Diet; Texture: Regular Texture (IDDSI 7); Fluid Consistency: Thin (IDDSI 0)       Discharge Activity:   Activity Instructions     Activity as Tolerated            CODE STATUS:    Code Status and Medical Interventions:   Ordered at: 11/29/22 1722     Code Status (Patient has no pulse and is not breathing):    CPR (Attempt to Resuscitate)     Medical Interventions (Patient has pulse or is breathing):    Full Support       No future appointments.   Follow-up Information     Tessy Serrano PA .    Specialty: Family Medicine  Contact information:  [address] [facility] [address] [phone]                         Time Spent on Discharge:  Greater than 30 minutes      Gasper Castellanos MD  [facility]  12/02/22  08:28 EST

## 2022-12-02 NOTE — PLAN OF CARE
Goal Outcome Evaluation:  Plan of Care Reviewed With: patient        Progress: improving  Outcome Evaluation: VSS.Oxygen sat WNL on Ra.  Occasional coughing.  Phenergan with codeine given PRN.  c/o mild SOB.  Possible home today

## 2022-12-03 ENCOUNTER — READMISSION MANAGEMENT (OUTPATIENT)
Dept: CALL CENTER | Facility: HOSPITAL | Age: 67
End: 2022-12-03

## 2022-12-03 VITALS
BODY MASS INDEX: 29.74 KG/M2 | HEART RATE: 98 BPM | WEIGHT: 196.21 LBS | SYSTOLIC BLOOD PRESSURE: 122 MMHG | HEIGHT: 68 IN | DIASTOLIC BLOOD PRESSURE: 70 MMHG | TEMPERATURE: 97.2 F | OXYGEN SATURATION: 93 % | RESPIRATION RATE: 18 BRPM

## 2022-12-03 PROCEDURE — 94799 UNLISTED PULMONARY SVC/PX: CPT

## 2022-12-03 PROCEDURE — 63710000001 PREDNISONE PER 1 MG: Performed by: INTERNAL MEDICINE

## 2022-12-03 PROCEDURE — G0378 HOSPITAL OBSERVATION PER HR: HCPCS

## 2022-12-03 PROCEDURE — 94664 DEMO&/EVAL PT USE INHALER: CPT

## 2022-12-03 RX ADMIN — CETIRIZINE HYDROCHLORIDE 10 MG: 10 TABLET ORAL at 09:08

## 2022-12-03 RX ADMIN — PANTOPRAZOLE SODIUM 40 MG: 40 TABLET, DELAYED RELEASE ORAL at 09:08

## 2022-12-03 RX ADMIN — FLUTICASONE PROPIONATE 2 SPRAY: 50 SPRAY, METERED NASAL at 09:08

## 2022-12-03 RX ADMIN — GUAIFENESIN AND DEXTROMETHORPHAN HYDROBROMIDE 1 TABLET: 600; 30 TABLET, EXTENDED RELEASE ORAL at 09:09

## 2022-12-03 RX ADMIN — IPRATROPIUM BROMIDE AND ALBUTEROL SULFATE 3 ML: 2.5; .5 SOLUTION RESPIRATORY (INHALATION) at 11:23

## 2022-12-03 RX ADMIN — PREDNISONE 20 MG: 20 TABLET ORAL at 09:08

## 2022-12-03 RX ADMIN — IPRATROPIUM BROMIDE AND ALBUTEROL SULFATE 3 ML: 2.5; .5 SOLUTION RESPIRATORY (INHALATION) at 07:31

## 2022-12-03 RX ADMIN — IPRATROPIUM BROMIDE AND ALBUTEROL SULFATE 3 ML: 2.5; .5 SOLUTION RESPIRATORY (INHALATION) at 15:08

## 2022-12-03 RX ADMIN — PROMETHAZINE HYDROCHLORIDE AND CODEINE PHOSPHATE 5 ML: 10; 6.25 SOLUTION ORAL at 09:08

## 2022-12-03 RX ADMIN — FLUOXETINE HYDROCHLORIDE 80 MG: 20 CAPSULE ORAL at 09:08

## 2022-12-03 RX ADMIN — IPRATROPIUM BROMIDE AND ALBUTEROL SULFATE 3 ML: 2.5; .5 SOLUTION RESPIRATORY (INHALATION) at 03:18

## 2022-12-03 RX ADMIN — PREDNISONE 20 MG: 20 TABLET ORAL at 12:50

## 2022-12-03 RX ADMIN — Medication 2000 UNITS: at 09:08

## 2022-12-03 NOTE — PROGRESS NOTES
Continued Stay Note  [facility]     Patient Name: Jennifer Shelton  MRN: [id number]  Today's Date: 12/3/2022    Admit Date: 11/29/2022    Plan: Home with O2 from [employer].............Kelly RN   Discharge Plan     Row Name 12/03/22 1731       Plan    Plan Comments Angelica/[employer] returned page, will follow for home O2. Patient provided with instruction sheet with contact information for [employer] when tank delivered to bedside for transport home.......Kelly RN    Row Name 12/03/22 1713       Plan    Plan Home with O2 from [employer].............Kelly TRAN    Patient/Family in Agreement with Plan yes    Plan Comments Orders received for O2, tank to bedside. S/W Rukhs[employer] will page on call representative to call [facility]. Pt. instructed to call [phone] option 1 and let them know she needs equipment delivered to home when she arrives home.........Kelly RN               Discharge Codes    No documentation.               Expected Discharge Date and Time     Expected Discharge Date Expected Discharge Time    Dec 3, 2022             Sanna Parmar, RN

## 2022-12-03 NOTE — PLAN OF CARE
Goal Outcome Evaluation:  Plan of Care Reviewed With: patient        Progress: no change  Outcome Evaluation: VSS, coughing relieved w/ cough syrup, up ad alan, back on 2L O2 due to sats dropping, family at bedside most of shift, tums for heartburn w/ relief, possible d/c tomorrow.

## 2022-12-03 NOTE — DISCHARGE SUMMARY
Patient Name: Jennifer Shelton  : 1955  MRN: [id number]    Date of Admission: 2022  Date of Discharge:  12/3/2022  Primary Care Physician: Tessy Serrano PA      Chief Complaint:   Shortness of Breath and Cough      Discharge Diagnoses     Active Hospital Problems    Diagnosis  POA   • **Acute hypoxemic respiratory failure (HCC) [J96.01]  Yes   • COPD exacerbation (HCC) [J44.1]  Yes   • Seizures (HCC) [R56.9]  Unknown   • Sleep apnea [G47.30]  Unknown   • Gastroesophageal reflux disease [K21.9]  Yes   • Irritable bowel syndrome with constipation [K58.1]  Yes   • Chronic obstructive pulmonary disease (HCC) [J44.9]  Yes      Resolved Hospital Problems   No resolved problems to display.        Hospital Course       Patient is 67-year-old female with known history of COPD, seizures, GERD, obstructive sleep apnea and tubal bowel syndrome presented to the hospital with complaints of shortness of breath, cough, wheezes and was requiring supplemental oxygen to keep her sats above 90% therefore she was admitted to the hospital.  Chest x-ray upon admission with no evidence of any infiltrate.  Respiratory viral panel was also negative and she was afebrile and WBC was normal.  Was treated with bronchodilators mucolytics and cough suppressants along with steroids and she started to feel much better today pulmonary also did evaluate and initial plan was to go home yesterday however patient requested to stay 1 more night henceforth she was monitored overnight and feeling much better today.  Able to complete sentences without any difficulty and not using any accessory muscles.  Henceforth she has been discharged home and will continue bronchodilators upon discharge and will be on tapered dose of prednisone.  Patient is in agreement with the above plan.  Of note she is requiring 2 L of oxygen to keep sats above 90% which is being arranged.    Day of Discharge       Physical Exam:  Temp:  [97.2 °F (36.2 °C)-98.5  °F (36.9 °C)] 97.2 °F (36.2 °C)  Heart Rate:  [] 98  Resp:  [16-20] 18  BP: (122-142)/(67-89) 122/70  Body mass index is 29.83 kg/m².  Physical Exam    HEENT:  PERRLA, extraocular movements intact, sclerae is no icterus   Neck:  Supple, no JVD   Cardiovascular:  Regular rate and rhythm with normal S1 and S2   Respiratory: Diminished breath sounds with faint wheezes and improved air movement  GI:  Soft, nontender, bowel sounds are present   Extremities:  No edema, palpable pulses  Neurologic: Grossly nonfocal, no facial asymmetry.      Consultants     Consult Orders (all) (From admission, onward)     Start     Ordered    11/30/22 0824  Inpatient Pulmonology Consult  Once        Specialty:  Pulmonary Disease  Provider:  Andrea Moeller MD    11/30/22 0823    11/29/22 1351  [facility] (on-call MD unless specified) Details  Once        Specialty:  Hospitalist  Provider:  (Not yet assigned)    11/29/22 1350              Procedures     * Surgery not found *      Imaging Results (All)     Procedure Component Value Units Date/Time    XR Chest 2 View [[id number]] Collected: 11/28/22 2315     Updated: 11/28/22 2319    Narrative:      PA AND LATERAL CHEST RADIOGRAPH     HISTORY: Shortness of air     COMPARISON: None available.     FINDINGS:  Heart size is within normal limits. No pneumothorax, pleural effusion,  or acute infiltrate is seen. There is mild biapical scarring. There are  background changes of COPD.       Impression:      No acute findings.     This report was finalized on 11/28/2022 11:16 PM by Dr. Esmer Roldan M.D.             Results for orders placed during the hospital encounter of 11/29/22    Adult Transthoracic Echo Complete W/ Cont if Necessary Per Protocol    Interpretation Summary  •  Left ventricular systolic function is normal. Calculated left ventricular EF = 67.3% Left ventricular ejection fraction appears to be 61 - 65%.  •  Left ventricular diastolic function is consistent with (grade I)  impaired relaxation.  •  Normal valvular structure and function    Pertinent Labs     Results from last 7 days   Lab Units 12/01/22  0348 11/30/22 0446 11/29/22  0049   WBC 10*3/mm3 8.62 6.19 8.17   HEMOGLOBIN g/dL 12.0 12.0 12.4   PLATELETS 10*3/mm3 330 341 335     Results from last 7 days   Lab Units 12/01/22  0348 11/30/22 0446 11/29/22  0049   SODIUM mmol/L 138 138 141   POTASSIUM mmol/L 4.1 4.0 3.8   CHLORIDE mmol/L 103 103 105   CO2 mmol/L 25.6 25.0 23.3   BUN mg/dL 21 21 15   CREATININE mg/dL 0.60 0.63 0.73   GLUCOSE mg/dL 137* 131* 116*   EGFR mL/min/1.73 99.8 98.6 91.4     Results from last 7 days   Lab Units 11/30/22  0446 11/29/22  0049   ALBUMIN g/dL 4.00 4.00   BILIRUBIN mg/dL 0.2 0.3   ALK PHOS U/L 65 69   AST (SGOT) U/L 13 11   ALT (SGPT) U/L 7 10     Results from last 7 days   Lab Units 12/01/22  0348 11/30/22 0446 11/29/22  0049   CALCIUM mg/dL 8.8 8.8 9.7   ALBUMIN g/dL  --  4.00 4.00       Results from last 7 days   Lab Units 11/29/22  0049   TROPONIN T ng/mL <0.010   PROBNP pg/mL 64.7           Invalid input(s): LDLCALC      Results from last 7 days   Lab Units 11/29/22  1047   COVID19  Not Detected       Test Results Pending at Discharge       Discharge Details        Discharge Medications      New Medications      Instructions Start Date   predniSONE 10 MG tablet  Commonly known as: DELTASONE   Take 4 tablets by mouth Daily for 3 days, THEN 3 tablets Daily for 3 days, THEN 2 tablets Daily for 3 days, THEN 1 tablet Daily for 3 days, THEN 0.5 tablets Daily for 4 days.   Start Date: December 2, 2022     promethazine-codeine 6.25-10 MG/5ML solution  Commonly known as: PHENERGAN with CODEINE   5 mL, Oral, Every 6 Hours PRN         Continue These Medications      Instructions Start Date   albuterol sulfate  (90 Base) MCG/ACT inhaler  Commonly known as: PROVENTIL HFA;VENTOLIN HFA;PROAIR HFA   2 puffs, Inhalation, Every 6 Hours PRN      albuterol sulfate  (90 Base) MCG/ACT  inhaler  Commonly known as: PROVENTIL HFA;VENTOLIN HFA;PROAIR HFA   2 puffs, Inhalation, Every 4 Hours PRN      atorvastatin 20 MG tablet  Commonly known as: LIPITOR   20 mg, Oral, Nightly      benzonatate 200 MG capsule  Commonly known as: TESSALON   200 mg, Oral, 3 Times Daily PRN      diphenhydrAMINE 25 mg capsule  Commonly known as: BENADRYL   50 mg, Oral, Nightly PRN      FLUoxetine 40 MG capsule  Commonly known as: PROzac   80 mg, Oral, Daily, Patient due for follow up Nov 1 for future refills      fluticasone 50 MCG/ACT nasal spray  Commonly known as: FLONASE   2 sprays, Each Nare, Daily      GaviLyte-G 236 g solution  Generic drug: polyethylene glycol   No dose, route, or frequency recorded.      guaifenesin-dextromethorphan  MG tablet sustained-release 12 hour tablet   1 tablet, Oral, 2 Times Daily      hyoscyamine 0.125 MG tablet  Commonly known as: ANASPAZ,LEVSIN   0.125 mg, Oral, 4 Times Daily With Meals & Nightly      ipratropium-albuterol 0.5-2.5 mg/3 ml nebulizer  Commonly known as: DUO-NEB   3 mL, Nebulization, 4 Times Daily - RT      loratadine 10 MG tablet  Commonly known as: Claritin   10 mg, Oral, Daily      montelukast 10 MG tablet  Commonly known as: SINGULAIR   10 mg, Oral, Every Night at Bedtime      pantoprazole 40 MG EC tablet  Commonly known as: PROTONIX   40 mg, Oral, Daily      Qvar RediHaler 80 MCG/ACT inhaler  Generic drug: Beclomethasone Diprop HFA   2 puffs, Inhalation, 2 Times Daily - RT, Then rinse mouth      sodium chloride 3 % nebulizer solution   inhale contents of 1 vial via nebulizer TWICE DAILY      Stiolto Respimat 2.5-2.5 MCG/ACT aerosol solution inhaler  Generic drug: tiotropium bromide-olodaterol   2 puffs, Inhalation, 2 Times Daily      sucralfate 1 g tablet  Commonly known as: CARAFATE   1 g, Oral, 4 Times Daily      Vitamin D3 50 MCG (2000 UT) tablet   50 mcg, Oral, Daily         Stop These Medications    famotidine 20 MG tablet  Commonly known as: PEPCID             Allergies   Allergen Reactions   • Aspirin Hives and Arrhythmia     Chest pain   • Cephalexin Hives   • Nsaids Hives   • Penicillins Hives   • Contrast Dye Rash       Discharge Disposition:  Home or Self Care      Discharge Diet:  Diet Order   Procedures   • Diet: Regular/House Diet; Texture: Regular Texture (IDDSI 7); Fluid Consistency: Thin (IDDSI 0)       Discharge Activity:   Activity Instructions     Activity as Tolerated      Activity as Tolerated            CODE STATUS:    Code Status and Medical Interventions:   Ordered at: 11/29/22 7223     Code Status (Patient has no pulse and is not breathing):    CPR (Attempt to Resuscitate)     Medical Interventions (Patient has pulse or is breathing):    Full Support       No future appointments.  Additional Instructions for the Follow-ups that You Need to Schedule     Discharge Follow-up with PCP   As directed       Currently Documented PCP:    Tessy Serrano PA    PCP Phone Number:    [phone]     Follow Up Details: 2 weeks            Follow-up Information     Tessy Serrano PA .    Specialty: Family Medicine  Why: 2 weeks  Contact information:  [address] [place], [place] [id number]  [phone]                         Additional Instructions for the Follow-ups that You Need to Schedule     Discharge Follow-up with PCP   As directed       Currently Documented PCP:    Tessy Serrano PA    PCP Phone Number:    [phone]     Follow Up Details: 2 weeks           Time Spent on Discharge:  Greater than 30 minutes      Tigre Cuello MD  [facility]  12/03/22  16:36 EST

## 2022-12-03 NOTE — PLAN OF CARE
Goal Outcome Evaluation:  Plan of Care Reviewed With: patient        Progress: improving  Outcome Evaluation: Afebrile, mildly tachycardic at times, Oxygen @ 2L via nasal cannula continued, O2 saturation dropped below 90% on room air. Intermittent cough and occasional wheezing, Phenergan with Codeine syrup given and timed breathing TX. No signs of respiratory distress, up ad alan. Rested well in between. No c/o pain.

## 2022-12-03 NOTE — NURSING NOTE
Patient was given discharge instructions.  [facility] brought up a portable oxygen tank for patient along with oxygen tubing.  I instructed pt on its use (how to turn it on and off, change L flow, how to feel for oxygen flowing through NC).  Instructions were given to patient to call [facility] to notify them that she is home and needing the concentrator.  Patient verbalized understanding.  She did not want to wait for a flu shot (high dose ordered and not on unit).  She received her prescribed medications from the retail pharmacy yesterday, so I went over the medications with her prior to discharge.  All questions answered.  Patient eager and ready to go home.

## 2022-12-03 NOTE — PROGRESS NOTES
Continued Stay Note  [facility]     Patient Name: Jennifer Shelton  MRN: [id number]  Today's Date: 12/3/2022    Admit Date: 11/29/2022    Plan: Home with home O2 from [facility] pending orders from MD........Kelly RN   Discharge Plan     Row Name 12/03/22 0817       Plan    Plan Home with home O2 from [facility] pending orders from MD........Kelly RN    Patient/Family in Agreement with Plan yes    Plan Comments Inbound call from nursing unit secretary, states patient will need O2 for DC. Advised to inform RN order needed, await orders. S/W Patient by phone, introduced self and role. Pt. verified face sheet information is correct and would like to use [facility] for home O2. EPIC referral sent to [facility], will contact by phone when orders received......Kelly RN               Discharge Codes    No documentation.               Expected Discharge Date and Time     Expected Discharge Date Expected Discharge Time    Dec 2, 2022             Sanna Parmar, RN

## 2022-12-04 NOTE — OUTREACH NOTE
Prep Survey    Flowsheet Row Responses   [facility] patient discharged from? [place]   Is LACE score < 7 ? No   Emergency Room discharge w/ pulse ox? No   Eligibility [facility]   Date of Admission 11/29/22   Date of Discharge 12/03/22   Discharge diagnosis COPD exacerbation    Does the patient have one of the following disease processes/diagnoses(primary or secondary)? Other   Does the patient have Home health ordered? No   Is there a DME ordered? Yes   What DME was ordered? O2   Prep survey completed? Yes          HENRY NEAL - Registered Nurse

## 2022-12-05 ENCOUNTER — TRANSITIONAL CARE MANAGEMENT TELEPHONE ENCOUNTER (OUTPATIENT)
Dept: CALL CENTER | Facility: HOSPITAL | Age: 67
End: 2022-12-05

## 2022-12-05 NOTE — PROGRESS NOTES
Case Management Discharge Note      Final Note: Discharged home. Dariana Lanier, RN    Provided Post Acute Provider List?: Yes  Post Acute Provider List: Home Health, Nursing Home  Delivered To: Patient  Method of Delivery: In person    Selected Continued Care - Discharged on 12/3/2022 Admission date: 11/29/2022 - Discharge disposition: Home or Self Care    Destination    No services have been selected for the patient.              Durable Medical Equipment     Service Provider Selected Services Address Phone Fax Patient Preferred    [facility] [employer] [id number] [id number] [phone] --            Transportation Services  Private: Car    Final Discharge Disposition Code: 01 - home or self-care

## 2022-12-05 NOTE — OUTREACH NOTE
Call Center TCM Note    Flowsheet Row Responses   [facility] patient discharged from? [place]   Does the patient have one of the following disease processes/diagnoses(primary or secondary)? Other   TCM attempt successful? No   Unsuccessful attempts Attempt 2          Charley Da Silva LPN    12/5/2022, 16:11 EST

## 2022-12-05 NOTE — OUTREACH NOTE
Call Center TCM Note    Flowsheet Row Responses   [facility] patient discharged from? [place]   Does the patient have one of the following disease processes/diagnoses(primary or secondary)? Other   TCM attempt successful? No   Unsuccessful attempts Attempt 1          Charley Da Silva LPN    12/5/2022, 12:04 EST

## 2022-12-06 ENCOUNTER — TRANSITIONAL CARE MANAGEMENT TELEPHONE ENCOUNTER (OUTPATIENT)
Dept: CALL CENTER | Facility: HOSPITAL | Age: 67
End: 2022-12-06

## 2022-12-06 NOTE — OUTREACH NOTE
Call Center TCM Note    Flowsheet Row Responses   [facility] patient discharged from? [place]   Does the patient have one of the following disease processes/diagnoses(primary or secondary)? Other   TCM attempt successful? No  [No verbal release]   Unsuccessful attempts Attempt 3          Alexsandra Fernandez RN    12/6/2022, 08:09 EST

## 2022-12-07 ENCOUNTER — TELEPHONE (OUTPATIENT)
Dept: GASTROENTEROLOGY | Facility: CLINIC | Age: 67
End: 2022-12-07

## 2022-12-13 ENCOUNTER — CLINICAL SUPPORT (OUTPATIENT)
Dept: FAMILY MEDICINE CLINIC | Facility: CLINIC | Age: 67
End: 2022-12-13
Payer: MEDICARE

## 2022-12-13 ENCOUNTER — OFFICE VISIT (OUTPATIENT)
Dept: FAMILY MEDICINE CLINIC | Facility: CLINIC | Age: 67
End: 2022-12-13
Payer: MEDICARE

## 2022-12-13 VITALS
HEIGHT: 68 IN | TEMPERATURE: 98 F | WEIGHT: 198 LBS | OXYGEN SATURATION: 99 % | SYSTOLIC BLOOD PRESSURE: 118 MMHG | BODY MASS INDEX: 30.01 KG/M2 | HEART RATE: 68 BPM | DIASTOLIC BLOOD PRESSURE: 60 MMHG | RESPIRATION RATE: 16 BRPM

## 2022-12-13 DIAGNOSIS — R93.89 ABNORMAL CT OF THE CHEST: ICD-10-CM

## 2022-12-13 DIAGNOSIS — M47.812 CERVICAL SPONDYLOSIS WITHOUT MYELOPATHY: ICD-10-CM

## 2022-12-13 DIAGNOSIS — M54.12 CERVICAL RADICULOPATHY: ICD-10-CM

## 2022-12-13 DIAGNOSIS — R68.89 ACTIVITY INTOLERANCE: ICD-10-CM

## 2022-12-13 DIAGNOSIS — R68.84 JAW PAIN: ICD-10-CM

## 2022-12-13 DIAGNOSIS — R05.8 POST-TUSSIVE SYNCOPE: ICD-10-CM

## 2022-12-13 DIAGNOSIS — G89.29 CHRONIC BILATERAL LOW BACK PAIN WITHOUT SCIATICA: ICD-10-CM

## 2022-12-13 DIAGNOSIS — R07.89 OTHER CHEST PAIN: ICD-10-CM

## 2022-12-13 DIAGNOSIS — M54.50 CHRONIC BILATERAL LOW BACK PAIN WITHOUT SCIATICA: ICD-10-CM

## 2022-12-13 DIAGNOSIS — R12 HEARTBURN: ICD-10-CM

## 2022-12-13 DIAGNOSIS — Z23 NEED FOR INFLUENZA VACCINATION: ICD-10-CM

## 2022-12-13 DIAGNOSIS — Z82.49 FAMILY HISTORY OF MYOCARDIAL INFARCTION: ICD-10-CM

## 2022-12-13 DIAGNOSIS — M54.6 THORACIC SPINE PAIN: ICD-10-CM

## 2022-12-13 DIAGNOSIS — Z82.49 FAMILY HISTORY OF CORONARY ARTERY BYPASS GRAFT: ICD-10-CM

## 2022-12-13 DIAGNOSIS — M25.511 ACUTE PAIN OF BOTH SHOULDERS: ICD-10-CM

## 2022-12-13 DIAGNOSIS — M25.512 ACUTE PAIN OF BOTH SHOULDERS: ICD-10-CM

## 2022-12-13 DIAGNOSIS — R11.2 NAUSEA AND VOMITING, UNSPECIFIED VOMITING TYPE: ICD-10-CM

## 2022-12-13 DIAGNOSIS — R91.1 PULMONARY NODULE: ICD-10-CM

## 2022-12-13 DIAGNOSIS — R06.02 SHORTNESS OF BREATH: Primary | ICD-10-CM

## 2022-12-13 DIAGNOSIS — Z82.49 FAMILY HISTORY OF ABDOMINAL AORTIC ANEURYSM (AAA): ICD-10-CM

## 2022-12-13 PROCEDURE — 1159F MED LIST DOCD IN RCRD: CPT | Performed by: PHYSICIAN ASSISTANT

## 2022-12-13 PROCEDURE — 99214 OFFICE O/P EST MOD 30 MIN: CPT | Performed by: PHYSICIAN ASSISTANT

## 2022-12-13 PROCEDURE — 1160F RVW MEDS BY RX/DR IN RCRD: CPT | Performed by: PHYSICIAN ASSISTANT

## 2022-12-13 PROCEDURE — 90686 IIV4 VACC NO PRSV 0.5 ML IM: CPT | Performed by: PHYSICIAN ASSISTANT

## 2022-12-13 PROCEDURE — G0008 ADMIN INFLUENZA VIRUS VAC: HCPCS | Performed by: PHYSICIAN ASSISTANT

## 2022-12-13 RX ORDER — GUAIFENESIN 600 MG/1
1200 TABLET, EXTENDED RELEASE ORAL 2 TIMES DAILY
COMMUNITY

## 2022-12-13 NOTE — PROGRESS NOTES
Subjective   Jennifer Shelton is a 67 y.o. female who presents today in follow up of hospitalization for acute hypoxic respiratory failure.     History of Present Illness     She had Covid 8-9/2022. Then came here 10/2022. She then improved with treatment. Within a couple weeks, she was back at ED 11/6/2022 and was given Prednisone 50 mg daily. She felt better from ER then got worse again. She was waiting to see if she would get better and did not. She was very sick. Waited in waiting room for 12 hours. She required oxygen in waiting room- saturation down to 85% in ER. She was given Prednisone taper, Phenergan DM, albuterol, Tessalon (patient did not receive).     Patient was hospitalized 11/29/2022 through 12/3/2022 for acute hypoxic respiratory failure, COPD exacerbation, seizures, URIEL, GERD, IBS, COPD.     I have reviewed and discussed with patient hospital notes, including H&P, labs, imaging, consult notes, and discharge summary. Per discharge summary, she presented with SOA, cough, wheezing, and required oxygen to keep oxygen > 90%. CXR and respiratory panel negative with normal WBC. Echo performed- grade 1 diastolic dysfunction but otherwise normal. She was treated with bronchodilators, mucolytics, and cough suppressants with steroid. She was monitored overnight after feeling better. Advised to continue bronchodilators on discharge and given ad prednisone taper and Phenergan with codeine. She required 2 L oxygen to keep oxygen > 90%.    Within 48 business hours after discharge, Jennifer Shelton was contacted via telephone to coordinate care and needs.   Current outpatient and discharge medications have been reconciled for the patient.  Risk for Readmission (LACE) No data recorded    On Stiolto twice daily. They stopped QVAR.     1/9/2023- appt with Dr Moleler.     Cannot stand up straight with pain. Gasping for air but reports it is related to pain. Will be gasping for air and oxygen 94%. She was low but has been  ok since discharge.     In the shower, she hurts so bad she can barely finish.     Lower lumbar spine pain and thoracic spine pain- she feels like she will be paralyzed with the pain. She has FHx pancreatic cancer. Concerned about that.   Neck pain is constant.   She reports she only feels ok in her recliner.     Has colonsocopy scheduled but will require pulmonology will have to clear for sedation.   Daughter having another surgery.     Last PT a couple years ago.     The following portions of the patient's history were reviewed and updated as appropriate: allergies, current medications, past family history, past medical history, past social history, past surgical history and problem list.    Review of Systems   Constitutional: Negative for fever.   HENT: Negative.    Respiratory: Positive for cough, shortness of breath and wheezing.    Gastrointestinal: Positive for nausea and vomiting.        GERD   Musculoskeletal: Positive for back pain, gait problem and myalgias.       Objective   Vitals:    12/13/22 0945   BP: 118/60   Pulse: 68   Resp: 16   Temp: 98 °F (36.7 °C)   SpO2: 99%     Body mass index is 30.11 kg/m².    Physical Exam  Vitals and nursing note reviewed.   Constitutional:       General: She is not in acute distress.     Appearance: Normal appearance. She is well-developed.   HENT:      Head: Normocephalic and atraumatic.      Right Ear: Tympanic membrane, ear canal and external ear normal.      Left Ear: Tympanic membrane, ear canal and external ear normal.      Nose: Nose normal.      Mouth/Throat:      Pharynx: Uvula midline.   Eyes:      General: Lids are normal.      Conjunctiva/sclera: Conjunctivae normal.   Neck:      Vascular: No carotid bruit.   Cardiovascular:      Rate and Rhythm: Normal rate and regular rhythm.      Heart sounds: Normal heart sounds. No murmur heard.    No friction rub. No gallop.   Pulmonary:      Effort: Pulmonary effort is normal. No respiratory distress.      Breath  sounds: Decreased breath sounds and wheezing present. No rales.   Musculoskeletal:         General: No deformity.      Cervical back: Neck supple.   Skin:     General: Skin is warm and dry.   Neurological:      Mental Status: She is alert and oriented to person, place, and time.      Gait: Gait normal.   Psychiatric:         Speech: Speech normal.         Behavior: Behavior normal.         Thought Content: Thought content normal.         Assessment & Plan   Diagnoses and all orders for this visit:    1. Shortness of breath (Primary)  -     Ambulatory Referral to Cardiology  -     CT Chest Without Contrast; Future    2. Heartburn  -     Ambulatory Referral to Cardiology  -     CT Chest Without Contrast; Future    3. Nausea and vomiting, unspecified vomiting type  -     Ambulatory Referral to Cardiology  -     CT Chest Without Contrast; Future    4. Other chest pain  -     Ambulatory Referral to Cardiology  -     CT Chest Without Contrast; Future    5. Jaw pain  -     Ambulatory Referral to Cardiology  -     CT Chest Without Contrast; Future    6. Acute pain of both shoulders  -     Ambulatory Referral to Cardiology  -     CT Chest Without Contrast; Future    7. Activity intolerance  -     Ambulatory Referral to Cardiology  -     CT Chest Without Contrast; Future    8. Family history of coronary artery bypass graft  -     Ambulatory Referral to Cardiology    9. Family history of myocardial infarction  -     Ambulatory Referral to Cardiology    10. Family history of abdominal aortic aneurysm (AAA)  -     Ambulatory Referral to Cardiology    11. Pulmonary nodule  -     CT Chest Without Contrast; Future    12. Abnormal CT of the chest  -     CT Chest Without Contrast; Future    13. Cervical spondylosis without myelopathy  -     XR Spine Cervical Complete 4 or 5 View; Future    14. Cervical radiculopathy  -     XR Spine Cervical Complete 4 or 5 View; Future    15. Thoracic spine pain  -     XR spine thoracic 3 vw;  Future    16. Chronic bilateral low back pain without sciatica  -     XR Spine Lumbar 4+ View; Future    17. Post-tussive syncope  -     CT Chest Without Contrast; Future    18. Need for influenza vaccination  -     FluLaval/Fluarix/Fluzone >6 Months        Assessment and Plan  Patient with recurrent COPD exacerbations and increased SOA since having Covid 19 infection 8/2022. She has underlying COPD but has had exacerbations in symptoms for the last few months. She is now requiring oxygen to maintain oxygen levels. She continues with exertional midback pain and SOA, decreased activity tolerance. I will refer for CT chest. She has upcoming appt with pulmonology and should keep appt- she may need to be seen sooner with frequent exacerbations. I will also refer to cardiology to ensure no cardiac etiology of SOA and activity intolerance. Patient thinks her back pain may contribute to her SOA and activity limitations. I will have her check spine xrays with CT chest. Consideration of PT vs MRI pending xrays. She would like to update her flu shot. With negative respiratory panel in the hospital and significant COPD with frequent exacerbations, I would recommend this. We will update today. To be seen here or ER ASAP if worsening ,new, or changing symptoms prior to imaging and specialists.      I spent 35 minutes caring for Jennifer Shelton on this date of service. This time includes time spent by me in the following activities as necessary: preparing for the visit, reviewing tests, specialists records and previous visits, obtaining and/or reviewing a separately obtained history, performing a medically appropriate exam and/or evaluation, counseling and educating the patient, family, caregiver, referring and/or communicating with other healthcare professionals, documenting information in the medical record, independently interpreting results and communicating that information with the patient, family, caregiver, and  developing a medically appropriate treatment plan with consideration of other conditions, medications, and treatments.

## 2022-12-21 ENCOUNTER — HOSPITAL ENCOUNTER (OUTPATIENT)
Dept: GENERAL RADIOLOGY | Facility: HOSPITAL | Age: 67
Discharge: HOME | End: 2022-12-21

## 2022-12-21 ENCOUNTER — PATIENT MESSAGE (OUTPATIENT)
Dept: FAMILY MEDICINE CLINIC | Facility: CLINIC | Age: 67
End: 2022-12-21

## 2022-12-21 ENCOUNTER — OFFICE VISIT (OUTPATIENT)
Dept: CARDIOLOGY | Facility: CLINIC | Age: 67
End: 2022-12-21

## 2022-12-21 ENCOUNTER — HOSPITAL ENCOUNTER (OUTPATIENT)
Dept: CT IMAGING | Facility: HOSPITAL | Age: 67
Discharge: HOME | End: 2022-12-21

## 2022-12-21 VITALS
WEIGHT: 196 LBS | DIASTOLIC BLOOD PRESSURE: 65 MMHG | BODY MASS INDEX: 29.7 KG/M2 | SYSTOLIC BLOOD PRESSURE: 105 MMHG | HEIGHT: 68 IN | HEART RATE: 107 BPM

## 2022-12-21 DIAGNOSIS — K21.9 GASTROESOPHAGEAL REFLUX DISEASE, UNSPECIFIED WHETHER ESOPHAGITIS PRESENT: ICD-10-CM

## 2022-12-21 DIAGNOSIS — M47.812 CERVICAL SPONDYLOSIS WITHOUT MYELOPATHY: ICD-10-CM

## 2022-12-21 DIAGNOSIS — R11.2 NAUSEA AND VOMITING, UNSPECIFIED VOMITING TYPE: ICD-10-CM

## 2022-12-21 DIAGNOSIS — R93.89 ABNORMAL CT OF THE CHEST: ICD-10-CM

## 2022-12-21 DIAGNOSIS — E78.5 HYPERLIPIDEMIA LDL GOAL <70: ICD-10-CM

## 2022-12-21 DIAGNOSIS — R68.84 JAW PAIN: ICD-10-CM

## 2022-12-21 DIAGNOSIS — F33.1 MAJOR DEPRESSIVE DISORDER, RECURRENT EPISODE, MODERATE DEGREE: ICD-10-CM

## 2022-12-21 DIAGNOSIS — R06.09 EXERTIONAL DYSPNEA: ICD-10-CM

## 2022-12-21 DIAGNOSIS — R07.89 OTHER CHEST PAIN: ICD-10-CM

## 2022-12-21 DIAGNOSIS — E55.9 VITAMIN D DEFICIENCY: ICD-10-CM

## 2022-12-21 DIAGNOSIS — G89.29 CHRONIC BILATERAL LOW BACK PAIN WITHOUT SCIATICA: ICD-10-CM

## 2022-12-21 DIAGNOSIS — R07.89 CHEST PAIN, ATYPICAL: Primary | ICD-10-CM

## 2022-12-21 DIAGNOSIS — J30.2 SEASONAL ALLERGIC RHINITIS, UNSPECIFIED TRIGGER: ICD-10-CM

## 2022-12-21 DIAGNOSIS — M25.512 ACUTE PAIN OF BOTH SHOULDERS: ICD-10-CM

## 2022-12-21 DIAGNOSIS — M25.511 ACUTE PAIN OF BOTH SHOULDERS: ICD-10-CM

## 2022-12-21 DIAGNOSIS — M54.12 CERVICAL RADICULOPATHY: ICD-10-CM

## 2022-12-21 DIAGNOSIS — R06.02 SHORTNESS OF BREATH: ICD-10-CM

## 2022-12-21 DIAGNOSIS — M54.6 THORACIC SPINE PAIN: ICD-10-CM

## 2022-12-21 DIAGNOSIS — J44.9 CHRONIC OBSTRUCTIVE PULMONARY DISEASE, UNSPECIFIED COPD TYPE: ICD-10-CM

## 2022-12-21 DIAGNOSIS — M54.50 CHRONIC BILATERAL LOW BACK PAIN WITHOUT SCIATICA: ICD-10-CM

## 2022-12-21 DIAGNOSIS — I25.10 CORONARY ARTERY CALCIFICATION SEEN ON CT SCAN: ICD-10-CM

## 2022-12-21 DIAGNOSIS — R05.8 POST-TUSSIVE SYNCOPE: ICD-10-CM

## 2022-12-21 DIAGNOSIS — R68.89 ACTIVITY INTOLERANCE: ICD-10-CM

## 2022-12-21 DIAGNOSIS — R91.1 PULMONARY NODULE: ICD-10-CM

## 2022-12-21 DIAGNOSIS — R12 HEARTBURN: ICD-10-CM

## 2022-12-21 PROCEDURE — 72110 X-RAY EXAM L-2 SPINE 4/>VWS: CPT

## 2022-12-21 PROCEDURE — 71250 CT THORAX DX C-: CPT

## 2022-12-21 PROCEDURE — 72072 X-RAY EXAM THORAC SPINE 3VWS: CPT

## 2022-12-21 PROCEDURE — 99203 OFFICE O/P NEW LOW 30 MIN: CPT | Performed by: INTERNAL MEDICINE

## 2022-12-21 PROCEDURE — 72050 X-RAY EXAM NECK SPINE 4/5VWS: CPT

## 2022-12-21 RX ORDER — ROSUVASTATIN CALCIUM 20 MG/1
20 TABLET, COATED ORAL DAILY
Qty: 90 TABLET | Refills: 3 | Status: SHIPPED | OUTPATIENT
Start: 2022-12-21

## 2022-12-21 NOTE — PROGRESS NOTES
Chief Complaint  Establish Care, Shortness of Breath, and Chest Pain    Subjective            Jennifer Shelton presents to [facility] CARD[facility]OLOG[facility]  History of Present Illness  Mrs. Shelton is a new patient who was referred here by her PCP (BILL Duncan) due to recurrent episodes of substernal chest pain, which frequently radiate to her neck and shoulders.  She states the pain frequently occurs while just sitting at home and it is not typically precipitated by physical exertion.  Her episodes of chest pain typically resolve spontaneously within 20-30 minutes.  She does admit that she is not very physically active due to moderate chronic exertional dyspnea.  She has a history of COPD and is a former long-term smoker (1.5 packs per day for 40 years; she quit smoking in 2020).  Her other cardiovascular risk factors include hyperlipidemia (last LDL = 113 in July 2022, on chronic statin therapy) and a family history of coronary artery disease (her father).  No history of diabetes mellitus or hypertension reported.  Her previous CT scan of the chest in October 2020 did reveal some coronary artery calcifications.  Mrs. Shelton does not report any episodes of palpitations, orthopnea, PND, peripheral edema, lightheadedness, or syncope.  She had a recent echocardiogram during a hospitalization at [facility] and it demonstrated normal left ventricular systolic function with an estimated ejection fraction of 60-65%, grade I diastolic dysfunction, and no significant valvular pathology.  The patient does report a history of URIEL, but is not using CPAP therapy.  She is scheduled to follow up with a pulmonologist regarding this issue in the near future.     Past Medical History:   Diagnosis Date   • Anesthesia complication     heart rate slowed   • Anxiety    • Arthritis    • Cancer (HCC)     skin cancer   • Cervical spinal stenosis 09/22/2016   • Cervical spondylosis without myelopathy    • Cervicalgia  2015   • Chronic pain disorder    • Colon polyp    • COPD (chronic obstructive pulmonary disease) (HCC)    • Depression    • Fibromyalgia    • Gastroesophageal reflux    • GERD (gastroesophageal reflux disease)    • Hematuria 2020   • Hernia ?    Hiatal hernia   • Herniated disc, cervical 2015    C4-5, C5-6, C6-7   • High cholesterol    • Hyperlipemia    • Irritable bowel syndrome    • Lung nodule 2020   • Rectal bleeding    • Seasonal allergies    • Seizures ([facility])    • Shortness of breath    • Sleep apnea        Past Surgical History:   • ABDOMINAL SURGERY    C section   •  SECTION   • COLONOSCOPY   • CYSTOSCOPY    Cystoscopy w/ Dr. Lundy   • ENDOSCOPY    Procedure: ESOPHAGOGASTRODUODENOSCOPY WITH BIOPSIES;  Surgeon: Oneal Raymond MD;  [facility] ENDOSCOPY;  Service: Gastroenterology;  Laterality: N/A;  HIATAL HERNIA, ESOPHAGITIS   • PILONIDAL CYSTECTOMY   • SQUAMOUS CELL CARCINOMA EXCISION    chest   • TUBAL ABDOMINAL LIGATION   • UPPER GASTROINTESTINAL ENDOSCOPY       Social History     Tobacco Use   • Smoking status: Former     Packs/day: 1.50     Years: 40.00     Pack years: 60.00     Types: Cigarettes     Start date: 1980     Quit date: 2020     Years since quittin.1   • Smokeless tobacco: Never   • Tobacco comments:     Quit 2020   Vaping Use   • Vaping Use: Never used   Substance Use Topics   • Alcohol use: Never   • Drug use: Never       Family History   Problem Relation Age of Onset   • Colon cancer Mother 60        Pancreatic, breast   • Cancer Mother    • Diabetes Mother    • Breast cancer Mother         60s   • Arthritis Mother    • Pancreatic cancer Mother    • Irritable bowel syndrome Mother    • Heart disease Father    • Heart attack Father    • Arthritis Father    • Ulcerative colitis Father    • Depression Sister    • Anxiety disorder Sister    • Cancer Sister    • Pancreatic cancer Sister    • Irritable bowel syndrome  Sister    • Bipolar disorder Maternal Aunt    • Colon cancer Maternal Grandmother 70   • Seizures Son    • Depression Son    • Suicide Attempts Son    • Depression Son    • ADD / ADHD Son    • Colon cancer Other         60s   • Depression Daughter    • Malig Hyperthermia Neg Hx         Current Outpatient Medications on File Prior to Visit   Medication Sig   • albuterol sulfate  (90 Base) MCG/ACT inhaler Inhale 2 puffs Every 4 (Four) Hours As Needed for Wheezing.   • Cholecalciferol (Vitamin D3) 50 MCG (2000 UT) tablet Take 1 tablet by mouth Daily.   • diphenhydrAMINE (BENADRYL) 25 mg capsule Take 50 mg by mouth At Night As Needed.   • FLUoxetine (PROzac) 40 MG capsule Take 2 capsules by mouth Daily. Patient due for follow up Nov 1 for future refills   • fluticasone (FLONASE) 50 MCG/ACT nasal spray 2 sprays by Each Nare route Daily.   • guaiFENesin (MUCINEX) 600 MG 12 hr tablet Take 1,200 mg by mouth 2 (Two) Times a Day.   • hyoscyamine (ANASPAZ,LEVSIN) 0.125 MG tablet Take 1 tablet by mouth 4 (Four) Times a Day With Meals & at Bedtime.   • ipratropium-albuterol (DUO-NEB) 0.5-2.5 mg/3 ml nebulizer Take 3 mL by nebulization 4 (Four) Times a Day.   • loratadine (Claritin) 10 MG tablet Take 1 tablet by mouth Daily.   • montelukast (SINGULAIR) 10 MG tablet Take 1 tablet by mouth every night at bedtime.   • O2 (OXYGEN) Inhale 1 (One) Time. Use 2 LPM PRN if O2 if below 90   • pantoprazole (PROTONIX) 40 MG EC tablet Take 1 tablet by mouth Daily.   • promethazine-codeine (PHENERGAN with CODEINE) 6.25-10 MG/5ML syrup Take 5 mL by mouth Every 6 (Six) Hours As Needed for Cough.   • sodium chloride 3 % nebulizer solution inhale contents of 1 vial via nebulizer TWICE DAILY   • Stiolto Respimat 2.5-2.5 MCG/ACT aerosol solution inhaler Inhale 2 puffs 2 (Two) Times a Day.   • [DISCONTINUED] atorvastatin (LIPITOR) 20 MG tablet Take 1 tablet by mouth Every Night.   • [DISCONTINUED] GaviLyte-G 236 g solution      No current  "facility-administered medications on file prior to visit.       Allergies   Allergen Reactions   • Aspirin Hives and Arrhythmia     Chest pain   • Cephalexin Hives   • Nsaids Hives   • Penicillins Hives   • Contrast Dye Rash       Review of Systems   Constitutional: Positive for fatigue. Negative for chills, fever and unexpected weight gain.   HENT: Negative for hearing loss, nosebleeds and sore throat.    Eyes: Negative for pain and visual disturbance.   Respiratory: Positive for apnea, cough and shortness of breath. Negative for wheezing.    Cardiovascular: Positive for chest pain. Negative for palpitations and leg swelling.   Gastrointestinal: Negative for abdominal pain, blood in stool and vomiting.   Endocrine: Negative for cold intolerance and heat intolerance.   Genitourinary: Negative for dysuria and hematuria.   Musculoskeletal: Positive for arthralgias. Negative for back pain, joint swelling and myalgias.   Skin: Negative for color change, pallor and rash.   Neurological: Negative for syncope, weakness, light-headedness and headache.   Hematological: Negative for adenopathy. Does not bruise/bleed easily.        Objective     /65   Pulse 107   Ht 172.7 cm (68\")   Wt 88.9 kg (196 lb)   BMI 29.80 kg/m²       Physical Exam  Constitutional:       General: She is not in acute distress.     Appearance: Normal appearance.   HENT:      Head: Atraumatic.      Mouth/Throat:      Mouth: Mucous membranes are moist.      Pharynx: Oropharynx is clear. No oropharyngeal exudate.   Eyes:      General: No scleral icterus.     Conjunctiva/sclera: Conjunctivae normal.   Neck:      Vascular: No carotid bruit or JVD.   Cardiovascular:      Rate and Rhythm: Normal rate and regular rhythm.  No extrasystoles are present.     Chest Wall: PMI is not displaced.      Pulses:           Radial pulses are 2+ on the right side and 2+ on the left side.      Heart sounds: S1 normal and S2 normal. No murmur heard.    No friction " rub. No gallop. No S3 or S4 sounds.   Pulmonary:      Effort: Pulmonary effort is normal. No tachypnea or respiratory distress.      Breath sounds: No decreased breath sounds, wheezing, rhonchi or rales.   Chest:      Chest wall: No tenderness.   Abdominal:      General: Bowel sounds are normal. There is no distension.      Palpations: Abdomen is soft. There is no mass.      Tenderness: There is no abdominal tenderness.   Musculoskeletal:         General: No swelling, tenderness or deformity.      Cervical back: Neck supple. No tenderness.      Right lower leg: No edema.      Left lower leg: No edema.   Skin:     General: Skin is warm and dry.      Coloration: Skin is not jaundiced.      Findings: No erythema or rash.      Nails: There is no clubbing.   Neurological:      General: No focal deficit present.      Mental Status: She is alert and oriented to person, place, and time.      Motor: No weakness.   Psychiatric:         Mood and Affect: Mood normal.         Behavior: Behavior normal.       Result Review :     The following data was reviewed by: David Oliveira MD on 12/21/2022:    CMP    CMP 11/29/22 11/30/22 12/1/22   Glucose 116 (A) 131 (A) 137 (A)   BUN 15 21 21   Creatinine 0.73 0.63 0.60   Sodium 141 138 138   Potassium 3.8 4.0 4.1   Chloride 105 103 103   Calcium 9.7 8.8 8.8   Albumin 4.00 4.00    Total Bilirubin 0.3 0.2    Alkaline Phosphatase 69 65    AST (SGOT) 11 13    ALT (SGPT) 10 7    (A) Abnormal value            CBC    CBC 11/29/22 11/30/22 12/1/22   WBC 8.17 6.19 8.62   RBC 4.21 4.17 4.06   Hemoglobin 12.4 12.0 12.0   Hematocrit 37.5 36.6 35.8   MCV 89.1 87.8 88.2   MCH 29.5 28.8 29.6   MCHC 33.1 32.8 33.5   RDW 13.0 12.8 13.0   Platelets 335 341 330           Lipid Panel    Lipid Panel 3/28/22 7/18/22   Total Cholesterol 187 187   Triglycerides 67 65   HDL Cholesterol 66 62   VLDL Cholesterol 12 12   LDL Cholesterol  109 (A) 113 (A)   LDL/HDL Ratio 1.7 1.8   (A) Abnormal value        Comments are available for some flowsheets but are not being displayed.           Echocardiogram 11/30/22:  •  Left ventricular systolic function is normal. Calculated left ventricular EF = 67.3% Left ventricular ejection fraction appears to be 61 - 65%.  •  Left ventricular diastolic function is consistent with (grade I) impaired relaxation.  •  Normal valvular structure and function.         Assessment and Plan      Diagnoses and all orders for this visit:    1. Chest pain, atypical (Primary)  -     Stress Test With Myocardial Perfusion One Day; Future    2. Exertional dyspnea  -     Stress Test With Myocardial Perfusion One Day; Future    3. Coronary artery calcification seen on CT scan  -     Stress Test With Myocardial Perfusion One Day; Future  -     rosuvastatin (CRESTOR) 20 MG tablet; Take 1 tablet by mouth Daily.  Dispense: 90 tablet; Refill: 3    4. Hyperlipidemia LDL goal <70  -     rosuvastatin (CRESTOR) 20 MG tablet; Take 1 tablet by mouth Daily.  Dispense: 90 tablet; Refill: 3    -Recurrent atypical chest pain with moderate chronic exertional dyspnea and coronary artery calcifications observed on previous CT scan of chest in October 2020:  I will obtain a Lexiscan nuclear stress test (she states she cannot exercise on a treadmill due to COPD and chronic dyspnea, as well as chronic arthritis and back pain) for further evaluation and cardiovascular risk stratification.  I will switch her to high-intensity statin therapy with rosuvastatin 20 mg nightly, and I recommended starting enteric coated aspirin 81 mg daily.    -Hyperlipidemia:  LDL goal <70, last LDL = 113 in July 2022.  I will switch her from low dose atorvastatin to rosuvastatin 20 mg nightly and we will plan to repeat a fasting lipid panel in 2-3 months.    -Diastolic dysfunction: Mild (grade I) per recent echo and stable with no signs/symptoms of decompensated CHF.  She was instructed to maintain a very low salt diet and to monitor her  weight at least a couple times weekly.        Follow Up     No follow-ups on file.    Patient was given instructions and counseling regarding her condition or for health maintenance advice. Please see specific information pulled into the AVS if appropriate.     Jennifer Shelton  reports that she quit smoking about 2 years ago. Her smoking use included cigarettes. She started smoking about 42 years ago. She has a 60.00 pack-year smoking history. She has never used smokeless tobacco.  I have educated her on the risk of diseases from using tobacco products such as cancer, COPD and heart disease.  Continued tobacco cessation was strongly advised.      David Oliveira MD, FACC, [facility]  Interventional Cardiology

## 2022-12-22 ENCOUNTER — APPOINTMENT (OUTPATIENT)
Dept: CT IMAGING | Facility: HOSPITAL | Age: 67
End: 2022-12-22

## 2022-12-22 RX ORDER — MONTELUKAST SODIUM 10 MG/1
10 TABLET ORAL
Qty: 30 TABLET | Refills: 5 | Status: SHIPPED | OUTPATIENT
Start: 2022-12-22

## 2022-12-22 RX ORDER — FAMOTIDINE 20 MG/1
20 TABLET, FILM COATED ORAL 2 TIMES DAILY
Qty: 60 TABLET | Refills: 0 | Status: SHIPPED | OUTPATIENT
Start: 2022-12-22

## 2022-12-22 RX ORDER — PANTOPRAZOLE SODIUM 40 MG/1
40 TABLET, DELAYED RELEASE ORAL DAILY
Qty: 90 TABLET | Refills: 1 | Status: SHIPPED | OUTPATIENT
Start: 2022-12-22

## 2022-12-22 RX ORDER — CHOLECALCIFEROL (VITAMIN D3) 125 MCG
1 CAPSULE ORAL DAILY
Qty: 30 TABLET | Refills: 0 | Status: SHIPPED | OUTPATIENT
Start: 2022-12-22 | End: 2023-02-13 | Stop reason: SDUPTHER

## 2022-12-22 RX ORDER — FLUOXETINE HYDROCHLORIDE 40 MG/1
80 CAPSULE ORAL DAILY
Qty: 180 CAPSULE | Refills: 1 | Status: SHIPPED | OUTPATIENT
Start: 2022-12-22 | End: 2023-02-13 | Stop reason: SDUPTHER

## 2022-12-22 NOTE — TELEPHONE ENCOUNTER
From: Jennifer Shelton  To: Tessy Serrano  Sent: 12/21/2022 8:05 PM EST  Subject: Famotidine 20mg tabs    Can you please authorize a 90 day refill on this medication?    Thank you,  Jennifer

## 2022-12-28 DIAGNOSIS — M51.36 DEGENERATIVE DISC DISEASE, LUMBAR: ICD-10-CM

## 2022-12-28 DIAGNOSIS — M47.812 CERVICAL SPONDYLOSIS WITHOUT MYELOPATHY: Primary | ICD-10-CM

## 2022-12-28 DIAGNOSIS — M51.34 DEGENERATIVE DISC DISEASE, THORACIC: ICD-10-CM

## 2022-12-28 DIAGNOSIS — R68.84 JAW PAIN: ICD-10-CM

## 2022-12-28 DIAGNOSIS — M41.22 OTHER IDIOPATHIC SCOLIOSIS, CERVICAL REGION: ICD-10-CM

## 2022-12-28 DIAGNOSIS — M54.6 THORACIC SPINE PAIN: ICD-10-CM

## 2022-12-28 DIAGNOSIS — G89.29 CHRONIC BILATERAL LOW BACK PAIN WITHOUT SCIATICA: ICD-10-CM

## 2022-12-28 DIAGNOSIS — R07.89 OTHER CHEST PAIN: ICD-10-CM

## 2022-12-28 DIAGNOSIS — M47.14 OSTEOARTHRITIS OF THORACIC SPINE WITH MYELOPATHY: ICD-10-CM

## 2022-12-28 DIAGNOSIS — M54.50 CHRONIC BILATERAL LOW BACK PAIN WITHOUT SCIATICA: ICD-10-CM

## 2022-12-28 DIAGNOSIS — M50.30 DEGENERATIVE DISC DISEASE, CERVICAL: ICD-10-CM

## 2022-12-28 DIAGNOSIS — M47.896 OTHER OSTEOARTHRITIS OF SPINE, LUMBAR REGION: ICD-10-CM

## 2022-12-28 DIAGNOSIS — M25.512 ACUTE PAIN OF BOTH SHOULDERS: ICD-10-CM

## 2022-12-28 DIAGNOSIS — M25.511 ACUTE PAIN OF BOTH SHOULDERS: ICD-10-CM

## 2022-12-28 DIAGNOSIS — M54.12 CERVICAL RADICULOPATHY: ICD-10-CM

## 2022-12-28 DIAGNOSIS — M41.9 SCOLIOSIS OF THORACIC SPINE, UNSPECIFIED SCOLIOSIS TYPE: ICD-10-CM

## 2023-01-03 ENCOUNTER — HOSPITAL ENCOUNTER (EMERGENCY)
Facility: HOSPITAL | Age: 68
Discharge: HOME | End: 2023-01-04
Attending: EMERGENCY MEDICINE | Admitting: EMERGENCY MEDICINE
Payer: MEDICARE

## 2023-01-03 ENCOUNTER — APPOINTMENT (OUTPATIENT)
Dept: GENERAL RADIOLOGY | Facility: HOSPITAL | Age: 68
End: 2023-01-03
Payer: MEDICARE

## 2023-01-03 DIAGNOSIS — J44.1 COPD EXACERBATION: Primary | ICD-10-CM

## 2023-01-03 LAB
ALBUMIN SERPL-MCNC: 3.9 G/DL (ref 3.5–5.2)
ALBUMIN/GLOB SERPL: 1.4 G/DL
ALP SERPL-CCNC: 65 U/L (ref 39–117)
ALT SERPL W P-5'-P-CCNC: 9 U/L (ref 1–33)
ANION GAP SERPL CALCULATED.3IONS-SCNC: 7.4 MMOL/L (ref 5–15)
AST SERPL-CCNC: 15 U/L (ref 1–32)
BASOPHILS # BLD AUTO: 0.08 10*3/MM3 (ref 0–0.2)
BASOPHILS NFR BLD AUTO: 1.4 % (ref 0–1.5)
BILIRUB SERPL-MCNC: <0.2 MG/DL (ref 0–1.2)
BUN SERPL-MCNC: 11 MG/DL (ref 8–23)
BUN/CREAT SERPL: 15.1 (ref 7–25)
CALCIUM SPEC-SCNC: 8.7 MG/DL (ref 8.6–10.5)
CHLORIDE SERPL-SCNC: 103 MMOL/L (ref 98–107)
CO2 SERPL-SCNC: 29.6 MMOL/L (ref 22–29)
CREAT SERPL-MCNC: 0.73 MG/DL (ref 0.57–1)
D-LACTATE SERPL-SCNC: 0.6 MMOL/L (ref 0.5–2)
DEPRECATED RDW RBC AUTO: 44.2 FL (ref 37–54)
EGFRCR SERPLBLD CKD-EPI 2021: 91.4 ML/MIN/1.73
EOSINOPHIL # BLD AUTO: 0.48 10*3/MM3 (ref 0–0.4)
EOSINOPHIL NFR BLD AUTO: 8.6 % (ref 0.3–6.2)
ERYTHROCYTE [DISTWIDTH] IN BLOOD BY AUTOMATED COUNT: 13.2 % (ref 12.3–15.4)
FLUAV AG NPH QL: NEGATIVE
FLUBV AG NPH QL IA: NEGATIVE
GLOBULIN UR ELPH-MCNC: 2.7 GM/DL
GLUCOSE SERPL-MCNC: 102 MG/DL (ref 65–99)
HCT VFR BLD AUTO: 37.1 % (ref 34–46.6)
HGB BLD-MCNC: 12 G/DL (ref 12–15.9)
HOLD SPECIMEN: NORMAL
HOLD SPECIMEN: NORMAL
IMM GRANULOCYTES # BLD AUTO: 0.01 10*3/MM3 (ref 0–0.05)
IMM GRANULOCYTES NFR BLD AUTO: 0.2 % (ref 0–0.5)
LYMPHOCYTES # BLD AUTO: 1.69 10*3/MM3 (ref 0.7–3.1)
LYMPHOCYTES NFR BLD AUTO: 30.3 % (ref 19.6–45.3)
MCH RBC QN AUTO: 29.3 PG (ref 26.6–33)
MCHC RBC AUTO-ENTMCNC: 32.3 G/DL (ref 31.5–35.7)
MCV RBC AUTO: 90.7 FL (ref 79–97)
MONOCYTES # BLD AUTO: 0.63 10*3/MM3 (ref 0.1–0.9)
MONOCYTES NFR BLD AUTO: 11.3 % (ref 5–12)
NEUTROPHILS NFR BLD AUTO: 2.69 10*3/MM3 (ref 1.7–7)
NEUTROPHILS NFR BLD AUTO: 48.2 % (ref 42.7–76)
NRBC BLD AUTO-RTO: 0 /100 WBC (ref 0–0.2)
NT-PROBNP SERPL-MCNC: 113.7 PG/ML (ref 0–900)
PLATELET # BLD AUTO: 423 10*3/MM3 (ref 140–450)
PMV BLD AUTO: 8.7 FL (ref 6–12)
POTASSIUM SERPL-SCNC: 3.5 MMOL/L (ref 3.5–5.2)
PROCALCITONIN SERPL-MCNC: 0.04 NG/ML (ref 0–0.25)
PROT SERPL-MCNC: 6.6 G/DL (ref 6–8.5)
RBC # BLD AUTO: 4.09 10*6/MM3 (ref 3.77–5.28)
SODIUM SERPL-SCNC: 140 MMOL/L (ref 136–145)
TROPONIN T SERPL-MCNC: <0.01 NG/ML (ref 0–0.03)
WBC NRBC COR # BLD: 5.58 10*3/MM3 (ref 3.4–10.8)
WHOLE BLOOD HOLD COAG: NORMAL
WHOLE BLOOD HOLD SPECIMEN: NORMAL

## 2023-01-03 PROCEDURE — 94640 AIRWAY INHALATION TREATMENT: CPT

## 2023-01-03 PROCEDURE — 85025 COMPLETE CBC W/AUTO DIFF WBC: CPT

## 2023-01-03 PROCEDURE — 93005 ELECTROCARDIOGRAM TRACING: CPT

## 2023-01-03 PROCEDURE — 99284 EMERGENCY DEPT VISIT MOD MDM: CPT

## 2023-01-03 PROCEDURE — 36415 COLL VENOUS BLD VENIPUNCTURE: CPT

## 2023-01-03 PROCEDURE — 83605 ASSAY OF LACTIC ACID: CPT | Performed by: NURSE PRACTITIONER

## 2023-01-03 PROCEDURE — U0004 COV-19 TEST NON-CDC HGH THRU: HCPCS | Performed by: NURSE PRACTITIONER

## 2023-01-03 PROCEDURE — 93010 ELECTROCARDIOGRAM REPORT: CPT | Performed by: INTERNAL MEDICINE

## 2023-01-03 PROCEDURE — 84145 PROCALCITONIN (PCT): CPT | Performed by: NURSE PRACTITIONER

## 2023-01-03 PROCEDURE — C9803 HOPD COVID-19 SPEC COLLECT: HCPCS | Performed by: NURSE PRACTITIONER

## 2023-01-03 PROCEDURE — 94664 DEMO&/EVAL PT USE INHALER: CPT

## 2023-01-03 PROCEDURE — 83880 ASSAY OF NATRIURETIC PEPTIDE: CPT

## 2023-01-03 PROCEDURE — 25010000002 METHYLPREDNISOLONE PER 125 MG: Performed by: NURSE PRACTITIONER

## 2023-01-03 PROCEDURE — 87040 BLOOD CULTURE FOR BACTERIA: CPT | Performed by: NURSE PRACTITIONER

## 2023-01-03 PROCEDURE — 96374 THER/PROPH/DIAG INJ IV PUSH: CPT

## 2023-01-03 PROCEDURE — 80053 COMPREHEN METABOLIC PANEL: CPT

## 2023-01-03 PROCEDURE — 84484 ASSAY OF TROPONIN QUANT: CPT

## 2023-01-03 PROCEDURE — 94799 UNLISTED PULMONARY SVC/PX: CPT

## 2023-01-03 PROCEDURE — 93005 ELECTROCARDIOGRAM TRACING: CPT | Performed by: EMERGENCY MEDICINE

## 2023-01-03 PROCEDURE — 71045 X-RAY EXAM CHEST 1 VIEW: CPT

## 2023-01-03 PROCEDURE — 87804 INFLUENZA ASSAY W/OPTIC: CPT | Performed by: NURSE PRACTITIONER

## 2023-01-03 RX ORDER — IPRATROPIUM BROMIDE AND ALBUTEROL SULFATE 2.5; .5 MG/3ML; MG/3ML
3 SOLUTION RESPIRATORY (INHALATION) ONCE
Status: COMPLETED | OUTPATIENT
Start: 2023-01-03 | End: 2023-01-03

## 2023-01-03 RX ORDER — SODIUM CHLORIDE 0.9 % (FLUSH) 0.9 %
10 SYRINGE (ML) INJECTION AS NEEDED
Status: DISCONTINUED | OUTPATIENT
Start: 2023-01-03 | End: 2023-01-04 | Stop reason: HOSPADM

## 2023-01-03 RX ORDER — IPRATROPIUM BROMIDE AND ALBUTEROL SULFATE 2.5; .5 MG/3ML; MG/3ML
SOLUTION RESPIRATORY (INHALATION)
Status: COMPLETED
Start: 2023-01-03 | End: 2023-01-03

## 2023-01-03 RX ORDER — METHYLPREDNISOLONE SODIUM SUCCINATE 125 MG/2ML
125 INJECTION, POWDER, LYOPHILIZED, FOR SOLUTION INTRAMUSCULAR; INTRAVENOUS ONCE
Status: COMPLETED | OUTPATIENT
Start: 2023-01-03 | End: 2023-01-03

## 2023-01-03 RX ADMIN — METHYLPREDNISOLONE SODIUM SUCCINATE 125 MG: 125 INJECTION, POWDER, FOR SOLUTION INTRAMUSCULAR; INTRAVENOUS at 21:12

## 2023-01-03 RX ADMIN — IPRATROPIUM BROMIDE AND ALBUTEROL SULFATE 3 ML: 2.5; .5 SOLUTION RESPIRATORY (INHALATION) at 21:16

## 2023-01-03 RX ADMIN — IPRATROPIUM BROMIDE AND ALBUTEROL SULFATE 3 ML: 2.5; .5 SOLUTION RESPIRATORY (INHALATION) at 17:09

## 2023-01-03 RX ADMIN — IPRATROPIUM BROMIDE AND ALBUTEROL SULFATE 3 ML: 2.5; .5 SOLUTION RESPIRATORY (INHALATION) at 21:24

## 2023-01-03 NOTE — ED PROVIDER NOTES
Time: 10:33 PM EST  Date of encounter:  1/3/2023  Independent Historian/Clinical History and Information was obtained by:   Patient  Chief Complaint   Patient presents with   • Shortness of Breath     Recent hospitalizations for tx of COPD exac. Pt reports every time she finishes her prednisone, it gets worse. Having to wear her home O2 around the clock for several days, increased to 3L.       History is limited by: N/A    History of Present Illness:    Patient is a 67 y.o. year old female who presents to the emergency department for evaluation of shortness of breath and wheezing. Patient states she has been progressively getting more SOA for about two weeks now. She states that once she stops taking steroids, she becomes worse. She reports she is now having to wear home 02 at all times now due to worsening SOA and wheezing. Denies fever. Recently admitted for COPD exacerbation.     Pt reports being unable to see her doctor. Pt reports COPD flare up for about a week. Pt reports no longer smoking. Pt reports having an albuterol rescue inhaler. Pt reports with each COPD exacerbation, symptoms worsen. Pt denies a fever. Pt reports intermittent chest pain. Pt reports SOB worsening with movement. Pt reports having dogs, but not wanting to get rid of them despite potential allergens.         History provided by:  Patient   used: No        Patient Care Team  Primary Care Provider: Tessy Serrano PA    Past Medical History:     Allergies   Allergen Reactions   • Aspirin Hives and Arrhythmia     Chest pain   • Cephalexin Hives   • Nsaids Hives   • Penicillins Hives   • Contrast Dye Rash     Past Medical History:   Diagnosis Date   • Anesthesia complication     heart rate slowed   • Anxiety    • Arthritis    • Cancer (HCC)     skin cancer   • Cervical spinal stenosis 09/22/2016   • Cervical spondylosis without myelopathy    • Cervicalgia 09/22/2015   • Chronic pain disorder    • Colon polyp 2008   • COPD  (chronic obstructive pulmonary disease) (HCC)    • Depression    • Fibromyalgia    • Gastroesophageal reflux    • GERD (gastroesophageal reflux disease)    • Hematuria 2020   • Hernia ?    Hiatal hernia   • Herniated disc, cervical 2015    C4-5, C5-6, C6-7   • High cholesterol    • Hyperlipemia    • Irritable bowel syndrome    • Lung nodule 2020   • Rectal bleeding    • Seasonal allergies    • Seizures (HCC)    • Shortness of breath    • Sleep apnea      Past Surgical History:   Procedure Laterality Date   • ABDOMINAL SURGERY  ,     C section   •  SECTION     • COLONOSCOPY     • CYSTOSCOPY  2020    Cystoscopy w/ Dr. Lundy   • ENDOSCOPY N/A 2022    Procedure: ESOPHAGOGASTRODUODENOSCOPY WITH BIOPSIES;  Surgeon: Oneal Raymond MD;  Location: [facility] ENDOSCOPY;  Service: Gastroenterology;  Laterality: N/A;  HIATAL HERNIA, ESOPHAGITIS   • PILONIDAL CYSTECTOMY     • SQUAMOUS CELL CARCINOMA EXCISION      chest   • TUBAL ABDOMINAL LIGATION     • UPPER GASTROINTESTINAL ENDOSCOPY  2021     Family History   Problem Relation Age of Onset   • Colon cancer Mother 60        Pancreatic, breast   • Cancer Mother    • Diabetes Mother    • Breast cancer Mother         60s   • Arthritis Mother    • Pancreatic cancer Mother    • Irritable bowel syndrome Mother    • Heart disease Father    • Heart attack Father    • Arthritis Father    • Ulcerative colitis Father    • Depression Sister    • Anxiety disorder Sister    • Cancer Sister    • Pancreatic cancer Sister    • Irritable bowel syndrome Sister    • Bipolar disorder Maternal Aunt    • Colon cancer Maternal Grandmother 70   • Seizures Son    • Depression Son    • Suicide Attempts Son    • Depression Son    • ADD / ADHD Son    • Colon cancer Other         60s   • Depression Daughter    • Malig Hyperthermia Neg Hx        Home Medications:  Prior to Admission medications    Medication Sig Start Date End Date Taking?  Authorizing Provider   albuterol sulfate  (90 Base) MCG/ACT inhaler Inhale 2 puffs Every 4 (Four) Hours As Needed for Wheezing. 11/6/22   Brooklyn Portillo MD   Cholecalciferol (Vitamin D3) 50 MCG (2000 UT) tablet Take 1 tablet by mouth Daily. 12/22/22   Tessy Serrano PA   diphenhydrAMINE (BENADRYL) 25 mg capsule Take 50 mg by mouth At Night As Needed.    ProviderVerenice MD   famotidine (Pepcid) 20 MG tablet Take 1 tablet by mouth 2 (Two) Times a Day. 12/22/22   Tessy Serrano PA   FLUoxetine (PROzac) 40 MG capsule Take 2 capsules by mouth Daily. Patient due for follow up Nov 1 for future refills 12/22/22   Tessy Serrano PA   fluticasone (FLONASE) 50 MCG/ACT nasal spray 2 sprays by Each Nare route Daily. 8/31/22   Tessy Serrano PA   guaiFENesin (MUCINEX) 600 MG 12 hr tablet Take 1,200 mg by mouth 2 (Two) Times a Day.    ProviderVerenice MD   hyoscyamine (ANASPAZ,LEVSIN) 0.125 MG tablet Take 1 tablet by mouth 4 (Four) Times a Day With Meals & at Bedtime. 10/5/22   Stephanie Moran APRN   ipratropium-albuterol (DUO-NEB) 0.5-2.5 mg/3 ml nebulizer Take 3 mL by nebulization 4 (Four) Times a Day. 4/8/22   Tessy Serrano PA   loratadine (Claritin) 10 MG tablet Take 1 tablet by mouth Daily. 8/31/22   Tessy Serrano PA   montelukast (SINGULAIR) 10 MG tablet Take 1 tablet by mouth every night at bedtime. 12/22/22   Tessy Serrano PA   O2 (OXYGEN) Inhale 1 (One) Time. Use 2 LPM PRN if O2 if below 90    Verenice Husain MD   pantoprazole (PROTONIX) 40 MG EC tablet Take 1 tablet by mouth Daily. 12/22/22   Brianna Martinez APRN   promethazine-codeine (PHENERGAN with CODEINE) 6.25-10 MG/5ML syrup Take 5 mL by mouth Every 6 (Six) Hours As Needed for Cough. 12/2/22   Gasper Castellanos MD   rosuvastatin (CRESTOR) 20 MG tablet Take 1 tablet by mouth Daily. 12/21/22   David Oliveira MD   sodium chloride 3 % nebulizer solution inhale contents of 1 vial via nebulizer TWICE DAILY 7/15/21    Provider, Historical, MD   Stiolto Respimat 2.5-2.5 MCG/ACT aerosol solution inhaler Inhale 2 puffs 2 (Two) Times a Day. 22   Tessy Serrano PA        Social History:   Social History     Tobacco Use   • Smoking status: Former     Packs/day: 1.50     Years: 40.00     Pack years: 60.00     Types: Cigarettes     Start date: 1980     Quit date: 2020     Years since quittin.1   • Smokeless tobacco: Never   • Tobacco comments:     Quit 2020   Vaping Use   • Vaping Use: Never used   Substance Use Topics   • Alcohol use: Never   • Drug use: Never         Review of Systems:  Review of Systems   Constitutional: Positive for fatigue. Negative for chills and fever.   HENT: Negative for congestion, rhinorrhea and sore throat.    Eyes: Negative for photophobia.   Respiratory: Positive for cough, shortness of breath and wheezing. Negative for apnea and chest tightness.    Cardiovascular: Positive for chest pain. Negative for palpitations.   Gastrointestinal: Negative for abdominal pain, diarrhea, nausea and vomiting.   Endocrine: Negative.    Genitourinary: Negative for difficulty urinating and dysuria.   Musculoskeletal: Negative for back pain, joint swelling and myalgias.   Skin: Negative for color change and wound.   Allergic/Immunologic: Negative.    Neurological: Negative for seizures and headaches.   Psychiatric/Behavioral: Negative.    All other systems reviewed and are negative.       Physical Exam:  /81 (BP Location: Left arm, Patient Position: Lying)   Pulse 104   Temp 98.9 °F (37.2 °C) (Oral)   Resp 20   Ht 172.7 cm (68\")   Wt 90.2 kg (198 lb 13.7 oz)   SpO2 95%   BMI 30.24 kg/m²     Physical Exam  Vitals and nursing note reviewed.   Constitutional:       General: She is awake.      Appearance: Normal appearance.   HENT:      Head: Normocephalic and atraumatic.      Nose: Nose normal.      Mouth/Throat:      Mouth: Mucous membranes are moist.   Eyes:      Extraocular Movements:  Extraocular movements intact.      Pupils: Pupils are equal, round, and reactive to light.   Cardiovascular:      Rate and Rhythm: Normal rate and regular rhythm.      Heart sounds: Normal heart sounds.   Pulmonary:      Effort: No tachypnea, accessory muscle usage or respiratory distress.      Breath sounds: Examination of the right-upper field reveals wheezing. Examination of the left-upper field reveals wheezing. Examination of the right-middle field reveals wheezing. Examination of the left-middle field reveals wheezing. Examination of the right-lower field reveals wheezing. Examination of the left-lower field reveals wheezing. Wheezing (inspiratory and expiratory) present. No rhonchi or rales.   Abdominal:      General: Bowel sounds are normal. There is no distension.      Palpations: Abdomen is soft.      Tenderness: There is no abdominal tenderness. There is no guarding or rebound.      Comments: No rigidity   Musculoskeletal:         General: No tenderness. Normal range of motion.      Cervical back: Normal range of motion and neck supple.   Skin:     General: Skin is warm and dry.      Coloration: Skin is not jaundiced.   Neurological:      General: No focal deficit present.      Mental Status: She is alert and oriented to person, place, and time. Mental status is at baseline.      Sensory: Sensation is intact.      Motor: Motor function is intact.      Coordination: Coordination is intact.   Psychiatric:         Attention and Perception: Attention and perception normal.         Mood and Affect: Mood and affect normal.         Speech: Speech normal.         Behavior: Behavior normal.         Judgment: Judgment normal.                  Procedures:  Procedures      Medical Decision Making:      Comorbidities that affect care:    COPD    External Notes reviewed:    None      The following orders were placed and all results were independently analyzed by me:  Orders Placed This Encounter   Procedures   •  Influenza Antigen, Rapid - Swab, Nasopharynx   • Blood Culture - Blood,   • Blood Culture - Blood,   • COVID-19,APTIMA PANTHER(JUAREZ),BH BRAD/BH ZE, NP/OP SWAB IN UTM/VTM/SALINE TRANSPORT MEDIA,24 HR TAT - Swab, Nasopharynx   • XR Chest 1 View   • [place] Draw   • Comprehensive Metabolic Panel   • BNP   • Troponin   • CBC Auto Differential   • Lactic Acid, Plasma   • Procalcitonin   • NPO Diet NPO Type: Strict NPO   • Undress & Gown   • Cardiac Monitoring   • Continuous Pulse Oximetry   • Vital Signs   • Oxygen Therapy- Nasal Cannula; 2 LPM; Titrate for SPO2: equal to or greater than, 92%   • ECG 12 Lead ED Triage Standing Order; SOA   • Insert Peripheral IV   • Insert Peripheral IV   • CBC & Differential   • Green Top (Gel)   • Lavender Top   • Gold Top - SST   • Light Blue Top       Medications Given in the Emergency Department:  Medications   sodium chloride 0.9 % flush 10 mL (has no administration in time range)   sodium chloride 0.9 % flush 10 mL (has no administration in time range)   ipratropium-albuterol (DUO-NEB) nebulizer solution 3 mL (has no administration in time range)   ipratropium-albuterol (DUO-NEB) nebulizer solution 3 mL (3 mL Nebulization Given 1/3/23 1709)   methylPREDNISolone sodium succinate (SOLU-Medrol) injection 125 mg (125 mg Intravenous Given 1/3/23 2112)   ipratropium-albuterol (DUO-NEB) nebulizer solution 3 mL (3 mL Nebulization Given 1/3/23 2116)   ipratropium-albuterol (DUO-NEB) 0.5-2.5 mg/3 ml nebulizer solution  - ADS Override Pull (3 mL  Given 1/3/23 2124)        ED Course:    The patient was initially evaluated in the triage area where orders were placed. The patient was later dispositioned by Dex Boyle MD.      The patient was advised to stay for completion of workup which includes but is not limited to communication of labs and radiological results, reassessment and plan. The patient was advised that leaving prior to disposition by a provider could result in critical  findings that are not communicated to the patient.     ED Course as of 01/04/23 0005 Tue Jan 03, 2023 1716 The patient was seen and examined by me, RESHMA Amador, while in triage. Orders placed. Patient is awaiting disposition.   [AR]   2250 EKG interpretation: Normal sinus rhythm, rate 96, normal WI and QT intervals, normal QRS duration, normal axis, nonspecific ST segment changes with no acute ischemia. [RP]      ED Course User Index  [AR] Chelsea Vazquez APRN  [RP] Dex Boyle MD       Labs:    Lab Results (last 24 hours)     Procedure Component Value Units Date/Time    CBC & Differential [[id number]]  (Abnormal) Collected: 01/03/23 1705    Specimen: Blood from Arm, Right Updated: 01/03/23 1720    Narrative:      The following orders were created for panel order CBC & Differential.  Procedure                               Abnormality         Status                     ---------                               -----------         ------                     CBC Auto Differential[[id number]]        Abnormal            Final result                 Please view results for these tests on the individual orders.    Comprehensive Metabolic Panel [[id number]]  (Abnormal) Collected: 01/03/23 1705    Specimen: Blood from Arm, Right Updated: 01/03/23 1745     Glucose 102 mg/dL      BUN 11 mg/dL      Creatinine 0.73 mg/dL      Sodium 140 mmol/L      Potassium 3.5 mmol/L      Chloride 103 mmol/L      CO2 29.6 mmol/L      Calcium 8.7 mg/dL      Total Protein 6.6 g/dL      Albumin 3.9 g/dL      ALT (SGPT) 9 U/L      AST (SGOT) 15 U/L      Alkaline Phosphatase 65 U/L      Total Bilirubin <0.2 mg/dL      Globulin 2.7 gm/dL      A/G Ratio 1.4 g/dL      BUN/Creatinine Ratio 15.1     Anion Gap 7.4 mmol/L      eGFR 91.4 mL/min/1.73      Comment: National Kidney Foundation and American Society of Nephrology (ASN) Task Force recommended calculation based on the Chronic Kidney Disease Epidemiology Collaboration (CKD-EPI)  equation refit without adjustment for race.       Narrative:      GFR Normal >60  Chronic Kidney Disease <60  Kidney Failure <15      BNP [[id number]]  (Normal) Collected: 01/03/23 1705    Specimen: Blood from Arm, Right Updated: 01/03/23 1741     proBNP 113.7 pg/mL     Narrative:      Among patients with dyspnea, NT-proBNP is highly sensitive for the detection of acute congestive heart failure. In addition NT-proBNP of <300 pg/ml effectively rules out acute congestive heart failure with 99% negative predictive value.      Troponin [[id number]]  (Normal) Collected: 01/03/23 1705    Specimen: Blood from Arm, Right Updated: 01/03/23 1743     Troponin T <0.010 ng/mL     Narrative:      Troponin T Reference Range:  <= 0.03 ng/mL-   Negative for AMI  >0.03 ng/mL-     Abnormal for myocardial necrosis.  Clinicians would have to utilize clinical acumen, EKG, Troponin and serial changes to determine if it is an Acute Myocardial Infarction or myocardial injury due to an underlying chronic condition.       Results may be falsely decreased if patient taking Biotin.      CBC Auto Differential [[id number]]  (Abnormal) Collected: 01/03/23 1705    Specimen: Blood from Arm, Right Updated: 01/03/23 1720     WBC 5.58 10*3/mm3      RBC 4.09 10*6/mm3      Hemoglobin 12.0 g/dL      Hematocrit 37.1 %      MCV 90.7 fL      MCH 29.3 pg      MCHC 32.3 g/dL      RDW 13.2 %      RDW-SD 44.2 fl      MPV 8.7 fL      Platelets 423 10*3/mm3      Neutrophil % 48.2 %      Lymphocyte % 30.3 %      Monocyte % 11.3 %      Eosinophil % 8.6 %      Basophil % 1.4 %      Immature Grans % 0.2 %      Neutrophils, Absolute 2.69 10*3/mm3      Lymphocytes, Absolute 1.69 10*3/mm3      Monocytes, Absolute 0.63 10*3/mm3      Eosinophils, Absolute 0.48 10*3/mm3      Basophils, Absolute 0.08 10*3/mm3      Immature Grans, Absolute 0.01 10*3/mm3      nRBC 0.0 /100 WBC     Procalcitonin [[id number]]  (Normal) Collected: 01/03/23 1705    Specimen: Blood from Arm, Right  Updated: 01/03/23 1750     Procalcitonin 0.04 ng/mL     Narrative:      As a Marker for Sepsis (Non-Neonates):    1. <0.5 ng/mL represents a low risk of severe sepsis and/or septic shock.  2. >2 ng/mL represents a high risk of severe sepsis and/or septic shock.    As a Marker for Lower Respiratory Tract Infections that require antibiotic therapy:    PCT on Admission    Antibiotic Therapy       6-12 Hrs later    >0.5                Strongly Recommended  >0.25 - <0.5        Recommended  0.1 - 0.25          Discouraged              Remeasure/reassess PCT  <0.1                Strongly Discouraged     Remeasure/reassess PCT    As 28 day mortality risk marker: \"Change in Procalcitonin Result\" (>80% or <=80%) if Day 0 (or Day 1) and Day 4 values are available. Refer to http://www.[facility]-pct-calculator.com    Change in PCT <=80%  A decrease of PCT levels below or equal to 80% defines a positive change in PCT test result representing a higher risk for 28-day all-cause mortality of patients diagnosed with severe sepsis for septic shock.    Change in PCT >80%  A decrease of PCT levels of more than 80% defines a negative change in PCT result representing a lower risk for 28-day all-cause mortality of patients diagnosed with severe sepsis or septic shock.    This test is Prognostic not Diagnostic, if elevated correlate with clinical findings before administering antibiotic treatment.        Influenza Antigen, Rapid - Swab, Nasopharynx [[id number]]  (Normal) Collected: 01/03/23 1731    Specimen: Swab from Nasopharynx Updated: 01/03/23 1829     Influenza A Ag, EIA Negative     Influenza B Ag, EIA Negative    COVID-19,APTIMA PANTHER(JUAREZ),[facility] BRAD/BH ZE, NP/OP SWAB IN UTM/VTM/SALINE TRANSPORT MEDIA,24 HR TAT - Swab, Nasopharynx [[id number]] Collected: 01/03/23 1731    Specimen: Swab from Nasopharynx Updated: 01/03/23 1800    Lactic Acid, Plasma [[id number]]  (Normal) Collected: 01/03/23 2123    Specimen: Blood Updated: 01/03/23 2148      Lactate 0.6 mmol/L     Blood Culture - Blood, Arm, Right [[id number]] Collected: 01/03/23 2123    Specimen: Blood from Arm, Right Updated: 01/03/23 2129    Blood Culture - Blood, Arm, Left [[id number]] Collected: 01/03/23 2123    Specimen: Blood from Arm, Left Updated: 01/03/23 2130           Imaging:    XR Chest 1 View    Result Date: 1/3/2023  PROCEDURE: XR CHEST 1 VW  COMPARISON: [facility], , XR CHEST 1 VW, 11/06/2022, 9:08.  INDICATIONS: INCREASED SHORTNESS OF BREATH X 1 WEEK  FINDINGS:  The heart size is normal.  The pulmonary vascular markings are normal.  There is underlying emphysema.  The lungs and pleural spaces are clear.  The bony thorax is normal for age.        1. No active disease. 2. Emphysema.       BABATUNDE WASHINGTON MD       Electronically Signed and Approved By: BABATUNDE WASHINGTON MD on 1/03/2023 at 18:33                 Differential Diagnosis and Discussion:      Dyspnea: Differential diagnosis includes but is not limited to metabolic acidosis, neurological disorders, psychogenic, asthma, pneumothorax, upper airway obstruction, COPD, pneumonia, noncardiogenic pulmonary edema, interstitial lung disease, anemia, congestive heart failure, and pulmonary embolism    All labs were reviewed and analyzed by me.    MDM  Number of Diagnoses or Management Options  COPD exacerbation (HCC): established and worsening     Amount and/or Complexity of Data Reviewed  Clinical lab tests: reviewed  Tests in the radiology section of CPT®: reviewed  Discuss the patient with other providers: yes  Independent visualization of images, tracings, or specimens: yes    Risk of Complications, Morbidity, and/or Mortality  Presenting problems: moderate  Management options: low             Patient Care Considerations:    I considered prescribing antibiotics as an outpatient however Patient has recently been on antibiotics and this appears to be a simple COPD exacerbation.  She presented today feeling like steroids are what would  make the most difference for her and I agree.  She has no signs for bacterial infection.      Consultants/Shared Management Plan:    None    Social Determinants of Health:    Patient has presented with family members who are responsible, reliable and will ensure follow up care.      Disposition and Care Coordination:    Discharged: The patient is suitable and stable for discharge with no need for consideration of observation or admission.    I have explained discharge medications and the need for follow up with the patient/caretakers. This was also printed in the discharge instructions. Patient was discharged with the following medications and follow up:      Medication List      New Prescriptions    predniSONE 20 MG tablet  Commonly known as: DELTASONE  Take 1 tablet by mouth 3 (Three) Times a Day With Meals for 5 days.           Where to Get Your Medications      These medications were sent to [facility] - [place], [place] - [address] [place] [address] [place] - [phone] PH - [phone] FX  [address] [facility] [address]    Phone: [phone]   · predniSONE 20 MG tablet      Tessy Serrano, PA  [address] [id number] [phone]    In 1 day         Final diagnoses:   COPD exacerbation (HCC)        ED Disposition     ED Disposition   Discharge    Condition   Stable    Comment   --             This medical record created using voice recognition software.      Documentation assistance provided by Paxton Parker, acting as scribe for Dex Boyle MD. Information recorded by the scribe was done at my direction and has been verified and validated by me.       Paxton Parker  01/03/23 2256       Dex Boyle MD  01/04/23 0005

## 2023-01-04 VITALS
WEIGHT: 198.85 LBS | SYSTOLIC BLOOD PRESSURE: 117 MMHG | DIASTOLIC BLOOD PRESSURE: 76 MMHG | TEMPERATURE: 98.9 F | OXYGEN SATURATION: 94 % | BODY MASS INDEX: 30.14 KG/M2 | HEIGHT: 68 IN | HEART RATE: 103 BPM | RESPIRATION RATE: 27 BRPM

## 2023-01-04 LAB — SARS-COV-2 RNA PNL SPEC NAA+PROBE: NOT DETECTED

## 2023-01-04 PROCEDURE — 94799 UNLISTED PULMONARY SVC/PX: CPT

## 2023-01-04 RX ORDER — PREDNISONE 20 MG/1
20 TABLET ORAL
Qty: 15 TABLET | Refills: 0 | Status: SHIPPED | OUTPATIENT
Start: 2023-01-04 | End: 2023-01-09

## 2023-01-04 RX ORDER — IPRATROPIUM BROMIDE AND ALBUTEROL SULFATE 2.5; .5 MG/3ML; MG/3ML
3 SOLUTION RESPIRATORY (INHALATION) ONCE
Status: COMPLETED | OUTPATIENT
Start: 2023-01-04 | End: 2023-01-04

## 2023-01-04 RX ORDER — IPRATROPIUM BROMIDE AND ALBUTEROL SULFATE 2.5; .5 MG/3ML; MG/3ML
3 SOLUTION RESPIRATORY (INHALATION) ONCE
Status: COMPLETED | OUTPATIENT
Start: 2023-01-04 | End: 2023-01-03

## 2023-01-04 RX ADMIN — IPRATROPIUM BROMIDE AND ALBUTEROL SULFATE 3 ML: 2.5; .5 SOLUTION RESPIRATORY (INHALATION) at 00:26

## 2023-01-04 NOTE — ED NOTES
Patient alert and oriented.   at bedside.  Patient states she is able to breathe better now after meds and duonebs.

## 2023-01-05 LAB — QT INTERVAL: 346 MS

## 2023-01-08 LAB
BACTERIA SPEC AEROBE CULT: NORMAL
BACTERIA SPEC AEROBE CULT: NORMAL

## 2023-01-11 ENCOUNTER — HOSPITAL ENCOUNTER (OUTPATIENT)
Dept: MRI IMAGING | Facility: HOSPITAL | Age: 68
Discharge: HOME | End: 2023-01-11
Payer: MEDICARE

## 2023-01-11 DIAGNOSIS — M54.50 CHRONIC BILATERAL LOW BACK PAIN WITHOUT SCIATICA: ICD-10-CM

## 2023-01-11 DIAGNOSIS — M51.36 DEGENERATIVE DISC DISEASE, LUMBAR: ICD-10-CM

## 2023-01-11 DIAGNOSIS — R68.84 JAW PAIN: ICD-10-CM

## 2023-01-11 DIAGNOSIS — G89.29 CHRONIC BILATERAL LOW BACK PAIN WITHOUT SCIATICA: ICD-10-CM

## 2023-01-11 DIAGNOSIS — M48.02 CERVICAL SPINAL STENOSIS: ICD-10-CM

## 2023-01-11 DIAGNOSIS — M47.14 OSTEOARTHRITIS OF THORACIC SPINE WITH MYELOPATHY: ICD-10-CM

## 2023-01-11 DIAGNOSIS — R07.89 OTHER CHEST PAIN: ICD-10-CM

## 2023-01-11 DIAGNOSIS — M47.896 OTHER OSTEOARTHRITIS OF SPINE, LUMBAR REGION: ICD-10-CM

## 2023-01-11 DIAGNOSIS — M41.9 SCOLIOSIS OF THORACIC SPINE, UNSPECIFIED SCOLIOSIS TYPE: ICD-10-CM

## 2023-01-11 DIAGNOSIS — M25.512 ACUTE PAIN OF BOTH SHOULDERS: ICD-10-CM

## 2023-01-11 DIAGNOSIS — M41.22 OTHER IDIOPATHIC SCOLIOSIS, CERVICAL REGION: ICD-10-CM

## 2023-01-11 DIAGNOSIS — M51.34 DEGENERATIVE DISC DISEASE, THORACIC: ICD-10-CM

## 2023-01-11 DIAGNOSIS — M54.6 THORACIC SPINE PAIN: ICD-10-CM

## 2023-01-11 DIAGNOSIS — M47.812 CERVICAL SPONDYLOSIS WITHOUT MYELOPATHY: ICD-10-CM

## 2023-01-11 DIAGNOSIS — M25.511 ACUTE PAIN OF BOTH SHOULDERS: ICD-10-CM

## 2023-01-11 DIAGNOSIS — M54.12 CERVICAL RADICULOPATHY: ICD-10-CM

## 2023-01-11 DIAGNOSIS — M50.30 DEGENERATIVE DISC DISEASE, CERVICAL: ICD-10-CM

## 2023-01-11 DIAGNOSIS — M47.812 CERVICAL SPONDYLOSIS WITHOUT MYELOPATHY: Primary | ICD-10-CM

## 2023-01-11 DIAGNOSIS — U07.1 COVID-19 VIRUS INFECTION: ICD-10-CM

## 2023-01-11 PROCEDURE — 72148 MRI LUMBAR SPINE W/O DYE: CPT

## 2023-01-11 PROCEDURE — 72146 MRI CHEST SPINE W/O DYE: CPT

## 2023-01-11 PROCEDURE — 72141 MRI NECK SPINE W/O DYE: CPT

## 2023-01-11 RX ORDER — LORATADINE 10 MG/1
10 TABLET ORAL DAILY
Qty: 30 TABLET | Refills: 2 | Status: SHIPPED | OUTPATIENT
Start: 2023-01-11

## 2023-01-23 ENCOUNTER — HOSPITAL ENCOUNTER (OUTPATIENT)
Dept: NUCLEAR MEDICINE | Facility: HOSPITAL | Age: 68
Discharge: HOME | End: 2023-01-23
Payer: MEDICARE

## 2023-01-23 DIAGNOSIS — R07.89 CHEST PAIN, ATYPICAL: ICD-10-CM

## 2023-01-23 DIAGNOSIS — R06.09 EXERTIONAL DYSPNEA: ICD-10-CM

## 2023-01-23 DIAGNOSIS — I25.10 CORONARY ARTERY CALCIFICATION SEEN ON CT SCAN: ICD-10-CM

## 2023-01-23 PROCEDURE — 0 TECHNETIUM TETROFOSMIN KIT: Performed by: INTERNAL MEDICINE

## 2023-01-23 PROCEDURE — A9502 TC99M TETROFOSMIN: HCPCS | Performed by: INTERNAL MEDICINE

## 2023-01-23 PROCEDURE — 93017 CV STRESS TEST TRACING ONLY: CPT

## 2023-01-23 PROCEDURE — 78452 HT MUSCLE IMAGE SPECT MULT: CPT

## 2023-01-23 PROCEDURE — 25010000002 REGADENOSON 0.4 MG/5ML SOLUTION: Performed by: INTERNAL MEDICINE

## 2023-01-23 PROCEDURE — 78452 HT MUSCLE IMAGE SPECT MULT: CPT | Performed by: INTERNAL MEDICINE

## 2023-01-23 PROCEDURE — 93018 CV STRESS TEST I&R ONLY: CPT | Performed by: INTERNAL MEDICINE

## 2023-01-23 RX ADMIN — TETROFOSMIN 1 DOSE: 1.38 INJECTION, POWDER, LYOPHILIZED, FOR SOLUTION INTRAVENOUS at 07:32

## 2023-01-23 RX ADMIN — TETROFOSMIN 1 DOSE: 1.38 INJECTION, POWDER, LYOPHILIZED, FOR SOLUTION INTRAVENOUS at 09:02

## 2023-01-23 RX ADMIN — REGADENOSON 0.4 MG: 0.08 INJECTION, SOLUTION INTRAVENOUS at 09:02

## 2023-01-25 ENCOUNTER — TELEPHONE (OUTPATIENT)
Dept: CARDIOLOGY | Facility: CLINIC | Age: 68
End: 2023-01-25
Payer: MEDICARE

## 2023-01-25 NOTE — TELEPHONE ENCOUNTER
Per Dr Oliveira: Patient stress test is normal, patient is cleared for colonoscopy and/or EGD.

## 2023-01-26 LAB
BH CV IMMEDIATE POST RECOVERY TECH DATA SYMPTOMS: NORMAL
BH CV IMMEDIATE POST TECH DATA BLOOD PRESSURE: NORMAL MMHG
BH CV IMMEDIATE POST TECH DATA HEART RATE: 106 BPM
BH CV IMMEDIATE POST TECH DATA OXYGEN SATS: 97 %
BH CV REST NUCLEAR ISOTOPE DOSE: 9.8 MCI
BH CV SIX MINUTE RECOVERY TECH DATA BLOOD PRESSURE: NORMAL
BH CV SIX MINUTE RECOVERY TECH DATA HEART RATE: 90 BPM
BH CV SIX MINUTE RECOVERY TECH DATA OXYGEN SATURATION: 98 %
BH CV SIX MINUTE RECOVERY TECH DATA SYMPTOMS: NORMAL
BH CV STRESS BP STAGE 1: NORMAL
BH CV STRESS COMMENTS STAGE 1: NORMAL
BH CV STRESS DOSE REGADENOSON STAGE 1: 0.4
BH CV STRESS DURATION MIN STAGE 1: 0
BH CV STRESS DURATION SEC STAGE 1: 10
BH CV STRESS HR STAGE 1: 102
BH CV STRESS NUCLEAR ISOTOPE DOSE: 34.5 MCI
BH CV STRESS O2 STAGE 1: 96
BH CV STRESS PROTOCOL 1: NORMAL
BH CV STRESS RECOVERY BP: NORMAL MMHG
BH CV STRESS RECOVERY HR: 90 BPM
BH CV STRESS RECOVERY O2: 98 %
BH CV STRESS STAGE 1: 1
BH CV THREE MINUTE POST TECH DATA BLOOD PRESSURE: NORMAL MMHG
BH CV THREE MINUTE POST TECH DATA HEART RATE: 102 BPM
BH CV THREE MINUTE POST TECH DATA OXYGEN SATURATION: 97 %
LV EF NUC BP: 72 %
MAXIMAL PREDICTED HEART RATE: 155 BPM
PERCENT MAX PREDICTED HR: 68.39 %
STRESS BASELINE BP: NORMAL MMHG
STRESS BASELINE HR: 92 BPM
STRESS O2 SAT REST: 96 %
STRESS PERCENT HR: 80 %
STRESS POST O2 SAT PEAK: 96 %
STRESS POST PEAK BP: NORMAL MMHG
STRESS POST PEAK HR: 106 BPM
STRESS TARGET HR: 132 BPM

## 2023-01-27 ENCOUNTER — TELEPHONE (OUTPATIENT)
Dept: GASTROENTEROLOGY | Facility: CLINIC | Age: 68
End: 2023-01-27
Payer: MEDICARE

## 2023-01-27 RX ORDER — PEG-3350, SODIUM SULFATE, SODIUM CHLORIDE, POTASSIUM CHLORIDE, SODIUM ASCORBATE AND ASCORBIC ACID 7.5-2.691G
1000 KIT ORAL TAKE AS DIRECTED
Qty: 1000 ML | Refills: 0 | Status: SHIPPED | OUTPATIENT
Start: 2023-01-27 | End: 2023-03-10

## 2023-01-27 NOTE — TELEPHONE ENCOUNTER
----- Message from Jennifer Shelton sent at 1/27/2023  2:28 PM EST -----  Regarding: Colonoscopy Reminder  Contact: [phone]  I received a call from my pulmonologist’s office last week saying they received a surgical clearance letter from Dr. Verma’s office. They told me they could not approve this until I have a PFT test, which I’m having done March 9th. They were supposed to let his office know. I am assuming I need to reschedule this until later in March.     Thank you,    Jennifer

## 2023-01-27 NOTE — TELEPHONE ENCOUNTER
I left  reminding Ms Shelton of her scheduled scope on 02.10.23, estimated arrival time of 12:30pm. Reminded of liquid diet the day prior. Reminded of bowel prep and instructions. Stated a hospital nurse will call to go over Rx's and confirm time.    Dean jug was originally sent over. I will send Movi prep to pharmacy. Will send mychart message too. el

## 2023-01-31 ENCOUNTER — TELEPHONE (OUTPATIENT)
Dept: GASTROENTEROLOGY | Facility: CLINIC | Age: 68
End: 2023-01-31
Payer: MEDICARE

## 2023-01-31 NOTE — TELEPHONE ENCOUNTER
----- Message from Shawn Krishnan sent at 1/30/2023  2:36 PM EST -----  Regarding: FW: Acid Reflux  Contact: [phone]    ----- Message -----  From: Jennifer Shelton  Sent: 1/30/2023   1:45 AM EST  To: [facility]  Subject: Acid Reflux                                      I am having so much trouble with my acid reflux that I can’t even sleep most nights. I am taking one pantaprazole in the mornings and two Famotidine throughout the day. I am also taking Mylanta occasionally. I can go hours without eating and still have issues when I lay down at night. Due to my breathing issues, I have been unable to be cleared for my colonoscopy. My pulmonologist said I would be cleared after my PFT test. This will be done   March 9th. My colonoscopy has been rescheduled for April 4th. I just don’t know what to do until then. I am miserable. Is there anything I can do or am I just stuck until then?    Thanks,    Jennifer

## 2023-01-31 NOTE — TELEPHONE ENCOUNTER
I spoke with patient. She is not interested in taking Carafate, states she did not like it. Patient would like to hold off on Nissen surgery due to pulmonology clearance. Pt doesn't think she would be cleared at this time. Pt would like to think about the surgery and call us back at a later date. FYI.

## 2023-01-31 NOTE — TELEPHONE ENCOUNTER
Is the patient still taking Carafate QID? If not, okay to refill for her. We are maximizing medication for reflux management if she has reconsidered referral to discuss nissen fundoplication due to large hiatal hernia, please place referral. Recommended lifestyle modifications include: elevated HOB at night, do not eat 4 hours before laying down, small frequent meals, avoid fried and greasy food.

## 2023-02-10 DIAGNOSIS — E55.9 VITAMIN D DEFICIENCY: ICD-10-CM

## 2023-02-10 RX ORDER — CHOLECALCIFEROL (VITAMIN D3) 125 MCG
1 CAPSULE ORAL DAILY
Qty: 30 TABLET | Refills: 0 | OUTPATIENT
Start: 2023-02-10

## 2023-02-13 DIAGNOSIS — E55.9 VITAMIN D DEFICIENCY: ICD-10-CM

## 2023-02-13 DIAGNOSIS — F33.1 MAJOR DEPRESSIVE DISORDER, RECURRENT EPISODE, MODERATE DEGREE: ICD-10-CM

## 2023-02-15 RX ORDER — CHOLECALCIFEROL (VITAMIN D3) 125 MCG
1 CAPSULE ORAL DAILY
Qty: 30 TABLET | Refills: 0 | Status: SHIPPED | OUTPATIENT
Start: 2023-02-15 | End: 2023-03-21

## 2023-02-15 RX ORDER — FLUOXETINE HYDROCHLORIDE 40 MG/1
80 CAPSULE ORAL DAILY
Qty: 60 CAPSULE | Refills: 0 | Status: SHIPPED | OUTPATIENT
Start: 2023-02-15

## 2023-02-15 NOTE — TELEPHONE ENCOUNTER
Patient has not been seen for routine visit since 7/2022. Please schedule follow up with me, fasting.

## 2023-02-23 NOTE — TELEPHONE ENCOUNTER
Tried to call pt, pt did not answer and I was unable to leave a VM. [facility]

## 2023-03-10 ENCOUNTER — OFFICE VISIT (OUTPATIENT)
Dept: FAMILY MEDICINE CLINIC | Facility: CLINIC | Age: 68
End: 2023-03-10
Payer: MEDICARE

## 2023-03-10 VITALS
DIASTOLIC BLOOD PRESSURE: 72 MMHG | BODY MASS INDEX: 29.25 KG/M2 | TEMPERATURE: 97.1 F | HEART RATE: 83 BPM | SYSTOLIC BLOOD PRESSURE: 142 MMHG | OXYGEN SATURATION: 94 % | WEIGHT: 193 LBS | HEIGHT: 68 IN

## 2023-03-10 DIAGNOSIS — M25.511 ACUTE PAIN OF BOTH SHOULDERS: ICD-10-CM

## 2023-03-10 DIAGNOSIS — M79.7 FIBROMYALGIA: ICD-10-CM

## 2023-03-10 DIAGNOSIS — E78.5 HYPERLIPIDEMIA, UNSPECIFIED HYPERLIPIDEMIA TYPE: Primary | ICD-10-CM

## 2023-03-10 DIAGNOSIS — M54.12 CERVICAL RADICULOPATHY: ICD-10-CM

## 2023-03-10 DIAGNOSIS — M48.02 CERVICAL SPINAL STENOSIS: ICD-10-CM

## 2023-03-10 DIAGNOSIS — F33.1 MAJOR DEPRESSIVE DISORDER, RECURRENT EPISODE, MODERATE DEGREE: ICD-10-CM

## 2023-03-10 DIAGNOSIS — K58.1 IRRITABLE BOWEL SYNDROME WITH CONSTIPATION: ICD-10-CM

## 2023-03-10 DIAGNOSIS — R91.1 PULMONARY NODULE: ICD-10-CM

## 2023-03-10 DIAGNOSIS — M47.812 CERVICAL SPONDYLOSIS WITHOUT MYELOPATHY: ICD-10-CM

## 2023-03-10 DIAGNOSIS — M41.22 OTHER IDIOPATHIC SCOLIOSIS, CERVICAL REGION: ICD-10-CM

## 2023-03-10 DIAGNOSIS — M47.14 OSTEOARTHRITIS OF THORACIC SPINE WITH MYELOPATHY: ICD-10-CM

## 2023-03-10 DIAGNOSIS — K44.9 HIATAL HERNIA: ICD-10-CM

## 2023-03-10 DIAGNOSIS — E55.9 VITAMIN D DEFICIENCY: ICD-10-CM

## 2023-03-10 DIAGNOSIS — M25.512 ACUTE PAIN OF BOTH SHOULDERS: ICD-10-CM

## 2023-03-10 DIAGNOSIS — G89.29 CHRONIC BILATERAL LOW BACK PAIN WITHOUT SCIATICA: ICD-10-CM

## 2023-03-10 DIAGNOSIS — K21.00 GASTROESOPHAGEAL REFLUX DISEASE WITH ESOPHAGITIS WITHOUT HEMORRHAGE: ICD-10-CM

## 2023-03-10 DIAGNOSIS — M41.9 SCOLIOSIS OF THORACIC SPINE, UNSPECIFIED SCOLIOSIS TYPE: ICD-10-CM

## 2023-03-10 DIAGNOSIS — M54.6 THORACIC SPINE PAIN: ICD-10-CM

## 2023-03-10 DIAGNOSIS — M50.30 DEGENERATIVE DISC DISEASE, CERVICAL: ICD-10-CM

## 2023-03-10 DIAGNOSIS — J44.9 CHRONIC OBSTRUCTIVE PULMONARY DISEASE, UNSPECIFIED COPD TYPE: ICD-10-CM

## 2023-03-10 DIAGNOSIS — M54.50 CHRONIC BILATERAL LOW BACK PAIN WITHOUT SCIATICA: ICD-10-CM

## 2023-03-10 DIAGNOSIS — M51.36 DEGENERATIVE DISC DISEASE, LUMBAR: ICD-10-CM

## 2023-03-10 DIAGNOSIS — R53.83 OTHER FATIGUE: ICD-10-CM

## 2023-03-10 DIAGNOSIS — M51.34 DEGENERATIVE DISC DISEASE, THORACIC: ICD-10-CM

## 2023-03-10 DIAGNOSIS — M47.896 OTHER OSTEOARTHRITIS OF SPINE, LUMBAR REGION: ICD-10-CM

## 2023-03-10 DIAGNOSIS — D75.839 THROMBOCYTOSIS: ICD-10-CM

## 2023-03-10 LAB
25(OH)D3+25(OH)D2 SERPL-MCNC: 35 NG/ML (ref 30–100)
ALBUMIN SERPL-MCNC: 4.4 G/DL (ref 3.5–5.2)
ALBUMIN/GLOB SERPL: 1.9 G/DL
ALP SERPL-CCNC: 72 U/L (ref 39–117)
ALT SERPL-CCNC: 9 U/L (ref 1–33)
AST SERPL-CCNC: 13 U/L (ref 1–32)
BASOPHILS # BLD AUTO: 0.07 10*3/MM3 (ref 0–0.2)
BASOPHILS NFR BLD AUTO: 1.4 % (ref 0–1.5)
BILIRUB SERPL-MCNC: 0.5 MG/DL (ref 0–1.2)
BUN SERPL-MCNC: 19 MG/DL (ref 8–23)
BUN/CREAT SERPL: 27.9 (ref 7–25)
CALCIUM SERPL-MCNC: 9.7 MG/DL (ref 8.6–10.5)
CHLORIDE SERPL-SCNC: 101 MMOL/L (ref 98–107)
CHOLEST SERPL-MCNC: 176 MG/DL (ref 0–200)
CK SERPL-CCNC: 50 U/L (ref 20–180)
CO2 SERPL-SCNC: 27.2 MMOL/L (ref 22–29)
CREAT SERPL-MCNC: 0.68 MG/DL (ref 0.57–1)
EGFRCR SERPLBLD CKD-EPI 2021: 96.8 ML/MIN/1.73
EOSINOPHIL # BLD AUTO: 0.26 10*3/MM3 (ref 0–0.4)
EOSINOPHIL NFR BLD AUTO: 5.3 % (ref 0.3–6.2)
ERYTHROCYTE [DISTWIDTH] IN BLOOD BY AUTOMATED COUNT: 12.4 % (ref 12.3–15.4)
GLOBULIN SER CALC-MCNC: 2.3 GM/DL
GLUCOSE SERPL-MCNC: 96 MG/DL (ref 65–99)
HCT VFR BLD AUTO: 37 % (ref 34–46.6)
HDLC SERPL-MCNC: 66 MG/DL (ref 40–60)
HGB BLD-MCNC: 12.6 G/DL (ref 12–15.9)
IMM GRANULOCYTES # BLD AUTO: 0.01 10*3/MM3 (ref 0–0.05)
IMM GRANULOCYTES NFR BLD AUTO: 0.2 % (ref 0–0.5)
LDLC SERPL CALC-MCNC: 98 MG/DL (ref 0–100)
LDLC/HDLC SERPL: 1.47 {RATIO}
LYMPHOCYTES # BLD AUTO: 1.43 10*3/MM3 (ref 0.7–3.1)
LYMPHOCYTES NFR BLD AUTO: 29.3 % (ref 19.6–45.3)
MCH RBC QN AUTO: 29.3 PG (ref 26.6–33)
MCHC RBC AUTO-ENTMCNC: 34.1 G/DL (ref 31.5–35.7)
MCV RBC AUTO: 86 FL (ref 79–97)
MONOCYTES # BLD AUTO: 0.55 10*3/MM3 (ref 0.1–0.9)
MONOCYTES NFR BLD AUTO: 11.3 % (ref 5–12)
NEUTROPHILS # BLD AUTO: 2.56 10*3/MM3 (ref 1.7–7)
NEUTROPHILS NFR BLD AUTO: 52.5 % (ref 42.7–76)
NRBC BLD AUTO-RTO: 0 /100 WBC (ref 0–0.2)
PLATELET # BLD AUTO: 472 10*3/MM3 (ref 140–450)
POTASSIUM SERPL-SCNC: 4.1 MMOL/L (ref 3.5–5.2)
PROT SERPL-MCNC: 6.7 G/DL (ref 6–8.5)
RBC # BLD AUTO: 4.3 10*6/MM3 (ref 3.77–5.28)
SODIUM SERPL-SCNC: 137 MMOL/L (ref 136–145)
TRIGL SERPL-MCNC: 64 MG/DL (ref 0–150)
VLDLC SERPL CALC-MCNC: 12 MG/DL (ref 5–40)
WBC # BLD AUTO: 4.88 10*3/MM3 (ref 3.4–10.8)

## 2023-03-10 PROCEDURE — 99214 OFFICE O/P EST MOD 30 MIN: CPT | Performed by: PHYSICIAN ASSISTANT

## 2023-03-10 RX ORDER — ALBUTEROL SULFATE 0.63 MG/3ML
SOLUTION RESPIRATORY (INHALATION)
COMMUNITY
Start: 2023-01-09

## 2023-03-10 RX ORDER — FLUTICASONE FUROATE, UMECLIDINIUM BROMIDE AND VILANTEROL TRIFENATATE 200; 62.5; 25 UG/1; UG/1; UG/1
POWDER RESPIRATORY (INHALATION)
COMMUNITY
Start: 2023-02-11

## 2023-03-10 NOTE — PROGRESS NOTES
Subjective   Jennifer Shelton is a 67 y.o. female who presents today in follow up of hyperlipidemia, vitamin D deficiency, thrombocytosis, moods, COPD, pulmonary nodule, GERD, fibromyalgia, Cervical spine DDD, and history of squamous cell skin cancer.     HPI      Hyperlipidemia- taking Lipitor 20 mg nightly  · Last medication 2016- Lipitor- did well on medication. She has not been on other medications in the past.   · Stopped all medication years ago. Last year was full of specialists and let go of routine things.   Vitamin D deficiency-taking vitamin D 2000 IU daily.   Thrombocytosis- no signs of clotting or any concerns.     Moods- Prozac 40 mg twice daily changed to Prozac 80 mg daily. Did not change anything. Does not think she needs to change medication. Did not seem to click as much.    Moods have not improved. She has had continued depression. Thinks it may be time to see specialist.   · Prozac in the past- she was up to 80 mg once daily. She had depression since childhood and started treatment in 30s. Increased depression following her son's death. She stopped all medications following his death.   · Son's suicide 3/11/2021- she has not been to the doctor for CT chest, scans, other testing and treatment. She has not been doing anything since her son's death.  · She noticed improvement in moods with restarting Prozac 20 mg once daily but not as good as it was when she initially restarted. Decreased motivation, not wanting to get up and do anything. She has had mind racing and depressed feeling.   · On Prozac 40 mg daily, she had no motivation. When she was out, she was ok but came back home and did not want to do anything.   · Previous medications- Cymbalta- helped but Prozac helped more. Last Cymbalta 2016.  Taking benadryl to sleep but not helping much. Elavil- helped with depression but made her very tired. She also would forget things when she was taking it and talking to people.     Fatigue- Sleep  medicine ordered home sleep study. Picking up next week.   She is tired all the time- reports she will sleep a few hours at night wake up and go back to sleep. She is always tired and does not feel rested. Reports she was to have sleep study right after her son's death but cancelled. Has not followed up with pulmonology in some time.   COPD, asthma, eosinophilia- stopped Qvar and was taking Stiolto. They changed to Trelegy 200 daily. She has noted significant improvement. She reports they advised more like asthma with elevated eosinophils.    Still using inhalers. Only taking nebulizer as needed- was to take twice daily but she has taken only as needed. Taking her daily inhaler and Singulair. Medications- Qvar, Stiolto, Monteleukast, Flonase, Mucinex, and has duoneb  As needed.   Pulmonary nodule-CT chest 11/8/2021- Bilateral apical pleural thickening, mild emphysema, calcified pulmonary nodules right lower lobe, other subpleural nodules and areas of scarring with nodularity-all reported without change.  Small hiatal hernia.  Per radiology, no suspicious pulmonary nodules.  Recheck in 1 year. Repeat 12/2022- stable. No need to continue to follow but per pulmonology, may lori annual screening.   · Nodule on right lung- they were scanning and following with Dr Kelley.   · Pulmonology- [facility] Taylor- missed appt for follow up of pulmonary nodule. Hospitalized 2/2/21 and 10/2021- she had been coughing for 2 months then went to doctor and was given steroid. She also had poison ivy and that helped too. Seen by someone in [place]- urgent care.     GERD, hiatal hernia, IBS- Dr Oneal Raymond, [facility] Sharma-  Taking Protonix 40 mg once daily and Pepcid 20 mg 2-3 x daily, and has carafate- did not help and has not been taking. Negative gastric emptying study. Will have colonoscopy 4/2023. They will consider Nissen.   She is still having heartburn symptoms- sometimes wakes up with it and sometimes has after  taking the medication in the day. She is also having increased IBS symptoms. Patient with frequent, urgent BM. Has never taken anything for it but she has continued with symptoms. She would like to consider medication.   · She had pain in her chest- if she does not take Pepcid, she has pain in her chest and cannot eat. When she takes the medication, she does ok. She passes blood in stool that is bright red at times. Seeing GI.   · EGD 2/2022 with large hiatal hernia, esophagitis.  Fibromyalgia- has been on no medication in a while.   · Previously Cymbalta and Elavil.   DDD cervical spine- always hurting- having headaches. Thinks from neck pain. 1/2021 for cervicalgia, cervical radiculopathy, herniated C4-5, C5-6, left shoulder pain. Ordered MRI left shoulder. If no abnormality, recommended C4-5, C5-6 ACDF  · Went to orthopedist for rotator cuff as well 3/2021- Xray, MRI 2/2021 with moderate supraspinatus tendonopathy, partial thickness fraying, intra-articular long head biceps tendinopathy, small glenohummeral effusion, mild to moderate chondromalacia, synovial thickening, or intr-articular debris vs labral tissue in axillary recess- suggested capsulitis. DJD AC joint with chronic deformity of clavicle. Exercises for tendonitis given and advised to return for consideration of injection if no improvement    Squamous cell skin cancer- patient had SCC 1/2021.    Mother and sister with pancreatic cancer. Mother with history of colon and breast cancer. MAunt and MGM with colon cancer.     No Hx abnormal PAP- last PAP 3 years ago.     Patient's Specialists:  Behavioral health-[facility] Farias- last appointment 8/2022 for major depressive disorder with prolonged grief from death of son 3/2022 for suicide.  Advised change Prozac from 40 mg twice a day to 80 mg once daily in the morning.  If increased irritability, will change to 60 mg.  Advised to check to see if Trintellix or Viibryd would be approved.  Follow-up in 4  weeks.  Patient canceled follow-up.  She has not rescheduled.  Cardiology-Dr. David Oliveira-last appointment 12/2022 for chest pain, exertional dyspnea, diastolic dysfunction, coronary artery calcification on CT, hyperlipidemia.  Advised Lexiscan nuclear stress test, change in intensity to Crestor 20 mg nightly and advised aspirin 81 mg.  Mild diastolic dysfunction on echo.  Advised low-salt diet and monitor weight.  Advised follow-up 6 to 9 months.  · Stress test normal.    · Prior Dr Lb Boyer- last appt 4/2020 for preop exam, CP, SOA, abnormal EKG. Advised smoking cessation, follow up with pulmonology, take Lipitor, hold off on colonoscopy pending cardiac workup- stress test, echo, and holter monitor. Advised AAA screening. Negative cardiac testing. Advised follow up 1 year. She did not return for follow up.   GI- Dr Raymond, Brianna Martinez, [facility], [facility] Up- last appt 10/2022 for GERD, hiatal hernia, IBS with constipation.  Advise gastric emptying study and hyoscyamine.  Schedule colonoscopy 10/2022.  Consider referral for Nissen.  Constipation not adequately relieved with Linzess 72 mcg and did not wish for trial of a stronger dose due to cost.  Advised MiraLAX and stool softeners as needed.  Sent prescription for Levsin for abdominal cramping.  Advised [facility] genetic testing.  Endoscopy scheduled for 2023  · Normal gastric emptying study.  · 10/2021-for blood in stool, IBS, and GERD. Advised Miralax, Protonix 40 mg once daily, and EGD, Colonoscopy. Endoscopy 2/2022 with large hitala hernia. No colonoscopy performed. Benign pathology.   General surgery- [facility] Arias- last appt 3/2020 for hematuria. CT abd/pelvis, cystoscopy, and UA.   Neurosurgery- Dr Willi Ghotra- last appt 1/2021 for cervicalgia, cervical radiculopathy, herniated C4-5, C5-6, left shoulder pain. Ordered MRI left shoulder. If no abnormality, recommended C4-5, C5-6 ACDF.  Appointment 3/30/2023  Orthopedic surgery- Dr Jet Salinas-  last appt 3/2021 for left shoulder pain. Xray, MRI 2/2021 with moderate supraspinatus tendonopathy, partial thickness fraying, intra-articular long head biceps tendinopathy, small glenohummeral effusion, mild to moderate chondromalacia, synovial thickening, or intr-articular debris vs labral tissue in axillary recess- suggested capsulitis. DJD AC joint with chronic deformity of clavicle. Exercises for tendonitis given and advised to return for consideration of injection if no improvement.   Pulmonology-, Dr. Sajan Renteria, [facility]- last appt 1/2023 for COPD with emphysema, pulmonary nodules, history of tobacco abuse, URIEL.  Changed Stiolto to Trelegy 200.   CT stable over 2 years.-no need for follow-up testing.  May benefit for yearly screening and will address at next visit.  Need to evaluate for URIEL.  Follow-up 2 months with full PFT.   · 2/2021 in follow up of hospitalization for acute hypoxic respiratory failure requiring noninvasince positive pressure ventilation and COPD exacerbation. Hypoxia to 86% on 4 L O2. Transitioned to BIPAP with improvement. She did not need home oxygen. Treated with steroid, nebulizer. Advised Stiolto, Qvar, and Singulair. Ordered sleep study, started flutter valve, DuoNeb followed by sodium chloride nebulizer then flutter valve twice daily. Low dose CT 6/2021. Had CT abd/pelvis with pulm nodule. Continued Stiolto and PA Singulair. Changed Qvar to Flovent due to cost. Follow up in 2-3 months.   Urology- Dr Chelsea Lundy- last appt 3/2020 for cystoscopy for hematuria. Negative and negative CT abd/pelvis. Follow up in 1 year.     The following portions of the patient's history were reviewed and updated as appropriate: allergies, current medications, past family history, past medical history, past social history, past surgical history and problem list.    Review of Systems   Constitutional: Positive for fatigue. Negative for fever.   Respiratory: Positive for shortness of breath.     Cardiovascular: Positive for chest pain.   Gastrointestinal: Positive for abdominal pain.        GERD, heartburn   Musculoskeletal: Positive for arthralgias, back pain, myalgias and neck stiffness.   Neurological: Positive for weakness and numbness.   Psychiatric/Behavioral: Positive for agitation, decreased concentration, dysphoric mood and sleep disturbance. The patient is nervous/anxious.        Objective   Vitals:    03/10/23 0813   BP: 142/72   Pulse: 83   Temp: 97.1 °F (36.2 °C)   SpO2: 94%     Body mass index is 29.35 kg/m².    Physical Exam  Constitutional:       General: She is not in acute distress.     Appearance: She is well-developed.   HENT:      Head: Normocephalic and atraumatic.   Eyes:      General:         Right eye: No discharge.         Left eye: No discharge.   Pulmonary:      Effort: Pulmonary effort is normal. No respiratory distress.   Skin:     General: Skin is dry.   Neurological:      Mental Status: She is alert.   Psychiatric:         Attention and Perception: She is attentive.         Mood and Affect: Mood is depressed.         Speech: Speech normal.         Behavior: Behavior normal.         Assessment & Plan   Diagnoses and all orders for this visit:    1. Hyperlipidemia, unspecified hyperlipidemia type (Primary)  -     Comprehensive Metabolic Panel  -     CK  -     Lipid Panel With LDL / HDL Ratio    2. Vitamin D deficiency  -     Comprehensive Metabolic Panel  -     Vitamin D,25-Hydroxy    3. Thrombocytosis  -     CBC & Differential    4. Major depressive disorder, recurrent episode, moderate degree (HCC)    5. Other fatigue    6. Chronic obstructive pulmonary disease, unspecified COPD type (HCC)    7. Pulmonary nodule    8. Gastroesophageal reflux disease with esophagitis without hemorrhage    9. Hiatal hernia    10. Irritable bowel syndrome with constipation    11. Fibromyalgia    12. Cervical spondylosis without myelopathy    13. Cervical radiculopathy    14. Other idiopathic  scoliosis, cervical region    15. Degenerative disc disease, cervical    16. Cervical spinal stenosis    17. Acute pain of both shoulders    18. Thoracic spine pain    19. Scoliosis of thoracic spine, unspecified scoliosis type    20. Degenerative disc disease, thoracic    21. Osteoarthritis of thoracic spine with myelopathy    22. Chronic bilateral low back pain without sciatica    23. Degenerative disc disease, lumbar    24. Other osteoarthritis of spine, lumbar region        Assessment and Plan  Patient will have fasting labs. Call if no results in 1 week. Stability of conditions, plan, follow up, and further recommendations pending labs. Follow up in 3 months.     · Hyperlipidemia- Lipids improved but LDL remained uncontrolled. Continue Lipitor 20 mg once daily and continue to work on diet and exercise. Recheck labs at follow up.   · Vitamin D deficiency- Improved vitamin D. Continue Vitamin D 2000 IU daily. Dosing recommendations pending labs.   · Osteopenia- Patient to have DEXA scan as referred.   · Thrombocytosis- I will continue to monitor and make further recommendations.   · Moods- She has had longstanding depression that has worsened with her son's death. She previously did well on Prozac. I restarted Prozac 20 mg once daily with some improvement but continued with significant depressive symptoms. Increased Prozac to 40 mg once daily then twice daily with improvement but persistent symptoms. She has complicated mood symptoms with grief reaction. She is now willing to see psychiatry. I will refer today. She should be seen ASAP if worsening moods or 9-1-1 to ER if she develops SI/HI. No SI/HI today. She should also ensure regular counseling.   · COPD- Continue Qvar or Flovent, Stiolto, Monteleukast, Flonase, Mucinex, and DuoNeb as needed. Ensure she follows up with pulmonology as directed by them.    · Pulmonary nodule- CT chest 11/2021 with bilateral apical pleural thickening, mild emphysema, calcified  pulmonary nodules right lower lobe, other subpleural nodules and areas of scarring with nodularity-all reported without change.  Small hiatal hernia.  Per radiology, no suspicious pulmonary nodules.  Recheck in 1 year.  · GERD, hiatal hernia- Last EGD 2/2022 with large hiatal hernia and reflux esophagitis with benign pathology. Continue Protonix 40 mg once daily and Pepcid 20 mg up to twice daily. She need follow up with GI with consideration of colonoscopy, as this was not performed with EGD. She should make appt with uncontrolled GERD symptoms.   · Fibromyalgia- Patient was previously on Cymbalta and Elavil for pain. However, moods were better controlled on Prozac. We will re-evaluated at follow up. We could consider Elavil in addition to Prozac if needed or change back to Cymbalta if moods or pain is not well controlled pending evaluation and treatment per psychiatry.    · DDD cervical spine- Follow up with neurosurgery if persistent pain.   · Squamous cell skin cancer- Patient to ensure she follows with dermatology regularly.     She does have family history of breast and colon cancer as well as pancreatic cancer. She needs to ensure she stays up to date on colonoscopy and mammogram. She has been referred for mammogram and DEXA scan and needs to schedule. Consideration of genetics consultation to determine most appropriate screening recommendations. I will discuss this further at follow up.     I spent 30 minutes for video visit for Jennifer Shelton on this date of service. This time includes time spent by me in the following activities as necessary: preparing for the visit, reviewing tests, specialists records and previous visits, obtaining and/or reviewing a separately obtained history, counseling and educating the patient, referring and/or communicating with other healthcare professionals, documenting information in the medical record, independently interpreting results and communicating that information with  the patient, family, caregiver, and developing a medically appropriate treatment plan with consideration of other conditions, medications, and treatments.

## 2023-03-20 DIAGNOSIS — E55.9 VITAMIN D DEFICIENCY: ICD-10-CM

## 2023-03-21 ENCOUNTER — TELEPHONE (OUTPATIENT)
Dept: GASTROENTEROLOGY | Facility: CLINIC | Age: 68
End: 2023-03-21
Payer: MEDICARE

## 2023-03-21 RX ORDER — CHOLECALCIFEROL (VITAMIN D3) 125 MCG
CAPSULE ORAL
Qty: 90 TABLET | Refills: 1 | Status: SHIPPED | OUTPATIENT
Start: 2023-03-21

## 2023-03-21 NOTE — TELEPHONE ENCOUNTER
Ms Shelton is scheduled on 04.04.23 for a colonoscopy.   Need a updated cardiac clearance please.    el

## 2023-03-21 NOTE — TELEPHONE ENCOUNTER
I left  reminding Ms Shelton of her scheduled scope on 04.04.23.  Reminded of liquid diet the day prior.  Reminded of bowel prep and instructions.  Stated hospital nurse will call to go over Rx's and give arrival time.  Will send [employer] message. el

## 2023-03-22 NOTE — TELEPHONE ENCOUNTER
Ms. Shelton may proceed with upcoming surgical procedure at moderate risk for perioperative cardiac events.

## 2023-04-03 NOTE — PRE-PROCEDURE INSTRUCTIONS
Arrival-time of 0730.    Must have a  for transportation home post-procedure.    Education provided on laxative administration; bowel prep to be taken in two doses.  Reviewed diet instructions for day prior to procedure.  Only plain, unflavored water after midnight until two hours prior to arrival-time.    Bring all home medications and inhalers to the hospital on the morning of the procedure.  Do not take any morning medications on the day of the procedure.    Instructed to take any nebulizer treatments prior to coming on the morning of the procedure.    Resume medications as prescribed post-procedure.     Patient verbalized understanding of instructions.    Tessy Price RN

## 2023-04-04 ENCOUNTER — ANESTHESIA EVENT (OUTPATIENT)
Dept: GASTROENTEROLOGY | Facility: HOSPITAL | Age: 68
End: 2023-04-04
Payer: MEDICARE

## 2023-04-04 ENCOUNTER — ANESTHESIA (OUTPATIENT)
Dept: GASTROENTEROLOGY | Facility: HOSPITAL | Age: 68
End: 2023-04-04
Payer: MEDICARE

## 2023-04-04 ENCOUNTER — HOSPITAL ENCOUNTER (OUTPATIENT)
Facility: HOSPITAL | Age: 68
Setting detail: HOSPITAL OUTPATIENT SURGERY
Discharge: HOME | End: 2023-04-04
Attending: INTERNAL MEDICINE | Admitting: INTERNAL MEDICINE
Payer: MEDICARE

## 2023-04-04 VITALS
WEIGHT: 189.15 LBS | SYSTOLIC BLOOD PRESSURE: 121 MMHG | BODY MASS INDEX: 28.77 KG/M2 | OXYGEN SATURATION: 97 % | HEART RATE: 77 BPM | RESPIRATION RATE: 16 BRPM | DIASTOLIC BLOOD PRESSURE: 71 MMHG | TEMPERATURE: 97.4 F

## 2023-04-04 DIAGNOSIS — K58.1 IRRITABLE BOWEL SYNDROME WITH CONSTIPATION: ICD-10-CM

## 2023-04-04 DIAGNOSIS — Z80.0 FAMILY HISTORY OF COLON CANCER: ICD-10-CM

## 2023-04-04 DIAGNOSIS — K92.1 HEMATOCHEZIA: ICD-10-CM

## 2023-04-04 DIAGNOSIS — K21.00 GASTROESOPHAGEAL REFLUX DISEASE WITH ESOPHAGITIS WITHOUT HEMORRHAGE: Primary | ICD-10-CM

## 2023-04-04 DIAGNOSIS — K44.9 HIATAL HERNIA: ICD-10-CM

## 2023-04-04 PROCEDURE — 45385 COLONOSCOPY W/LESION REMOVAL: CPT | Performed by: INTERNAL MEDICINE

## 2023-04-04 PROCEDURE — 25010000002 PROPOFOL 10 MG/ML EMULSION: Performed by: NURSE ANESTHETIST, CERTIFIED REGISTERED

## 2023-04-04 PROCEDURE — 88305 TISSUE EXAM BY PATHOLOGIST: CPT | Performed by: INTERNAL MEDICINE

## 2023-04-04 PROCEDURE — 88302 TISSUE EXAM BY PATHOLOGIST: CPT | Performed by: INTERNAL MEDICINE

## 2023-04-04 PROCEDURE — 45390 COLONOSCOPY W/RESECTION: CPT | Performed by: INTERNAL MEDICINE

## 2023-04-04 DEVICE — DEV CLIP HEMO RESOLUTION360/ULTR 235CM 17MM STRL: Type: IMPLANTABLE DEVICE | Site: ASCENDING COLON | Status: FUNCTIONAL

## 2023-04-04 RX ORDER — LIDOCAINE HYDROCHLORIDE 20 MG/ML
INJECTION, SOLUTION EPIDURAL; INFILTRATION; INTRACAUDAL; PERINEURAL AS NEEDED
Status: DISCONTINUED | OUTPATIENT
Start: 2023-04-04 | End: 2023-04-04 | Stop reason: SURG

## 2023-04-04 RX ORDER — PROPOFOL 10 MG/ML
VIAL (ML) INTRAVENOUS AS NEEDED
Status: DISCONTINUED | OUTPATIENT
Start: 2023-04-04 | End: 2023-04-04 | Stop reason: SURG

## 2023-04-04 RX ORDER — SODIUM CHLORIDE, SODIUM LACTATE, POTASSIUM CHLORIDE, CALCIUM CHLORIDE 600; 310; 30; 20 MG/100ML; MG/100ML; MG/100ML; MG/100ML
30 INJECTION, SOLUTION INTRAVENOUS CONTINUOUS
Status: DISCONTINUED | OUTPATIENT
Start: 2023-04-04 | End: 2023-04-04 | Stop reason: HOSPADM

## 2023-04-04 RX ADMIN — PROPOFOL 100 MG: 10 INJECTION, EMULSION INTRAVENOUS at 09:27

## 2023-04-04 RX ADMIN — SODIUM CHLORIDE, POTASSIUM CHLORIDE, SODIUM LACTATE AND CALCIUM CHLORIDE: 600; 310; 30; 20 INJECTION, SOLUTION INTRAVENOUS at 09:25

## 2023-04-04 RX ADMIN — PROPOFOL 150 MCG/KG/MIN: 10 INJECTION, EMULSION INTRAVENOUS at 09:27

## 2023-04-04 RX ADMIN — LIDOCAINE HYDROCHLORIDE 50 MG: 20 INJECTION, SOLUTION EPIDURAL; INFILTRATION; INTRACAUDAL; PERINEURAL at 09:27

## 2023-04-04 NOTE — H&P
ScreeningPre Procedure History & Physical    Chief Complaint:   Screening     Subjective     HPI:   Screening     Past Medical History:   Past Medical History:   Diagnosis Date   • Anesthesia complication     heart rate slowed   • Anxiety    • Arthritis    • Cancer     skin cancer   • Cervical spinal stenosis 2016   • Cervical spondylosis without myelopathy    • Cervicalgia 2015   • Chronic pain disorder    • Colon polyp    • COPD (chronic obstructive pulmonary disease)    • Depression    • Fibromyalgia    • Gastroesophageal reflux    • GERD (gastroesophageal reflux disease)    • Hematuria 2020   • Hernia ?    Hiatal hernia   • Herniated disc, cervical 2015    C4-5, C5-6, C6-7   • High cholesterol    • Hyperlipemia    • Irritable bowel syndrome    • Lung nodule 2020   • Rectal bleeding    • Seasonal allergies    • Seizures    • Shortness of breath    • Sleep apnea        Past Surgical History:  Past Surgical History:   Procedure Laterality Date   • ABDOMINAL SURGERY  ,     C section   •  SECTION     • COLONOSCOPY     • CYSTOSCOPY  2020    Cystoscopy w/ Dr. Lundy   • ENDOSCOPY N/A 2022    Procedure: ESOPHAGOGASTRODUODENOSCOPY WITH BIOPSIES;  Surgeon: Oneal Raymond MD;  [facility] ENDOSCOPY;  Service: Gastroenterology;  Laterality: N/A;  HIATAL HERNIA, ESOPHAGITIS   • PILONIDAL CYSTECTOMY     • SQUAMOUS CELL CARCINOMA EXCISION      chest   • TUBAL ABDOMINAL LIGATION     • UPPER GASTROINTESTINAL ENDOSCOPY  2021       Family History:  Family History   Problem Relation Age of Onset   • Colon cancer Mother 60        Pancreatic, breast   • Cancer Mother    • Diabetes Mother    • Breast cancer Mother         60s   • Arthritis Mother    • Pancreatic cancer Mother    • Irritable bowel syndrome Mother    • Heart disease Father    • Heart attack Father    • Arthritis Father    • Ulcerative colitis Father    • Depression Sister    •  Anxiety disorder Sister    • Cancer Sister    • Pancreatic cancer Sister    • Irritable bowel syndrome Sister    • Bipolar disorder Maternal Aunt    • Colon cancer Maternal Grandmother 70   • Seizures Son    • Depression Son    • Suicide Attempts Son    • Depression Son    • ADD / ADHD Son    • Colon cancer Other         60s   • Depression Daughter    • Malig Hyperthermia Neg Hx        Social History:   reports that she quit smoking about 2 years ago. Her smoking use included cigarettes. She started smoking about 42 years ago. She has a 60.00 pack-year smoking history. She has never used smokeless tobacco. She reports that she does not drink alcohol and does not use drugs.    Medications:   Medications Prior to Admission   Medication Sig Dispense Refill Last Dose   • Cholecalciferol (Vitamin D3) 50 MCG (2000 UT) tablet TAKE 1 TABLET BY MOUTH DAILY. NEEDS TO BE SEEN FOR MORE REFILLS. 90 tablet 1 4/3/2023   • famotidine (Pepcid) 20 MG tablet Take 1 tablet by mouth 2 (Two) Times a Day. 60 tablet 0 4/3/2023   • FLUoxetine (PROzac) 40 MG capsule Take 2 capsules by mouth Daily. Patient due for follow up Nov 1 for future refills 60 capsule 0 4/3/2023   • fluticasone (FLONASE) 50 MCG/ACT nasal spray 2 sprays by Each Nare route Daily. 16 g 2 4/4/2023   • guaiFENesin (MUCINEX) 600 MG 12 hr tablet Take 2 tablets by mouth 2 (Two) Times a Day.   4/3/2023   • hyoscyamine (ANASPAZ,LEVSIN) 0.125 MG tablet Take 1 tablet by mouth 4 (Four) Times a Day With Meals & at Bedtime. 360 tablet 3 4/3/2023   • loratadine (Claritin) 10 MG tablet Take 1 tablet by mouth Daily. 30 tablet 2 4/3/2023   • montelukast (SINGULAIR) 10 MG tablet Take 1 tablet by mouth every night at bedtime. 30 tablet 5 4/3/2023   • pantoprazole (PROTONIX) 40 MG EC tablet Take 1 tablet by mouth Daily. 90 tablet 1 4/4/2023   • Trelegy Ellipta 200-62.5-25 MCG/ACT aerosol powder     4/3/2023   • albuterol (ACCUNEB) 0.63 MG/3ML nebulizer solution    Unknown   • albuterol  sulfate  (90 Base) MCG/ACT inhaler Inhale 2 puffs Every 4 (Four) Hours As Needed for Wheezing. 90 g 0 Unknown   • diphenhydrAMINE (BENADRYL) 25 mg capsule Take 2 capsules by mouth At Night As Needed.   4/2/2023   • ipratropium-albuterol (DUO-NEB) 0.5-2.5 mg/3 ml nebulizer Take 3 mL by nebulization 4 (Four) Times a Day. 360 mL 1 Unknown   • O2 (OXYGEN) Inhale 1 (One) Time. Use 2 LPM PRN if O2 if below 90   4/2/2023   • rosuvastatin (CRESTOR) 20 MG tablet Take 1 tablet by mouth Daily. 90 tablet 3 4/2/2023   • sodium chloride 3 % nebulizer solution inhale contents of 1 vial via nebulizer TWICE DAILY          Allergies:  Aspirin, Cephalexin, Nsaids, Penicillins, and Contrast dye (echo or unknown ct/mr)        Objective     Blood pressure 112/75, pulse 87, temperature 97.7 °F (36.5 °C), temperature source Temporal, resp. rate 16, weight 85.8 kg (189 lb 2.5 oz), SpO2 96 %, not currently breastfeeding.    Physical Exam   Constitutional: Pt is oriented to person, place, and time and well-developed, well-nourished, and in no distress.   Mouth/Throat: Oropharynx is clear and moist.   Neck: Normal range of motion.   Cardiovascular: Normal rate, regular rhythm and normal heart sounds.    Pulmonary/Chest: Effort normal and breath sounds normal.   Abdominal: Soft. Nontender  Skin: Skin is warm and dry.   Psychiatric: Mood, memory, affect and judgment normal.     Assessment & Plan     Diagnosis:  Screening colonoscopy  H/o colon polyps   Family h/o colon cancer    Anticipated Surgical Procedure:  colonoscopy    The risks, benefits, and alternatives of this procedure have been discussed with the patient or the responsible party- the patient understands and agrees to proceed.

## 2023-04-04 NOTE — ANESTHESIA PREPROCEDURE EVALUATION
Anesthesia Evaluation     Patient summary reviewed and Nursing notes reviewed   no history of anesthetic complications:  NPO Solid Status: > 8 hours  NPO Liquid Status: > 2 hours           Airway   Mallampati: III  TM distance: <3 FB  Neck ROM: full  No difficulty expected  Dental      Pulmonary - normal exam    breath sounds clear to auscultation  (+) a smoker Former, COPD, shortness of breath, sleep apnea,   Cardiovascular - normal exam  Exercise tolerance: good (4-7 METS)    Rhythm: regular  Rate: normal    (+) hyperlipidemia,       Neuro/Psych  (+) seizures, numbness, psychiatric history Anxiety and Depression,    GI/Hepatic/Renal/Endo    (+)  hiatal hernia, GERD, GI bleeding lower ,     Musculoskeletal     (+) neck pain,   Abdominal    Substance History - negative use     OB/GYN negative ob/gyn ROS         Other   arthritis,    history of cancer    ROS/Med Hx Other: PAT Nursing Notes unavailable.                   Anesthesia Plan    ASA 3     general and MAC     (Patient understands anesthesia not responsible for dental damage.)  intravenous induction     Anesthetic plan, risks, benefits, and alternatives have been provided, discussed and informed consent has been obtained with: patient.    Use of blood products discussed with patient .   Plan discussed with CRNA.        CODE STATUS:

## 2023-04-04 NOTE — ANESTHESIA POSTPROCEDURE EVALUATION
Patient: Jennifer Shelton    Procedure Summary     Date: 04/04/23 Room / Location: [facility] [place] END[facility]COPY 2 / [facility] [place] [facility]    Anesthesia Start: 0924 Anesthesia Stop: 1002    Procedure: COLONOSCOPY WITH ELEVIEW INJECTION, HOT SNARE, POLYPECTOMIES, CLIP APPLICATION X3 Diagnosis:       Family history of colon cancer      Hematochezia      Irritable bowel syndrome with constipation      (Family history of colon cancer [Z80.0])      (Hematochezia [K92.1])      (Irritable bowel syndrome with constipation [K58.1])    Surgeons: Oneal Raymond MD Provider: Christine Dubose MD    Anesthesia Type: general, MAC ASA Status: 3          Anesthesia Type: general, MAC    Vitals  Vitals Value Taken Time   /79 04/04/23 1012   Temp 36.8 °C (98.2 °F) 04/04/23 1001   Pulse 73 04/04/23 1014   Resp 16 04/04/23 1011   SpO2 97 % 04/04/23 1014   Vitals shown include unvalidated device data.        Post Anesthesia Care and Evaluation    Patient location during evaluation: bedside  Patient participation: complete - patient participated  Level of consciousness: awake  Pain management: adequate    Airway patency: patent  PONV Status: none  Cardiovascular status: acceptable and stable  Respiratory status: acceptable  Hydration status: acceptable    Comments: An Anesthesiologist personally participated in the most demanding procedures (including induction and emergence if applicable) in the anesthesia plan, monitored the course of anesthesia administration at frequent intervals and remained physically present and available for immediate diagnosis and treatment of emergencies.

## 2023-05-05 DIAGNOSIS — U07.1 COVID-19 VIRUS INFECTION: ICD-10-CM

## 2023-05-08 RX ORDER — LORATADINE 10 MG/1
10 TABLET ORAL DAILY
Qty: 90 TABLET | Refills: 1 | Status: SHIPPED | OUTPATIENT
Start: 2023-05-08

## 2023-08-07 NOTE — PROGRESS NOTES
Chief Complaint  Colonoscopy follow up     Jennifer Shelton is a 68 y.o. female who presents to [facility] [place] follow [place]     History of present Illness  Patient presents to the office for colonoscopy follow up.  Patient has history of persistent heartburn, large hiatal hernia, and constipation. Patient reported persistent heartburn despite Protonix, Pepcid, and Carafate therefore referral ordered for hiatal hernia repair, patient did not proceed. She continue Protonix 40mg 1 times/day and Pepcid 2-3 times a day depending on severity of heartburn. With this regimen she has breakthrough about 2-3 times a week. Denies nausea, vomiting, epigastric pain, and dysphagia. Previously trialed and failed Linzess. She has a bowel movement every 2-3 days while taking Miralax daily. Levsin 4 times a day controls abdominal cramping. Denies melena and hematochezia. [facility] genetic testing previously ordered due to family history of pancreatic, colon, and breast cancer. Patient did not complete but is wanting to proceed at his time.      Family history of colon cancer (mother 60s, maternal grandmother 70s, and maternal aunt 60s), pancreatic cancer (mother 81 and sister 54), and breast cancer (mother 60s0.     Colonoscopy 04/04/2023 by Dr. Raymond - Small polyp in the cecum, 12 mm polyp in the ascending colon and small polyp in the descending colon.  Polyps - sessile serrated and tubular adenoma. Repeat 3 years.     Gastric emptying study 11/28/22 - normal.     EGD 02/14/2022 by Dr. Raymond - large hiatal hernia, grade A esophagitis, normal stomach, and normal duodenum.  Biopsies consistent with reflux esophagitis, negative for H. pylori, intestinal metaplasia, dysplasia, and malignancy.      Past Medical History:   Diagnosis Date    Anesthesia complication     heart rate slowed    Anxiety     Arthritis     Cancer     skin cancer    Cervical spinal stenosis 09/22/2016    Cervical  spondylosis without myelopathy     Cervicalgia 2015    Chronic pain disorder     Colon polyp     COPD (chronic obstructive pulmonary disease)     Depression     Fibromyalgia     Gastroesophageal reflux     GERD (gastroesophageal reflux disease)     Hematuria 2020    Hernia 2012?    Hiatal hernia    Herniated disc, cervical 2015    C4-5, C5-6, C6-7    High cholesterol     Hyperlipemia     Irritable bowel syndrome 1986    Lung nodule 2020    Rectal bleeding     Seasonal allergies     Seizures     Shortness of breath     Sleep apnea        Past Surgical History:   Procedure Laterality Date    ABDOMINAL SURGERY  ,     C section     SECTION      COLONOSCOPY      COLONOSCOPY N/A 2023    Procedure: COLONOSCOPY WITH ELEVIEW INJECTION, HOT SNARE, POLYPECTOMIES, CLIP APPLICATION X3;  Surgeon: Oneal Raymond MD;  [facility];  Service: Gastroenterology;  Laterality: N/A;  COLON POLYPS    CYSTOSCOPY  2020    Cystoscopy w/ Dr. Lundy    ENDOSCOPY N/A 2022    Procedure: ESOPHAGOGASTRODUODENOSCOPY WITH BIOPSIES;  Surgeon: Oneal Raymond MD;  Location: [facility] END[facility];  Service: Gastroenterology;  Laterality: N/A;  HIATAL HERNIA, ESOPHAGITIS    PILONIDAL CYSTECTOMY      SQUAMOUS CELL CARCINOMA EXCISION      chest    TUBAL ABDOMINAL LIGATION      UPPER GASTROINTESTINAL ENDOSCOPY  2021         Current Outpatient Medications:     albuterol (ACCUNEB) 0.63 MG/3ML nebulizer solution, , Disp: , Rfl:     albuterol sulfate  (90 Base) MCG/ACT inhaler, Inhale 2 puffs Every 4 (Four) Hours As Needed for Wheezing., Disp: 90 g, Rfl: 0    Cholecalciferol (Vitamin D3) 50 MCG (2000) tablet, TAKE 1 TABLET BY MOUTH DAILY. NEEDS TO BE SEEN FOR MORE REFILLS., Disp: 90 tablet, Rfl: 1    diphenhydrAMINE (BENADRYL) 25 mg capsule, Take 2 capsules by mouth At Night As Needed., Disp: , Rfl:     famotidine (PEPCID) 20 MG tablet, TAKE 1 TABLET BY  MOUTH 2 (TWO) TIMES A DAY AS NEEDED FOR HEARTBURN., Disp: 180 tablet, Rfl: 1    FLUoxetine (PROzac) 40 MG capsule, Take 2 capsules by mouth Daily. Patient due for follow up Nov 1 for future refills, Disp: 60 capsule, Rfl: 0    fluticasone (FLONASE) 50 MCG/ACT nasal spray, 2 sprays by Each Nare route Daily., Disp: 16 g, Rfl: 2    guaiFENesin (MUCINEX) 600 MG 12 hr tablet, Take 2 tablets by mouth 2 (Two) Times a Day., Disp: , Rfl:     hyoscyamine (ANASPAZ,LEVSIN) 0.125 MG tablet, Take 1 tablet by mouth 4 (Four) Times a Day With Meals & at Bedtime., Disp: 360 tablet, Rfl: 3    ipratropium-albuterol (DUO-NEB) 0.5-2.5 mg/3 ml nebulizer, Take 3 mL by nebulization 4 (Four) Times a Day., Disp: 360 mL, Rfl: 1    loratadine (CLARITIN) 10 MG tablet, TAKE 1 TABLET BY MOUTH DAILY., Disp: 90 tablet, Rfl: 1    montelukast (SINGULAIR) 10 MG tablet, TAKE 1 TABLET BY MOUTH EVERY NIGHT AT BEDTIME., Disp: 90 tablet, Rfl: 1    O2 (OXYGEN), Inhale 1 (One) Time. Use 2 LPM PRN if O2 if below 90, Disp: , Rfl:     pantoprazole (PROTONIX) 40 MG EC tablet, Take 1 tablet by mouth Daily., Disp: 90 tablet, Rfl: 1    rosuvastatin (CRESTOR) 20 MG tablet, Take 1 tablet by mouth Daily., Disp: 90 tablet, Rfl: 3    sodium chloride 3 % nebulizer solution, inhale contents of 1 vial via nebulizer TWICE DAILY, Disp: , Rfl:     Trelegy Ellipta 200-62.5-25 MCG/ACT aerosol powder , , Disp: , Rfl:      Allergies   Allergen Reactions    Aspirin Hives and Arrhythmia     Chest pain    Cephalexin Hives    Nsaids Hives    Penicillins Hives    Contrast Dye (Echo Or Unknown Ct/Mr) Rash       Family History   Problem Relation Age of Onset    Colon cancer Mother 60        Pancreatic, breast    Cancer Mother     Diabetes Mother     Breast cancer Mother         60s    Arthritis Mother     Pancreatic cancer Mother     Irritable bowel syndrome Mother     Heart disease Father     Heart attack Father     Arthritis Father     Ulcerative colitis Father     Depression Sister   "   Anxiety disorder Sister     Cancer Sister     Pancreatic cancer Sister     Irritable bowel syndrome Sister     Bipolar disorder Maternal Aunt     Colon cancer Maternal Grandmother 70    Seizures Son     Depression Son     Suicide Attempts Son     Depression Son     ADD / ADHD Son     Colon cancer Other         60s    Depression Daughter     Malig Hyperthermia Neg Hx         Social History     Social History Narrative    Not on file       Objective       Vital Signs:   /80 (BP Location: Left arm, Patient Position: Sitting, Cuff Size: Adult)   Pulse 92   Ht 172.7 cm (67.99\")   Wt 85.7 kg (189 lb)   SpO2 95%   BMI 28.74 kg/mý       Physical Exam    Result Review :                       Assessment and Plan    Diagnoses and all orders for this visit:    1. Family history of colon cancer (Primary)  -     Genetic Testing - Blood,; Future    2. Gastroesophageal reflux disease, unspecified whether esophagitis present    3. Hiatal hernia    4. Constipation, unspecified constipation type    5. Family history of pancreatic cancer  -     Genetic Testing - Blood,; Future    6. Family history of breast cancer  -     Genetic Testing - Blood,; Future    Other orders  -     hyoscyamine (ANASPAZ,LEVSIN) 0.125 MG tablet; Take 1 tablet by mouth 4 (Four) Times a Day With Meals & at Bedtime.  Dispense: 360 tablet; Refill: 3    68 year old patient presents to the office for colonoscopy follow up. Patient has history of persistent heartburn, large hiatal hernia, and constipation. Patient reported persistent heartburn despite Protonix, Pepcid, and Carafate therefore referral ordered for hiatal hernia repair, patient did not proceed. She continue Protonix 40mg 1 times/day and Pepcid 2-3 times a day depending on severity of heartburn. With this regimen she has breakthrough about 2-3 times a week. Patient will contact office when ready to proceed with referral. Constipation has improved with Miralax once daily has a bowel movement " every 2-3 days, she will increase Miralax to twice daily. Levsin is providing relief in abdominal cramping. [facility] genetic testing previously ordered due to family history of pancreatic, colon, and breast cancer. Patient did not complete but is wanting to proceed at his time, reordered. Patient will follow up in 6 months. Patient is agreeable to plan and will call the office with any questions or concerns.     Follow Up   Return in about 6 months (around 2/8/2024).  Patient was given instructions and counseling regarding her condition or for health maintenance advice. Please see specific information pulled into the AVS if appropriate.

## 2023-08-08 ENCOUNTER — OFFICE VISIT (OUTPATIENT)
Dept: GASTROENTEROLOGY | Facility: CLINIC | Age: 68
End: 2023-08-08
Payer: MEDICARE

## 2023-08-08 VITALS
OXYGEN SATURATION: 95 % | DIASTOLIC BLOOD PRESSURE: 80 MMHG | WEIGHT: 189 LBS | SYSTOLIC BLOOD PRESSURE: 126 MMHG | HEIGHT: 68 IN | BODY MASS INDEX: 28.64 KG/M2 | HEART RATE: 92 BPM

## 2023-08-08 DIAGNOSIS — K59.00 CONSTIPATION, UNSPECIFIED CONSTIPATION TYPE: ICD-10-CM

## 2023-08-08 DIAGNOSIS — Z80.0 FAMILY HISTORY OF COLON CANCER: Primary | ICD-10-CM

## 2023-08-08 DIAGNOSIS — Z80.3 FAMILY HISTORY OF BREAST CANCER: ICD-10-CM

## 2023-08-08 DIAGNOSIS — Z80.0 FAMILY HISTORY OF PANCREATIC CANCER: ICD-10-CM

## 2023-08-08 DIAGNOSIS — K44.9 HIATAL HERNIA: ICD-10-CM

## 2023-08-08 DIAGNOSIS — K21.9 GASTROESOPHAGEAL REFLUX DISEASE, UNSPECIFIED WHETHER ESOPHAGITIS PRESENT: ICD-10-CM

## 2023-08-08 PROCEDURE — 1160F RVW MEDS BY RX/DR IN RCRD: CPT

## 2023-08-08 PROCEDURE — 99214 OFFICE O/P EST MOD 30 MIN: CPT

## 2023-08-08 PROCEDURE — 1159F MED LIST DOCD IN RCRD: CPT

## 2023-08-08 RX ORDER — HYOSCYAMINE SULFATE 0.125 MG
0.12 TABLET ORAL
Qty: 360 TABLET | Refills: 3 | Status: SHIPPED | OUTPATIENT
Start: 2023-08-08

## 2023-10-11 ENCOUNTER — TELEPHONE (OUTPATIENT)
Dept: GASTROENTEROLOGY | Facility: CLINIC | Age: 68
End: 2023-10-11
Payer: MEDICARE

## 2023-10-11 ENCOUNTER — OFFICE VISIT (OUTPATIENT)
Dept: FAMILY MEDICINE CLINIC | Facility: CLINIC | Age: 68
End: 2023-10-11
Payer: MEDICARE

## 2023-10-11 VITALS
SYSTOLIC BLOOD PRESSURE: 98 MMHG | HEART RATE: 86 BPM | TEMPERATURE: 98 F | WEIGHT: 189.2 LBS | RESPIRATION RATE: 16 BRPM | HEIGHT: 68 IN | OXYGEN SATURATION: 96 % | BODY MASS INDEX: 28.67 KG/M2 | DIASTOLIC BLOOD PRESSURE: 56 MMHG

## 2023-10-11 DIAGNOSIS — Z11.59 ENCOUNTER FOR HEPATITIS C SCREENING TEST FOR LOW RISK PATIENT: ICD-10-CM

## 2023-10-11 DIAGNOSIS — C44.92 SQUAMOUS CELL SKIN CANCER: ICD-10-CM

## 2023-10-11 DIAGNOSIS — M48.02 CERVICAL SPINAL STENOSIS: ICD-10-CM

## 2023-10-11 DIAGNOSIS — E78.5 HYPERLIPIDEMIA, UNSPECIFIED HYPERLIPIDEMIA TYPE: ICD-10-CM

## 2023-10-11 DIAGNOSIS — M51.34 DEGENERATIVE DISC DISEASE, THORACIC: ICD-10-CM

## 2023-10-11 DIAGNOSIS — J44.9 CHRONIC OBSTRUCTIVE PULMONARY DISEASE, UNSPECIFIED COPD TYPE: ICD-10-CM

## 2023-10-11 DIAGNOSIS — M41.9 SCOLIOSIS OF THORACIC SPINE, UNSPECIFIED SCOLIOSIS TYPE: ICD-10-CM

## 2023-10-11 DIAGNOSIS — D72.10 EOSINOPHILIA, UNSPECIFIED TYPE: ICD-10-CM

## 2023-10-11 DIAGNOSIS — Z00.00 MEDICARE ANNUAL WELLNESS VISIT, SUBSEQUENT: Primary | ICD-10-CM

## 2023-10-11 DIAGNOSIS — M47.14 OSTEOARTHRITIS OF THORACIC SPINE WITH MYELOPATHY: ICD-10-CM

## 2023-10-11 DIAGNOSIS — M25.511 ACUTE PAIN OF BOTH SHOULDERS: ICD-10-CM

## 2023-10-11 DIAGNOSIS — Z78.0 POSTMENOPAUSAL: ICD-10-CM

## 2023-10-11 DIAGNOSIS — F33.1 MAJOR DEPRESSIVE DISORDER, RECURRENT EPISODE, MODERATE DEGREE: ICD-10-CM

## 2023-10-11 DIAGNOSIS — M54.6 THORACIC SPINE PAIN: ICD-10-CM

## 2023-10-11 DIAGNOSIS — D75.839 THROMBOCYTOSIS: ICD-10-CM

## 2023-10-11 DIAGNOSIS — M54.12 CERVICAL RADICULOPATHY: ICD-10-CM

## 2023-10-11 DIAGNOSIS — E55.9 VITAMIN D DEFICIENCY: ICD-10-CM

## 2023-10-11 DIAGNOSIS — K44.9 HIATAL HERNIA: ICD-10-CM

## 2023-10-11 DIAGNOSIS — M25.512 ACUTE PAIN OF BOTH SHOULDERS: ICD-10-CM

## 2023-10-11 DIAGNOSIS — M54.50 CHRONIC BILATERAL LOW BACK PAIN WITHOUT SCIATICA: ICD-10-CM

## 2023-10-11 DIAGNOSIS — R53.83 OTHER FATIGUE: ICD-10-CM

## 2023-10-11 DIAGNOSIS — G89.29 CHRONIC BILATERAL LOW BACK PAIN WITHOUT SCIATICA: ICD-10-CM

## 2023-10-11 DIAGNOSIS — M47.812 CERVICAL SPONDYLOSIS WITHOUT MYELOPATHY: ICD-10-CM

## 2023-10-11 DIAGNOSIS — M79.7 FIBROMYALGIA: ICD-10-CM

## 2023-10-11 DIAGNOSIS — M41.22 OTHER IDIOPATHIC SCOLIOSIS, CERVICAL REGION: ICD-10-CM

## 2023-10-11 DIAGNOSIS — M51.36 DEGENERATIVE DISC DISEASE, LUMBAR: ICD-10-CM

## 2023-10-11 DIAGNOSIS — M50.30 DEGENERATIVE DISC DISEASE, CERVICAL: ICD-10-CM

## 2023-10-11 DIAGNOSIS — M85.80 OSTEOPENIA, UNSPECIFIED LOCATION: ICD-10-CM

## 2023-10-11 DIAGNOSIS — K21.00 GASTROESOPHAGEAL REFLUX DISEASE WITH ESOPHAGITIS WITHOUT HEMORRHAGE: ICD-10-CM

## 2023-10-11 DIAGNOSIS — M47.896 OTHER OSTEOARTHRITIS OF SPINE, LUMBAR REGION: ICD-10-CM

## 2023-10-11 DIAGNOSIS — K58.1 IRRITABLE BOWEL SYNDROME WITH CONSTIPATION: ICD-10-CM

## 2023-10-11 DIAGNOSIS — J30.2 SEASONAL ALLERGIC RHINITIS, UNSPECIFIED TRIGGER: ICD-10-CM

## 2023-10-11 DIAGNOSIS — Z12.31 ENCOUNTER FOR SCREENING MAMMOGRAM FOR MALIGNANT NEOPLASM OF BREAST: ICD-10-CM

## 2023-10-11 DIAGNOSIS — R91.1 PULMONARY NODULE: ICD-10-CM

## 2023-10-11 PROBLEM — M51.369 DEGENERATIVE DISC DISEASE, LUMBAR: Status: ACTIVE | Noted: 2023-10-11

## 2023-10-11 PROBLEM — M47.9 SPINAL OSTEOARTHRITIS: Status: ACTIVE | Noted: 2023-10-11

## 2023-10-11 NOTE — TELEPHONE ENCOUNTER
Called patient to follow up on overdue results. Patient did not answer, left VM and call back number

## 2023-10-11 NOTE — PROGRESS NOTES
Subjective   Jennifer Shelton is a 68 y.o. female who presents today in follow up of hyperlipidemia, vitamin D deficiency, thrombocytosis, moods, COPD, pulmonary nodule, GERD, fibromyalgia, Cervical spine DDD, and history of squamous cell skin cancer.     Hyperlipidemia  Associated symptoms include chest pain, myalgias and shortness of breath.     Hyperlipidemia- taking Lipitor 20 mg nightly  Last medication 2016- Lipitor- did well on medication. She has not been on other medications in the past.   Stopped all medication years ago. Last year was full of specialists and let go of routine things.   Vitamin D deficiency-taking vitamin D 2000 IU daily.   Thrombocytosis- no signs of clotting or any concerns.     Moods- Prozac 40 mg twice daily changed to Prozac 80 mg daily. Did not change anything. Does not think she needs to change medication. Did not seem to click as much.    Moods have not improved. She has had continued depression. Thinks it may be time to see specialist.  Referred to psychiatry and was seen 8/2022 but did not keep appointment 4/9/2022.  Prozac in the past- she was up to 80 mg once daily. She had depression since childhood and started treatment in 30s. Increased depression following her son's death. She stopped all medications following his death.   Son's suicide 3/11/2021- she has not been to the doctor for CT chest, scans, other testing and treatment. She has not been doing anything since her son's death.  She noticed improvement in moods with restarting Prozac 20 mg once daily but not as good as it was when she initially restarted. Decreased motivation, not wanting to get up and do anything. She has had mind racing and depressed feeling.   On Prozac 40 mg daily, she had no motivation. When she was out, she was ok but came back home and did not want to do anything.   Previous medications- Cymbalta- helped but Prozac helped more. Last Cymbalta 2016.  Taking benadryl to sleep but not helping much.  Elavil- helped with depression but made her very tired. She also would forget things when she was taking it and talking to people.     Fatigue- Sleep medicine ordered home sleep study. Has not picked up- did not perform. Still tired.    She is tired all the time- reports she will sleep a few hours at night wake up and go back to sleep. She is always tired and does not feel rested.    She was to have sleep study right after her son's death but cancelled. Has not followed up with pulmonology in some time.   COPD, asthma, eosinophilia- using Trelegy and Singulair daily. Has not had follow up with pulmonology. She is overdue but has not completed sleep study.   stopped Qvar and was taking Stiolto. They changed to Trelegy 200 daily. She has noted significant improvement. She reports they advised more like asthma with elevated eosinophils.    Still using inhalers. Only taking nebulizer as needed- was to take twice daily but she has taken only as needed. Taking her daily inhaler and Singulair. Medications- Qvar, Stiolto, Monteleukast, Flonase, Mucinex, and has duoneb  As needed.   Pulmonary nodule-CT chest 11/8/2021- Bilateral apical pleural thickening, mild emphysema, calcified pulmonary nodules right lower lobe, other subpleural nodules and areas of scarring with nodularity-all reported without change.  Small hiatal hernia.  Per radiology, no suspicious pulmonary nodules.  Recheck in 1 year. Repeat 12/2022- stable. No need to continue to follow but per pulmonology, may lori annual screening.   Nodule on right lung- they were scanning and following with Dr Kelley.   Pulmonology- Beth Renteria, [facility]- missed appt for follow up of pulmonary nodule. Hospitalized 2/2/21 and 10/2021- she had been coughing for 2 months then went to doctor and was given steroid. She also had poison ivy and that helped too. Seen by someone in [place]- urgent care.     GERD, hiatal hernia, IBS- Dr Oneal Raymond, Brianna Martinez, APRPRADIP-  she is  doing terrible- she is having worsening symptoms. Last seen by GI- they advised she let them know when she is ready to see surgeon for procedure. She may have to proceed because cannot live like this. She reports she gets up in the morning then has chest pain, vomits, and feels better- she feels heartburn/ indigestion. She wakes up in the middle of the night and has to take medication. She takes Pepcid. She was unable to get Carafate in between medications and did not feel like it helped when taking it.   Taking Protonix 40 mg once daily and Pepcid 20 mg 2-3 x daily, and has carafate- did not help and has not been taking. Negative gastric emptying study. Will have colonoscopy 4/2023. They will consider Nissen.   She is still having heartburn symptoms- sometimes wakes up with it and sometimes has after taking the medication in the day. She is also having increased IBS symptoms. Patient with frequent, urgent BM. Has never taken anything for it but she has continued with symptoms. She would like to consider medication.   She had pain in her chest- if she does not take Pepcid, she has pain in her chest and cannot eat. When she takes the medication, she does ok. She passes blood in stool that is bright red at times. Seeing GI.   EGD 2/2022 with large hiatal hernia, esophagitis.  Fibromyalgia- has been on no medication in a while.   Previously Cymbalta and Elavil.   Shoulder pain- she had MRI left shoulder with tendinopathy supraspinatous, biceps, glenohumeral joint effusion, chondromalacia- suggesting capsulitis and minimal DJD AC joint. Now right shoulder pain. Does not want to see orthopedist at this time.   DDD cervical spine- she had appt with neurosurgery 5/2023- cancelled because was nervous the answer was going to be surgery.   always hurting- having headaches. Thinks from neck pain. 1/2021 for cervicalgia, cervical radiculopathy, herniated C4-5, C5-6, left shoulder pain. Ordered MRI left shoulder. If no  abnormality, recommended C4-5, C5-6 ACDF  Went to orthopedist for rotator cuff as well 3/2021- Xray, MRI 2/2021 with moderate supraspinatus tendonopathy, partial thickness fraying, intra-articular long head biceps tendinopathy, small glenohummeral effusion, mild to moderate chondromalacia, synovial thickening, or intr-articular debris vs labral tissue in axillary recess- suggested capsulitis. DJD AC joint with chronic deformity of clavicle. Exercises for tendonitis given and advised to return for consideration of injection if no improvement    Squamous cell skin cancer- has not been since then. [facility] [place]  Patient had SCC 1/2021.    Mother and sister with pancreatic cancer. Mother with history of colon and breast cancer. MAunt and MGM with colon cancer.     No Hx abnormal PAP- last PAP 3 years ago.     Patient's Specialists:  Behavioral health-[facility] Farias- last appointment 8/2022 for major depressive disorder with prolonged grief from death of son 3/2022 for suicide.  Advised change Prozac from 40 mg twice a day to 80 mg once daily in the morning.  If increased irritability, will change to 60 mg.  Advised to check to see if Trintellix or Viibryd would be approved.  Follow-up in 4 weeks.  Patient canceled follow-up.  She has not rescheduled.  Cardiology-Dr. David Oliveira-last appointment 12/2022 for chest pain, exertional dyspnea, diastolic dysfunction, coronary artery calcification on CT, hyperlipidemia.  Advised Lexiscan nuclear stress test, change in intensity to Crestor 20 mg nightly and advised aspirin 81 mg.  Mild diastolic dysfunction on echo.  Advised low-salt diet and monitor weight.  Advised follow-up 6 to 9 months.  Stress test normal.    Prior Dr Lb Boyer- last appt 4/2020 for preop exam, CP, SOA, abnormal EKG. Advised smoking cessation, follow up with pulmonology, take Lipitor, hold off on colonoscopy pending cardiac workup- stress test, echo, and holter monitor. Advised AAA  screening. Negative cardiac testing. Advised follow up 1 year. She did not return for follow up.   GI- Dr Raymond, [facility] Sharma, [facility] Up- last appt 8/2023 for family history of colon cancer, breast cancer, pancreatic cancer, GERD, hiatal hernia, IBS with constipation.  Advise gastric emptying study and hyoscyamine.  Colonoscopy for 2023.  Consider referral for Nissen.  Constipation not adequately relieved with Linzess 72 mcg and did not wish for trial of a stronger dose due to cost.  Advised MiraLAX and stool softeners as needed.  Sent prescription for Levsin for abdominal cramping.  Advised [facility] genetic testing.  Continue Protonix, Pepcid, and was given Carafate.  Advised she see surgery for hiatal hernia repair.  Follow-up 2/2024  Normal gastric emptying study.  10/2021-for blood in stool, IBS, and GERD. Advised Miralax, Protonix 40 mg once daily, and EGD, Colonoscopy. Endoscopy 2/2022 with large hitala hernia. No colonoscopy performed. Benign pathology.   General surgery- [facility] Arias- last appt 3/2020 for hematuria. CT abd/pelvis, cystoscopy, and UA.   Neurosurgery- Dr Willi Ghotra- last appt 1/2021 for cervicalgia, cervical radiculopathy, herniated C4-5, C5-6, left shoulder pain. Ordered MRI left shoulder. If no abnormality, recommended C4-5, C5-6 ACDF.  Appointment 3/30/2023 and 5/2023 canceled.  Orthopedic surgery- Dr Jet Salinas- last appt 3/2021 for left shoulder pain. Xray, MRI 2/2021 with moderate supraspinatus tendonopathy, partial thickness fraying, intra-articular long head biceps tendinopathy, small glenohummeral effusion, mild to moderate chondromalacia, synovial thickening, or intr-articular debris vs labral tissue in axillary recess- suggested capsulitis. DJD AC joint with chronic deformity of clavicle. Exercises for tendonitis given and advised to return for consideration of injection if no improvement.   Pulmonology-, RESHMA Atwood- last appt 1/2023  for COPD with emphysema, pulmonary nodules, history of tobacco abuse, URIEL.  Changed Stiolto to Trelegy 200.   CT stable over 2 years.-no need for follow-up testing.  May benefit for yearly screening and will address at next visit.  Need to evaluate for URIEL.  Follow-up 2 months with full PFT.   2/2021 in follow up of hospitalization for acute hypoxic respiratory failure requiring noninvasince positive pressure ventilation and COPD exacerbation. Hypoxia to 86% on 4 L O2. Transitioned to BIPAP with improvement. She did not need home oxygen. Treated with steroid, nebulizer. Advised Stiolto, Qvar, and Singulair. Ordered sleep study, started flutter valve, DuoNeb followed by sodium chloride nebulizer then flutter valve twice daily. Low dose CT 6/2021. Had CT abd/pelvis with pulm nodule. Continued Stiolto and PA Singulair. Changed Qvar to Flovent due to cost. Follow up in 2-3 months.   Urology- Dr Chelsea Lundy- last appt 3/2020 for cystoscopy for hematuria. Negative and negative CT abd/pelvis. Follow up in 1 year.     The following portions of the patient's history were reviewed and updated as appropriate: allergies, current medications, past family history, past medical history, past social history, past surgical history and problem list.    Review of Systems   Constitutional:  Positive for fatigue. Negative for fever.   Respiratory:  Positive for shortness of breath.    Cardiovascular:  Positive for chest pain.   Gastrointestinal:  Positive for abdominal pain.        GERD, heartburn   Musculoskeletal:  Positive for arthralgias, back pain, myalgias and neck stiffness.   Neurological:  Positive for weakness and numbness.   Psychiatric/Behavioral:  Positive for agitation, decreased concentration, dysphoric mood and sleep disturbance. The patient is nervous/anxious.        Objective   Vitals:    10/11/23 0807   BP: 98/56   Pulse: 86   Resp: 16   Temp: 98 øF (36.7 øC)   SpO2: 96%     Body mass index is 28.77  kg/mý.    Physical Exam  Constitutional:       General: She is not in acute distress.     Appearance: She is well-developed.   HENT:      Head: Normocephalic and atraumatic.   Eyes:      General:         Right eye: No discharge.         Left eye: No discharge.   Pulmonary:      Effort: Pulmonary effort is normal. No respiratory distress.   Skin:     General: Skin is dry.   Neurological:      Mental Status: She is alert.   Psychiatric:         Attention and Perception: She is attentive.         Mood and Affect: Mood is depressed.         Speech: Speech normal.         Behavior: Behavior normal.         Assessment & Plan   Diagnoses and all orders for this visit:    1. Medicare annual wellness visit, subsequent (Primary)    2. Osteopenia, unspecified location  -     DEXA Bone Density Axial; Future    3. Encounter for screening mammogram for malignant neoplasm of breast  -     Mammo screening digital tomosynthesis bilateral w CAD; Future    4. Postmenopausal  -     DEXA Bone Density Axial; Future    5. Encounter for hepatitis C screening test for low risk patient  -     Hepatitis C antibody    6. Hyperlipidemia, unspecified hyperlipidemia type  -     Comprehensive Metabolic Panel  -     CK  -     Lipid Panel With LDL / HDL Ratio  -     TSH  -     Urinalysis With Culture If Indicated -    7. Vitamin D deficiency  -     Comprehensive Metabolic Panel  -     Vitamin D,25-Hydroxy  -     TSH  -     Urinalysis With Culture If Indicated -    8. Thrombocytosis  -     CBC & Differential  -     TSH  -     Urinalysis With Culture If Indicated -    9. Major depressive disorder, recurrent episode, moderate degree  -     TSH  -     Urinalysis With Culture If Indicated -    10. Other fatigue  -     TSH  -     Urinalysis With Culture If Indicated -    11. Chronic obstructive pulmonary disease, unspecified COPD type    12. Eosinophilia, unspecified type    13. Pulmonary nodule    14. Seasonal allergic rhinitis, unspecified  trigger    15. Gastroesophageal reflux disease with esophagitis without hemorrhage    16. Hiatal hernia    17. Irritable bowel syndrome with constipation    18. Fibromyalgia    19. Cervical spondylosis without myelopathy    20. Cervical radiculopathy    21. Other idiopathic scoliosis, cervical region    22. Degenerative disc disease, cervical    23. Cervical spinal stenosis    24. Acute pain of both shoulders    25. Thoracic spine pain    26. Scoliosis of thoracic spine, unspecified scoliosis type    27. Degenerative disc disease, thoracic    28. Osteoarthritis of thoracic spine with myelopathy    29. Chronic bilateral low back pain without sciatica    30. Degenerative disc disease, lumbar    31. Other osteoarthritis of spine, lumbar region    32. Squamous cell skin cancer  -     Ambulatory Referral to Dermatology          Assessment and Plan  AWV completed today.  She is up-to-date on colonoscopy.  She does have family history of breast and colon cancer as well as pancreatic cancer. She needs to ensure she stays up to date on colonoscopy and mammogram. She has been referred for mammogram and DEXA scan and needs to schedule. Consideration of genetics consultation to determine most appropriate screening recommendations.  She was recommended for genetics testing per GI.  Patient to check with insurance to determine coverage and location of coverage for Tdap, Prevnar 20, and Shingrix.  She should also ensure annual flu shot and COVID-19 booster.    Patient will have fasting labs. Call if no results in 1 week. Stability of conditions, plan, follow up, and further recommendations pending labs. Follow up in 3 months.     Hyperlipidemia- Lipids improved but LDL remained uncontrolled. Continue Lipitor 20 mg once daily and continue to work on diet and exercise.   Vitamin D deficiency- Improved vitamin D. Continue Vitamin D 2000 IU daily. Dosing recommendations pending labs.   Osteopenia- Patient to have DEXA scan as  referred.   Thrombocytosis- I will continue to monitor and make further recommendations.   Moods- She has had longstanding depression that has worsened with her son's death. She previously did well on Prozac. I restarted Prozac 20 mg once daily with some improvement but continued with significant depressive symptoms. Increased Prozac to 40 mg once daily then twice daily with improvement but persistent symptoms. She has complicated mood symptoms with grief reaction. She was willing to see psychiatry, was referred, seen, and has not had follow-up.  I would like to consider psychiatry again in the future and she should ensure regular counseling.  She should be seen ASAP if worsening moods or 9-1-1 to ER if she develops SI/HI. No SI/HI today.  COPD- Continue Trelegy, Monteleukast, Flonase, Mucinex, and DuoNeb, and follow up with pulmonology as directed by them.    Pulmonary nodule- CT chest 11/2021 with bilateral apical pleural thickening, mild emphysema, calcified pulmonary nodules right lower lobe, other subpleural nodules and areas of scarring with nodularity-all reported without change.  Hiatal hernia.  Per radiology, no suspicious pulmonary nodules.  Recheck in 1 year.  Patient to see pulmonology and follow-up and ensure recheck CT as directed by pulmonology.  GERD, hiatal hernia- Last EGD 2/2022 with large hiatal hernia and reflux esophagitis with benign pathology. Continue Protonix 40 mg once daily and Pepcid 20 mg up to twice daily. She would consider general surgery as directed by GI with uncontrolled GERD symptoms.   Fibromyalgia- Patient was previously on Cymbalta and Elavil for pain. However, moods were better controlled on Prozac. We will re-evaluated at follow up. We could consider Elavil in addition to Prozac if needed or change back to Cymbalta if moods or pain is not well controlled pending evaluation and treatment per psychiatry.    DDD cervical spine- Follow up with neurosurgery if persistent pain.    Squamous cell skin cancer- Patient to ensure she follows with dermatology regularly.  I will refer today.    I spent 45 minutes caring for Jennifer Shelton on this date of service, including 15 minutes for AWV and 30 minutes for follow-up and problem focused visit. This time includes time spent by me in the following activities as necessary: preparing for the visit, reviewing tests, specialists records and previous visits, obtaining and/or reviewing a separately obtained history, performing a medically appropriate exam and/or evaluation, counseling and educating the patient, family, caregiver, referring and/or communicating with other healthcare professionals, documenting information in the medical record, independently interpreting results and communicating that information with the patient, family, caregiver, and developing a medically appropriate treatment plan with consideration of other conditions, medications, and treatments.

## 2023-10-11 NOTE — PROGRESS NOTES
The ABCs of the Annual Wellness Visit  Subsequent Medicare Wellness Visit    Subjective      Jennifer Shelton is a 68 y.o. female who presents for a Subsequent Medicare Wellness Visit.    Last Pap- No Hx abnormal PAP- last PAP 2019. Mother and sister with pancreatic cancer. Mother with history of colon and breast cancer. MAunt and MGM with colon cancer.   Last mammogram- 11/2022- negative.   Last DEXA- 2/2019- osteopenia.   Last colonoscopy- Dr Raymond- 4/2023- sessile serrated lesion and tubular adenoma- multiple polyps. Recheck 3 years.   Last Tdap- thinks [id number]. 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   Prevnar-  Pneumovax- 9/2022  Hepatitis A- has not had  Last flu shot- 9/2022  Shingrix- had varicella as a child. Has not had vaccination but has had Shingles.   Covid 19-  Lucio- 12/2021, Pfizer- 3/2021, 3/2021, 4/2021      The following portions of the patient's history were reviewed and   updated as appropriate: allergies, current medications, past family history, past medical history, past social history, past surgical history, and problem list.    Compared to one year ago, the patient feels her physical   health is worse. Worsening back issues.     Compared to one year ago, the patient feels her mental   health is the same.    Recent Hospitalizations:  This patient has had a [facility] admission record on file within the last 365 days.    Current Medical Providers:  Patient Care Team:  Tessy Serrano PA as PCP - General (Family Medicine)  Deja Chadwick APRN as Nurse Practitioner (Behavioral Health)  Oneal Raymond MD as Consulting Physician (Gastroenterology)  Stephanie Moran APRN as Nurse Practitioner (Gastroenterology)  David Oliveira MD as Consulting Physician (Interventional Cardiology)  Andrea Moeller MD as Consulting Physician (Pulmonary Disease)    Outpatient Medications Prior to Visit   Medication Sig Dispense Refill    albuterol (ACCUNEB) 0.63 MG/3ML nebulizer solution        albuterol sulfate  (90 Base) MCG/ACT inhaler Inhale 2 puffs Every 4 (Four) Hours As Needed for Wheezing. 90 g 0    Cholecalciferol (Vitamin D3) 50 MCG (2000 UT) tablet TAKE 1 TABLET BY MOUTH DAILY. NEEDS TO BE SEEN FOR MORE REFILLS. 90 tablet 1    diphenhydrAMINE (BENADRYL) 25 mg capsule Take 2 capsules by mouth At Night As Needed.      famotidine (PEPCID) 20 MG tablet TAKE 1 TABLET BY MOUTH 2 (TWO) TIMES A DAY AS NEEDED FOR HEARTBURN. 180 tablet 1    FLUoxetine (PROzac) 40 MG capsule Take 2 capsules by mouth Daily. Patient due for follow up Nov 1 for future refills 60 capsule 0    fluticasone (FLONASE) 50 MCG/ACT nasal spray 2 sprays by Each Nare route Daily. 16 g 2    guaiFENesin (MUCINEX) 600 MG 12 hr tablet Take 2 tablets by mouth 2 (Two) Times a Day.      hyoscyamine (ANASPAZ,LEVSIN) 0.125 MG tablet Take 1 tablet by mouth 4 (Four) Times a Day With Meals & at Bedtime. 360 tablet 3    ipratropium-albuterol (DUO-NEB) 0.5-2.5 mg/3 ml nebulizer Take 3 mL by nebulization 4 (Four) Times a Day. 360 mL 1    loratadine (CLARITIN) 10 MG tablet TAKE 1 TABLET BY MOUTH DAILY. 90 tablet 1    montelukast (SINGULAIR) 10 MG tablet TAKE 1 TABLET BY MOUTH EVERY NIGHT AT BEDTIME. 90 tablet 1    O2 (OXYGEN) Inhale 1 (One) Time. Use 2 LPM PRN if O2 if below 90      pantoprazole (PROTONIX) 40 MG EC tablet Take 1 tablet by mouth Daily. 90 tablet 1    rosuvastatin (CRESTOR) 20 MG tablet Take 1 tablet by mouth Daily. 90 tablet 3    sodium chloride 3 % nebulizer solution inhale contents of 1 vial via nebulizer TWICE DAILY      Trelegy Ellipta 200-62.5-25 MCG/ACT aerosol powder         No facility-administered medications prior to visit.       No opioid medication identified on active medication list. I have reviewed chart for other potential  high risk medication/s and harmful drug interactions in the elderly.        Aspirin is not on active medication list.  Aspirin use is not indicated based on review of current medical  "condition/s. Risk of harm outweighs potential benefits.  .    Patient Active Problem List   Diagnosis    Accelerated junctional rhythm    Allergic to aspirin    Arthritis    Cervical spondylosis without myelopathy    Chest pain    Chronic obstructive pulmonary disease    Depression    Ectopic atrial rhythm    Hematuria    Hyperlipemia    Neck pain    Fibromyalgia    Degenerative disc disease, cervical    Cervical radiculopathy    Cervical spinal stenosis    Other dyspnea and respiratory abnormality    Rectal bleeding    Seasonal allergic rhinitis    Shortness of breath    Lung mass    Squamous cell skin cancer    Irritable bowel syndrome with constipation    Hematochezia    Gastroesophageal reflux disease    Hiatal hernia    Family history of colon cancer    Acute hypoxemic respiratory failure    Seizures    Sleep apnea    COPD exacerbation    Vitamin D deficiency    Thrombocytosis    Major depressive disorder, recurrent episode, moderate degree    Other fatigue    Eosinophilia    Pulmonary nodule    Other idiopathic scoliosis, cervical region    Acute pain of both shoulders    Thoracic spine pain    Scoliosis of thoracic spine    Osteoarthritis of thoracic spine with myelopathy    Chronic bilateral low back pain without sciatica    Degenerative disc disease, lumbar    Degenerative disc disease, thoracic    Spinal osteoarthritis     Advance Care Planning   Advance Care Planning     Advance Directive is not on file.  ACP discussion was held with the patient during this visit. Patient does not have an advance directive, information provided.     Objective    Vitals:    10/11/23 0807   BP: 98/56   Pulse: 86   Resp: 16   Temp: 98 øF (36.7 øC)   TempSrc: Temporal   SpO2: 96%   Weight: 85.8 kg (189 lb 3.2 oz)   Height: 172.7 cm (67.99\")     Estimated body mass index is 28.77 kg/mý as calculated from the following:    Height as of this encounter: 172.7 cm (67.99\").    Weight as of this encounter: 85.8 kg (189 lb 3.2 " oz).           Does the patient have evidence of cognitive impairment?   No            HEALTH RISK ASSESSMENT    Smoking Status:  Social History     Tobacco Use   Smoking Status Former    Packs/day: 1.50    Years: 40.00    Additional pack years: 0.00    Total pack years: 60.00    Types: Cigarettes    Start date: 1980    Quit date: 2020    Years since quittin.9    Passive exposure: Past   Smokeless Tobacco Never   Tobacco Comments    Quit 2020     Alcohol Consumption:  Social History     Substance and Sexual Activity   Alcohol Use Never     Fall Risk Screen:    VICTOR MANUELYAMILETH Fall Risk Assessment has not been completed.    Depression Screening:      10/11/2023     8:05 AM   PHQ-2/PHQ-9 Depression Screening   Little Interest or Pleasure in Doing Things 0-->not at all   Feeling Down, Depressed or Hopeless 0-->not at all   PHQ-9: Brief Depression Severity Measure Score 0       Health Habits and Functional and Cognitive Screening:      10/11/2023     8:00 AM   Functional & Cognitive Status   Do you have difficulty preparing food and eating? No   Do you have difficulty bathing yourself, getting dressed or grooming yourself? No   Do you have difficulty using the toilet? No   Do you have difficulty moving around from place to place? No   Do you have trouble with steps or getting out of a bed or a chair? No   Current Diet Well Balanced Diet   Dental Exam Not up to date   Eye Exam Not up to date   Exercise (times per week) 0 times per week   Current Exercises Include No Regular Exercise   Do you need help using the phone?  No   Are you deaf or do you have serious difficulty hearing?  Yes   Do you need help to go to places out of walking distance? Yes   Do you need help shopping? No   Do you need help preparing meals?  No   Do you need help with housework?  No   Do you need help with laundry? No   Do you need help taking your medications? No   Do you need help managing money? No   Do you ever drive or ride in a car  without wearing a seat belt? No   Have you felt unusual stress, anger or loneliness in the last month? No   Who do you live with? Spouse   If you need help, do you have trouble finding someone available to you? No   Have you been bothered in the last four weeks by sexual problems? No   Do you have difficulty concentrating, remembering or making decisions? No       Age-appropriate Screening Schedule:  Refer to the list below for future screening recommendations based on patient's age, sex and/or medical conditions. Orders for these recommended tests are listed in the plan section. The patient has been provided with a written plan.    Health Maintenance   Topic Date Due    DXA SCAN  02/11/2021    HEPATITIS C SCREENING  Never done    ANNUAL WELLNESS VISIT  Never done    ZOSTER VACCINE (1 of 2) 10/11/2023 (Originally 5/24/2007)    COVID-19 Vaccine (5 - 2023-24 season) 10/13/2023 (Originally 9/1/2023)    INFLUENZA VACCINE  03/31/2024 (Originally 8/1/2023)    Pneumococcal Vaccine 65+ (2 - PCV) 10/11/2024 (Originally 1/1/2021)    TDAP/TD VACCINES (1 - Tdap) 10/11/2024 (Originally 5/24/1976)    LUNG CANCER SCREENING  12/21/2023    BMI FOLLOWUP  12/21/2023    LIPID PANEL  03/10/2024    MAMMOGRAM  08/24/2024    COLORECTAL CANCER SCREENING  04/04/2026                  CMS Preventative Services Quick Reference  Risk Factors Identified During Encounter:    Chronic Pain: Natural history and expected course discussed. Questions answered.  Pain Management Referral Ordered  Neurosurgery Referral Ordered  Depression/Dysphoria: Referral to Mental Health specialist  Immunizations Discussed/Encouraged: Influenza, Prevnar 20 (Pneumococcal 20-valent conjugate), Shingrix, and COVID19    The above risks/problems have been discussed with the patient.  Pertinent information has been shared with the patient in the After Visit Summary.    Diagnoses and all orders for this visit:    1. Medicare annual wellness visit, subsequent (Primary)    2.  Osteopenia, unspecified location  -     DEXA Bone Density Axial; Future    3. Encounter for screening mammogram for malignant neoplasm of breast  -     Mammo screening digital tomosynthesis bilateral w CAD; Future    4. Postmenopausal  -     DEXA Bone Density Axial; Future    5. Encounter for hepatitis C screening test for low risk patient  -     Hepatitis C antibody    6. Hyperlipidemia, unspecified hyperlipidemia type  -     Comprehensive Metabolic Panel  -     CK  -     Lipid Panel With LDL / HDL Ratio  -     TSH  -     Urinalysis With Culture If Indicated -    7. Vitamin D deficiency  -     Comprehensive Metabolic Panel  -     Vitamin D,25-Hydroxy  -     TSH  -     Urinalysis With Culture If Indicated -    8. Thrombocytosis  -     CBC & Differential  -     TSH  -     Urinalysis With Culture If Indicated -    9. Major depressive disorder, recurrent episode, moderate degree  -     TSH  -     Urinalysis With Culture If Indicated -    10. Other fatigue  -     TSH  -     Urinalysis With Culture If Indicated -    11. Chronic obstructive pulmonary disease, unspecified COPD type    12. Eosinophilia, unspecified type    13. Pulmonary nodule    14. Seasonal allergic rhinitis, unspecified trigger    15. Gastroesophageal reflux disease with esophagitis without hemorrhage    16. Hiatal hernia    17. Irritable bowel syndrome with constipation    18. Fibromyalgia    19. Cervical spondylosis without myelopathy    20. Cervical radiculopathy    21. Other idiopathic scoliosis, cervical region    22. Degenerative disc disease, cervical    23. Cervical spinal stenosis    24. Acute pain of both shoulders    25. Thoracic spine pain    26. Scoliosis of thoracic spine, unspecified scoliosis type    27. Degenerative disc disease, thoracic    28. Osteoarthritis of thoracic spine with myelopathy    29. Chronic bilateral low back pain without sciatica    30. Degenerative disc disease, lumbar    31. Other osteoarthritis of spine, lumbar  region    32. Squamous cell skin cancer  -     Ambulatory Referral to Dermatology        Follow Up:   Next Medicare Wellness visit to be scheduled in 1 year.      An After Visit Summary and PPPS were made available to the patient.

## 2023-10-11 NOTE — PATIENT INSTRUCTIONS
Medicare Wellness  Personal Prevention Plan of Service     Date of Office Visit:    Encounter Provider:  BILL Duncan  Place of Service:  [facility]  Patient Name: Jennifer Shelton  :  1955    As part of the Medicare Wellness portion of your visit today, we are providing you with this personalized preventive plan of services (PPPS). This plan is based upon recommendations of the [place] Preventive Services Task Force (USPSTF) and the Advisory Committee on Immunization Practices (ACIP).    This lists the preventive care services that should be considered, and provides dates of when you are due. Items listed as completed are up-to-date and do not require any further intervention.    Health Maintenance   Topic Date Due    DXA SCAN  2021    HEPATITIS C SCREENING  Never done    ANNUAL WELLNESS VISIT  Never done    ZOSTER VACCINE (1 of 2) 10/11/2023 (Originally 2007)    COVID-19 Vaccine (5 - -24 season) 10/13/2023 (Originally 2023)    INFLUENZA VACCINE  2024 (Originally 2023)    Pneumococcal Vaccine 65+ (2 - PCV) 10/11/2024 (Originally 2021)    TDAP/TD VACCINES (1 - Tdap) 10/11/2024 (Originally 1976)    LUNG CANCER SCREENING  2023    BMI FOLLOWUP  2023    LIPID PANEL  03/10/2024    MAMMOGRAM  2024    COLORECTAL CANCER SCREENING  2026       Orders Placed This Encounter   Procedures    Mammo screening digital tomosynthesis bilateral w CAD     Standing Status:   Future     Standing Expiration Date:   10/11/2024     Scheduling Instructions:      Patient prefers [place]     Order Specific Question:   Reason for Exam:     Answer:   breast cancer screening     Order Specific Question:   Release to patient     Answer:   Routine Release [[id number]]    DEXA Bone Density Axial     Standing Status:   Future     Standing Expiration Date:   10/11/2024     Scheduling Instructions:      Patient prefers [place]     Order  Specific Question:   Is patient taking or have taken long term Glucocorticoid (steroids)?     Answer:   No     Order Specific Question:   Does the patient have rheumatoid arthritis?     Answer:   No     Order Specific Question:   Does the patient have secondary osteoporosis?     Answer:   No     Order Specific Question:   Reason for Exam:     Answer:   postmenopausal     Order Specific Question:   Release to patient     Answer:   Routine Release [1400000002]    Hepatitis C antibody     Order Specific Question:   Release to patient     Answer:   Routine Release [1400000002]    Comprehensive Metabolic Panel     Order Specific Question:   Release to patient     Answer:   Routine Release [1400000002]    CK     Order Specific Question:   Release to patient     Answer:   Routine Release [1400000002]    Lipid Panel With LDL / HDL Ratio     Order Specific Question:   Release to patient     Answer:   Routine Release [1400000002]    Vitamin D,25-Hydroxy     Order Specific Question:   Release to patient     Answer:   Routine Release [1400000002]    TSH     Order Specific Question:   Release to patient     Answer:   Routine Release [1400000002]    Urinalysis With Culture If Indicated -     Order Specific Question:   Release to patient     Answer:   Routine Release [1400000002]    CBC & Differential     Order Specific Question:   Manual Differential     Answer:   No     Order Specific Question:   Release to patient     Answer:   Routine Release [1400000002]       No follow-ups on file.

## 2023-10-14 LAB
25(OH)D3+25(OH)D2 SERPL-MCNC: 35.7 NG/ML (ref 30–100)
ALBUMIN SERPL-MCNC: 4.5 G/DL (ref 3.5–5.2)
ALBUMIN/GLOB SERPL: 1.9 G/DL
ALP SERPL-CCNC: 90 U/L (ref 39–117)
ALT SERPL-CCNC: 11 U/L (ref 1–33)
APPEARANCE UR: CLEAR
AST SERPL-CCNC: 15 U/L (ref 1–32)
BACTERIA #/AREA URNS HPF: ABNORMAL /HPF
BACTERIA UR CULT: ABNORMAL
BASOPHILS # BLD AUTO: 0.08 10*3/MM3 (ref 0–0.2)
BASOPHILS NFR BLD AUTO: 1.4 % (ref 0–1.5)
BILIRUB SERPL-MCNC: 0.4 MG/DL (ref 0–1.2)
BILIRUB UR QL STRIP: NEGATIVE
BUN SERPL-MCNC: 19 MG/DL (ref 8–23)
BUN/CREAT SERPL: 25.7 (ref 7–25)
CALCIUM SERPL-MCNC: 9.4 MG/DL (ref 8.6–10.5)
CASTS URNS MICRO: ABNORMAL
CASTS URNS QL MICRO: PRESENT /LPF
CHLORIDE SERPL-SCNC: 102 MMOL/L (ref 98–107)
CHOLEST SERPL-MCNC: 187 MG/DL (ref 0–200)
CK SERPL-CCNC: 82 U/L (ref 20–180)
CO2 SERPL-SCNC: 27.6 MMOL/L (ref 22–29)
COLOR UR: YELLOW
CREAT SERPL-MCNC: 0.74 MG/DL (ref 0.57–1)
EGFRCR SERPLBLD CKD-EPI 2021: 89.4 ML/MIN/1.73
EOSINOPHIL # BLD AUTO: 0.35 10*3/MM3 (ref 0–0.4)
EOSINOPHIL NFR BLD AUTO: 6.3 % (ref 0.3–6.2)
EPI CELLS #/AREA URNS HPF: ABNORMAL /HPF (ref 0–10)
ERYTHROCYTE [DISTWIDTH] IN BLOOD BY AUTOMATED COUNT: 12.4 % (ref 12.3–15.4)
GLOBULIN SER CALC-MCNC: 2.4 GM/DL
GLUCOSE SERPL-MCNC: 88 MG/DL (ref 65–99)
GLUCOSE UR QL STRIP: NEGATIVE
HCT VFR BLD AUTO: 38.5 % (ref 34–46.6)
HCV IGG SERPL QL IA: NON REACTIVE
HDLC SERPL-MCNC: 74 MG/DL (ref 40–60)
HGB BLD-MCNC: 13.1 G/DL (ref 12–15.9)
HGB UR QL STRIP: NEGATIVE
IMM GRANULOCYTES # BLD AUTO: 0.01 10*3/MM3 (ref 0–0.05)
IMM GRANULOCYTES NFR BLD AUTO: 0.2 % (ref 0–0.5)
KETONES UR QL STRIP: ABNORMAL
LDLC SERPL CALC-MCNC: 100 MG/DL (ref 0–100)
LDLC/HDLC SERPL: 1.34 {RATIO}
LEUKOCYTE ESTERASE UR QL STRIP: ABNORMAL
LYMPHOCYTES # BLD AUTO: 1.46 10*3/MM3 (ref 0.7–3.1)
LYMPHOCYTES NFR BLD AUTO: 26.2 % (ref 19.6–45.3)
MCH RBC QN AUTO: 30.2 PG (ref 26.6–33)
MCHC RBC AUTO-ENTMCNC: 34 G/DL (ref 31.5–35.7)
MCV RBC AUTO: 88.7 FL (ref 79–97)
MICRO URNS: ABNORMAL
MONOCYTES # BLD AUTO: 0.59 10*3/MM3 (ref 0.1–0.9)
MONOCYTES NFR BLD AUTO: 10.6 % (ref 5–12)
MUCOUS THREADS URNS QL MICRO: PRESENT /HPF
NEUTROPHILS # BLD AUTO: 3.09 10*3/MM3 (ref 1.7–7)
NEUTROPHILS NFR BLD AUTO: 55.3 % (ref 42.7–76)
NITRITE UR QL STRIP: NEGATIVE
NRBC BLD AUTO-RTO: 0 /100 WBC (ref 0–0.2)
PH UR STRIP: 6.5 [PH] (ref 5–7.5)
PLATELET # BLD AUTO: 407 10*3/MM3 (ref 140–450)
POTASSIUM SERPL-SCNC: 4.1 MMOL/L (ref 3.5–5.2)
PROT SERPL-MCNC: 6.9 G/DL (ref 6–8.5)
PROT UR QL STRIP: ABNORMAL
RBC # BLD AUTO: 4.34 10*6/MM3 (ref 3.77–5.28)
RBC #/AREA URNS HPF: ABNORMAL /HPF (ref 0–2)
SODIUM SERPL-SCNC: 140 MMOL/L (ref 136–145)
SP GR UR STRIP: 1.02 (ref 1–1.03)
TRIGL SERPL-MCNC: 71 MG/DL (ref 0–150)
TSH SERPL DL<=0.005 MIU/L-ACNC: 1.26 UIU/ML (ref 0.27–4.2)
URINALYSIS REFLEX: ABNORMAL
UROBILINOGEN UR STRIP-MCNC: 1 MG/DL (ref 0.2–1)
VLDLC SERPL CALC-MCNC: 13 MG/DL (ref 5–40)
WBC # BLD AUTO: 5.58 10*3/MM3 (ref 3.4–10.8)
WBC #/AREA URNS HPF: ABNORMAL /HPF (ref 0–5)

## 2023-10-18 ENCOUNTER — TELEPHONE (OUTPATIENT)
Dept: FAMILY MEDICINE CLINIC | Facility: CLINIC | Age: 68
End: 2023-10-18

## 2023-10-18 NOTE — TELEPHONE ENCOUNTER
Caller: Jennifer Shelton    Relationship: Self    Best call back number: [phone]    What was the call regarding: PATIENT WOULD LIKE TO KNOW LAB RESULTS FROM 10/11/23.    PLEASE CALL.

## 2023-10-25 ENCOUNTER — TELEPHONE (OUTPATIENT)
Dept: FAMILY MEDICINE CLINIC | Facility: CLINIC | Age: 68
End: 2023-10-25

## 2023-10-25 NOTE — TELEPHONE ENCOUNTER
Caller:     Relationship:     Best call back number: [phone]    What is the best time to reach you: ANYTIME     Who are you requesting to speak with (clinical staff, provider,  specific staff member): CLINICAL STAFF     Do you know the name of the person who called: MIGUELITO     What was the call regarding:  MIGUELITO WITH [facility] STATED PATIENT HAD A CT OF CHEST IN NOVEMBER AND A NODULE FOUND. THEIR IS A FOLLOW UP CT OF CHEST NOV 8TH PLEASE PLACE ORDER WITH CT OF CHEST WO/CONTRAST.FOR ANY QUESTIONS PLEASE CALL [phone]    Is it okay if the provider responds through MyChart: N/A

## 2023-10-27 NOTE — TELEPHONE ENCOUNTER
She should be seeing pulmonology. Please check with patient. If she is seeing pulmonology, they need to contact pulmonologist for repeat CT.

## 2023-11-12 DIAGNOSIS — U07.1 COVID-19 VIRUS INFECTION: ICD-10-CM

## 2023-11-12 DIAGNOSIS — E55.9 VITAMIN D DEFICIENCY: ICD-10-CM

## 2023-11-13 RX ORDER — CHOLECALCIFEROL (VITAMIN D3) 125 MCG
CAPSULE ORAL
Qty: 90 TABLET | Refills: 1 | Status: SHIPPED | OUTPATIENT
Start: 2023-11-13

## 2023-11-13 RX ORDER — FLUTICASONE PROPIONATE 50 MCG
2 SPRAY, SUSPENSION (ML) NASAL DAILY
Qty: 16 G | Refills: 2 | Status: SHIPPED | OUTPATIENT
Start: 2023-11-13

## 2023-11-13 NOTE — TELEPHONE ENCOUNTER
Per lab result from 10/2023, patient was to be scheduled for follow-up with me in 3 months, fasting.  Please schedule her for follow-up with me the end of January 2024.

## 2023-11-28 ENCOUNTER — TELEPHONE (OUTPATIENT)
Dept: FAMILY MEDICINE CLINIC | Facility: CLINIC | Age: 68
End: 2023-11-28

## 2023-11-28 NOTE — TELEPHONE ENCOUNTER
Caller: JORGE - [facility]    Relationship: Other    Best callback number: [phone]    What was the call regarding: JORGE CALLED AND STATED THAT THE PATIENT HAD A CT CHEST DONE ON 12/21/2022 AND DUE TO THE FINDINGS, THEY RECOMMEND THE PATIENT HAS A FOLLOW UP IN NOVEMBER 2023. JUAN HAS ORDERED THIS, BUT NEEDS TO FOLLOW UP WITH THE PATIENT.

## 2023-12-11 DIAGNOSIS — Z80.0 FAMILY HISTORY OF COLON CANCER: ICD-10-CM

## 2023-12-11 DIAGNOSIS — Z80.3 FAMILY HISTORY OF BREAST CANCER: ICD-10-CM

## 2023-12-11 DIAGNOSIS — Z80.0 FAMILY HISTORY OF PANCREATIC CANCER: ICD-10-CM

## 2023-12-12 ENCOUNTER — TELEPHONE (OUTPATIENT)
Dept: GASTROENTEROLOGY | Facility: CLINIC | Age: 68
End: 2023-12-12
Payer: MEDICARE

## 2023-12-12 NOTE — TELEPHONE ENCOUNTER
----- Message from RESHMA Molina sent at 12/12/2023 12:12 PM EST -----  I have reviewed the patient's most recent [facility] genetic testing which shows only 1 variant of unknown significance.  It is not known if this genetic variant increases the patient's risk for cancer or not.  As more genes are studied in the future more information regarding this variant might be available.  At this time we will plan to proceed with repeat colonoscopy in 2026 for surveillance.  There is further pancreatic cancer research and screening being completed at [facility] if patient is interested in pursuing referral. There is also genetic counselors that can offer more information and counseling to patient's if she is interested

## 2023-12-12 NOTE — TELEPHONE ENCOUNTER
Called pt left vm to call the office back over resutls, left office call back number [facility]

## 2023-12-18 DIAGNOSIS — K21.9 GASTROESOPHAGEAL REFLUX DISEASE, UNSPECIFIED WHETHER ESOPHAGITIS PRESENT: ICD-10-CM

## 2023-12-18 DIAGNOSIS — J44.9 CHRONIC OBSTRUCTIVE PULMONARY DISEASE, UNSPECIFIED COPD TYPE: ICD-10-CM

## 2023-12-18 DIAGNOSIS — J30.2 SEASONAL ALLERGIC RHINITIS, UNSPECIFIED TRIGGER: ICD-10-CM

## 2023-12-18 RX ORDER — MONTELUKAST SODIUM 10 MG/1
10 TABLET ORAL
Qty: 90 TABLET | Refills: 0 | Status: SHIPPED | OUTPATIENT
Start: 2023-12-18

## 2023-12-18 NOTE — TELEPHONE ENCOUNTER
Please see previous refill requests-    [facility]   11/13/23  7:57 AM Note      Per lab result from 10/2023, patient was to be scheduled for follow-up with me in 3 months, fasting.  Please schedule her for follow-up with me the end of January 2024.

## 2023-12-19 RX ORDER — FAMOTIDINE 20 MG/1
20 TABLET, FILM COATED ORAL 2 TIMES DAILY PRN
Qty: 180 TABLET | Refills: 1 | Status: SHIPPED | OUTPATIENT
Start: 2023-12-19

## 2023-12-19 RX ORDER — HYOSCYAMINE SULFATE 0.125 MG
0.12 TABLET ORAL
Qty: 360 TABLET | Refills: 3 | Status: SHIPPED | OUTPATIENT
Start: 2023-12-19

## 2023-12-19 RX ORDER — PANTOPRAZOLE SODIUM 40 MG/1
40 TABLET, DELAYED RELEASE ORAL DAILY
Qty: 90 TABLET | Refills: 1 | Status: SHIPPED | OUTPATIENT
Start: 2023-12-19

## 2024-01-02 RX ORDER — ALBUTEROL SULFATE 90 UG/1
AEROSOL, METERED RESPIRATORY (INHALATION)
Qty: 18 G | Refills: 3 | OUTPATIENT
Start: 2024-01-02

## 2024-01-02 NOTE — TELEPHONE ENCOUNTER
Left message for patient, ok for hub to relay. Patient needs to get pulmonary to refill this. Patient needs a follow up with chente

## 2024-01-02 NOTE — TELEPHONE ENCOUNTER
Please see previous refill requests-this patient should be scheduled for follow-up with me in the end of January.  Also-patient should be seeing pulmonology.  This should be refilled through pulmonology.

## 2024-02-20 ENCOUNTER — OFFICE VISIT (OUTPATIENT)
Dept: GASTROENTEROLOGY | Facility: CLINIC | Age: 69
End: 2024-02-20
Payer: MEDICARE

## 2024-02-20 VITALS
HEIGHT: 68 IN | HEART RATE: 96 BPM | SYSTOLIC BLOOD PRESSURE: 120 MMHG | OXYGEN SATURATION: 100 % | BODY MASS INDEX: 28.79 KG/M2 | WEIGHT: 190 LBS | DIASTOLIC BLOOD PRESSURE: 69 MMHG

## 2024-02-20 DIAGNOSIS — K44.9 HIATAL HERNIA: Primary | ICD-10-CM

## 2024-02-20 DIAGNOSIS — K21.9 GASTROESOPHAGEAL REFLUX DISEASE, UNSPECIFIED WHETHER ESOPHAGITIS PRESENT: ICD-10-CM

## 2024-02-20 DIAGNOSIS — K58.1 IRRITABLE BOWEL SYNDROME WITH CONSTIPATION: ICD-10-CM

## 2024-02-20 PROCEDURE — 1160F RVW MEDS BY RX/DR IN RCRD: CPT

## 2024-02-20 PROCEDURE — 1159F MED LIST DOCD IN RCRD: CPT

## 2024-02-20 PROCEDURE — 99214 OFFICE O/P EST MOD 30 MIN: CPT

## 2024-02-20 RX ORDER — FAMOTIDINE 20 MG/1
20 TABLET, FILM COATED ORAL 2 TIMES DAILY PRN
Qty: 180 TABLET | Refills: 1 | Status: SHIPPED | OUTPATIENT
Start: 2024-02-20

## 2024-02-20 RX ORDER — PANTOPRAZOLE SODIUM 40 MG/1
40 TABLET, DELAYED RELEASE ORAL DAILY
Qty: 90 TABLET | Refills: 1 | Status: SHIPPED | OUTPATIENT
Start: 2024-02-20

## 2024-02-20 RX ORDER — FLUOROURACIL 50 MG/G
CREAM TOPICAL
COMMUNITY
Start: 2023-11-03

## 2024-02-20 NOTE — PROGRESS NOTES
Chief Complaint  Heartburn and Constipation (- every 2/3  days )    Jennifer Shelton is a 68 y.o. female who presents to [facility]OLOG[facility]- [facility] for follow-up    History of present Illness  Patient presents to the office for follow-up with a history of heartburn, large hiatal hernia, and IBS - constipation. Patient reported persistent heartburn despite Protonix, Pepcid, and Carafate therefore referral ordered for hiatal hernia repair, patient previously did not want to proceed but is ready at this time. She continue Protonix 40mg 1 times/day and Pepcid 2-3 times a day depending on severity of heartburn. With this regimen she has breakthrough almost daily. Denies nausea, vomiting, epigastric pain, and dysphagia. Patient continues to struggle with constipation having a bowel movement every 2-3 days.  Currently using Miralax as needed with adequate relief of constipation. Previously failed Linzess due to diarrhea. Currently taking Levsin 3 times daily with adequate control of abdominal cramping. Denies melena and hematochezia.    Family history of colon cancer (mother 60s, maternal grandmother 70s, and maternal aunt 60s), pancreatic cancer (mother 81 and sister 54), and breast cancer (mother 60s).   [facility] genetic testing 12/08/23 -variant of unknown significance detected    Colonoscopy 04/04/2023 by Dr. Raymond - Small polyp in the cecum, 12 mm polyp in the ascending colon and small polyp in the descending colon.  Polyps - sessile serrated and tubular adenoma. Repeat 3 years.      Gastric emptying study 11/28/22 - normal.      EGD 02/14/2022 by Dr. Raymond - large hiatal hernia, grade A esophagitis, normal stomach, and normal duodenum.  Biopsies consistent with reflux esophagitis, negative for H. pylori, intestinal metaplasia, dysplasia, and malignancy.         Past Medical History:   Diagnosis Date    Anesthesia complication     heart rate slowed    Anxiety     Arthritis     Cancer      skin cancer    Cervical spinal stenosis 2016    Cervical spondylosis without myelopathy     Cervicalgia 2015    Chronic pain disorder     Colon polyp     COPD (chronic obstructive pulmonary disease)     Depression     Fibromyalgia     Gastroesophageal reflux     GERD (gastroesophageal reflux disease)     Hematuria 2020    Hernia 2012?    Hiatal hernia    Herniated disc, cervical 2015    C4-5, C5-6, C6-7    High cholesterol     Hyperlipemia     Irritable bowel syndrome 1986    Lung nodule 2020    Rectal bleeding     Seasonal allergies     Seizures     Shortness of breath     Sleep apnea        Past Surgical History:   Procedure Laterality Date    ABDOMINAL SURGERY  ,     C section     SECTION      COLONOSCOPY      COLONOSCOPY N/A 2023    Procedure: COLONOSCOPY WITH ELEVIEW INJECTION, HOT SNARE, POLYPECTOMIES, CLIP APPLICATION X3;  Surgeon: Oneal Raymond MD;  Location: [facility] ENDOSCOPY;  Service: Gastroenterology;  Laterality: N/A;  COLON POLYPS    CYSTOSCOPY  2020    Cystoscopy w/ Dr. Lundy    ENDOSCOPY N/A 2022    Procedure: ESOPHAGOGASTRODUODENOSCOPY WITH BIOPSIES;  Surgeon: Oneal Raymond MD;  Location: [facility] ENDOSCOPY;  Service: Gastroenterology;  Laterality: N/A;  HIATAL HERNIA, ESOPHAGITIS    PILONIDAL CYSTECTOMY      SQUAMOUS CELL CARCINOMA EXCISION      chest    TUBAL ABDOMINAL LIGATION      UPPER GASTROINTESTINAL ENDOSCOPY  2021         Current Outpatient Medications:     albuterol (ACCUNEB) 0.63 MG/3ML nebulizer solution, , Disp: , Rfl:     albuterol sulfate  (90 Base) MCG/ACT inhaler, Inhale 2 puffs Every 4 (Four) Hours As Needed for Wheezing., Disp: 90 g, Rfl: 0    Cholecalciferol (Vitamin D3) 50 MCG ( UT) tablet, TAKE 1 TABLET BY MOUTH DAILY. NEEDS TO BE SEEN FOR MORE REFILLS., Disp: 90 tablet, Rfl: 1    diphenhydrAMINE (BENADRYL) 25 mg capsule, Take 2 capsules by mouth At Night As Needed., Disp: ,  Rfl:     famotidine (PEPCID) 20 MG tablet, Take 1 tablet by mouth 2 (Two) Times a Day As Needed for Heartburn., Disp: 180 tablet, Rfl: 1    fluorouracil (EFUDEX) 5 % cream, , Disp: , Rfl:     FLUoxetine (PROzac) 40 MG capsule, Take 2 capsules by mouth Daily. Patient due for follow up Nov 1 for future refills, Disp: 60 capsule, Rfl: 0    fluticasone (FLONASE) 50 MCG/ACT nasal spray, INSTILL 2 SPRAYS IN EACH NOSTRIL DAILY., Disp: 16 g, Rfl: 2    guaiFENesin (MUCINEX) 600 MG 12 hr tablet, Take 2 tablets by mouth 2 (Two) Times a Day., Disp: , Rfl:     hyoscyamine (ANASPAZ,LEVSIN) 0.125 MG tablet, Take 1 tablet by mouth 4 (Four) Times a Day With Meals & at Bedtime., Disp: 360 tablet, Rfl: 3    ipratropium-albuterol (DUO-NEB) 0.5-2.5 mg/3 ml nebulizer, Take 3 mL by nebulization 4 (Four) Times a Day., Disp: 360 mL, Rfl: 1    loratadine (CLARITIN) 10 MG tablet, TAKE 1 TABLET BY MOUTH DAILY., Disp: 90 tablet, Rfl: 1    montelukast (SINGULAIR) 10 MG tablet, Take 1 tablet by mouth every night at bedtime., Disp: 90 tablet, Rfl: 0    O2 (OXYGEN), Inhale 1 (One) Time. Use 2 LPM PRN if O2 if below 90, Disp: , Rfl:     pantoprazole (PROTONIX) 40 MG EC tablet, Take 1 tablet by mouth Daily., Disp: 90 tablet, Rfl: 1    rosuvastatin (CRESTOR) 20 MG tablet, Take 1 tablet by mouth Daily., Disp: 90 tablet, Rfl: 3    sodium chloride 3 % nebulizer solution, inhale contents of 1 vial via nebulizer TWICE DAILY, Disp: , Rfl:     Trelegy Ellipta 200-62.5-25 MCG/ACT aerosol powder , , Disp: , Rfl:      Allergies   Allergen Reactions    Aspirin Hives and Arrhythmia     Chest pain    Cephalexin Hives    Nsaids Hives    Penicillins Hives    Contrast Dye (Echo Or Unknown Ct/Mr) Rash       Family History   Problem Relation Age of Onset    Colon cancer Mother 60        Pancreatic, breast    Cancer Mother     Diabetes Mother     Breast cancer Mother         60s    Arthritis Mother     Pancreatic cancer Mother     Irritable bowel syndrome Mother      "Heart disease Father     Heart attack Father     Arthritis Father     Ulcerative colitis Father     Depression Sister     Anxiety disorder Sister     Cancer Sister     Pancreatic cancer Sister     Irritable bowel syndrome Sister     Bipolar disorder Maternal Aunt     Colon cancer Maternal Grandmother 70    Seizures Son     Depression Son     Suicide Attempts Son     Depression Son     ADD / ADHD Son     Colon cancer Other         60s    Depression Daughter     Malig Hyperthermia Neg Hx         Social History     Social History Narrative    Not on file       Objective       Vital Signs:   /69 (BP Location: Left arm, Patient Position: Sitting, Cuff Size: Adult)   Pulse 96   Ht 172.7 cm (67.99\")   Wt 86.2 kg (190 lb)   SpO2 100%   BMI 28.90 kg/m²       Physical Exam  Constitutional:       Appearance: Normal appearance. She is normal weight.   HENT:      Head: Normocephalic and atraumatic.      Nose: Nose normal.   Pulmonary:      Effort: Pulmonary effort is normal.   Skin:     General: Skin is warm and dry.   Neurological:      Mental Status: She is alert and oriented to person, place, and time. Mental status is at baseline.   Psychiatric:         Mood and Affect: Mood normal.         Behavior: Behavior normal.         Thought Content: Thought content normal.         Judgment: Judgment normal.         Result Review :       CBC w/diff          3/10/2023    09:07 10/11/2023    09:12   CBC w/Diff   WBC 4.88  5.58    RBC 4.30  4.34    Hemoglobin 12.6  13.1    Hematocrit 37.0  38.5    MCV 86.0  88.7    MCH 29.3  30.2    MCHC 34.1  34.0    RDW 12.4  12.4    Platelets 472  407    Neutrophil Rel % 52.5  55.3    Lymphocyte Rel % 29.3  26.2    Monocyte Rel % 11.3  10.6    Eosinophil Rel % 5.3  6.3    Basophil Rel % 1.4  1.4      CMP          3/10/2023    09:07 10/11/2023    09:12   CMP   Glucose 96  88    BUN 19  19    Creatinine 0.68  0.74    Sodium 137  140    Potassium 4.1  4.1    Chloride 101  102    Calcium 9.7  " 9.4    Total Protein 6.7  6.9    Albumin 4.4  4.5    Globulin 2.3  2.4    Total Bilirubin 0.5  0.4    Alkaline Phosphatase 72  90    AST (SGOT) 13  15    ALT (SGPT) 9  11    BUN/Creatinine Ratio 27.9  25.7              Assessment and Plan    Diagnoses and all orders for this visit:    1. Hiatal hernia (Primary)  -     Ambulatory Referral to Thoracic Surgery  -     FL ESOPHAGRAM DOUBLE CONTRAST; Future    2. Gastroesophageal reflux disease, unspecified whether esophagitis present  -     pantoprazole (PROTONIX) 40 MG EC tablet; Take 1 tablet by mouth Daily.  Dispense: 90 tablet; Refill: 1    3. Irritable bowel syndrome with constipation    Other orders  -     famotidine (PEPCID) 20 MG tablet; Take 1 tablet by mouth 2 (Two) Times a Day As Needed for Heartburn.  Dispense: 180 tablet; Refill: 1    68 year old patient presents to the office for follow-up with a history of heartburn, large hiatal hernia, and IBS - constipation. Patient has persistent heartburn despite Protonix, Pepcid, and Carafate and is ready to proceed with further discuss on hiatal hernia repair, referral placed. Esophagram ordered. She will continue Protonix 40mg 1 times/day and Pepcid 2-3 times a day depending on severity of heartburn.  She will continue Levsin and MiraLAX for management of IBS constipation.  Patient will follow-up in 6 months.  Patient is agreeable to plan call office any questions or concerns.    Follow Up   No follow-ups on file.  Patient was given instructions and counseling regarding her condition or for health maintenance advice. Please see specific information pulled into the AVS if appropriate.

## 2024-02-25 DIAGNOSIS — F33.1 MAJOR DEPRESSIVE DISORDER, RECURRENT EPISODE, MODERATE DEGREE: ICD-10-CM

## 2024-02-27 RX ORDER — FLUOXETINE HYDROCHLORIDE 40 MG/1
80 CAPSULE ORAL DAILY
Qty: 60 CAPSULE | Refills: 0 | Status: SHIPPED | OUTPATIENT
Start: 2024-02-27

## 2024-03-19 ENCOUNTER — HOSPITAL ENCOUNTER (OUTPATIENT)
Dept: GENERAL RADIOLOGY | Facility: HOSPITAL | Age: 69
Discharge: HOME | End: 2024-03-19
Payer: MEDICARE

## 2024-03-19 ENCOUNTER — TELEPHONE (OUTPATIENT)
Dept: CARDIOLOGY | Facility: CLINIC | Age: 69
End: 2024-03-19
Payer: MEDICARE

## 2024-03-19 DIAGNOSIS — K44.9 HIATAL HERNIA: ICD-10-CM

## 2024-03-19 PROCEDURE — 74221 X-RAY XM ESOPHAGUS 2CNTRST: CPT

## 2024-03-19 RX ADMIN — BARIUM SULFATE 183 ML: 960 POWDER, FOR SUSPENSION ORAL at 11:34

## 2024-03-19 RX ADMIN — ANTACID/ANTIFLATULENT 1 PACKET: 380; 550; 10; 10 GRANULE, EFFERVESCENT ORAL at 11:34

## 2024-03-19 RX ADMIN — BARIUM SULFATE 700 MG: 700 TABLET ORAL at 11:34

## 2024-03-19 NOTE — TELEPHONE ENCOUNTER
[facility] received a fax that requires your attention. The document has been indexed to the patient’s chart for your review.      Reason for sending: EXTERNAL MEDICAL RECORD NOTIFICATION     Documents Description: MEDS-ROSUVASTATIN REFILL-3.18.24    Name of Sender: [facility]     Date Indexed: 3.18.24

## 2024-03-20 DIAGNOSIS — J30.2 SEASONAL ALLERGIC RHINITIS, UNSPECIFIED TRIGGER: ICD-10-CM

## 2024-03-20 DIAGNOSIS — J44.9 CHRONIC OBSTRUCTIVE PULMONARY DISEASE, UNSPECIFIED COPD TYPE: ICD-10-CM

## 2024-03-21 RX ORDER — MONTELUKAST SODIUM 10 MG/1
10 TABLET ORAL
Qty: 30 TABLET | Refills: 0 | Status: SHIPPED | OUTPATIENT
Start: 2024-03-21

## 2024-03-21 NOTE — TELEPHONE ENCOUNTER
See previous messages about this patient. She is overdue for follow up with me. Please call her to schedule follow up.     Me to [facility]   2/27/24  1:49 PM Note      Patient was to follow up with me in 3 months from 10/2023. Please schedule follow up with me.

## 2024-03-22 ENCOUNTER — TELEPHONE (OUTPATIENT)
Dept: GASTROENTEROLOGY | Facility: CLINIC | Age: 69
End: 2024-03-22
Payer: MEDICARE

## 2024-03-22 NOTE — TELEPHONE ENCOUNTER
S/w patient. Aware of results. Patient will continue current reflux medication     Patient has appointment with Dr. Carl 04/08/2024    Patient is currently now having and SX'S or trouble swallowing    Patient will call with any questions or concerns

## 2024-03-22 NOTE — TELEPHONE ENCOUNTER
----- Message from RESHMA Molina sent at 3/19/2024  3:20 PM EDT -----  I have reviewed the patient's most recent esophagram which this massive amount of reflux with moderate sliding hiatal hernia - plan to proceed with referrral to Dr. Carl to discuss surgical intervention for hiatal hernia repair. Continue reflux medication. There is possible tiny Zenker's diverticulum around C5-6 which is a small outpouching, only concerning if symptomatic (experiencing dysphagia)

## 2024-04-08 ENCOUNTER — OFFICE VISIT (OUTPATIENT)
Dept: SURGERY | Facility: CLINIC | Age: 69
End: 2024-04-08
Payer: MEDICARE

## 2024-04-08 VITALS
OXYGEN SATURATION: 98 % | DIASTOLIC BLOOD PRESSURE: 60 MMHG | HEIGHT: 68 IN | BODY MASS INDEX: 28.34 KG/M2 | HEART RATE: 88 BPM | WEIGHT: 187 LBS | SYSTOLIC BLOOD PRESSURE: 114 MMHG

## 2024-04-08 DIAGNOSIS — K44.9 HIATAL HERNIA: Primary | ICD-10-CM

## 2024-04-08 PROCEDURE — 99204 OFFICE O/P NEW MOD 45 MIN: CPT | Performed by: THORACIC SURGERY (CARDIOTHORACIC VASCULAR SURGERY)

## 2024-04-08 PROCEDURE — 1160F RVW MEDS BY RX/DR IN RCRD: CPT | Performed by: THORACIC SURGERY (CARDIOTHORACIC VASCULAR SURGERY)

## 2024-04-08 PROCEDURE — 1159F MED LIST DOCD IN RCRD: CPT | Performed by: THORACIC SURGERY (CARDIOTHORACIC VASCULAR SURGERY)

## 2024-04-08 NOTE — PROGRESS NOTES
Chief Complaint  GERD    Subjective        Jennifer Shelton presents to [facility] THORACIC SURGERY  History of Present Illness    The patient is a pleasant 68-year-old female who presents to the clinic with complaints of reflux. She is accompanied by an adult female.    She has been experiencing severe acid reflux for several years, which has escalated to the point where no therapeutic intervention has been effective. Concurrently, she has been diagnosed with irritable bowel syndrome. Her current medication regimen includes hyoscyamine for irritable bowel syndrome, famotidine, and pantoprazole, which have not been effective. She experiences frequent nocturnal awakenings, necessitating the use of three Benadryl tablets. Despite sleeping with 2 pillows, her symptoms persist. Recently, she has been snacking frequently due to her irritable bowel syndrome. Recently, she experienced an episode of severe acid reflux at 1:30 AM, which necessitated a bowel movement and subsequent vomiting, although this is not a common occurrence for her. Her diet is limited due to acid reflux. A recent test revealed severe reflux, the cause of which is uncertain, but she speculates that her sphincter is not closing properly or if it is due to improper repair of her hernia. Her diet is limited due to her reflux and Irritable Bowel Syndrome.     She has fibromyalgia, which has bothered her multiple times recently. She has herniated disks and COPD. She has not had a lung cancer screening completed for a while because she had a nodule on her left lung. They were keeping a watch on that, but now that it is past a year, they do not have to watch it anymore. She has had a couple of surgeries. She denies any trouble with anesthesia. She had genetic testing done.    She quit smoking on 11/20/2020. Prior to that, she smoked about a pack and a half a day.    Her mother, grandmother, and maternal aunt had pancreatic  "cancer.    Objective   Vital Signs:  /60   Pulse 88   Ht 172.7 cm (68\")   Wt 84.8 kg (187 lb)   SpO2 98%   BMI 28.43 kg/m²   Estimated body mass index is 28.43 kg/m² as calculated from the following:    Height as of this encounter: 172.7 cm (68\").    Weight as of this encounter: 84.8 kg (187 lb).             Physical Exam  Vitals and nursing note reviewed.   Constitutional:       Appearance: She is well-developed.   HENT:      Head: Normocephalic and atraumatic.      Nose: Nose normal.   Eyes:      Conjunctiva/sclera: Conjunctivae normal.   Cardiovascular:      Rate and Rhythm: Normal rate.   Pulmonary:      Effort: Pulmonary effort is normal.   Abdominal:      Palpations: Abdomen is soft.   Musculoskeletal:      Cervical back: Neck supple.   Skin:     General: Skin is warm and dry.   Neurological:      Mental Status: She is alert and oriented to person, place, and time.   Psychiatric:         Behavior: Behavior normal.         Thought Content: Thought content normal.         Judgment: Judgment normal.        Result Review :    Imaging  Esophagram performed on 3/19/2024, demonstrates an esophageal web at C5-C6 with a tiny Zenker's diverticulum less than 1 cm, a moderate hiatal hernia, spontaneous reflux when recumbent reflux into the upper thoracic esophagus.                 Assessment and Plan     Diagnoses and all orders for this visit:    1. Hiatal hernia (Primary)  -     Manometry Study, Gastric; Future  -     CT Chest Without Contrast; Future    The patient is a pleasant 68-year-old female who presents to the clinic with complaints of reflux and what appears to be a moderate-sized hernia on imaging.    Gastroesophageal reflux disease.  - A manometry will be required for a comprehensive evaluation of her esophageal function, as this procedure may alter the proposed procedure.   - Additionally, a CT scan will be required.   - We have discussed the possibility of her anatomical " structure.    Follow-up  She is scheduled for a follow-up visit subsequent to her manometry and CT scan of the chest.         I spent 45 minutes caring for Jennifer on this date of service. This time includes time spent by me in the following activities:preparing for the visit, reviewing tests, obtaining and/or reviewing a separately obtained history, performing a medically appropriate examination and/or evaluation , counseling and educating the patient/family/caregiver, ordering medications, tests, or procedures, documenting information in the medical record, and independently interpreting results and communicating that information with the patient/family/caregiver  Follow Up     No follow-ups on file.  Patient was given instructions and counseling regarding her condition or for health maintenance advice. Please see specific information pulled into the AVS if appropriate.         Transcribed from ambient dictation for Merari Carl MD by Naomi Arteaga.  04/08/24   12:36 EDT    Patient or patient representative verbalized consent to the visit recording.  I have personally performed the services described in this document as transcribed by the above individual, and it is both accurate and complete.

## 2024-04-08 NOTE — LETTER
April 19, 2024     IBLL Duncan  [address] [address]    Patient: Jennifer Shelton   YOB: 1955   Date of Visit: 4/8/2024     Dear BILL Duncan:       Thank you for referring Jennifer Shelton to me for evaluation. Below are the relevant portions of my assessment and plan of care.    If you have questions, please do not hesitate to call me. I look forward to following Jennifer along with you.         Sincerely,        Merari Carl MD        CC: No Recipients    Merari Carl MD  04/19/24 1154  Sign when Signing Visit  Chief Complaint  GERD    Subjective       Jennifer Shelton presents to [facility] THORACIC SURGERY  History of Present Illness    The patient is a pleasant 68-year-old female who presents to the clinic with complaints of reflux. She is accompanied by an adult female.    She has been experiencing severe acid reflux for several years, which has escalated to the point where no therapeutic intervention has been effective. Concurrently, she has been diagnosed with irritable bowel syndrome. Her current medication regimen includes hyoscyamine for irritable bowel syndrome, famotidine, and pantoprazole, which have not been effective. She experiences frequent nocturnal awakenings, necessitating the use of three Benadryl tablets. Despite sleeping with 2 pillows, her symptoms persist. Recently, she has been snacking frequently due to her irritable bowel syndrome. Recently, she experienced an episode of severe acid reflux at 1:30 AM, which necessitated a bowel movement and subsequent vomiting, although this is not a common occurrence for her. Her diet is limited due to acid reflux. A recent test revealed severe reflux, the cause of which is uncertain, but she speculates that her sphincter is not closing properly or if it is due to improper repair of her hernia. Her diet is limited due to her reflux and Irritable Bowel Syndrome.     She has  "fibromyalgia, which has bothered her multiple times recently. She has herniated disks and COPD. She has not had a lung cancer screening completed for a while because she had a nodule on her left lung. They were keeping a watch on that, but now that it is past a year, they do not have to watch it anymore. She has had a couple of surgeries. She denies any trouble with anesthesia. She had genetic testing done.    She quit smoking on 11/20/2020. Prior to that, she smoked about a pack and a half a day.    Her mother, grandmother, and maternal aunt had pancreatic cancer.    Objective  Vital Signs:  /60   Pulse 88   Ht 172.7 cm (68\")   Wt 84.8 kg (187 lb)   SpO2 98%   BMI 28.43 kg/m²   Estimated body mass index is 28.43 kg/m² as calculated from the following:    Height as of this encounter: 172.7 cm (68\").    Weight as of this encounter: 84.8 kg (187 lb).             Physical Exam  Vitals and nursing note reviewed.   Constitutional:       Appearance: She is well-developed.   HENT:      Head: Normocephalic and atraumatic.      Nose: Nose normal.   Eyes:      Conjunctiva/sclera: Conjunctivae normal.   Cardiovascular:      Rate and Rhythm: Normal rate.   Pulmonary:      Effort: Pulmonary effort is normal.   Abdominal:      Palpations: Abdomen is soft.   Musculoskeletal:      Cervical back: Neck supple.   Skin:     General: Skin is warm and dry.   Neurological:      Mental Status: She is alert and oriented to person, place, and time.   Psychiatric:         Behavior: Behavior normal.         Thought Content: Thought content normal.         Judgment: Judgment normal.        Result Review:    Imaging  Esophagram performed on 3/19/2024, demonstrates an esophageal web at C5-C6 with a tiny Zenker's diverticulum less than 1 cm, a moderate hiatal hernia, spontaneous reflux when recumbent reflux into the upper thoracic esophagus.                 Assessment and Plan     Diagnoses and all orders for this visit:    1. Hiatal " hernia (Primary)  -     Manometry Study, Gastric; Future  -     CT Chest Without Contrast; Future    The patient is a pleasant 68-year-old female who presents to the clinic with complaints of reflux and what appears to be a moderate-sized hernia on imaging.    Gastroesophageal reflux disease.  - A manometry will be required for a comprehensive evaluation of her esophageal function, as this procedure may alter the proposed procedure.   - Additionally, a CT scan will be required.   - We have discussed the possibility of her anatomical structure.    Follow-up  She is scheduled for a follow-up visit subsequent to her manometry and CT scan of the chest.         I spent 45 minutes caring for Jennifer on this date of service. This time includes time spent by me in the following activities:preparing for the visit, reviewing tests, obtaining and/or reviewing a separately obtained history, performing a medically appropriate examination and/or evaluation , counseling and educating the patient/family/caregiver, ordering medications, tests, or procedures, documenting information in the medical record, and independently interpreting results and communicating that information with the patient/family/caregiver  Follow Up     No follow-ups on file.  Patient was given instructions and counseling regarding her condition or for health maintenance advice. Please see specific information pulled into the AVS if appropriate.         Transcribed from ambient dictation for Merari Carl MD by Naomi Arteaga.  04/08/24   12:36 EDT    Patient or patient representative verbalized consent to the visit recording.  I have personally performed the services described in this document as transcribed by the above individual, and it is both accurate and complete.

## 2024-04-09 ENCOUNTER — PATIENT ROUNDING (BHMG ONLY) (OUTPATIENT)
Dept: SURGERY | Facility: CLINIC | Age: 69
End: 2024-04-09
Payer: MEDICARE

## 2024-04-09 NOTE — PROGRESS NOTES
April 9, 2024                Done in office             We're always looking for ways to make our patients' experiences even better. Do you have recommendations on ways we may improve?  no    Overall were you satisfied with your first visit to our practice? yes

## 2024-06-04 ENCOUNTER — TELEPHONE (OUTPATIENT)
Dept: SURGERY | Facility: CLINIC | Age: 69
End: 2024-06-04
Payer: MEDICARE

## 2024-06-04 NOTE — TELEPHONE ENCOUNTER
I left a detailed message regarding appointment cancellation. Good afternoon. My name is Gia from dr Merari Calr office and I will cancel your appointment for you. If there is an issue with getting your manometry, or scheduling conflicts please give us a call at [phone] for assistance. Thank you     DB 12:41  6/4/2024

## 2024-06-07 ENCOUNTER — HOSPITAL ENCOUNTER (OUTPATIENT)
Dept: MAMMOGRAPHY | Facility: HOSPITAL | Age: 69
Discharge: HOME | End: 2024-06-07
Payer: MEDICARE

## 2024-06-07 ENCOUNTER — HOSPITAL ENCOUNTER (OUTPATIENT)
Dept: BONE DENSITY | Facility: HOSPITAL | Age: 69
Discharge: HOME | End: 2024-06-07
Payer: MEDICARE

## 2024-06-07 ENCOUNTER — HOSPITAL ENCOUNTER (OUTPATIENT)
Dept: CT IMAGING | Facility: HOSPITAL | Age: 69
Discharge: HOME | End: 2024-06-07
Payer: MEDICARE

## 2024-06-07 DIAGNOSIS — Z12.31 ENCOUNTER FOR SCREENING MAMMOGRAM FOR MALIGNANT NEOPLASM OF BREAST: ICD-10-CM

## 2024-06-07 DIAGNOSIS — Z78.0 POSTMENOPAUSAL: ICD-10-CM

## 2024-06-07 DIAGNOSIS — K44.9 HIATAL HERNIA: ICD-10-CM

## 2024-06-07 DIAGNOSIS — M85.80 OSTEOPENIA, UNSPECIFIED LOCATION: ICD-10-CM

## 2024-06-07 PROCEDURE — 77063 BREAST TOMOSYNTHESIS BI: CPT

## 2024-06-07 PROCEDURE — 77067 SCR MAMMO BI INCL CAD: CPT

## 2024-06-07 PROCEDURE — 77080 DXA BONE DENSITY AXIAL: CPT

## 2024-06-07 PROCEDURE — 71250 CT THORAX DX C-: CPT

## 2024-07-15 ENCOUNTER — OFFICE VISIT (OUTPATIENT)
Dept: FAMILY MEDICINE CLINIC | Facility: CLINIC | Age: 69
End: 2024-07-15
Payer: MEDICARE

## 2024-07-15 VITALS
TEMPERATURE: 98.4 F | WEIGHT: 186.6 LBS | DIASTOLIC BLOOD PRESSURE: 74 MMHG | OXYGEN SATURATION: 93 % | SYSTOLIC BLOOD PRESSURE: 118 MMHG | BODY MASS INDEX: 28.28 KG/M2 | HEIGHT: 68 IN | RESPIRATION RATE: 16 BRPM | HEART RATE: 82 BPM

## 2024-07-15 DIAGNOSIS — K92.1 HEMATOCHEZIA: ICD-10-CM

## 2024-07-15 DIAGNOSIS — M48.02 CERVICAL SPINAL STENOSIS: ICD-10-CM

## 2024-07-15 DIAGNOSIS — M19.90 ARTHRITIS: ICD-10-CM

## 2024-07-15 DIAGNOSIS — G47.30 SLEEP APNEA, UNSPECIFIED TYPE: ICD-10-CM

## 2024-07-15 DIAGNOSIS — D72.10 EOSINOPHILIA, UNSPECIFIED TYPE: ICD-10-CM

## 2024-07-15 DIAGNOSIS — F33.1 MAJOR DEPRESSIVE DISORDER, RECURRENT EPISODE, MODERATE DEGREE: ICD-10-CM

## 2024-07-15 DIAGNOSIS — I49.8 ACCELERATED JUNCTIONAL RHYTHM: ICD-10-CM

## 2024-07-15 DIAGNOSIS — K21.00 GASTROESOPHAGEAL REFLUX DISEASE WITH ESOPHAGITIS WITHOUT HEMORRHAGE: ICD-10-CM

## 2024-07-15 DIAGNOSIS — C44.92 SQUAMOUS CELL SKIN CANCER: ICD-10-CM

## 2024-07-15 DIAGNOSIS — M54.6 THORACIC SPINE PAIN: ICD-10-CM

## 2024-07-15 DIAGNOSIS — M41.22 OTHER IDIOPATHIC SCOLIOSIS, CERVICAL REGION: ICD-10-CM

## 2024-07-15 DIAGNOSIS — M54.2 NECK PAIN: ICD-10-CM

## 2024-07-15 DIAGNOSIS — D75.839 THROMBOCYTOSIS: ICD-10-CM

## 2024-07-15 DIAGNOSIS — M54.12 CERVICAL RADICULOPATHY: ICD-10-CM

## 2024-07-15 DIAGNOSIS — Z88.8 ALLERGIC TO ASPIRIN: ICD-10-CM

## 2024-07-15 DIAGNOSIS — M47.14 OSTEOARTHRITIS OF THORACIC SPINE WITH MYELOPATHY: ICD-10-CM

## 2024-07-15 DIAGNOSIS — K58.1 IRRITABLE BOWEL SYNDROME WITH CONSTIPATION: ICD-10-CM

## 2024-07-15 DIAGNOSIS — R06.02 SHORTNESS OF BREATH: ICD-10-CM

## 2024-07-15 DIAGNOSIS — M51.36 DEGENERATIVE DISC DISEASE, LUMBAR: ICD-10-CM

## 2024-07-15 DIAGNOSIS — M79.7 FIBROMYALGIA: ICD-10-CM

## 2024-07-15 DIAGNOSIS — K62.5 RECTAL BLEEDING: ICD-10-CM

## 2024-07-15 DIAGNOSIS — M47.812 CERVICAL SPONDYLOSIS WITHOUT MYELOPATHY: ICD-10-CM

## 2024-07-15 DIAGNOSIS — J44.9 CHRONIC OBSTRUCTIVE PULMONARY DISEASE, UNSPECIFIED COPD TYPE: ICD-10-CM

## 2024-07-15 DIAGNOSIS — M51.34 DEGENERATIVE DISC DISEASE, THORACIC: ICD-10-CM

## 2024-07-15 DIAGNOSIS — E78.5 HYPERLIPIDEMIA, UNSPECIFIED HYPERLIPIDEMIA TYPE: Primary | ICD-10-CM

## 2024-07-15 DIAGNOSIS — M47.9 OSTEOARTHRITIS OF SPINE, UNSPECIFIED SPINAL OSTEOARTHRITIS COMPLICATION STATUS, UNSPECIFIED SPINAL REGION: ICD-10-CM

## 2024-07-15 DIAGNOSIS — M41.9 SCOLIOSIS OF THORACIC SPINE, UNSPECIFIED SCOLIOSIS TYPE: ICD-10-CM

## 2024-07-15 DIAGNOSIS — R53.83 OTHER FATIGUE: ICD-10-CM

## 2024-07-15 DIAGNOSIS — K44.9 HIATAL HERNIA: ICD-10-CM

## 2024-07-15 DIAGNOSIS — I49.1 ECTOPIC ATRIAL RHYTHM: ICD-10-CM

## 2024-07-15 DIAGNOSIS — M50.30 DEGENERATIVE DISC DISEASE, CERVICAL: ICD-10-CM

## 2024-07-15 DIAGNOSIS — R91.1 PULMONARY NODULE: ICD-10-CM

## 2024-07-15 DIAGNOSIS — J30.2 SEASONAL ALLERGIC RHINITIS, UNSPECIFIED TRIGGER: ICD-10-CM

## 2024-07-15 DIAGNOSIS — E55.9 VITAMIN D DEFICIENCY: ICD-10-CM

## 2024-07-15 PROBLEM — F32.A DEPRESSION: Status: RESOLVED | Noted: 2021-10-16 | Resolved: 2024-07-15

## 2024-07-15 NOTE — PROGRESS NOTES
Subjective   Jennifer Shelton is a 69 y.o. female who presents today in follow up of hyperlipidemia, vitamin D deficiency, thrombocytosis, moods, COPD, pulmonary nodule, GERD, fibromyalgia, Cervical spine DDD, and history of squamous cell skin cancer.     Hyperlipidemia      Hyperlipidemia- changed to Crestor- she reports ran out a few months ago and she has had less body aches and not as much trouble with stomach and heartburn. Still flairs occasionally but on medication, she was having daily. She was getting from cardiology and reports they did not fill, so she stopped it. She was taking in the morning. Still aching but has much less aching since stopping Crestor. Feet, ankles, and fingers were hurting all the time and now only occasionally an ache but not as severe and not as often. Also less headaches off Crestor.   She was previously taking Lipitor 20 mg nightly  Last medication 2016- Lipitor- did well on medication. She has not been on other medications in the past.   Stopped all medication years ago. Last year was full of specialists and let go of routine things.   Vitamin D deficiency- she stopped vitamin D because she is out and did not get more.   She was taking vitamin D 2000 IU daily.   Thrombocytosis- no signs of clotting or any concerns.     Moods- Prozac 40 mg twice daily changed to Prozac 80 mg daily. Did not change anything. Does not think she needs to change medication. Did not seem to click as much.    Moods had not improved. She continued with depression. She thought it may be time to see specialist.  Referred to psychiatry and was seen 8/2022 but did not keep appointment 4/9/2022.  Prozac in the past- she was up to 80 mg once daily. She had depression since childhood and started treatment in 30s. Increased depression following her son's death. She stopped all medications following his death.   Son's suicide 3/11/2021- she has not been to the doctor for CT chest, scans, other testing and  treatment. She has not been doing anything since her son's death.  She noticed improvement in moods with restarting Prozac 20 mg once daily but not as good as it was when she initially restarted. Decreased motivation, not wanting to get up and do anything. She has had mind racing and depressed feeling.   On Prozac 40 mg daily, she had no motivation. When she was out, she was ok but came back home and did not want to do anything.   Previous medications- Cymbalta- helped but Prozac helped more. Last Cymbalta 2016.  Taking benadryl to sleep but not helping much. Elavil- helped with depression but made her very tired. She also would forget things when she was taking it and talking to people.     Fatigue- Sleep medicine ordered home sleep study. Has not picked up- did not perform. Still tired.    She is tired all the time- she will sleep a few hours at night wake up and go back to sleep. She is always tired and does not feel rested.    She was to have sleep study right after her son's death but cancelled. Has not followed up with pulmonology in some time.   COPD, asthma, eosinophilia- using Trelegy and Singulair daily. Has not had follow up with pulmonology. She is overdue but has not completed sleep study.   stopped Qvar and was taking Stiolto. They changed to Trelegy 200 daily. She has noted significant improvement. She reports they advised more like asthma with elevated eosinophils.    Still using inhalers. Only taking nebulizer as needed- was to take twice daily but she has taken only as needed. Taking her daily inhaler and Singulair. Medications- Qvar, Stiolto, Monteleukast, Flonase, Mucinex, and has duoneb  As needed.   Pulmonary nodule- had follow up CT with thoracic surgery and no follow up needed.   CT chest 11/8/2021- Bilateral apical pleural thickening, mild emphysema, calcified pulmonary nodules right lower lobe, other subpleural nodules and areas of scarring with nodularity-all reported without change.   Small hiatal hernia.  Per radiology, no suspicious pulmonary nodules.  Recheck in 1 year. Repeat 12/2022- stable. No need to continue to follow but per pulmonology, may lori annual screening.   Nodule on right lung- they were scanning and following with Dr Kelley.   Pulmonology- [facility] Taylor- missed appt for follow up of pulmonary nodule. Hospitalized 2/2/21 and 10/2021- she had been coughing for 2 months then went to doctor and was given steroid. She also had poison ivy and that helped too. Seen by someone in [place]- urgent care.     GERD, hiatal hernia, IBS- Dr Oneal Raymond, Brianna Martinez, [facility]-  she was to have manometry test and follow up. She was not sure she wanted to do it and did not do the test. Not sure she wants to consider it.   She was having worsening symptoms. Last seen by GI- they advised she let them know when she was ready to see surgeon for procedure. She may have to proceed because cannot live like this. She reports she gets up in the morning then has chest pain, vomits, and feels better- she feels heartburn/ indigestion. She wakes up in the middle of the night and has to take medication. She takes Pepcid. She was unable to get Carafate in between medications and did not feel like it helped when taking it.   Taking Protonix 40 mg once daily and Pepcid 20 mg 2-3 x daily, and has carafate- did not help and has not been taking. Negative gastric emptying study. Will have colonoscopy 4/2023. They will consider Nissen.   She is still having heartburn symptoms- sometimes wakes up with it and sometimes has after taking the medication in the day. She is also having increased IBS symptoms. Patient with frequent, urgent BM. Has never taken anything for it but she has continued with symptoms. She would like to consider medication.   She had pain in her chest- if she does not take Pepcid, she has pain in her chest and cannot eat. When she takes the medication, she does ok. She passes blood in  stool that is bright red at times. Seeing GI.   EGD 2/2022 with large hiatal hernia, esophagitis.    Still aching but has much less aching since stopping Crestor. Feet, ankles, and fingers were hurting all the time and now only occasionally an ache but not as severe and not as often. Also less headaches off Crestor.   Fibromyalgia- has been on no medication in a while.   Previously Cymbalta and Elavil.   Shoulder pain- she had MRI left shoulder with tendinopathy supraspinatous, biceps, glenohumeral joint effusion, chondromalacia- suggesting capsulitis and minimal DJD AC joint. Now right shoulder pain. Does not want to see orthopedist at this time.   DDD cervical spine- she had appt with neurosurgery 5/2023- cancelled because was nervous the answer was going to be surgery.   always hurting- having headaches. Thinks from neck pain. 1/2021 for cervicalgia, cervical radiculopathy, herniated C4-5, C5-6, left shoulder pain. Ordered MRI left shoulder. If no abnormality, recommended C4-5, C5-6 ACDF  Went to orthopedist for rotator cuff as well 3/2021- Xray, MRI 2/2021 with moderate supraspinatus tendonopathy, partial thickness fraying, intra-articular long head biceps tendinopathy, small glenohummeral effusion, mild to moderate chondromalacia, synovial thickening, or intr-articular debris vs labral tissue in axillary recess- suggested capsulitis. DJD AC joint with chronic deformity of clavicle. Exercises for tendonitis given and advised to return for consideration of injection if no improvement    Squamous cell skin cancer- last appt 11-12/2023- follow up 1 year. Had to use chemo cream started Thanksgiving night. Went back and they said was ok. [facility] [place]  Patient had SCC 1/2021.    Mother and sister with pancreatic cancer. Mother with history of colon and breast cancer. MAunt and MGM with colon cancer.     No Hx abnormal PAP- last PAP 3 years ago.     Patient's Specialists:  Behavioral health-Deja Chadwick  APRN- last appointment 8/2022 for major depressive disorder with prolonged grief from death of son 3/2022 for suicide.  Advised change Prozac from 40 mg twice a day to 80 mg once daily in the morning.  If increased irritability, will change to 60 mg.  Advised to check to see if Trintellix or Viibryd would be approved.  Follow-up in 4 weeks.  Patient canceled follow-up.  She has not rescheduled.  Cardiology-Dr. David Oliveira-last appointment 12/2022 for chest pain, exertional dyspnea, diastolic dysfunction, coronary artery calcification on CT, hyperlipidemia.  Advised Lexiscan nuclear stress test, change in intensity to Crestor 20 mg nightly and advised aspirin 81 mg.  Mild diastolic dysfunction on echo.  Advised low-salt diet and monitor weight.  Advised follow-up 6 to 9 months.  Stress test normal.    Prior Dr Lb Boyer- last appt 4/2020 for preop exam, CP, SOA, abnormal EKG. Advised smoking cessation, follow up with pulmonology, take Lipitor, hold off on colonoscopy pending cardiac workup- stress test, echo, and holter monitor. Advised AAA screening. Negative cardiac testing. Advised follow up 1 year. She did not return for follow up.   GI- Dr Raymond, Brianna Martinez, Stephanie JUSTICE, [facility]- last appt 8/2023 for family history of colon cancer, breast cancer, pancreatic cancer, GERD, hiatal hernia, IBS with constipation.  Advise gastric emptying study and hyoscyamine.  Colonoscopy for 2023.  Consider referral for Nissen.  Constipation not adequately relieved with Linzess 72 mcg and did not wish for trial of a stronger dose due to cost.  Advised MiraLAX and stool softeners as needed.  Sent prescription for Levsin for abdominal cramping.  Advised [facility] genetic testing.  Continue Protonix, Pepcid, and was given Carafate.  Advised she see surgery for hiatal hernia repair.  Follow-up 2/2024  Normal gastric emptying study.  10/2021-for blood in stool, IBS, and GERD. Advised Miralax, Protonix 40 mg once daily, and  EGD, Colonoscopy. Endoscopy 2/2022 with large hitala hernia. No colonoscopy performed. Benign pathology.   General surgery- [facility] Arias- last appt 3/2020 for hematuria. CT abd/pelvis, cystoscopy, and UA.   Neurosurgery- Dr Willi Ghotra- last appt 1/2021 for cervicalgia, cervical radiculopathy, herniated C4-5, C5-6, left shoulder pain. Ordered MRI left shoulder. If no abnormality, recommended C4-5, C5-6 ACDF.  Appointment 3/30/2023 and 5/2023 canceled.  Orthopedic surgery- Dr Jet Salinas- last appt 3/2021 for left shoulder pain. Xray, MRI 2/2021 with moderate supraspinatus tendonopathy, partial thickness fraying, intra-articular long head biceps tendinopathy, small glenohummeral effusion, mild to moderate chondromalacia, synovial thickening, or intr-articular debris vs labral tissue in axillary recess- suggested capsulitis. DJD AC joint with chronic deformity of clavicle. Exercises for tendonitis given and advised to return for consideration of injection if no improvement.   Pulmonology-, Dr. Sajan Renteria, APRN- last appt 1/2023 for COPD with emphysema, pulmonary nodules, history of tobacco abuse, URIEL.  Changed Stiolto to Trelegy 200.   CT stable over 2 years.-no need for follow-up testing.  May benefit for yearly screening and will address at next visit.  Need to evaluate for URIEL.  Follow-up 2 months with full PFT.   2/2021 in follow up of hospitalization for acute hypoxic respiratory failure requiring noninvasince positive pressure ventilation and COPD exacerbation. Hypoxia to 86% on 4 L O2. Transitioned to BIPAP with improvement. She did not need home oxygen. Treated with steroid, nebulizer. Advised Stiolto, Qvar, and Singulair. Ordered sleep study, started flutter valve, DuoNeb followed by sodium chloride nebulizer then flutter valve twice daily. Low dose CT 6/2021. Had CT abd/pelvis with pulm nodule. Continued Stiolto and PA Singulair. Changed Qvar to Flovent due to cost. Follow up in 2-3 months.    Urology- Dr Chelsea Lundy- last appt 3/2020 for cystoscopy for hematuria. Negative and negative CT abd/pelvis. Follow up in 1 year.     The following portions of the patient's history were reviewed and updated as appropriate: allergies, current medications, past family history, past medical history, past social history, past surgical history and problem list.    Review of Systems    Objective   Vitals:    07/15/24 1323   BP: 118/74   Pulse: 82   Resp: 16   Temp: 98.4 °F (36.9 °C)   SpO2: 93%       Body mass index is 28.38 kg/m².    Physical Exam  Constitutional:       General: She is not in acute distress.     Appearance: She is well-developed.   HENT:      Head: Normocephalic and atraumatic.   Eyes:      General:         Right eye: No discharge.         Left eye: No discharge.   Pulmonary:      Effort: Pulmonary effort is normal. No respiratory distress.   Skin:     General: Skin is dry.   Neurological:      Mental Status: She is alert.   Psychiatric:         Attention and Perception: She is attentive.         Mood and Affect: Mood is depressed.         Speech: Speech normal.         Behavior: Behavior normal.         Assessment & Plan   Diagnoses and all orders for this visit:    1. Hyperlipidemia, unspecified hyperlipidemia type (Primary)  -     Comprehensive Metabolic Panel  -     CK  -     Lipid Panel With LDL / HDL Ratio    2. Vitamin D deficiency  -     Comprehensive Metabolic Panel  -     Vitamin D,25-Hydroxy    3. Thrombocytosis  -     CBC & Differential    4. Major depressive disorder, recurrent episode, moderate degree    5. Other fatigue    6. Chronic obstructive pulmonary disease, unspecified COPD type    7. Eosinophilia, unspecified type    8. Pulmonary nodule    9. Shortness of breath    10. Sleep apnea, unspecified type    11. Seasonal allergic rhinitis, unspecified trigger    12. Allergic to aspirin    13. Gastroesophageal reflux disease with esophagitis without hemorrhage    14. Hiatal  hernia    15. Irritable bowel syndrome with constipation    16. Rectal bleeding    17. Hematochezia    18. Fibromyalgia    19. Neck pain    20. Other idiopathic scoliosis, cervical region    21. Scoliosis of thoracic spine, unspecified scoliosis type    22. Thoracic spine pain    23. Cervical radiculopathy    24. Cervical spinal stenosis    25. Cervical spondylosis without myelopathy    26. Degenerative disc disease, cervical    27. Degenerative disc disease, lumbar    28. Degenerative disc disease, thoracic    29. Osteoarthritis of thoracic spine with myelopathy    30. Osteoarthritis of spine, unspecified spinal osteoarthritis complication status, unspecified spinal region    31. Arthritis    32. Squamous cell skin cancer    33. Accelerated junctional rhythm    34. Ectopic atrial rhythm            Assessment and Plan  Patient will have fasting labs. Call if no results in 1 week. Stability of conditions, plan, follow up, and further recommendations pending labs. Follow up in 3 months.     Hyperlipidemia- Lipids improved but LDL remained uncontrolled. She was taking Lipitor then she was taking Crestor from cardiology. She is not taking any longer and noticed improvement in side effects with stopping medication. I will recheck labs and send to cardiology for recommendations for treatment.   Vitamin D deficiency- Dosing recommendations pending labs.   Osteopenia- Patient had DEXA scan that met criteria for treatment. We will need to discuss medication vs specialist referral at follow up.    Thrombocytosis- I will continue to monitor and make further recommendations.   Moods- She has had longstanding depression that has worsened with her son's death. She previously did well on Prozac. I restarted Prozac 20 mg once daily with some improvement but continued with significant depressive symptoms. Increased Prozac to 40 mg once daily then twice daily with improvement but persistent symptoms. She has complicated mood symptoms  with grief reaction. She was willing to see psychiatry, was referred, seen, and has not had follow-up.  I discussed she needed to consider psychiatry again in the future and should ensure regular counseling.  For now, she reports her moods are stable on Prozac 80 mg daily. She should be seen ASAP if worsening moods or 9-1-1 to ER if she develops SI/HI. No SI/HI today.  COPD- Continue Trelegy, Monteleukast, Flonase, Mucinex, and DuoNeb, and follow up with pulmonology as directed by them.    Pulmonary nodule- CT chest 11/2021 with bilateral apical pleural thickening, mild emphysema, calcified pulmonary nodules right lower lobe, other subpleural nodules and areas of scarring with nodularity-all reported without change.  Hiatal hernia.  Per radiology, no suspicious pulmonary nodules.  Recheck in 1 year.  Patient to see pulmonology and follow-up and ensure recheck CT as directed by pulmonology.  GERD, hiatal hernia- Last EGD 2/2022 with large hiatal hernia and reflux esophagitis with benign pathology. Continue Protonix 40 mg once daily and Pepcid 20 mg up to twice daily. She would consider general surgery as directed by GI with uncontrolled GERD symptoms.   Fibromyalgia- Patient was previously on Cymbalta and Elavil for pain. However, moods were better controlled on Prozac. We will re-evaluated at follow up.     DDD cervical spine- Follow up with neurosurgery if persistent pain.   Squamous cell skin cancer- Patient to ensure she follows with dermatology regularly and was referred.    [facility]/2023.  She is up-to-date on colonoscopy.  She does have family history of breast and colon cancer as well as pancreatic cancer. She needs to ensure she stays up to date on colonoscopy and mammogram. She has been referred for mammogram and DEXA scan and needs to schedule. Consideration of genetics consultation to determine most appropriate screening recommendations.  She was recommended for genetics testing per GI.  Patient to  check with insurance to determine coverage and location of coverage for Tdap, Prevnar 20, and Shingrix.  She should also ensure annual flu shot and COVID-19 booster.    I spent 30 minutes caring for Jennifer Shelton on this date of service. This time includes time spent by me in the following activities as necessary: preparing for the visit, reviewing tests, specialists records and previous visits, obtaining and/or reviewing a separately obtained history, performing a medically appropriate exam and/or evaluation, counseling and educating the patient, family, caregiver, referring and/or communicating with other healthcare professionals, documenting information in the medical record, independently interpreting results and communicating that information with the patient, family, caregiver, and developing a medically appropriate treatment plan with consideration of other conditions, medications, and treatments.

## 2024-07-23 LAB
25(OH)D3+25(OH)D2 SERPL-MCNC: 30.5 NG/ML (ref 30–100)
ALBUMIN SERPL-MCNC: 4.3 G/DL (ref 3.5–5.2)
ALBUMIN/GLOB SERPL: 1.7 G/DL
ALP SERPL-CCNC: 83 U/L (ref 39–117)
ALT SERPL-CCNC: 9 U/L (ref 1–33)
AST SERPL-CCNC: 15 U/L (ref 1–32)
BASOPHILS # BLD AUTO: 0.06 10*3/MM3 (ref 0–0.2)
BASOPHILS NFR BLD AUTO: 1.4 % (ref 0–1.5)
BILIRUB SERPL-MCNC: 0.3 MG/DL (ref 0–1.2)
BUN SERPL-MCNC: 16 MG/DL (ref 8–23)
BUN/CREAT SERPL: 20.8 (ref 7–25)
CALCIUM SERPL-MCNC: 9.1 MG/DL (ref 8.6–10.5)
CHLORIDE SERPL-SCNC: 101 MMOL/L (ref 98–107)
CHOLEST SERPL-MCNC: 324 MG/DL (ref 0–200)
CK SERPL-CCNC: 88 U/L (ref 20–180)
CO2 SERPL-SCNC: 28.3 MMOL/L (ref 22–29)
CREAT SERPL-MCNC: 0.77 MG/DL (ref 0.57–1)
EGFRCR SERPLBLD CKD-EPI 2021: 84.7 ML/MIN/1.73
EOSINOPHIL # BLD AUTO: 0.23 10*3/MM3 (ref 0–0.4)
EOSINOPHIL NFR BLD AUTO: 5.2 % (ref 0.3–6.2)
ERYTHROCYTE [DISTWIDTH] IN BLOOD BY AUTOMATED COUNT: 13.1 % (ref 12.3–15.4)
GLOBULIN SER CALC-MCNC: 2.5 GM/DL
GLUCOSE SERPL-MCNC: 84 MG/DL (ref 65–99)
HCT VFR BLD AUTO: 39.4 % (ref 34–46.6)
HDLC SERPL-MCNC: 66 MG/DL (ref 40–60)
HGB BLD-MCNC: 12.9 G/DL (ref 12–15.9)
IMM GRANULOCYTES # BLD AUTO: 0 10*3/MM3 (ref 0–0.05)
IMM GRANULOCYTES NFR BLD AUTO: 0 % (ref 0–0.5)
LDLC SERPL CALC-MCNC: 241 MG/DL (ref 0–100)
LDLC/HDLC SERPL: 3.6 {RATIO}
LYMPHOCYTES # BLD AUTO: 1.84 10*3/MM3 (ref 0.7–3.1)
LYMPHOCYTES NFR BLD AUTO: 41.6 % (ref 19.6–45.3)
MCH RBC QN AUTO: 28.8 PG (ref 26.6–33)
MCHC RBC AUTO-ENTMCNC: 32.7 G/DL (ref 31.5–35.7)
MCV RBC AUTO: 87.9 FL (ref 79–97)
MONOCYTES # BLD AUTO: 0.38 10*3/MM3 (ref 0.1–0.9)
MONOCYTES NFR BLD AUTO: 8.6 % (ref 5–12)
NEUTROPHILS # BLD AUTO: 1.91 10*3/MM3 (ref 1.7–7)
NEUTROPHILS NFR BLD AUTO: 43.2 % (ref 42.7–76)
NRBC BLD AUTO-RTO: 0 /100 WBC (ref 0–0.2)
PLATELET # BLD AUTO: 381 10*3/MM3 (ref 140–450)
POTASSIUM SERPL-SCNC: 3.9 MMOL/L (ref 3.5–5.2)
PROT SERPL-MCNC: 6.8 G/DL (ref 6–8.5)
RBC # BLD AUTO: 4.48 10*6/MM3 (ref 3.77–5.28)
SODIUM SERPL-SCNC: 137 MMOL/L (ref 136–145)
TRIGL SERPL-MCNC: 101 MG/DL (ref 0–150)
VLDLC SERPL CALC-MCNC: 17 MG/DL (ref 5–40)
WBC # BLD AUTO: 4.42 10*3/MM3 (ref 3.4–10.8)

## 2024-08-04 DIAGNOSIS — F33.1 MAJOR DEPRESSIVE DISORDER, RECURRENT EPISODE, MODERATE DEGREE: ICD-10-CM

## 2024-08-05 DIAGNOSIS — E78.5 HYPERLIPIDEMIA, UNSPECIFIED HYPERLIPIDEMIA TYPE: Primary | ICD-10-CM

## 2024-08-05 RX ORDER — FLUOXETINE HYDROCHLORIDE 40 MG/1
80 CAPSULE ORAL DAILY
Qty: 180 CAPSULE | Refills: 0 | Status: SHIPPED | OUTPATIENT
Start: 2024-08-05

## 2024-08-20 ENCOUNTER — OFFICE VISIT (OUTPATIENT)
Dept: GASTROENTEROLOGY | Facility: CLINIC | Age: 69
End: 2024-08-20
Payer: MEDICARE

## 2024-08-20 VITALS
BODY MASS INDEX: 28.85 KG/M2 | OXYGEN SATURATION: 96 % | SYSTOLIC BLOOD PRESSURE: 136 MMHG | HEART RATE: 85 BPM | WEIGHT: 190.4 LBS | DIASTOLIC BLOOD PRESSURE: 92 MMHG | HEIGHT: 68 IN

## 2024-08-20 DIAGNOSIS — K21.9 GASTROESOPHAGEAL REFLUX DISEASE, UNSPECIFIED WHETHER ESOPHAGITIS PRESENT: ICD-10-CM

## 2024-08-20 DIAGNOSIS — K59.00 CONSTIPATION, UNSPECIFIED CONSTIPATION TYPE: ICD-10-CM

## 2024-08-20 DIAGNOSIS — K44.9 HIATAL HERNIA: ICD-10-CM

## 2024-08-20 RX ORDER — FAMOTIDINE 20 MG/1
20 TABLET, FILM COATED ORAL 2 TIMES DAILY PRN
Qty: 180 TABLET | Refills: 3 | Status: SHIPPED | OUTPATIENT
Start: 2024-08-20

## 2024-08-20 RX ORDER — PANTOPRAZOLE SODIUM 40 MG/1
40 TABLET, DELAYED RELEASE ORAL DAILY
Qty: 90 TABLET | Refills: 3 | Status: SHIPPED | OUTPATIENT
Start: 2024-08-20

## 2024-08-20 RX ORDER — FLUTICASONE FUROATE, UMECLIDINIUM BROMIDE AND VILANTEROL TRIFENATATE 100; 62.5; 25 UG/1; UG/1; UG/1
POWDER RESPIRATORY (INHALATION)
COMMUNITY
Start: 2024-08-06

## 2024-08-20 NOTE — PROGRESS NOTES
Chief Complaint  6M follow up, Constipation, and Heartburn    Jennifer Shelton is a 69 y.o. female who presents to [facility] [place] for follow up.     History of present Illness  Patient presents to the office for  follow-up with a history of heartburn, large hiatal hernia, and IBS - constipation. Patient previously referred and evaluated by Dr. Carl to consider nissen fundoplication, ultimately deferred. She continues Protonix 40mg daily and Pepcid 1-3 times a day depending on symptoms. She feels that her symptoms are overall improved and well managed. Denies nausea, vomiting, epigastric pain, and dysphagia. Bowel habits are more regular, intermittent constipation about 1 time a week. Uses Levsin as needed for abdominal cramping. Denies melena and hematochezia. Weight is stable compared to last office visit.     Family history of colon cancer (mother 60s, maternal grandmother 70s, and maternal aunt 60s), pancreatic cancer (mother 81 and sister 54), and breast cancer (mother 60s).   [facility] genetic testing 12/08/23 -variant of unknown significance detected     Colonoscopy 04/04/2023 by Dr. Raymond - Small polyp in the cecum, 12 mm polyp in the ascending colon and small polyp in the descending colon.  Polyps - sessile serrated and tubular adenoma. Repeat 3 years.      Gastric emptying study 11/28/22 - normal.      EGD 02/14/2022 by Dr. Raymond - large hiatal hernia, grade A esophagitis, normal stomach, and normal duodenum.  Biopsies consistent with reflux esophagitis, negative for H. pylori, intestinal metaplasia, dysplasia, and malignancy.      Past Medical History:   Diagnosis Date    Anesthesia complication     heart rate slowed    Anxiety     Arthritis     Cancer     skin cancer    Cervical spinal stenosis 09/22/2016    Cervical spondylosis without myelopathy     Cervicalgia 09/22/2015    Chronic pain disorder     Colon polyp 2008    COPD (chronic obstructive pulmonary  disease)     Depression     Fibromyalgia     Gastroesophageal reflux     GERD (gastroesophageal reflux disease)     Hematuria 2020    Hernia 2012?    Hiatal hernia    Herniated disc, cervical 2015    C4-5, C5-6, C6-7    High cholesterol     Hyperlipemia     Irritable bowel syndrome 1986    Lung nodule 2020    Rectal bleeding     Seasonal allergies     Seizures     Shortness of breath     Sleep apnea        Past Surgical History:   Procedure Laterality Date    ABDOMINAL SURGERY  ,     C section     SECTION      COLONOSCOPY      COLONOSCOPY N/A 2023    Procedure: COLONOSCOPY WITH ELEVIEW INJECTION, HOT SNARE, POLYPECTOMIES, CLIP APPLICATION X3;  Surgeon: Oneal Raymond MD;  Location: [facility] ENDOSCOPY;  Service: Gastroenterology;  Laterality: N/A;  COLON POLYPS    CYSTOSCOPY  2020    Cystoscopy w/ Dr. Lundy    ENDOSCOPY N/A 2022    Procedure: ESOPHAGOGASTRODUODENOSCOPY WITH BIOPSIES;  Surgeon: Oneal Raymond MD;  Location: [facility] ENDOSCOPY;  Service: Gastroenterology;  Laterality: N/A;  HIATAL HERNIA, ESOPHAGITIS    PILONIDAL CYSTECTOMY      SQUAMOUS CELL CARCINOMA EXCISION      chest    TUBAL ABDOMINAL LIGATION      UPPER GASTROINTESTINAL ENDOSCOPY  2021         Current Outpatient Medications:     albuterol (ACCUNEB) 0.63 MG/3ML nebulizer solution, , Disp: , Rfl:     albuterol sulfate  (90 Base) MCG/ACT inhaler, Inhale 2 puffs Every 4 (Four) Hours As Needed for Wheezing., Disp: 90 g, Rfl: 0    diphenhydrAMINE (BENADRYL) 25 mg capsule, Take 3 capsules by mouth At Night As Needed for Sleep., Disp: , Rfl:     famotidine (PEPCID) 20 MG tablet, Take 1 tablet by mouth 2 (Two) Times a Day As Needed for Heartburn., Disp: 180 tablet, Rfl: 3    fluorouracil (EFUDEX) 5 % cream, , Disp: , Rfl:     FLUoxetine (PROzac) 40 MG capsule, Take 2 capsules by mouth Daily., Disp: 180 capsule, Rfl: 0    fluticasone (FLONASE) 50 MCG/ACT nasal spray, INSTILL  2 SPRAYS IN EACH NOSTRIL DAILY., Disp: 16 g, Rfl: 2    ipratropium-albuterol (DUO-NEB) 0.5-2.5 mg/3 ml nebulizer, Take 3 mL by nebulization 4 (Four) Times a Day., Disp: 360 mL, Rfl: 1    montelukast (SINGULAIR) 10 MG tablet, TAKE 1 TABLET BY MOUTH EVERY NIGHT AT BEDTIME., Disp: 30 tablet, Rfl: 0    O2 (OXYGEN), Inhale 1 (One) Time. Use 2 LPM PRN if O2 if below 90, Disp: , Rfl:     pantoprazole (PROTONIX) 40 MG EC tablet, Take 1 tablet by mouth Daily., Disp: 90 tablet, Rfl: 3    sodium chloride 3 % nebulizer solution, inhale contents of 1 vial via nebulizer TWICE DAILY, Disp: , Rfl:     Trelegy Ellipta 100-62.5-25 MCG/ACT inhaler, , Disp: , Rfl:     Trelegy Ellipta 200-62.5-25 MCG/ACT aerosol powder , , Disp: , Rfl:     Cholecalciferol (Vitamin D3) 50 MCG (2000 UT) tablet, TAKE 1 TABLET BY MOUTH DAILY. NEEDS TO BE SEEN FOR MORE REFILLS. (Patient not taking: Reported on 8/20/2024), Disp: 90 tablet, Rfl: 1    rosuvastatin (CRESTOR) 20 MG tablet, Take 1 tablet by mouth Daily. (Patient not taking: Reported on 8/20/2024), Disp: 90 tablet, Rfl: 3     Allergies   Allergen Reactions    Aspirin Hives and Arrhythmia     Chest pain    Cephalexin Hives    Nsaids Hives    Penicillins Hives    Contrast Dye (Echo Or Unknown Ct/Mr) Rash       Family History   Problem Relation Age of Onset    Colon cancer Mother 60        Pancreatic, breast    Cancer Mother     Diabetes Mother     Breast cancer Mother         60s    Arthritis Mother     Pancreatic cancer Mother     Irritable bowel syndrome Mother     Heart disease Father     Heart attack Father     Arthritis Father     Ulcerative colitis Father     Depression Sister     Anxiety disorder Sister     Cancer Sister     Pancreatic cancer Sister     Irritable bowel syndrome Sister     Bipolar disorder Maternal Aunt     Colon cancer Maternal Grandmother 70    Seizures Son     Depression Son     Suicide Attempts Son     Depression Son     ADD / ADHD Son     Colon cancer Other         60s     "Depression Daughter     Chikis Hyperthermia Neg Hx         Social History     Social History Narrative    Not on file       Objective       Vital Signs:   /92 (BP Location: Right arm, Patient Position: Sitting, Cuff Size: Adult)   Pulse 85   Ht 172.7 cm (67.99\")   Wt 86.4 kg (190 lb 6.4 oz)   SpO2 96%   BMI 28.96 kg/m²       Physical Exam  Constitutional:       Appearance: Normal appearance. She is normal weight.   HENT:      Head: Normocephalic and atraumatic.      Nose: Nose normal.   Pulmonary:      Effort: Pulmonary effort is normal.   Skin:     General: Skin is warm and dry.   Neurological:      Mental Status: She is alert and oriented to person, place, and time. Mental status is at baseline.   Psychiatric:         Mood and Affect: Mood normal.         Behavior: Behavior normal.         Thought Content: Thought content normal.         Judgment: Judgment normal.         Result Review :       CBC w/diff          10/11/2023    09:12 7/22/2024    00:00   CBC w/Diff   WBC 5.58  4.42    RBC 4.34  4.48    Hemoglobin 13.1  12.9    Hematocrit 38.5  39.4    MCV 88.7  87.9    MCH 30.2  28.8    MCHC 34.0  32.7    RDW 12.4  13.1    Platelets 407  381    Neutrophil Rel % 55.3  43.2    Lymphocyte Rel % 26.2  41.6    Monocyte Rel % 10.6  8.6    Eosinophil Rel % 6.3  5.2    Basophil Rel % 1.4  1.4      CMP          10/11/2023    09:12 7/22/2024    00:00   CMP   Glucose 88  84    BUN 19  16    Creatinine 0.74  0.77    Sodium 140  137    Potassium 4.1  3.9    Chloride 102  101    Calcium 9.4  9.1    Total Protein 6.9  6.8    Albumin 4.5  4.3    Globulin 2.4  2.5    Total Bilirubin 0.4  0.3    Alkaline Phosphatase 90  83    AST (SGOT) 15  15    ALT (SGPT) 11  9    BUN/Creatinine Ratio 25.7  20.8              Assessment and Plan    Diagnoses and all orders for this visit:    1. Gastroesophageal reflux disease, unspecified whether esophagitis present    2. Hiatal hernia    3. Constipation, unspecified constipation " type    Other orders  -     pantoprazole (PROTONIX) 40 MG EC tablet; Take 1 tablet by mouth Daily.  Dispense: 90 tablet; Refill: 3  -     famotidine (PEPCID) 20 MG tablet; Take 1 tablet by mouth 2 (Two) Times a Day As Needed for Heartburn.  Dispense: 180 tablet; Refill: 3    69-year-old patient presents to the office for  follow-up with a history of heartburn, large hiatal hernia, and IBS - constipation. Patient previously referred and evaluated by Dr. Carl to consider nissen fundoplication, ultimately deferred. She continues Protonix 40mg daily and Pepcid 1-3 times a day depending on symptoms. She feels that her symptoms are overall improved and well managed.  Refill sent to pharmacy.  Bowel habits are more regular.  She can utilize MiraLAX as needed for intermittent constipation.  Overall patient is doing very well on current medication regimen and will follow-up in 1 year.  Patient is agreeable to plan call office any questions or concerns.    Follow Up   Return in about 1 year (around 8/20/2025).  Patient was given instructions and counseling regarding her condition or for health maintenance advice. Please see specific information pulled into the AVS if appropriate.

## 2024-12-15 DIAGNOSIS — J44.9 CHRONIC OBSTRUCTIVE PULMONARY DISEASE, UNSPECIFIED COPD TYPE: ICD-10-CM

## 2024-12-15 DIAGNOSIS — J30.2 SEASONAL ALLERGIC RHINITIS, UNSPECIFIED TRIGGER: ICD-10-CM

## 2024-12-19 RX ORDER — MONTELUKAST SODIUM 10 MG/1
10 TABLET ORAL
Qty: 30 TABLET | Refills: 0 | Status: SHIPPED | OUTPATIENT
Start: 2024-12-19

## 2024-12-19 NOTE — TELEPHONE ENCOUNTER
See result note. She was due for follow up with me 10/2024. Please schedule her for follow up with me.

## 2025-01-02 DIAGNOSIS — J30.2 SEASONAL ALLERGIC RHINITIS, UNSPECIFIED TRIGGER: ICD-10-CM

## 2025-01-02 DIAGNOSIS — J44.9 CHRONIC OBSTRUCTIVE PULMONARY DISEASE, UNSPECIFIED COPD TYPE: ICD-10-CM

## 2025-01-03 RX ORDER — MONTELUKAST SODIUM 10 MG/1
TABLET ORAL
Qty: 14 TABLET | Refills: 0 | Status: SHIPPED | OUTPATIENT
Start: 2025-01-03

## 2025-01-03 NOTE — TELEPHONE ENCOUNTER
See previous message.  Please call the patient and get her scheduled for follow-up with me.    Me to [facility] 12/19/24 11:48 AM Note      See result note. She was due for follow up with me 10/2024. Please schedule her for follow up with me.

## 2025-01-21 ENCOUNTER — OFFICE VISIT (OUTPATIENT)
Dept: FAMILY MEDICINE CLINIC | Facility: CLINIC | Age: 70
End: 2025-01-21
Payer: MEDICARE

## 2025-01-21 VITALS
TEMPERATURE: 97.3 F | HEART RATE: 97 BPM | HEIGHT: 68 IN | DIASTOLIC BLOOD PRESSURE: 76 MMHG | BODY MASS INDEX: 30.01 KG/M2 | SYSTOLIC BLOOD PRESSURE: 124 MMHG | OXYGEN SATURATION: 96 % | WEIGHT: 198 LBS | RESPIRATION RATE: 16 BRPM

## 2025-01-21 DIAGNOSIS — Z00.00 MEDICARE ANNUAL WELLNESS VISIT, SUBSEQUENT: Primary | ICD-10-CM

## 2025-01-21 DIAGNOSIS — D75.839 THROMBOCYTOSIS: ICD-10-CM

## 2025-01-21 DIAGNOSIS — C44.92 SQUAMOUS CELL SKIN CANCER: ICD-10-CM

## 2025-01-21 DIAGNOSIS — M48.02 CERVICAL SPINAL STENOSIS: ICD-10-CM

## 2025-01-21 DIAGNOSIS — E55.9 VITAMIN D DEFICIENCY: ICD-10-CM

## 2025-01-21 DIAGNOSIS — Z82.49 FAMILY HISTORY OF ABDOMINAL AORTIC ANEURYSM (AAA): ICD-10-CM

## 2025-01-21 DIAGNOSIS — M85.80 OSTEOPENIA, UNSPECIFIED LOCATION: ICD-10-CM

## 2025-01-21 DIAGNOSIS — R91.1 PULMONARY NODULE: ICD-10-CM

## 2025-01-21 DIAGNOSIS — M47.812 CERVICAL SPONDYLOSIS WITHOUT MYELOPATHY: ICD-10-CM

## 2025-01-21 DIAGNOSIS — M54.6 THORACIC SPINE PAIN: ICD-10-CM

## 2025-01-21 DIAGNOSIS — K58.1 IRRITABLE BOWEL SYNDROME WITH CONSTIPATION: ICD-10-CM

## 2025-01-21 DIAGNOSIS — M79.7 FIBROMYALGIA: ICD-10-CM

## 2025-01-21 DIAGNOSIS — E78.5 HYPERLIPIDEMIA, UNSPECIFIED HYPERLIPIDEMIA TYPE: ICD-10-CM

## 2025-01-21 DIAGNOSIS — M50.30 DEGENERATIVE DISC DISEASE, CERVICAL: ICD-10-CM

## 2025-01-21 DIAGNOSIS — K44.9 HIATAL HERNIA: ICD-10-CM

## 2025-01-21 DIAGNOSIS — R53.83 OTHER FATIGUE: ICD-10-CM

## 2025-01-21 DIAGNOSIS — Z13.6 ENCOUNTER FOR SCREENING FOR VASCULAR DISEASE: ICD-10-CM

## 2025-01-21 DIAGNOSIS — M47.9 OSTEOARTHRITIS OF SPINE, UNSPECIFIED SPINAL OSTEOARTHRITIS COMPLICATION STATUS, UNSPECIFIED SPINAL REGION: ICD-10-CM

## 2025-01-21 DIAGNOSIS — L20.89 OTHER ATOPIC DERMATITIS: ICD-10-CM

## 2025-01-21 DIAGNOSIS — F33.1 MAJOR DEPRESSIVE DISORDER, RECURRENT EPISODE, MODERATE DEGREE: ICD-10-CM

## 2025-01-21 DIAGNOSIS — R06.02 SHORTNESS OF BREATH: ICD-10-CM

## 2025-01-21 DIAGNOSIS — K21.00 GASTROESOPHAGEAL REFLUX DISEASE WITH ESOPHAGITIS WITHOUT HEMORRHAGE: ICD-10-CM

## 2025-01-21 DIAGNOSIS — J44.9 CHRONIC OBSTRUCTIVE PULMONARY DISEASE, UNSPECIFIED COPD TYPE: ICD-10-CM

## 2025-01-21 DIAGNOSIS — M41.22 OTHER IDIOPATHIC SCOLIOSIS, CERVICAL REGION: ICD-10-CM

## 2025-01-21 DIAGNOSIS — M19.90 ARTHRITIS: ICD-10-CM

## 2025-01-21 DIAGNOSIS — M51.34 DEGENERATIVE DISC DISEASE, THORACIC: ICD-10-CM

## 2025-01-21 DIAGNOSIS — K92.1 HEMATOCHEZIA: ICD-10-CM

## 2025-01-21 DIAGNOSIS — J30.2 SEASONAL ALLERGIC RHINITIS, UNSPECIFIED TRIGGER: ICD-10-CM

## 2025-01-21 DIAGNOSIS — D72.10 EOSINOPHILIA, UNSPECIFIED TYPE: ICD-10-CM

## 2025-01-21 DIAGNOSIS — M47.14 OSTEOARTHRITIS OF THORACIC SPINE WITH MYELOPATHY: ICD-10-CM

## 2025-01-21 DIAGNOSIS — M54.12 CERVICAL RADICULOPATHY: ICD-10-CM

## 2025-01-21 DIAGNOSIS — M54.2 NECK PAIN: ICD-10-CM

## 2025-01-21 DIAGNOSIS — M41.9 SCOLIOSIS OF THORACIC SPINE, UNSPECIFIED SCOLIOSIS TYPE: ICD-10-CM

## 2025-01-21 DIAGNOSIS — M51.362 DEGENERATION OF INTERVERTEBRAL DISC OF LUMBAR REGION WITH DISCOGENIC BACK PAIN AND LOWER EXTREMITY PAIN: ICD-10-CM

## 2025-01-21 DIAGNOSIS — K62.5 RECTAL BLEEDING: ICD-10-CM

## 2025-01-21 DIAGNOSIS — G47.30 SLEEP APNEA, UNSPECIFIED TYPE: ICD-10-CM

## 2025-01-21 PROCEDURE — G0439 PPPS, SUBSEQ VISIT: HCPCS | Performed by: PHYSICIAN ASSISTANT

## 2025-01-21 PROCEDURE — 1160F RVW MEDS BY RX/DR IN RCRD: CPT | Performed by: PHYSICIAN ASSISTANT

## 2025-01-21 PROCEDURE — 1159F MED LIST DOCD IN RCRD: CPT | Performed by: PHYSICIAN ASSISTANT

## 2025-01-21 PROCEDURE — 1126F AMNT PAIN NOTED NONE PRSNT: CPT | Performed by: PHYSICIAN ASSISTANT

## 2025-01-21 PROCEDURE — 99214 OFFICE O/P EST MOD 30 MIN: CPT | Performed by: PHYSICIAN ASSISTANT

## 2025-01-21 NOTE — ASSESSMENT & PLAN NOTE
Orders:    JULIAN With / DsDNA, RNP, Sjogrens A / B, Smith    C-reactive Protein    Cyclic Citrul Peptide Antibody, IgG / IgA    Rheumatoid Factor    Sedimentation Rate    Uric Acid    Alpha-Gal IgE Panel    Ehrlichia Antibody Panel    Lyme Disease Total Antibody With Reflex to Immunoassay    Rickettsia Species DNA, Real-Time PCR    Spotted Fever Group AB, IgG/IgM

## 2025-01-21 NOTE — ASSESSMENT & PLAN NOTE
Orders:    CBC & Differential    Comprehensive Metabolic Panel    Vitamin D,25-Hydroxy    TSH    T4, free    T3, Free

## 2025-01-21 NOTE — ASSESSMENT & PLAN NOTE
Orders:    JUILAN With / DsDNA, RNP, Sjogrens A / B, Smith    C-reactive Protein    Cyclic Citrul Peptide Antibody, IgG / IgA    Rheumatoid Factor    Sedimentation Rate    Uric Acid    Alpha-Gal IgE Panel    Ehrlichia Antibody Panel    Lyme Disease Total Antibody With Reflex to Immunoassay    Rickettsia Species DNA, Real-Time PCR    Spotted Fever Group AB, IgG/IgM

## 2025-01-21 NOTE — PROGRESS NOTES
Subjective   The ABCs of the Annual Wellness Visit  Medicare Wellness Visit      Jennifer Shelton is a 69 y.o. patient who presents for a Medicare Wellness Visit.    Last Pap- No Hx abnormal PAP- last PAP 2019. Mother and sister with pancreatic cancer. Mother with history of colon and breast cancer. MAunt and MGM with colon cancer.   Last mammogram- 6/2024- negative.   Last DEXA- 6/2024- osteopenia but FRAX 18.1% and 3% for hip fracture.   2/2019- osteopenia.   Last colonoscopy- Dr Raymond- 4/2023- sessile serrated lesion and tubular adenoma- multiple polyps. Recheck 3 years.   Last Tdap- thinks [id number]. 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   Prevnar-  Pneumovax- 9/2022  Hepatitis A- has not had  Last flu shot- 9/2022  Shingrix- had varicella as a child. Has not had vaccination but has had Shingles.   Covid 19-  Lucio- 12/2021, Pfizer- 3/2021, 3/2021, 4/2021  RSV-Abrysvo-12/2023    The following portions of the patient's history were reviewed and   updated as appropriate: allergies, current medications, past family history, past medical history, past social history, past surgical history, and problem list.    Compared to one year ago, the patient's physical   health is the same.  Compared to one year ago, the patient's mental   health is the same.    Recent Hospitalizations:  She was not admitted to the hospital during the last year.     Current Medical Providers:  Patient Care Team:  Tessy Serrano PA as PCP - General (Family Medicine)  Deja Chadwick APRN as Nurse Practitioner (Behavioral Health)  Oneal Raymond MD as Consulting Physician (Gastroenterology)  Stephanie Moran APRN as Nurse Practitioner (Gastroenterology)  David Oliveira MD as Consulting Physician (Interventional Cardiology)  Andrea Moeller MD as Consulting Physician (Pulmonary Disease)    Outpatient Medications Prior to Visit   Medication Sig Dispense Refill    albuterol (ACCUNEB) 0.63 MG/3ML nebulizer solution       albuterol  sulfate  (90 Base) MCG/ACT inhaler Inhale 2 puffs Every 4 (Four) Hours As Needed for Wheezing. 90 g 0    diphenhydrAMINE (BENADRYL) 25 mg capsule Take 3 capsules by mouth At Night As Needed for Sleep.      famotidine (PEPCID) 20 MG tablet Take 1 tablet by mouth 2 (Two) Times a Day As Needed for Heartburn. 180 tablet 3    fluorouracil (EFUDEX) 5 % cream       FLUoxetine (PROzac) 40 MG capsule Take 2 capsules by mouth Daily. 180 capsule 0    fluticasone (FLONASE) 50 MCG/ACT nasal spray INSTILL 2 SPRAYS IN EACH NOSTRIL DAILY. 16 g 2    ipratropium-albuterol (DUO-NEB) 0.5-2.5 mg/3 ml nebulizer Take 3 mL by nebulization 4 (Four) Times a Day. 360 mL 1    montelukast (SINGULAIR) 10 MG tablet TAKE 1 TABLET BY MOUTH EVERY NIGHT AT BEDTIME. NEED APPT FOR REFILLS. 14 tablet 0    O2 (OXYGEN) Inhale 1 (One) Time. Use 2 LPM PRN if O2 if below 90      pantoprazole (PROTONIX) 40 MG EC tablet Take 1 tablet by mouth Daily. 90 tablet 3    sodium chloride 3 % nebulizer solution inhale contents of 1 vial via nebulizer TWICE DAILY      Cholecalciferol (Vitamin D3) 50 MCG (2000 UT) tablet TAKE 1 TABLET BY MOUTH DAILY. NEEDS TO BE SEEN FOR MORE REFILLS. (Patient not taking: Reported on 8/20/2024) 90 tablet 1    rosuvastatin (CRESTOR) 20 MG tablet Take 1 tablet by mouth Daily. (Patient not taking: Reported on 8/20/2024) 90 tablet 3    Trelegy Ellipta 100-62.5-25 MCG/ACT inhaler       Trelegy Ellipta 200-62.5-25 MCG/ACT aerosol powder         No facility-administered medications prior to visit.     No opioid medication identified on active medication list. I have reviewed chart for other potential  high risk medication/s and harmful drug interactions in the elderly.      Aspirin is not on active medication list.  Aspirin use is not indicated based on review of current medical condition/s. Risk of harm outweighs potential benefits.  .    Patient Active Problem List   Diagnosis    Accelerated junctional rhythm    Allergic to aspirin     "Arthritis    Cervical spondylosis without myelopathy    Chest pain    Chronic obstructive pulmonary disease    Ectopic atrial rhythm    Hematuria    Hyperlipemia    Neck pain    Fibromyalgia    Degenerative disc disease, cervical    Cervical radiculopathy    Cervical spinal stenosis    Other dyspnea and respiratory abnormality    Rectal bleeding    Seasonal allergic rhinitis    Shortness of breath    Lung mass    Squamous cell skin cancer    Irritable bowel syndrome with constipation    Hematochezia    Gastroesophageal reflux disease    Hiatal hernia    Family history of colon cancer    Acute hypoxemic respiratory failure    Seizures    Sleep apnea    COPD exacerbation    Vitamin D deficiency    Thrombocytosis    Major depressive disorder, recurrent episode, moderate degree    Other fatigue    Eosinophilia    Pulmonary nodule    Other idiopathic scoliosis, cervical region    Acute pain of both shoulders    Thoracic spine pain    Scoliosis of thoracic spine    Osteoarthritis of thoracic spine with myelopathy    Chronic bilateral low back pain without sciatica    Degenerative disc disease, lumbar    Degenerative disc disease, thoracic    Spinal osteoarthritis     Advance Care Planning Advance Directive is not on file.  ACP discussion was held with the patient during this visit. Patient does not have an advance directive, information provided.            Objective   Vitals:    01/21/25 1032   BP: 124/76   Pulse: 97   Resp: 16   Temp: 97.3 °F (36.3 °C)   TempSrc: Temporal   SpO2: 96%   Weight: 89.8 kg (198 lb)   Height: 172.7 cm (67.99\")       Estimated body mass index is 30.11 kg/m² as calculated from the following:    Height as of this encounter: 172.7 cm (67.99\").    Weight as of this encounter: 89.8 kg (198 lb).                Does the patient have evidence of cognitive impairment? No  Lab Results   Component Value Date    CHLPL 291 (H) 01/30/2025    TRIG 177 (H) 01/30/2025    HDL 53 01/30/2025     (H) " 2025    VLDL 33 2025                                                                                               Health  Risk Assessment    Smoking Status:  Social History     Tobacco Use   Smoking Status Former    Current packs/day: 0.00    Average packs/day: 1.5 packs/day for 40.3 years (60.5 ttl pk-yrs)    Types: Cigarettes    Start date: 1980    Quit date: 2020    Years since quittin.2    Passive exposure: Past   Smokeless Tobacco Never   Tobacco Comments    Quit 2020     Alcohol Consumption:  Social History     Substance and Sexual Activity   Alcohol Use Never       Fall Risk Screen  STEADI Fall Risk Assessment was completed, and patient is at LOW risk for falls.Assessment completed on:2025    Depression Screening   Little interest or pleasure in doing things? Not at all   Feeling down, depressed, or hopeless? Not at all   PHQ-2 Total Score 0      Health Habits and Functional and Cognitive Screenin/21/2025    10:00 AM   Functional & Cognitive Status   Do you have difficulty preparing food and eating? No   Do you have difficulty bathing yourself, getting dressed or grooming yourself? No   Do you have difficulty using the toilet? No   Do you have difficulty moving around from place to place? No   Do you have trouble with steps or getting out of a bed or a chair? No   Current Diet Well Balanced Diet   Dental Exam Not up to date   Eye Exam Not up to date   Exercise (times per week) 0 times per week   Current Exercises Include No Regular Exercise   Do you need help using the phone?  No   Are you deaf or do you have serious difficulty hearing?  Yes   Do you need help to go to places out of walking distance? Yes   Do you need help shopping? No   Do you need help preparing meals?  No   Do you need help with housework?  No   Do you need help with laundry? No   Do you need help taking your medications? No   Do you need help managing money? No   Do you ever drive or ride in a  car without wearing a seat belt? No   Have you felt unusual stress, anger or loneliness in the last month? No   Who do you live with? Spouse   If you need help, do you have trouble finding someone available to you? No   Have you been bothered in the last four weeks by sexual problems? No   Do you have difficulty concentrating, remembering or making decisions? No           Age-appropriate Screening Schedule:  Refer to the list below for future screening recommendations based on patient's age, sex and/or medical conditions. Orders for these recommended tests are listed in the plan section. The patient has been provided with a written plan.    Health Maintenance List  Health Maintenance   Topic Date Due    ZOSTER VACCINE (1 of 2) Never done    COVID-19 Vaccine (5 - 2024-25 season) 09/01/2024    ANNUAL WELLNESS VISIT  10/11/2024    INFLUENZA VACCINE  03/31/2025 (Originally 7/1/2024)    Pneumococcal Vaccine 65+ (2 of 2 - PCV) 01/21/2026 (Originally 1/1/2021)    TDAP/TD VACCINES (1 - Tdap) 01/21/2026 (Originally 5/24/1974)    LUNG CANCER SCREENING  06/07/2025    BMI FOLLOWUP  07/15/2025    LIPID PANEL  01/30/2026    COLORECTAL CANCER SCREENING  04/04/2026    MAMMOGRAM  06/07/2026    DXA SCAN  06/07/2026    HEPATITIS C SCREENING  Completed                                                                                                                                                CMS Preventative Services Quick Reference  Risk Factors Identified During Encounter  Depression/Dysphoria: Current medication is effective, no change recommended  Immunizations Discussed/Encouraged: Influenza, Prevnar 20 (Pneumococcal 20-valent conjugate), Shingrix, and COVID19    The above risks/problems have been discussed with the patient.  Pertinent information has been shared with the patient in the After Visit Summary.  An After Visit Summary and PPPS were made available to the patient.    Follow Up:   Next Medicare Wellness visit to be  scheduled in 1 year.     Assessment & Plan  Medicare annual wellness visit, subsequent         Hyperlipidemia, unspecified hyperlipidemia type       Orders:    Comprehensive Metabolic Panel    CK    Lipid Panel With LDL / HDL Ratio    Vitamin D deficiency    Orders:    Comprehensive Metabolic Panel    Vitamin D,25-Hydroxy    Thrombocytosis    Orders:    CBC & Differential    Major depressive disorder, recurrent episode, moderate degree      Orders:    TSH    T4, free    T3, Free    Other fatigue    Orders:    CBC & Differential    Comprehensive Metabolic Panel    Vitamin D,25-Hydroxy    TSH    T4, free    T3, Free    Chronic obstructive pulmonary disease, unspecified COPD type           Eosinophilia, unspecified type    Orders:    CBC & Differential    Urinalysis With Culture If Indicated -    Pulmonary nodule         Shortness of breath         Sleep apnea, unspecified type         Seasonal allergic rhinitis, unspecified trigger         Gastroesophageal reflux disease with esophagitis without hemorrhage         Hiatal hernia         Irritable bowel syndrome with constipation         Rectal bleeding         Hematochezia         Fibromyalgia    Orders:    JULIAN With / DsDNA, RNP, Sjogrens A / B, Smith    C-reactive Protein    Cyclic Citrul Peptide Antibody, IgG / IgA    Rheumatoid Factor    Sedimentation Rate    Uric Acid    Alpha-Gal IgE Panel    Ehrlichia Antibody Panel    Lyme Disease Total Antibody With Reflex to Immunoassay    Rickettsia Species DNA, Real-Time PCR    Spotted Fever Group AB, IgG/IgM    Neck pain    Orders:    JULIAN With / DsDNA, RNP, Sjogrens A / B, Smith    C-reactive Protein    Cyclic Citrul Peptide Antibody, IgG / IgA    Rheumatoid Factor    Sedimentation Rate    Uric Acid    Alpha-Gal IgE Panel    Ehrlichia Antibody Panel    Lyme Disease Total Antibody With Reflex to Immunoassay    Rickettsia Species DNA, Real-Time PCR    Spotted Fever Group AB, IgG/IgM    Other idiopathic scoliosis, cervical  region    Orders:    JULIAN With / DsDNA, RNP, Sjogrens A / B, Smith    C-reactive Protein    Cyclic Citrul Peptide Antibody, IgG / IgA    Rheumatoid Factor    Sedimentation Rate    Uric Acid    Alpha-Gal IgE Panel    Ehrlichia Antibody Panel    Lyme Disease Total Antibody With Reflex to Immunoassay    Rickettsia Species DNA, Real-Time PCR    Spotted Fever Group AB, IgG/IgM    Scoliosis of thoracic spine, unspecified scoliosis type    Orders:    JULIAN With / DsDNA, RNP, Sjogrens A / B, Smith    C-reactive Protein    Cyclic Citrul Peptide Antibody, IgG / IgA    Rheumatoid Factor    Sedimentation Rate    Uric Acid    Alpha-Gal IgE Panel    Ehrlichia Antibody Panel    Lyme Disease Total Antibody With Reflex to Immunoassay    Rickettsia Species DNA, Real-Time PCR    Spotted Fever Group AB, IgG/IgM    Thoracic spine pain    Orders:    JULIAN With / DsDNA, RNP, Sjogrens A / B, Smith    C-reactive Protein    Cyclic Citrul Peptide Antibody, IgG / IgA    Rheumatoid Factor    Sedimentation Rate    Uric Acid    Alpha-Gal IgE Panel    Ehrlichia Antibody Panel    Lyme Disease Total Antibody With Reflex to Immunoassay    Rickettsia Species DNA, Real-Time PCR    Spotted Fever Group AB, IgG/IgM    Cervical radiculopathy    Orders:    JULIAN With / DsDNA, RNP, Sjogrens A / B, Smith    C-reactive Protein    Cyclic Citrul Peptide Antibody, IgG / IgA    Rheumatoid Factor    Sedimentation Rate    Uric Acid    Alpha-Gal IgE Panel    Ehrlichia Antibody Panel    Lyme Disease Total Antibody With Reflex to Immunoassay    Rickettsia Species DNA, Real-Time PCR    Spotted Fever Group AB, IgG/IgM    Cervical spinal stenosis    Orders:    JULIAN With / DsDNA, RNP, Sjogrens A / B, Smith    C-reactive Protein    Cyclic Citrul Peptide Antibody, IgG / IgA    Rheumatoid Factor    Sedimentation Rate    Uric Acid    Alpha-Gal IgE Panel    Ehrlichia Antibody Panel    Lyme Disease Total Antibody With Reflex to Immunoassay    Rickettsia Species DNA, Real-Time PCR     Spotted Fever Group AB, IgG/IgM    Cervical spondylosis without myelopathy    Orders:    JULIAN With / DsDNA, RNP, Sjogrens A / B, Smith    C-reactive Protein    Cyclic Citrul Peptide Antibody, IgG / IgA    Rheumatoid Factor    Sedimentation Rate    Uric Acid    Alpha-Gal IgE Panel    Ehrlichia Antibody Panel    Lyme Disease Total Antibody With Reflex to Immunoassay    Rickettsia Species DNA, Real-Time PCR    Spotted Fever Group AB, IgG/IgM    Degenerative disc disease, cervical    Orders:    JULIAN With / DsDNA, RNP, Sjogrens A / B, Smith    C-reactive Protein    Cyclic Citrul Peptide Antibody, IgG / IgA    Rheumatoid Factor    Sedimentation Rate    Uric Acid    Alpha-Gal IgE Panel    Ehrlichia Antibody Panel    Lyme Disease Total Antibody With Reflex to Immunoassay    Rickettsia Species DNA, Real-Time PCR    Spotted Fever Group AB, IgG/IgM    Degeneration of intervertebral disc of lumbar region with discogenic back pain and lower extremity pain    Orders:    JULIAN With / DsDNA, RNP, Sjogrens A / B, Smith    C-reactive Protein    Cyclic Citrul Peptide Antibody, IgG / IgA    Rheumatoid Factor    Sedimentation Rate    Uric Acid    Alpha-Gal IgE Panel    Ehrlichia Antibody Panel    Lyme Disease Total Antibody With Reflex to Immunoassay    Rickettsia Species DNA, Real-Time PCR    Spotted Fever Group AB, IgG/IgM    Degenerative disc disease, thoracic    Orders:    JULIAN With / DsDNA, RNP, Sjogrens A / B, Smith    C-reactive Protein    Cyclic Citrul Peptide Antibody, IgG / IgA    Rheumatoid Factor    Sedimentation Rate    Uric Acid    Alpha-Gal IgE Panel    Ehrlichia Antibody Panel    Lyme Disease Total Antibody With Reflex to Immunoassay    Rickettsia Species DNA, Real-Time PCR    Spotted Fever Group AB, IgG/IgM    Osteoarthritis of thoracic spine with myelopathy    Orders:    JULIAN With / DsDNA, RNP, Sjogrens A / B, Smith    C-reactive Protein    Cyclic Citrul Peptide Antibody, IgG / IgA    Rheumatoid Factor    Sedimentation  Rate    Uric Acid    Alpha-Gal IgE Panel    Ehrlichia Antibody Panel    Lyme Disease Total Antibody With Reflex to Immunoassay    Rickettsia Species DNA, Real-Time PCR    Spotted Fever Group AB, IgG/IgM    Osteoarthritis of spine, unspecified spinal osteoarthritis complication status, unspecified spinal region    Orders:    JULIAN With / DsDNA, RNP, Sjogrens A / B, Smith    C-reactive Protein    Cyclic Citrul Peptide Antibody, IgG / IgA    Rheumatoid Factor    Sedimentation Rate    Uric Acid    Alpha-Gal IgE Panel    Ehrlichia Antibody Panel    Lyme Disease Total Antibody With Reflex to Immunoassay    Rickettsia Species DNA, Real-Time PCR    Spotted Fever Group AB, IgG/IgM    Arthritis    Orders:    JULIAN With / DsDNA, RNP, Sjogrens A / B, Smith    C-reactive Protein    Cyclic Citrul Peptide Antibody, IgG / IgA    Rheumatoid Factor    Sedimentation Rate    Uric Acid    Alpha-Gal IgE Panel    Ehrlichia Antibody Panel    Lyme Disease Total Antibody With Reflex to Immunoassay    Rickettsia Species DNA, Real-Time PCR    Spotted Fever Group AB, IgG/IgM    Squamous cell skin cancer         Osteopenia, unspecified location    Orders:    Ambulatory Referral to Endocrinology    Encounter for screening for vascular disease    Orders:    Vascular Screening (Bundle) CAR; Future    Family history of abdominal aortic aneurysm (AAA)    Orders:    Vascular Screening (Bundle) CAR; Future    Other atopic dermatitis    Orders:    Alpha-Gal IgE Panel         Follow Up:   No follow-ups on file.

## 2025-01-21 NOTE — PROGRESS NOTES
Subjective   Jennifer Shelton is a 69 y.o. female who presents today in follow up of hyperlipidemia, vitamin D deficiency, thrombocytosis, moods, COPD, pulmonary nodule, GERD, fibromyalgia, Cervical spine DDD, and history of squamous cell skin cancer.     Hyperlipidemia      Hyperlipidemia- has not taken in 6 months or longer. When quit taking, cramping and heartburn disappeared then started again without medication.   changed to Crestor- she reports ran out a few months ago and she has had less body aches and not as much trouble with stomach and heartburn. Still flairs occasionally but on medication, she was having daily. She was getting from cardiology and reports they did not fill, so she stopped it. She was taking in the morning. Still aching but has much less aching since stopping Crestor. Feet, ankles, and fingers were hurting all the time and now only occasionally an ache but not as severe and not as often. Also less headaches off Crestor.   She was previously taking Lipitor 20 mg nightly  Last medication 2016- Lipitor- did well on medication. She has not been on other medications in the past.   Stopped all medication years ago. Last year was full of specialists and let go of routine things.   Vitamin D deficiency- she stopped vitamin D because she is out and did not get more.   She was taking vitamin D 2000 IU daily.   Thrombocytosis- no signs of clotting or any concerns.     Moods- Prozac 40 mg twice daily changed to Prozac 80 mg daily. Did not change anything. Does not think she needs to change medication. Did not seem to click as much.    Moods had not improved. She continued with depression. She thought it may be time to see specialist.  Referred to psychiatry and was seen 8/2022 but did not keep appointment 4/9/2022.  Prozac in the past- she was up to 80 mg once daily. She had depression since childhood and started treatment in 30s. Increased depression following her son's death. She stopped all  medications following his death.   Son's suicide 3/11/2021- she has not been to the doctor for CT chest, scans, other testing and treatment. She has not been doing anything since her son's death.  She noticed improvement in moods with restarting Prozac 20 mg once daily but not as good as it was when she initially restarted. Decreased motivation, not wanting to get up and do anything. She has had mind racing and depressed feeling.   On Prozac 40 mg daily, she had no motivation. When she was out, she was ok but came back home and did not want to do anything.   Previous medications- Cymbalta- helped but Prozac helped more. Last Cymbalta 2016.  Taking benadryl to sleep but not helping much. Elavil- helped with depression but made her very tired. She also would forget things when she was taking it and talking to people.     Fatigue- Sleep medicine ordered home sleep study. Has not picked up- did not perform. Still tired.    She is tired all the time- she will sleep a few hours at night wake up and go back to sleep. She is always tired and does not feel rested.    She was to have sleep study right after her son's death but cancelled. Has not followed up with pulmonology in some time.   COPD, asthma, eosinophilia- using Trelegy and Singulair daily. Has not picked up inhaler because from $40 to $130.    Has not had follow up with pulmonology. She is overdue but has not completed sleep study.   stopped Qvar and was taking Stiolto. They changed to Trelegy 200 daily. She has noted significant improvement. She reports they advised more like asthma with elevated eosinophils.    Still using inhalers. Only taking nebulizer as needed- was to take twice daily but she has taken only as needed. Taking her daily inhaler and Singulair. Medications- Qvar, Stiolto, Monteleukast, Flonase, Mucinex, and has duoneb  As needed.   Pulmonary nodule- had follow up CT with thoracic surgery and no follow up needed.   CT chest 11/8/2021- Bilateral  apical pleural thickening, mild emphysema, calcified pulmonary nodules right lower lobe, other subpleural nodules and areas of scarring with nodularity-all reported without change.  Small hiatal hernia.  Per radiology, no suspicious pulmonary nodules.  Recheck in 1 year. Repeat 12/2022- stable. No need to continue to follow but per pulmonology, may lori annual screening.   Nodule on right lung- they were scanning and following with Dr Kelley.   Pulmonology- [facility] Taylor- missed appt for follow up of pulmonary nodule. Hospitalized 2/2/21 and 10/2021- she had been coughing for 2 months then went to doctor and was given steroid. She also had poison ivy and that helped too. Seen by someone in [place]- urgent care.     GERD, hiatal hernia, IBS- Dr Oneal Raymond, Brianna Martinez, [facility]-  she was to have manometry test and follow up. She was not sure she wanted to do it and did not do the test. Not sure she wants to consider it. She has symptoms for weeks then has symptoms for weeks.   She was having worsening symptoms. Last seen by GI- they advised she let them know when she was ready to see surgeon for procedure. She may have to proceed because cannot live like this. She reports she gets up in the morning then has chest pain, vomits, and feels better- she feels heartburn/ indigestion. She wakes up in the middle of the night and has to take medication. She takes Pepcid. She was unable to get Carafate in between medications and did not feel like it helped when taking it.   Taking Protonix 40 mg once daily and Pepcid 20 mg 2-3 x daily, and has carafate- did not help and has not been taking. Negative gastric emptying study. Will have colonoscopy 4/2023. They will consider Nissen.   She is still having heartburn symptoms- sometimes wakes up with it and sometimes has after taking the medication in the day. She is also having increased IBS symptoms. Patient with frequent, urgent BM. Has never taken anything for it but  she has continued with symptoms. She would like to consider medication.   She had pain in her chest- if she does not take Pepcid, she has pain in her chest and cannot eat. When she takes the medication, she does ok. She passes blood in stool that is bright red at times. Seeing GI.   EGD 2/2022 with large hiatal hernia, esophagitis.    Still aching but has much less aching since stopping Crestor. Feet, ankles, and fingers were hurting all the time and now only occasionally an ache but not as severe and not as often. Also less headaches off Crestor.   Fibromyalgia- has been on no medication in a while.   Previously Cymbalta and Elavil.   Shoulder pain- she had MRI left shoulder with tendinopathy supraspinatous, biceps, glenohumeral joint effusion, chondromalacia- suggesting capsulitis and minimal DJD AC joint. Now right shoulder pain. Does not want to see orthopedist at this time.   DDD cervical spine- she had appt with neurosurgery 5/2023- cancelled because was nervous the answer was going to be surgery.   always hurting- having headaches. Thinks from neck pain. 1/2021 for cervicalgia, cervical radiculopathy, herniated C4-5, C5-6, left shoulder pain. Ordered MRI left shoulder. If no abnormality, recommended C4-5, C5-6 ACDF  Went to orthopedist for rotator cuff as well 3/2021- Xray, MRI 2/2021 with moderate supraspinatus tendonopathy, partial thickness fraying, intra-articular long head biceps tendinopathy, small glenohummeral effusion, mild to moderate chondromalacia, synovial thickening, or intr-articular debris vs labral tissue in axillary recess- suggested capsulitis. DJD AC joint with chronic deformity of clavicle. Exercises for tendonitis given and advised to return for consideration of injection if no improvement    Squamous cell skin cancer- last appt 11-12/2023- follow up 1 year. Had to use chemo cream started Thanksgiving night. Went back and they said was ok. [facility] [place]  Patient had SCC  1/2021.    Mother and sister with pancreatic cancer. Mother with history of colon and breast cancer. MAunt and MGM with colon cancer.     No Hx abnormal PAP- last PAP 3 years ago.     Patient's Specialists:  Behavioral health-[facility] Farias- last appointment 8/2022 for major depressive disorder with prolonged grief from death of son 3/2022 for suicide.  Advised change Prozac from 40 mg twice a day to 80 mg once daily in the morning.  If increased irritability, will change to 60 mg.  Advised to check to see if Trintellix or Viibryd would be approved.  Follow-up in 4 weeks.  Patient canceled follow-up.  She has not rescheduled.  Cardiology-Dr. David Oliveira-last appointment 12/2022 for chest pain, exertional dyspnea, diastolic dysfunction, coronary artery calcification on CT, hyperlipidemia.  Advised Lexiscan nuclear stress test, change in intensity to Crestor 20 mg nightly and advised aspirin 81 mg.  Mild diastolic dysfunction on echo.  Advised low-salt diet and monitor weight.  Advised follow-up 6 to 9 months.  Stress test normal.    Prior Dr Lb Boyer- last appt 4/2020 for preop exam, CP, SOA, abnormal EKG. Advised smoking cessation, follow up with pulmonology, take Lipitor, hold off on colonoscopy pending cardiac workup- stress test, echo, and holter monitor. Advised AAA screening. Negative cardiac testing. Advised follow up 1 year. She did not return for follow up.   GI- Dr Raymond, Brianna Martinez, [facility], [facility] Up- last appt 8/2023 for family history of colon cancer, breast cancer, pancreatic cancer, GERD, hiatal hernia, IBS with constipation.  Advise gastric emptying study and hyoscyamine.  Colonoscopy for 2023.  Consider referral for Nissen.  Constipation not adequately relieved with Linzess 72 mcg and did not wish for trial of a stronger dose due to cost.  Advised MiraLAX and stool softeners as needed.  Sent prescription for Levsin for abdominal cramping.  Advised Ambry genetic testing.   Continue Protonix, Pepcid, and was given Carafate.  Advised she see surgery for hiatal hernia repair.  Follow-up 2/2024  Normal gastric emptying study.  10/2021-for blood in stool, IBS, and GERD. Advised Miralax, Protonix 40 mg once daily, and EGD, Colonoscopy. Endoscopy 2/2022 with large hitala hernia. No colonoscopy performed. Benign pathology.   General surgery- [facility] Arias- last appt 3/2020 for hematuria. CT abd/pelvis, cystoscopy, and UA.   Neurosurgery- Dr Willi Ghotra- last appt 1/2021 for cervicalgia, cervical radiculopathy, herniated C4-5, C5-6, left shoulder pain. Ordered MRI left shoulder. If no abnormality, recommended C4-5, C5-6 ACDF.  Appointment 3/30/2023 and 5/2023 canceled.  Orthopedic surgery- Dr Jet Salinas- last appt 3/2021 for left shoulder pain. Xray, MRI 2/2021 with moderate supraspinatus tendonopathy, partial thickness fraying, intra-articular long head biceps tendinopathy, small glenohummeral effusion, mild to moderate chondromalacia, synovial thickening, or intr-articular debris vs labral tissue in axillary recess- suggested capsulitis. DJD AC joint with chronic deformity of clavicle. Exercises for tendonitis given and advised to return for consideration of injection if no improvement.   Pulmonology-, Dr. Sajan Renteria, [facility]- last appt 1/2023 for COPD with emphysema, pulmonary nodules, history of tobacco abuse, URIEL.  Changed Stiolto to Trelegy 200.   CT stable over 2 years.-no need for follow-up testing.  May benefit for yearly screening and will address at next visit.  Need to evaluate for URIEL.  Follow-up 2 months with full PFT.   2/2021 in follow up of hospitalization for acute hypoxic respiratory failure requiring noninvasince positive pressure ventilation and COPD exacerbation. Hypoxia to 86% on 4 L O2. Transitioned to BIPAP with improvement. She did not need home oxygen. Treated with steroid, nebulizer. Advised Stiolto, Qvar, and Singulair. Ordered sleep study, started  flutter valve, DuoNeb followed by sodium chloride nebulizer then flutter valve twice daily. Low dose CT 6/2021. Had CT abd/pelvis with pulm nodule. Continued Stiolto and BILL Espinozaulair. Changed Qvar to Flovent due to cost. Follow up in 2-3 months.   Urology- Dr Chelsea Lundy- last appt 3/2020 for cystoscopy for hematuria. Negative and negative CT abd/pelvis. Follow up in 1 year.     The following portions of the patient's history were reviewed and updated as appropriate: allergies, current medications, past family history, past medical history, past social history, past surgical history and problem list.    Review of Systems    Objective   Vitals:    01/21/25 1032   BP: 124/76   Pulse: 97   Resp: 16   Temp: 97.3 °F (36.3 °C)   SpO2: 96%         Body mass index is 30.11 kg/m².    Physical Exam  Constitutional:       General: She is not in acute distress.     Appearance: She is well-developed.   HENT:      Head: Normocephalic and atraumatic.   Eyes:      General:         Right eye: No discharge.         Left eye: No discharge.   Pulmonary:      Effort: Pulmonary effort is normal. No respiratory distress.   Skin:     General: Skin is dry.   Neurological:      Mental Status: She is alert.   Psychiatric:         Attention and Perception: She is attentive.         Mood and Affect: Mood is depressed.         Speech: Speech normal.         Behavior: Behavior normal.         Assessment & Plan   Diagnoses and all orders for this visit:    1. Medicare annual wellness visit, subsequent (Primary)    2. Hyperlipidemia, unspecified hyperlipidemia type  Assessment & Plan:              Orders:  -     Comprehensive Metabolic Panel  -     CK  -     Lipid Panel With LDL / HDL Ratio    3. Vitamin D deficiency  Assessment & Plan:           Orders:  -     Comprehensive Metabolic Panel  -     Vitamin D,25-Hydroxy    4. Thrombocytosis  Assessment & Plan:           Orders:  -     CBC & Differential    5. Major depressive disorder, recurrent  episode, moderate degree  Assessment & Plan:             Orders:  -     TSH  -     T4, free  -     T3, Free    6. Other fatigue  Assessment & Plan:           Orders:  -     CBC & Differential  -     Comprehensive Metabolic Panel  -     Vitamin D,25-Hydroxy  -     TSH  -     T4, free  -     T3, Free    7. Chronic obstructive pulmonary disease, unspecified COPD type  Assessment & Plan:               8. Eosinophilia, unspecified type  Assessment & Plan:           Orders:  -     CBC & Differential  -     Urinalysis With Culture If Indicated -    9. Pulmonary nodule  Assessment & Plan:             10. Shortness of breath  Assessment & Plan:             11. Sleep apnea, unspecified type  Assessment & Plan:             12. Seasonal allergic rhinitis, unspecified trigger  Assessment & Plan:             13. Gastroesophageal reflux disease with esophagitis without hemorrhage  Assessment & Plan:             14. Hiatal hernia  Assessment & Plan:             15. Irritable bowel syndrome with constipation  Assessment & Plan:             16. Rectal bleeding  Assessment & Plan:             17. Hematochezia  Assessment & Plan:             18. Fibromyalgia  Assessment & Plan:           Orders:  -     JULIAN With / DsDNA, RNP, Sjogrens A / B, Smith  -     C-reactive Protein  -     Cyclic Citrul Peptide Antibody, IgG / IgA  -     Rheumatoid Factor  -     Sedimentation Rate  -     Uric Acid  -     Alpha-Gal IgE Panel  -     Ehrlichia Antibody Panel  -     Lyme Disease Total Antibody With Reflex to Immunoassay  -     Rickettsia Species DNA, Real-Time PCR  -     Spotted Fever Group AB, IgG/IgM    19. Neck pain  Assessment & Plan:           Orders:  -     JULIAN With / DsDNA, RNP, Sjogrens A / B, Smith  -     C-reactive Protein  -     Cyclic Citrul Peptide Antibody, IgG / IgA  -     Rheumatoid Factor  -     Sedimentation Rate  -     Uric Acid  -     Alpha-Gal IgE Panel  -     Ehrlichia Antibody Panel  -     Lyme Disease Total Antibody With  Reflex to Immunoassay  -     Rickettsia Species DNA, Real-Time PCR  -     Spotted Fever Group AB, IgG/IgM    20. Other idiopathic scoliosis, cervical region  Assessment & Plan:           Orders:  -     JULIAN With / DsDNA, RNP, Sjogrens A / B, Smith  -     C-reactive Protein  -     Cyclic Citrul Peptide Antibody, IgG / IgA  -     Rheumatoid Factor  -     Sedimentation Rate  -     Uric Acid  -     Alpha-Gal IgE Panel  -     Ehrlichia Antibody Panel  -     Lyme Disease Total Antibody With Reflex to Immunoassay  -     Rickettsia Species DNA, Real-Time PCR  -     Spotted Fever Group AB, IgG/IgM    21. Scoliosis of thoracic spine, unspecified scoliosis type  Assessment & Plan:           Orders:  -     JULIAN With / DsDNA, RNP, Sjogrens A / B, Smith  -     C-reactive Protein  -     Cyclic Citrul Peptide Antibody, IgG / IgA  -     Rheumatoid Factor  -     Sedimentation Rate  -     Uric Acid  -     Alpha-Gal IgE Panel  -     Ehrlichia Antibody Panel  -     Lyme Disease Total Antibody With Reflex to Immunoassay  -     Rickettsia Species DNA, Real-Time PCR  -     Spotted Fever Group AB, IgG/IgM    22. Thoracic spine pain  Assessment & Plan:           Orders:  -     JULIAN With / DsDNA, RNP, Sjogrens A / B, Smith  -     C-reactive Protein  -     Cyclic Citrul Peptide Antibody, IgG / IgA  -     Rheumatoid Factor  -     Sedimentation Rate  -     Uric Acid  -     Alpha-Gal IgE Panel  -     Ehrlichia Antibody Panel  -     Lyme Disease Total Antibody With Reflex to Immunoassay  -     Rickettsia Species DNA, Real-Time PCR  -     Spotted Fever Group AB, IgG/IgM    23. Cervical radiculopathy  Assessment & Plan:           Orders:  -     JULIAN With / DsDNA, RNP, Sjogrens A / B, Smith  -     C-reactive Protein  -     Cyclic Citrul Peptide Antibody, IgG / IgA  -     Rheumatoid Factor  -     Sedimentation Rate  -     Uric Acid  -     Alpha-Gal IgE Panel  -     Ehrlichia Antibody Panel  -     Lyme Disease Total Antibody With Reflex to Immunoassay  -      Rickettsia Species DNA, Real-Time PCR  -     Spotted Fever Group AB, IgG/IgM    24. Cervical spinal stenosis  Assessment & Plan:           Orders:  -     JULIAN With / DsDNA, RNP, Sjogrens A / B, Smith  -     C-reactive Protein  -     Cyclic Citrul Peptide Antibody, IgG / IgA  -     Rheumatoid Factor  -     Sedimentation Rate  -     Uric Acid  -     Alpha-Gal IgE Panel  -     Ehrlichia Antibody Panel  -     Lyme Disease Total Antibody With Reflex to Immunoassay  -     Rickettsia Species DNA, Real-Time PCR  -     Spotted Fever Group AB, IgG/IgM    25. Cervical spondylosis without myelopathy  Assessment & Plan:           Orders:  -     JULIAN With / DsDNA, RNP, Sjogrens A / B, Smith  -     C-reactive Protein  -     Cyclic Citrul Peptide Antibody, IgG / IgA  -     Rheumatoid Factor  -     Sedimentation Rate  -     Uric Acid  -     Alpha-Gal IgE Panel  -     Ehrlichia Antibody Panel  -     Lyme Disease Total Antibody With Reflex to Immunoassay  -     Rickettsia Species DNA, Real-Time PCR  -     Spotted Fever Group AB, IgG/IgM    26. Degenerative disc disease, cervical  Assessment & Plan:           Orders:  -     JULIAN With / DsDNA, RNP, Sjogrens A / B, Smith  -     C-reactive Protein  -     Cyclic Citrul Peptide Antibody, IgG / IgA  -     Rheumatoid Factor  -     Sedimentation Rate  -     Uric Acid  -     Alpha-Gal IgE Panel  -     Ehrlichia Antibody Panel  -     Lyme Disease Total Antibody With Reflex to Immunoassay  -     Rickettsia Species DNA, Real-Time PCR  -     Spotted Fever Group AB, IgG/IgM    27. Degeneration of intervertebral disc of lumbar region with discogenic back pain and lower extremity pain  Assessment & Plan:           Orders:  -     JULIAN With / DsDNA, RNP, Sjogrens A / B, Smith  -     C-reactive Protein  -     Cyclic Citrul Peptide Antibody, IgG / IgA  -     Rheumatoid Factor  -     Sedimentation Rate  -     Uric Acid  -     Alpha-Gal IgE Panel  -     Ehrlichia Antibody Panel  -     Lyme Disease Total  Antibody With Reflex to Immunoassay  -     Rickettsia Species DNA, Real-Time PCR  -     Spotted Fever Group AB, IgG/IgM    28. Degenerative disc disease, thoracic  Assessment & Plan:           Orders:  -     JULIAN With / DsDNA, RNP, Sjogrens A / B, Smith  -     C-reactive Protein  -     Cyclic Citrul Peptide Antibody, IgG / IgA  -     Rheumatoid Factor  -     Sedimentation Rate  -     Uric Acid  -     Alpha-Gal IgE Panel  -     Ehrlichia Antibody Panel  -     Lyme Disease Total Antibody With Reflex to Immunoassay  -     Rickettsia Species DNA, Real-Time PCR  -     Spotted Fever Group AB, IgG/IgM    29. Osteoarthritis of thoracic spine with myelopathy  Assessment & Plan:           Orders:  -     JULIAN With / DsDNA, RNP, Sjogrens A / B, Smith  -     C-reactive Protein  -     Cyclic Citrul Peptide Antibody, IgG / IgA  -     Rheumatoid Factor  -     Sedimentation Rate  -     Uric Acid  -     Alpha-Gal IgE Panel  -     Ehrlichia Antibody Panel  -     Lyme Disease Total Antibody With Reflex to Immunoassay  -     Rickettsia Species DNA, Real-Time PCR  -     Spotted Fever Group AB, IgG/IgM    30. Osteoarthritis of spine, unspecified spinal osteoarthritis complication status, unspecified spinal region  Assessment & Plan:           Orders:  -     JULIAN With / DsDNA, RNP, Sjogrens A / B, Smith  -     C-reactive Protein  -     Cyclic Citrul Peptide Antibody, IgG / IgA  -     Rheumatoid Factor  -     Sedimentation Rate  -     Uric Acid  -     Alpha-Gal IgE Panel  -     Ehrlichia Antibody Panel  -     Lyme Disease Total Antibody With Reflex to Immunoassay  -     Rickettsia Species DNA, Real-Time PCR  -     Spotted Fever Group AB, IgG/IgM    31. Arthritis  Assessment & Plan:           Orders:  -     JULIAN With / DsDNA, RNP, Sjogrens A / B, Smith  -     C-reactive Protein  -     Cyclic Citrul Peptide Antibody, IgG / IgA  -     Rheumatoid Factor  -     Sedimentation Rate  -     Uric Acid  -     Alpha-Gal IgE Panel  -     Ehrlichia Antibody  Panel  -     Lyme Disease Total Antibody With Reflex to Immunoassay  -     Rickettsia Species DNA, Real-Time PCR  -     Spotted Fever Group AB, IgG/IgM    32. Squamous cell skin cancer  Assessment & Plan:             33. Osteopenia, unspecified location  -     Ambulatory Referral to Endocrinology    34. Encounter for screening for vascular disease  -     Vascular Screening (Bundle) CAR; Future    35. Family history of abdominal aortic aneurysm (AAA)  -     Vascular Screening (Bundle) CAR; Future    36. Other atopic dermatitis  -     Alpha-Gal IgE Panel    Other orders  -     Microscopic Examination -  -     Urine culture, Comprehensive - ,              Assessment and Plan  Patient will have fasting labs. Call if no results in 1 week. Stability of conditions, plan, follow up, and further recommendations pending labs. Follow up in 3 months.     Hyperlipidemia- Lipids improved but LDL remained uncontrolled. She was taking Lipitor then she was taking Crestor from cardiology. She is not taking any longer and noticed improvement in side effects with stopping medication. I will recheck labs and send to cardiology for recommendations for treatment.   Vitamin D deficiency- Dosing recommendations pending labs.   Osteopenia- Patient had DEXA scan that met criteria for treatment. We will need to discuss medication vs specialist referral at follow up.    Thrombocytosis- I will continue to monitor and make further recommendations.   Moods- She has had longstanding depression that has worsened with her son's death. She previously did well on Prozac. I restarted Prozac 20 mg once daily with some improvement but continued with significant depressive symptoms. Increased Prozac to 40 mg once daily then twice daily with improvement but persistent symptoms. She has complicated mood symptoms with grief reaction. She was willing to see psychiatry, was referred, seen, and has not had follow-up.  I discussed she needed to consider  psychiatry again in the future and should ensure regular counseling.  For now, she reports her moods are stable on Prozac 80 mg daily. She should be seen ASAP if worsening moods or 9-1-1 to ER if she develops SI/HI. No SI/HI today.  COPD- Continue Trelegy, Monteleukast, Flonase, Mucinex, and DuoNeb, and follow up with pulmonology as directed by them.    Pulmonary nodule- CT chest 11/2021 with bilateral apical pleural thickening, mild emphysema, calcified pulmonary nodules right lower lobe, other subpleural nodules and areas of scarring with nodularity-all reported without change.  Hiatal hernia.  Per radiology, no suspicious pulmonary nodules.  Recheck in 1 year.  Patient to see pulmonology and follow-up and ensure recheck CT as directed by pulmonology.  GERD, hiatal hernia- Last EGD 2/2022 with large hiatal hernia and reflux esophagitis with benign pathology. Continue Protonix 40 mg once daily and Pepcid 20 mg up to twice daily. She would consider general surgery as directed by GI with uncontrolled GERD symptoms.   Fibromyalgia- Patient was previously on Cymbalta and Elavil for pain. However, moods were better controlled on Prozac. We will re-evaluated at follow up.     DDD cervical spine- Follow up with neurosurgery if persistent pain.   Squamous cell skin cancer- Patient to ensure she follows with dermatology regularly and was referred.    From V 10/2023.  She is up-to-date on colonoscopy.  She does have family history of breast and colon cancer as well as pancreatic cancer. She needs to ensure she stays up to date on colonoscopy and mammogram. She has been referred for mammogram and DEXA scan and needs to schedule. Consideration of genetics consultation to determine most appropriate screening recommendations.  She was recommended for genetics testing per GI.  Patient to check with insurance to determine coverage and location of coverage for Tdap, Prevnar 20, and Shingrix.  She should also ensure annual flu  shot and COVID-19 booster.    I spent 30 minutes caring for Jennifer Shelton on this date of service. This time includes time spent by me in the following activities as necessary: preparing for the visit, reviewing tests, specialists records and previous visits, obtaining and/or reviewing a separately obtained history, performing a medically appropriate exam and/or evaluation, counseling and educating the patient, family, caregiver, referring and/or communicating with other healthcare professionals, documenting information in the medical record, independently interpreting results and communicating that information with the patient, family, caregiver, and developing a medically appropriate treatment plan with consideration of other conditions, medications, and treatments.

## 2025-01-31 LAB
ANA SER QL: NEGATIVE
CCP IGA+IGG SERPL IA-ACNC: 3 UNITS (ref 0–19)
CRP SERPL-MCNC: 2 MG/L (ref 0–10)
ERYTHROCYTE [SEDIMENTATION RATE] IN BLOOD BY WESTERGREN METHOD: 37 MM/HR (ref 0–40)
RHEUMATOID FACT SERPL-ACNC: 10.4 IU/ML
URATE SERPL-MCNC: 2.4 MG/DL (ref 3–7.2)

## 2025-02-01 LAB
25(OH)D3+25(OH)D2 SERPL-MCNC: 24.3 NG/ML (ref 30–100)
ALBUMIN SERPL-MCNC: 3.9 G/DL (ref 3.9–4.9)
ALP SERPL-CCNC: 79 IU/L (ref 44–121)
ALT SERPL-CCNC: 5 IU/L (ref 0–32)
APPEARANCE UR: CLEAR
AST SERPL-CCNC: 14 IU/L (ref 0–40)
BACTERIA #/AREA URNS HPF: NORMAL /[HPF]
BACTERIA UR CULT: NORMAL
BACTERIA UR CULT: NORMAL
BASOPHILS # BLD AUTO: 0.1 X10E3/UL (ref 0–0.2)
BASOPHILS NFR BLD AUTO: 1 %
BILIRUB SERPL-MCNC: 0.3 MG/DL (ref 0–1.2)
BILIRUB UR QL STRIP: NEGATIVE
BUN SERPL-MCNC: 18 MG/DL (ref 8–27)
BUN/CREAT SERPL: 26 (ref 12–28)
CALCIUM SERPL-MCNC: 8.8 MG/DL (ref 8.7–10.3)
CASTS URNS QL MICRO: NORMAL /LPF
CHLORIDE SERPL-SCNC: 102 MMOL/L (ref 96–106)
CHOLEST SERPL-MCNC: 291 MG/DL (ref 100–199)
CK SERPL-CCNC: 45 U/L (ref 32–182)
CO2 SERPL-SCNC: 23 MMOL/L (ref 20–29)
COLOR UR: YELLOW
CREAT SERPL-MCNC: 0.68 MG/DL (ref 0.57–1)
EGFRCR SERPLBLD CKD-EPI 2021: 95 ML/MIN/1.73
EOSINOPHIL # BLD AUTO: 0.3 X10E3/UL (ref 0–0.4)
EOSINOPHIL NFR BLD AUTO: 6 %
EPI CELLS #/AREA URNS HPF: NORMAL /HPF (ref 0–10)
ERYTHROCYTE [DISTWIDTH] IN BLOOD BY AUTOMATED COUNT: 12.8 % (ref 11.7–15.4)
GLOBULIN SER CALC-MCNC: 2.6 G/DL (ref 1.5–4.5)
GLUCOSE SERPL-MCNC: 83 MG/DL (ref 70–99)
GLUCOSE UR QL STRIP: NEGATIVE
HCT VFR BLD AUTO: 38.9 % (ref 34–46.6)
HDLC SERPL-MCNC: 53 MG/DL
HGB BLD-MCNC: 13.1 G/DL (ref 11.1–15.9)
HGB UR QL STRIP: NEGATIVE
IMM GRANULOCYTES # BLD AUTO: 0 X10E3/UL (ref 0–0.1)
IMM GRANULOCYTES NFR BLD AUTO: 0 %
KETONES UR QL STRIP: NEGATIVE
LDL CALC COMMENT:: ABNORMAL
LDLC SERPL CALC-MCNC: 205 MG/DL (ref 0–99)
LDLC/HDLC SERPL: 3.9 RATIO (ref 0–3.2)
LEUKOCYTE ESTERASE UR QL STRIP: ABNORMAL
LYMPHOCYTES # BLD AUTO: 1.5 X10E3/UL (ref 0.7–3.1)
LYMPHOCYTES NFR BLD AUTO: 35 %
MCH RBC QN AUTO: 29.7 PG (ref 26.6–33)
MCHC RBC AUTO-ENTMCNC: 33.7 G/DL (ref 31.5–35.7)
MCV RBC AUTO: 88 FL (ref 79–97)
MICRO URNS: ABNORMAL
MONOCYTES # BLD AUTO: 0.4 X10E3/UL (ref 0.1–0.9)
MONOCYTES NFR BLD AUTO: 9 %
NEUTROPHILS # BLD AUTO: 2.1 X10E3/UL (ref 1.4–7)
NEUTROPHILS NFR BLD AUTO: 49 %
NITRITE UR QL STRIP: NEGATIVE
PH UR STRIP: 6 [PH] (ref 5–7.5)
PLATELET # BLD AUTO: 365 X10E3/UL (ref 150–450)
POTASSIUM SERPL-SCNC: 4.1 MMOL/L (ref 3.5–5.2)
PROT SERPL-MCNC: 6.5 G/DL (ref 6–8.5)
PROT UR QL STRIP: NEGATIVE
RBC # BLD AUTO: 4.41 X10E6/UL (ref 3.77–5.28)
RBC #/AREA URNS HPF: NORMAL /HPF (ref 0–2)
SODIUM SERPL-SCNC: 140 MMOL/L (ref 134–144)
SP GR UR STRIP: 1.02 (ref 1–1.03)
T3FREE SERPL-MCNC: 3 PG/ML (ref 2–4.4)
T4 FREE SERPL-MCNC: 1.09 NG/DL (ref 0.82–1.77)
TRIGL SERPL-MCNC: 177 MG/DL (ref 0–149)
TSH SERPL DL<=0.005 MIU/L-ACNC: 1.8 UIU/ML (ref 0.45–4.5)
URINALYSIS REFLEX: ABNORMAL
UROBILINOGEN UR STRIP-MCNC: 0.2 MG/DL (ref 0.2–1)
VLDLC SERPL CALC-MCNC: 33 MG/DL (ref 5–40)
WBC # BLD AUTO: 4.4 X10E3/UL (ref 3.4–10.8)
WBC #/AREA URNS HPF: NORMAL /HPF (ref 0–5)

## 2025-02-05 LAB
A PHAGOCYTOPH IGG TITR SER IF: NEGATIVE {TITER}
A PHAGOCYTOPH IGM TITR SER IF: NEGATIVE {TITER}
ALPHA-GAL IGE QN: 0.98 KU/L
B BURGDOR IGG+IGM SER QL IA: NEGATIVE
BEEF IGE QN: 0.41 KU/L
CONV CLASS DESCRIPTION: ABNORMAL
E CHAFFEENSIS IGG TITR SER IF: NEGATIVE {TITER}
E CHAFFEENSIS IGM TITR SER IF: NEGATIVE {TITER}
IGE SERPL-ACNC: 48 IU/ML (ref 6–495)
LAMB IGE QN: 0.13 KU/L
PORK IGE QN: 0.11 KU/L
RESULT COMMENT:: NORMAL
RESULT COMMENT:: NORMAL
RICK SF IGG TITR SER: NORMAL {TITER}
RICK SF IGM TITR SER: NORMAL {TITER}
RICKETTSIA RICKETTSII DNA, RT: NOT DETECTED

## 2025-02-14 DIAGNOSIS — F33.1 MAJOR DEPRESSIVE DISORDER, RECURRENT EPISODE, MODERATE DEGREE: ICD-10-CM

## 2025-02-14 DIAGNOSIS — E55.9 VITAMIN D DEFICIENCY: ICD-10-CM

## 2025-02-14 DIAGNOSIS — U07.1 COVID-19 VIRUS INFECTION: ICD-10-CM

## 2025-02-14 DIAGNOSIS — Z91.018 ALLERGY TO ALPHA-GAL: Primary | ICD-10-CM

## 2025-02-14 DIAGNOSIS — J44.9 CHRONIC OBSTRUCTIVE PULMONARY DISEASE, UNSPECIFIED COPD TYPE: ICD-10-CM

## 2025-02-14 DIAGNOSIS — J30.2 SEASONAL ALLERGIC RHINITIS, UNSPECIFIED TRIGGER: ICD-10-CM

## 2025-02-14 DIAGNOSIS — E78.5 HYPERLIPIDEMIA, UNSPECIFIED HYPERLIPIDEMIA TYPE: ICD-10-CM

## 2025-02-14 RX ORDER — CHOLECALCIFEROL (VITAMIN D3) 50 MCG
TABLET ORAL
Qty: 90 TABLET | Refills: 1 | Status: SHIPPED | OUTPATIENT
Start: 2025-02-14

## 2025-02-14 RX ORDER — MONTELUKAST SODIUM 10 MG/1
TABLET ORAL
Qty: 90 TABLET | Refills: 0 | Status: SHIPPED | OUTPATIENT
Start: 2025-02-14

## 2025-02-14 RX ORDER — FLUOXETINE 40 MG/1
CAPSULE ORAL
Qty: 180 CAPSULE | Refills: 0 | Status: SHIPPED | OUTPATIENT
Start: 2025-02-14

## 2025-02-14 RX ORDER — PITAVASTATIN CALCIUM 1.04 MG/1
1 TABLET, FILM COATED ORAL NIGHTLY
Qty: 90 TABLET | Refills: 0 | Status: SHIPPED | OUTPATIENT
Start: 2025-02-14

## 2025-02-14 RX ORDER — FLUTICASONE PROPIONATE 50 MCG
2 SPRAY, SUSPENSION (ML) NASAL DAILY
Qty: 16 G | Refills: 2 | Status: SHIPPED | OUTPATIENT
Start: 2025-02-14

## 2025-02-23 ENCOUNTER — PATIENT MESSAGE (OUTPATIENT)
Dept: FAMILY MEDICINE CLINIC | Facility: CLINIC | Age: 70
End: 2025-02-23
Payer: MEDICARE

## 2025-03-05 RX ORDER — ROSUVASTATIN CALCIUM 10 MG/1
10 TABLET, COATED ORAL DAILY
Qty: 90 TABLET | Refills: 0 | Status: SHIPPED | OUTPATIENT
Start: 2025-03-05

## 2025-03-07 ENCOUNTER — OFFICE VISIT (OUTPATIENT)
Dept: ENDOCRINOLOGY | Age: 70
End: 2025-03-07
Payer: MEDICARE

## 2025-03-07 VITALS
HEART RATE: 87 BPM | DIASTOLIC BLOOD PRESSURE: 74 MMHG | OXYGEN SATURATION: 96 % | SYSTOLIC BLOOD PRESSURE: 124 MMHG | TEMPERATURE: 98 F | HEIGHT: 68 IN | WEIGHT: 199.4 LBS | BODY MASS INDEX: 30.22 KG/M2

## 2025-03-07 DIAGNOSIS — M85.89 OSTEOPENIA OF MULTIPLE SITES: Primary | ICD-10-CM

## 2025-03-07 DIAGNOSIS — E55.9 VITAMIN D DEFICIENCY: ICD-10-CM

## 2025-03-07 NOTE — PATIENT INSTRUCTIONS
Women >49 y/o a minimum of 1,200 mg/day of calcium  Women 19-71 y/o a minimum of 600 units/day of vitamin D  Physical activity at least 150 minutes of moderate-intensity aerobic activity and 2 days a week of muscle strengthening activity
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
## 2025-03-31 ENCOUNTER — OFFICE VISIT (OUTPATIENT)
Dept: FAMILY MEDICINE CLINIC | Facility: CLINIC | Age: 70
End: 2025-03-31
Payer: MEDICARE

## 2025-03-31 VITALS
SYSTOLIC BLOOD PRESSURE: 108 MMHG | OXYGEN SATURATION: 95 % | TEMPERATURE: 97.8 F | BODY MASS INDEX: 29.92 KG/M2 | WEIGHT: 197.4 LBS | RESPIRATION RATE: 17 BRPM | HEIGHT: 68 IN | DIASTOLIC BLOOD PRESSURE: 60 MMHG | HEART RATE: 94 BPM

## 2025-03-31 DIAGNOSIS — R52 BODY ACHES: ICD-10-CM

## 2025-03-31 DIAGNOSIS — R05.9 COUGH, UNSPECIFIED TYPE: Primary | ICD-10-CM

## 2025-03-31 LAB
EXPIRATION DATE: NORMAL
FLUAV AG UPPER RESP QL IA.RAPID: NOT DETECTED
FLUBV AG UPPER RESP QL IA.RAPID: NOT DETECTED
INTERNAL CONTROL: NORMAL
Lab: NORMAL
SARS-COV-2 AG UPPER RESP QL IA.RAPID: NOT DETECTED

## 2025-03-31 PROCEDURE — 1126F AMNT PAIN NOTED NONE PRSNT: CPT | Performed by: NURSE PRACTITIONER

## 2025-03-31 PROCEDURE — 99213 OFFICE O/P EST LOW 20 MIN: CPT | Performed by: NURSE PRACTITIONER

## 2025-03-31 PROCEDURE — 87428 SARSCOV & INF VIR A&B AG IA: CPT | Performed by: NURSE PRACTITIONER

## 2025-03-31 NOTE — PROGRESS NOTES
"Chief Complaint  Cough (Patient complains of symptoms since yesterday ), Generalized Body Aches, and Diarrhea    Subjective        Jennifer Shelton presents to [facility]  History of Present Illness  This is a 69-year-old female patient here today for evaluation of cough general body aches.  She reports her symptoms started on Sunday.  She denies any chest pain or shortness of breath.  She is afebrile today.  She is here today with her  who is also sick.      Objective   Vital Signs:  /60   Pulse 94   Temp 97.8 °F (36.6 °C) (Infrared)   Resp 17   Ht 172.7 cm (67.99\")   Wt 89.5 kg (197 lb 6.4 oz)   SpO2 95%   BMI 30.02 kg/m²   Estimated body mass index is 30.02 kg/m² as calculated from the following:    Height as of this encounter: 172.7 cm (67.99\").    Weight as of this encounter: 89.5 kg (197 lb 6.4 oz).            Physical Exam  Vitals and nursing note reviewed.   Constitutional:       Appearance: Normal appearance.   HENT:      Head: Normocephalic.      Right Ear: Tympanic membrane normal.      Left Ear: Tympanic membrane normal.      Nose: Congestion present.      Mouth/Throat:      Mouth: Mucous membranes are moist.      Pharynx: No oropharyngeal exudate or posterior oropharyngeal erythema.   Cardiovascular:      Rate and Rhythm: Normal rate and regular rhythm.      Pulses: Normal pulses.      Heart sounds: Normal heart sounds.   Pulmonary:      Effort: Pulmonary effort is normal.      Breath sounds: No wheezing.   Musculoskeletal:      Cervical back: Neck supple.   Lymphadenopathy:      Cervical: No cervical adenopathy.   Skin:     General: Skin is warm and dry.   Neurological:      Mental Status: She is alert.        Result Review :                Assessment and Plan   Diagnoses and all orders for this visit:    1. Cough, unspecified type (Primary)  -     POCT SARS-CoV-2 Antigen GISEL + Flu    2. Body aches  -     POCT SARS-CoV-2 Antigen GISEL + Flu    COVID and " flu are both negative in the office today.  As discussed with her  she will take over-the-counter medications for symptoms.  She does have a follow-up routine visit with Tessy on Friday if symptoms continue she will address with her then         Follow Up   No follow-ups on file.  Patient was given instructions and counseling regarding her condition or for health maintenance advice. Please see specific information pulled into the AVS if appropriate.

## 2025-04-07 ENCOUNTER — OFFICE VISIT (OUTPATIENT)
Dept: FAMILY MEDICINE CLINIC | Facility: CLINIC | Age: 70
End: 2025-04-07
Payer: MEDICARE

## 2025-04-07 VITALS
HEART RATE: 107 BPM | WEIGHT: 197 LBS | TEMPERATURE: 97.1 F | BODY MASS INDEX: 29.86 KG/M2 | RESPIRATION RATE: 18 BRPM | DIASTOLIC BLOOD PRESSURE: 54 MMHG | OXYGEN SATURATION: 91 % | SYSTOLIC BLOOD PRESSURE: 88 MMHG | HEIGHT: 68 IN

## 2025-04-07 DIAGNOSIS — J06.9 ACUTE URI: ICD-10-CM

## 2025-04-07 DIAGNOSIS — Z87.891 PERSONAL HISTORY OF NICOTINE DEPENDENCE: ICD-10-CM

## 2025-04-07 DIAGNOSIS — E55.9 VITAMIN D DEFICIENCY: ICD-10-CM

## 2025-04-07 DIAGNOSIS — Z91.018 ALLERGY TO ALPHA-GAL: ICD-10-CM

## 2025-04-07 DIAGNOSIS — J40 BRONCHITIS: ICD-10-CM

## 2025-04-07 DIAGNOSIS — J44.1 COPD WITH EXACERBATION: ICD-10-CM

## 2025-04-07 DIAGNOSIS — R05.1 ACUTE COUGH: ICD-10-CM

## 2025-04-07 DIAGNOSIS — E78.5 HYPERLIPIDEMIA, UNSPECIFIED HYPERLIPIDEMIA TYPE: Primary | ICD-10-CM

## 2025-04-07 DIAGNOSIS — Z12.2 ENCOUNTER FOR SCREENING FOR LUNG CANCER: ICD-10-CM

## 2025-04-07 PROCEDURE — 1159F MED LIST DOCD IN RCRD: CPT | Performed by: PHYSICIAN ASSISTANT

## 2025-04-07 PROCEDURE — 1126F AMNT PAIN NOTED NONE PRSNT: CPT | Performed by: PHYSICIAN ASSISTANT

## 2025-04-07 PROCEDURE — 99214 OFFICE O/P EST MOD 30 MIN: CPT | Performed by: PHYSICIAN ASSISTANT

## 2025-04-07 PROCEDURE — 87428 SARSCOV & INF VIR A&B AG IA: CPT | Performed by: PHYSICIAN ASSISTANT

## 2025-04-07 PROCEDURE — 1160F RVW MEDS BY RX/DR IN RCRD: CPT | Performed by: PHYSICIAN ASSISTANT

## 2025-04-07 RX ORDER — DOXYCYCLINE 100 MG/1
100 CAPSULE ORAL 2 TIMES DAILY
Qty: 20 CAPSULE | Refills: 0 | Status: SHIPPED | OUTPATIENT
Start: 2025-04-07

## 2025-04-07 RX ORDER — BUDESONIDE, GLYCOPYRROLATE, AND FORMOTEROL FUMARATE 160; 9; 4.8 UG/1; UG/1; UG/1
2 AEROSOL, METERED RESPIRATORY (INHALATION) 2 TIMES DAILY
Qty: 5.9 G | Refills: 0 | COMMUNITY
Start: 2025-04-07

## 2025-04-07 RX ORDER — PREDNISONE 20 MG/1
TABLET ORAL
Qty: 11 TABLET | Refills: 0 | Status: SHIPPED | OUTPATIENT
Start: 2025-04-07

## 2025-04-07 NOTE — PROGRESS NOTES
Subjective   Jennifer Shelton is a 69 y.o. female who presents today in follow up of hyperlipidemia with new medications with vitamin D deficiency, thrombocytosis, moods, COPD, pulmonary nodule, GERD, fibromyalgia, Cervical spine DDD, and history of squamous cell skin cancer.     Hyperlipidemia     started to feel a little better 3 days ago but still coughing. He started a couple days prior.     She started just over a week ago with cough and body aches, congestion, PND, increased coughing and loosened up. Diarrhea intermittent but GI issues so difficult to tell.     She got better then worsened again the last couple days, she has had worsening and is feeling poorly. 2 nights ago- had to use oxygen up to 2 L. She usually does not need oxygen.  When she is sitting down, she does not feel that bad but when she stands, she feels bad and has to sit.  She does have intermittent pain in the right chest that is sharp.  Reports it is right lateral chest.    Taking allergy medication (tried Nyquil but was up all night). Has used inhaler but not nebulizer. Taking allergy medication.     Hyperlipidemia-I prescribed Livalo 1 mg 1/30/2025.  Advise she follow-up in 1 month with recheck CMP and CK and in 3 months, fasting.  She sent a message 2/23/2025 and went to fill the Livalo and reported it was $244.  She wanted to go back on the rosuvastatin.  I reordered rosuvastatin.  She had taken Crestor in 6 months or longer. When quit taking, cramping and heartburn disappeared then started again without medication.   We changed to Crestor- she reported she ran out a few months prior and she has had less body aches and not as much trouble with stomach and heartburn. Still flairs occasionally but on medication, she was having daily. She was getting from cardiology and reports they did not fill, so she stopped it. She was taking in the morning. Still aching but has much less aching since stopping Crestor. Feet, ankles, and fingers  were hurting all the time and now only occasionally an ache but not as severe and not as often. Also less headaches off Crestor.   She was previously taking Lipitor 20 mg nightly  Last medication 2016- Lipitor- did well on medication. She has not been on other medications in the past.   Stopped all medication years ago. Last year was full of specialists and let go of routine things.   Vitamin D deficiency-advise she take vitamin D 2000 IU daily 1/2025.  Taking as directed.  She stopped vitamin D because she is out and did not get more.   She was taking vitamin D 2000 IU daily.   Thrombocytosis- no signs of clotting or any concerns.       From 1/2025-  Moods- Prozac 40 mg twice daily changed to Prozac 80 mg daily. Did not change anything. Does not think she needs to change medication. Did not seem to click as much.    Moods had not improved. She continued with depression. She thought it may be time to see specialist.  Referred to psychiatry and was seen 8/2022 but did not keep appointment 4/9/2022.  Prozac in the past- she was up to 80 mg once daily. She had depression since childhood and started treatment in 30s. Increased depression following her son's death. She stopped all medications following his death.   Son's suicide 3/11/2021- she has not been to the doctor for CT chest, scans, other testing and treatment. She has not been doing anything since her son's death.  She noticed improvement in moods with restarting Prozac 20 mg once daily but not as good as it was when she initially restarted. Decreased motivation, not wanting to get up and do anything. She has had mind racing and depressed feeling.   On Prozac 40 mg daily, she had no motivation. When she was out, she was ok but came back home and did not want to do anything.   Previous medications- Cymbalta- helped but Prozac helped more. Last Cymbalta 2016.  Taking benadryl to sleep but not helping much. Elavil- helped with depression but made her very tired.  She also would forget things when she was taking it and talking to people.     Tick bite-alpha gal allergy and allergy to red meat-referred to allergist.    Fatigue- Sleep medicine ordered home sleep study. Has not picked up- did not perform. Still tired.    She is tired all the time- she will sleep a few hours at night wake up and go back to sleep. She is always tired and does not feel rested.    She was to have sleep study right after her son's death but cancelled. Has not followed up with pulmonology in some time.   COPD, asthma, eosinophilia- using Trelegy and Singulair daily. Has not picked up inhaler because from $40 to $130.    Has not had follow up with pulmonology. She is overdue but has not completed sleep study.   stopped Qvar and was taking Stiolto. They changed to Trelegy 200 daily. She has noted significant improvement. She reports they advised more like asthma with elevated eosinophils.    Still using inhalers. Only taking nebulizer as needed- was to take twice daily but she has taken only as needed. Taking her daily inhaler and Singulair. Medications- Qvar, Stiolto, Monteleukast, Flonase, Mucinex, and has duoneb  As needed.   Pulmonary nodule- had follow up CT with thoracic surgery and no follow up needed.   CT chest 11/8/2021- Bilateral apical pleural thickening, mild emphysema, calcified pulmonary nodules right lower lobe, other subpleural nodules and areas of scarring with nodularity-all reported without change.  Small hiatal hernia.  Per radiology, no suspicious pulmonary nodules.  Recheck in 1 year. Repeat 12/2022- stable. No need to continue to follow but per pulmonology, may lori annual screening.   Nodule on right lung- they were scanning and following with Dr Kleley.   Pulmonology- RESHMA Taylor- missed appt for follow up of pulmonary nodule. Hospitalized 2/2/21 and 10/2021- she had been coughing for 2 months then went to doctor and was given steroid. She also had poison ivy and that  helped too. Seen by someone in [place]- urgent care.     GERD, hiatal hernia, IBS- Dr Oneal Raymond, RESHMA Sharma-  she was to have manometry test and follow up. She was not sure she wanted to do it and did not do the test. Not sure she wants to consider it. She has symptoms for weeks then has symptoms for weeks.   She was having worsening symptoms. Last seen by GI- they advised she let them know when she was ready to see surgeon for procedure. She may have to proceed because cannot live like this. She reports she gets up in the morning then has chest pain, vomits, and feels better- she feels heartburn/ indigestion. She wakes up in the middle of the night and has to take medication. She takes Pepcid. She was unable to get Carafate in between medications and did not feel like it helped when taking it.   Taking Protonix 40 mg once daily and Pepcid 20 mg 2-3 x daily, and has carafate- did not help and has not been taking. Negative gastric emptying study. Will have colonoscopy 4/2023. They will consider Nissen.   She is still having heartburn symptoms- sometimes wakes up with it and sometimes has after taking the medication in the day. She is also having increased IBS symptoms. Patient with frequent, urgent BM. Has never taken anything for it but she has continued with symptoms. She would like to consider medication.   She had pain in her chest- if she does not take Pepcid, she has pain in her chest and cannot eat. When she takes the medication, she does ok. She passes blood in stool that is bright red at times. Seeing GI.   EGD 2/2022 with large hiatal hernia, esophagitis.    Still aching but has much less aching since stopping Crestor. Feet, ankles, and fingers were hurting all the time and now only occasionally an ache but not as severe and not as often. Also less headaches off Crestor.   Fibromyalgia- has been on no medication in a while.   Previously Cymbalta and Elavil.   Shoulder pain- she had MRI left  shoulder with tendinopathy supraspinatous, biceps, glenohumeral joint effusion, chondromalacia- suggesting capsulitis and minimal DJD AC joint. Now right shoulder pain. Does not want to see orthopedist at this time.   DDD cervical spine- she had appt with neurosurgery 5/2023- cancelled because was nervous the answer was going to be surgery.   always hurting- having headaches. Thinks from neck pain. 1/2021 for cervicalgia, cervical radiculopathy, herniated C4-5, C5-6, left shoulder pain. Ordered MRI left shoulder. If no abnormality, recommended C4-5, C5-6 ACDF  Went to orthopedist for rotator cuff as well 3/2021- Xray, MRI 2/2021 with moderate supraspinatus tendonopathy, partial thickness fraying, intra-articular long head biceps tendinopathy, small glenohummeral effusion, mild to moderate chondromalacia, synovial thickening, or intr-articular debris vs labral tissue in axillary recess- suggested capsulitis. DJD AC joint with chronic deformity of clavicle. Exercises for tendonitis given and advised to return for consideration of injection if no improvement    Squamous cell skin cancer- last appt 11-12/2023- follow up 1 year. Had to use chemo cream started Thanksgiving night. Went back and they said was ok. [facility] [place]  Patient had SCC 1/2021.    Mother and sister with pancreatic cancer. Mother with history of colon and breast cancer. MAunt and MGM with colon cancer.     No Hx abnormal PAP- last PAP 3 years ago.     Patient's Specialists:  Behavioral health-RESHMA Farias- last appointment 8/2022 for major depressive disorder with prolonged grief from death of son 3/2022 for suicide.  Advised change Prozac from 40 mg twice a day to 80 mg once daily in the morning.  If increased irritability, will change to 60 mg.  Advised to check to see if Trintellix or Viibryd would be approved.  Follow-up in 4 weeks.  Patient canceled follow-up.  She has not rescheduled.  Cardiology-Dr. David Oliveira-last  appointment 12/2022 for chest pain, exertional dyspnea, diastolic dysfunction, coronary artery calcification on CT, hyperlipidemia.  Advised Lexiscan nuclear stress test, change in intensity to Crestor 20 mg nightly and advised aspirin 81 mg.  Mild diastolic dysfunction on echo.  Advised low-salt diet and monitor weight.  Advised follow-up 6 to 9 months.  Stress test normal.    Prior Dr Lb Boyer- last appt 4/2020 for preop exam, CP, SOA, abnormal EKG. Advised smoking cessation, follow up with pulmonology, take Lipitor, hold off on colonoscopy pending cardiac workup- stress test, echo, and holter monitor. Advised AAA screening. Negative cardiac testing. Advised follow up 1 year. She did not return for follow up.   GI- Dr Raymond, RESHMA Sharma, RESHMA Up- last appt 8/2023 for family history of colon cancer, breast cancer, pancreatic cancer, GERD, hiatal hernia, IBS with constipation.  Advise gastric emptying study and hyoscyamine.  Colonoscopy for 2023.  Consider referral for Nissen.  Constipation not adequately relieved with Linzess 72 mcg and did not wish for trial of a stronger dose due to cost.  Advised MiraLAX and stool softeners as needed.  Sent prescription for Levsin for abdominal cramping.  Advised Ambry genetic testing.  Continue Protonix, Pepcid, and was given Carafate.  Advised she see surgery for hiatal hernia repair.  Follow-up 2/2024  Normal gastric emptying study.  10/2021-for blood in stool, IBS, and GERD. Advised Miralax, Protonix 40 mg once daily, and EGD, Colonoscopy. Endoscopy 2/2022 with large hitala hernia. No colonoscopy performed. Benign pathology.   General surgery- RESHMA Arias- last appt 3/2020 for hematuria. CT abd/pelvis, cystoscopy, and UA.   Neurosurgery- Dr Willi Ghotra- last appt 1/2021 for cervicalgia, cervical radiculopathy, herniated C4-5, C5-6, left shoulder pain. Ordered MRI left shoulder. If no abnormality, recommended C4-5, C5-6 ACDF.  Appointment  3/30/2023 and 5/2023 canceled.  Orthopedic surgery- Dr Jet Salinas- last appt 3/2021 for left shoulder pain. Xray, MRI 2/2021 with moderate supraspinatus tendonopathy, partial thickness fraying, intra-articular long head biceps tendinopathy, small glenohummeral effusion, mild to moderate chondromalacia, synovial thickening, or intr-articular debris vs labral tissue in axillary recess- suggested capsulitis. DJD AC joint with chronic deformity of clavicle. Exercises for tendonitis given and advised to return for consideration of injection if no improvement.   Pulmonology-, RESHMA Atwood- last appt 1/2023 for COPD with emphysema, pulmonary nodules, history of tobacco abuse, URIEL.  Changed Stiolto to Trelegy 200.   CT stable over 2 years.-no need for follow-up testing.  May benefit for yearly screening and will address at next visit.  Need to evaluate for URIEL.  Follow-up 2 months with full PFT.   2/2021 in follow up of hospitalization for acute hypoxic respiratory failure requiring noninvasince positive pressure ventilation and COPD exacerbation. Hypoxia to 86% on 4 L O2. Transitioned to BIPAP with improvement. She did not need home oxygen. Treated with steroid, nebulizer. Advised Stiolto, Qvar, and Singulair. Ordered sleep study, started flutter valve, DuoNeb followed by sodium chloride nebulizer then flutter valve twice daily. Low dose CT 6/2021. Had CT abd/pelvis with pulm nodule. Continued Stiolto and PA Singulair. Changed Qvar to Flovent due to cost. Follow up in 2-3 months.   Urology- Dr Chelsea Lundy- last appt 3/2020 for cystoscopy for hematuria. Negative and negative CT abd/pelvis. Follow up in 1 year.     The following portions of the patient's history were reviewed and updated as appropriate: allergies, current medications, past family history, past medical history, past social history, past surgical history and problem list.    Review of Systems    Objective   Vitals:    04/07/25 1302    BP: (!) 88/54   Pulse: 107   Resp: 18   Temp: 97.1 °F (36.2 °C)   SpO2: 91%           Body mass index is 29.96 kg/m².    Physical Exam  Constitutional:       General: She is not in acute distress.     Appearance: She is well-developed.   HENT:      Head: Normocephalic and atraumatic.   Eyes:      General:         Right eye: No discharge.         Left eye: No discharge.   Pulmonary:      Effort: Pulmonary effort is normal. No respiratory distress.   Skin:     General: Skin is dry.   Neurological:      Mental Status: She is alert.   Psychiatric:         Attention and Perception: She is attentive.         Mood and Affect: Mood is depressed.         Speech: Speech normal.         Behavior: Behavior normal.         Assessment & Plan   Diagnoses and all orders for this visit:    1. Hyperlipidemia, unspecified hyperlipidemia type (Primary)  -     Comprehensive Metabolic Panel  -     CK    2. Vitamin D deficiency    3. Allergy to alpha-gal    4. Encounter for screening for lung cancer  -      CT Chest Low Dose Cancer Screening WO; Future    5. Acute cough  -     POCT SARS-CoV-2 Antigen GISEL + Flu    6. Acute URI  -     Comprehensive Metabolic Panel  -     CBC & Differential  -     Budeson-Glycopyrrol-Formoterol (Breztri Aerosphere) 160-9-4.8 MCG/ACT aerosol inhaler; Inhale 2 puffs 2 (Two) Times a Day.  Dispense: 5.9 g; Refill: 0  -     XR Chest PA & Lateral  -     doxycycline (VIBRAMYCIN) 100 MG capsule; Take 1 capsule by mouth 2 (Two) Times a Day.  Dispense: 20 capsule; Refill: 0  -     predniSONE (DELTASONE) 20 MG tablet; Take 2 tablets daily for 3 days, 1 tablet daily for 3 days, half tablet daily for 3 days.  Dispense: 11 tablet; Refill: 0    7. Bronchitis  -     Comprehensive Metabolic Panel  -     CBC & Differential  -     Budeson-Glycopyrrol-Formoterol (Breztri Aerosphere) 160-9-4.8 MCG/ACT aerosol inhaler; Inhale 2 puffs 2 (Two) Times a Day.  Dispense: 5.9 g; Refill: 0  -     XR Chest PA & Lateral  -      doxycycline (VIBRAMYCIN) 100 MG capsule; Take 1 capsule by mouth 2 (Two) Times a Day.  Dispense: 20 capsule; Refill: 0  -     predniSONE (DELTASONE) 20 MG tablet; Take 2 tablets daily for 3 days, 1 tablet daily for 3 days, half tablet daily for 3 days.  Dispense: 11 tablet; Refill: 0    8. COPD with exacerbation  -     Comprehensive Metabolic Panel  -     CBC & Differential  -     Budeson-Glycopyrrol-Formoterol (Breztri Aerosphere) 160-9-4.8 MCG/ACT aerosol inhaler; Inhale 2 puffs 2 (Two) Times a Day.  Dispense: 5.9 g; Refill: 0  -     XR Chest PA & Lateral  -     doxycycline (VIBRAMYCIN) 100 MG capsule; Take 1 capsule by mouth 2 (Two) Times a Day.  Dispense: 20 capsule; Refill: 0  -     predniSONE (DELTASONE) 20 MG tablet; Take 2 tablets daily for 3 days, 1 tablet daily for 3 days, half tablet daily for 3 days.  Dispense: 11 tablet; Refill: 0    9. Personal history of nicotine dependence  -      CT Chest Low Dose Cancer Screening WO; Future        Assessment and Plan  Patient will have labs and x-ray. Call if no results in 1 week. Stability of conditions, plan, follow up, and further recommendations pending labs. Follow up in 2 months, fasting.     URI with bronchitis and likely COPD exacerbation- Patient was seen 3/31/2025 for URI by RESHMA Doan.  She thought she was improving then had sudden worsening.   was also sick and he is now improving.  Patient to use Breztri 2 puffs twice daily then rinse mouth, prednisone taper, and I will give doxycycline 100 mg twice daily for 10 days.  She also needs to use her nebulizer every 4-6 hours and continue Flonase, Singulair.  Patient will need to contact her pulmonologist if no resolution.  To ER ASAP if no improvement, worsening, increasing need for oxygen or continued need for oxygen since she does not typically use it.  Patient and  verbalized understanding and agreement with plan and recommendation and close monitoring and need for ER if no  significant improvement.  Hyperlipidemia- Lipids improved but LDL remained uncontrolled. She was taking Lipitor then she was taking Crestor from cardiology. She stopped the medications and reported she noticed improvement in side effects with stopping medication. I rechecked labs and advised she start Livalo.  She sent message back and reported it was too expensive and she wanted to change back to rosuvastatin.  Restarted rosuvastatin 10 mg daily and is tolerating without AE.  I will recheck CMP and CK and make further recommendations.  She will need follow-up with me in 2 months, fasting.  Vitamin D deficiency- Patient to ensure she is taking vitamin D 2000 IU daily as directed.  Will recheck at follow-up in 2 months.    From 1/2025-  Osteopenia- Patient had DEXA scan that met criteria for treatment. We will need to discuss medication vs specialist referral at follow up.    Thrombocytosis- I will continue to monitor and make further recommendations.   Moods- She has had longstanding depression that has worsened with her son's death. She previously did well on Prozac. I restarted Prozac 20 mg once daily with some improvement but continued with significant depressive symptoms. Increased Prozac to 40 mg once daily then twice daily with improvement but persistent symptoms. She has complicated mood symptoms with grief reaction. She was willing to see psychiatry, was referred, seen, and has not had follow-up.  I discussed she needed to consider psychiatry again in the future and should ensure regular counseling.  For now, she reports her moods are stable on Prozac 80 mg daily. She should be seen ASAP if worsening moods or 9-1-1 to ER if she develops SI/HI. No SI/HI today.  COPD- Continue Trelegy, Monteleukast, Flonase, Mucinex, and DuoNeb, and follow up with pulmonology as directed by them.    Pulmonary nodule- CT chest 11/2021 with bilateral apical pleural thickening, mild emphysema, calcified pulmonary nodules right  lower lobe, other subpleural nodules and areas of scarring with nodularity-all reported without change.  Hiatal hernia.  Per radiology, no suspicious pulmonary nodules.  Recheck in 1 year.  Patient to see pulmonology and follow-up and ensure recheck CT as directed by pulmonology.  GERD, hiatal hernia- Last EGD 2/2022 with large hiatal hernia and reflux esophagitis with benign pathology. Continue Protonix 40 mg once daily and Pepcid 20 mg up to twice daily. She would consider general surgery as directed by GI with uncontrolled GERD symptoms.   Fibromyalgia- Patient was previously on Cymbalta and Elavil for pain. However, moods were better controlled on Prozac. We will re-evaluated at follow up.     DDD cervical spine- Follow up with neurosurgery if persistent pain.   Squamous cell skin cancer- Patient to ensure she follows with dermatology regularly and was referred.    From [facility] 10/2023.  She is up-to-date on colonoscopy.  She does have family history of breast and colon cancer as well as pancreatic cancer. She needs to ensure she stays up to date on colonoscopy and mammogram. She has been referred for mammogram and DEXA scan and needs to schedule. Consideration of genetics consultation to determine most appropriate screening recommendations.  She was recommended for genetics testing per GI.  Patient to check with insurance to determine coverage and location of coverage for Tdap, Prevnar 20, and Shingrix.  She should also ensure annual flu shot and COVID-19 booster.    I spent 30 minutes caring for Jennifer Shelton on this date of service. This time includes time spent by me in the following activities as necessary: preparing for the visit, reviewing tests, specialists records and previous visits, obtaining and/or reviewing a separately obtained history, performing a medically appropriate exam and/or evaluation, counseling and educating the patient, family, caregiver, referring and/or communicating with other  healthcare professionals, documenting information in the medical record, independently interpreting results and communicating that information with the patient, family, caregiver, and developing a medically appropriate treatment plan with consideration of other conditions, medications, and treatments.

## 2025-04-08 LAB
ALBUMIN SERPL-MCNC: 4.1 G/DL (ref 3.9–4.9)
ALP SERPL-CCNC: 89 IU/L (ref 44–121)
ALT SERPL-CCNC: 7 IU/L (ref 0–32)
AST SERPL-CCNC: 14 IU/L (ref 0–40)
BASOPHILS # BLD AUTO: 0.1 X10E3/UL (ref 0–0.2)
BASOPHILS NFR BLD AUTO: 1 %
BILIRUB SERPL-MCNC: 0.3 MG/DL (ref 0–1.2)
BUN SERPL-MCNC: 18 MG/DL (ref 8–27)
BUN/CREAT SERPL: 30 (ref 12–28)
CALCIUM SERPL-MCNC: 8.9 MG/DL (ref 8.7–10.3)
CHLORIDE SERPL-SCNC: 102 MMOL/L (ref 96–106)
CK SERPL-CCNC: 85 U/L (ref 32–182)
CO2 SERPL-SCNC: 24 MMOL/L (ref 20–29)
CREAT SERPL-MCNC: 0.61 MG/DL (ref 0.57–1)
EGFRCR SERPLBLD CKD-EPI 2021: 98 ML/MIN/1.73
EOSINOPHIL # BLD AUTO: 0.6 X10E3/UL (ref 0–0.4)
EOSINOPHIL NFR BLD AUTO: 10 %
ERYTHROCYTE [DISTWIDTH] IN BLOOD BY AUTOMATED COUNT: 12.6 % (ref 11.7–15.4)
GLOBULIN SER CALC-MCNC: 2.6 G/DL (ref 1.5–4.5)
GLUCOSE SERPL-MCNC: 84 MG/DL (ref 70–99)
HCT VFR BLD AUTO: 38.4 % (ref 34–46.6)
HGB BLD-MCNC: 12.8 G/DL (ref 11.1–15.9)
IMM GRANULOCYTES # BLD AUTO: 0 X10E3/UL (ref 0–0.1)
IMM GRANULOCYTES NFR BLD AUTO: 0 %
LYMPHOCYTES # BLD AUTO: 1.6 X10E3/UL (ref 0.7–3.1)
LYMPHOCYTES NFR BLD AUTO: 28 %
MCH RBC QN AUTO: 29.6 PG (ref 26.6–33)
MCHC RBC AUTO-ENTMCNC: 33.3 G/DL (ref 31.5–35.7)
MCV RBC AUTO: 89 FL (ref 79–97)
MONOCYTES # BLD AUTO: 0.5 X10E3/UL (ref 0.1–0.9)
MONOCYTES NFR BLD AUTO: 8 %
NEUTROPHILS # BLD AUTO: 3 X10E3/UL (ref 1.4–7)
NEUTROPHILS NFR BLD AUTO: 53 %
PLATELET # BLD AUTO: 414 X10E3/UL (ref 150–450)
POTASSIUM SERPL-SCNC: 4.4 MMOL/L (ref 3.5–5.2)
PROT SERPL-MCNC: 6.7 G/DL (ref 6–8.5)
RBC # BLD AUTO: 4.32 X10E6/UL (ref 3.77–5.28)
SODIUM SERPL-SCNC: 139 MMOL/L (ref 134–144)
WBC # BLD AUTO: 5.7 X10E3/UL (ref 3.4–10.8)

## 2025-05-05 ENCOUNTER — OFFICE VISIT (OUTPATIENT)
Dept: FAMILY MEDICINE CLINIC | Facility: CLINIC | Age: 70
End: 2025-05-05
Payer: MEDICARE

## 2025-05-05 ENCOUNTER — HOSPITAL ENCOUNTER (OUTPATIENT)
Dept: CT IMAGING | Facility: HOSPITAL | Age: 70
Discharge: HOME | End: 2025-05-05
Admitting: PHYSICIAN ASSISTANT
Payer: MEDICARE

## 2025-05-05 ENCOUNTER — TELEPHONE (OUTPATIENT)
Dept: FAMILY MEDICINE CLINIC | Facility: CLINIC | Age: 70
End: 2025-05-05

## 2025-05-05 ENCOUNTER — HOSPITAL ENCOUNTER (OUTPATIENT)
Facility: HOSPITAL | Age: 70
Setting detail: OBSERVATION
LOS: 1 days | Discharge: HOME | End: 2025-05-10
Attending: INTERNAL MEDICINE | Admitting: INTERNAL MEDICINE
Payer: MEDICARE

## 2025-05-05 VITALS
OXYGEN SATURATION: 92 % | TEMPERATURE: 97.9 F | SYSTOLIC BLOOD PRESSURE: 92 MMHG | RESPIRATION RATE: 22 BRPM | DIASTOLIC BLOOD PRESSURE: 40 MMHG | HEART RATE: 113 BPM

## 2025-05-05 DIAGNOSIS — Z99.81 DEPENDENCE ON SUPPLEMENTAL OXYGEN: ICD-10-CM

## 2025-05-05 DIAGNOSIS — Z87.891 PERSONAL HISTORY OF NICOTINE DEPENDENCE: ICD-10-CM

## 2025-05-05 DIAGNOSIS — R05.1 ACUTE COUGH: Primary | ICD-10-CM

## 2025-05-05 DIAGNOSIS — J44.1 COPD WITH EXACERBATION: ICD-10-CM

## 2025-05-05 DIAGNOSIS — R09.02 HYPOXIA: ICD-10-CM

## 2025-05-05 DIAGNOSIS — Z12.2 ENCOUNTER FOR SCREENING FOR LUNG CANCER: ICD-10-CM

## 2025-05-05 PROCEDURE — 94799 UNLISTED PULMONARY SVC/PX: CPT

## 2025-05-05 PROCEDURE — 96374 THER/PROPH/DIAG INJ IV PUSH: CPT

## 2025-05-05 PROCEDURE — 71271 CT THORAX LUNG CANCER SCR C-: CPT

## 2025-05-05 PROCEDURE — 94640 AIRWAY INHALATION TREATMENT: CPT

## 2025-05-05 PROCEDURE — 25010000002 METHYLPREDNISOLONE PER 40 MG: Performed by: INTERNAL MEDICINE

## 2025-05-05 RX ORDER — POLYETHYLENE GLYCOL 3350 17 G/17G
17 POWDER, FOR SOLUTION ORAL DAILY PRN
Status: DISCONTINUED | OUTPATIENT
Start: 2025-05-05 | End: 2025-05-10 | Stop reason: HOSPADM

## 2025-05-05 RX ORDER — PANTOPRAZOLE SODIUM 40 MG/1
40 TABLET, DELAYED RELEASE ORAL DAILY
Status: DISCONTINUED | OUTPATIENT
Start: 2025-05-06 | End: 2025-05-10 | Stop reason: HOSPADM

## 2025-05-05 RX ORDER — MONTELUKAST SODIUM 10 MG/1
10 TABLET ORAL NIGHTLY
Status: DISCONTINUED | OUTPATIENT
Start: 2025-05-06 | End: 2025-05-10 | Stop reason: HOSPADM

## 2025-05-05 RX ORDER — ACETAMINOPHEN 160 MG/5ML
650 SOLUTION ORAL EVERY 4 HOURS PRN
Status: DISCONTINUED | OUTPATIENT
Start: 2025-05-05 | End: 2025-05-10 | Stop reason: HOSPADM

## 2025-05-05 RX ORDER — BENZONATATE 100 MG/1
200 CAPSULE ORAL 3 TIMES DAILY PRN
Status: DISCONTINUED | OUTPATIENT
Start: 2025-05-05 | End: 2025-05-07

## 2025-05-05 RX ORDER — IPRATROPIUM BROMIDE AND ALBUTEROL SULFATE 2.5; .5 MG/3ML; MG/3ML
3 SOLUTION RESPIRATORY (INHALATION)
Status: DISCONTINUED | OUTPATIENT
Start: 2025-05-06 | End: 2025-05-10 | Stop reason: HOSPADM

## 2025-05-05 RX ORDER — HYDROCODONE BITARTRATE AND HOMATROPINE METHYLBROMIDE ORAL SOLUTION 5; 1.5 MG/5ML; MG/5ML
5 LIQUID ORAL EVERY 4 HOURS PRN
Refills: 0 | Status: DISCONTINUED | OUTPATIENT
Start: 2025-05-05 | End: 2025-05-10 | Stop reason: HOSPADM

## 2025-05-05 RX ORDER — ONDANSETRON 2 MG/ML
4 INJECTION INTRAMUSCULAR; INTRAVENOUS EVERY 6 HOURS PRN
Status: DISCONTINUED | OUTPATIENT
Start: 2025-05-05 | End: 2025-05-10 | Stop reason: HOSPADM

## 2025-05-05 RX ORDER — ROSUVASTATIN CALCIUM 10 MG/1
10 TABLET, COATED ORAL DAILY
Status: DISCONTINUED | OUTPATIENT
Start: 2025-05-06 | End: 2025-05-10 | Stop reason: HOSPADM

## 2025-05-05 RX ORDER — ARFORMOTEROL TARTRATE 15 UG/2ML
15 SOLUTION RESPIRATORY (INHALATION)
Status: DISCONTINUED | OUTPATIENT
Start: 2025-05-06 | End: 2025-05-10 | Stop reason: HOSPADM

## 2025-05-05 RX ORDER — METHYLPREDNISOLONE SODIUM SUCCINATE 40 MG/ML
40 INJECTION, POWDER, LYOPHILIZED, FOR SOLUTION INTRAMUSCULAR; INTRAVENOUS EVERY 8 HOURS
Status: DISCONTINUED | OUTPATIENT
Start: 2025-05-05 | End: 2025-05-06

## 2025-05-05 RX ORDER — FAMOTIDINE 20 MG/1
20 TABLET, FILM COATED ORAL 2 TIMES DAILY PRN
Status: DISCONTINUED | OUTPATIENT
Start: 2025-05-05 | End: 2025-05-10 | Stop reason: HOSPADM

## 2025-05-05 RX ORDER — ONDANSETRON 4 MG/1
4 TABLET, ORALLY DISINTEGRATING ORAL EVERY 6 HOURS PRN
Status: DISCONTINUED | OUTPATIENT
Start: 2025-05-05 | End: 2025-05-10 | Stop reason: HOSPADM

## 2025-05-05 RX ORDER — FLUTICASONE PROPIONATE 50 MCG
2 SPRAY, SUSPENSION (ML) NASAL DAILY
Status: DISCONTINUED | OUTPATIENT
Start: 2025-05-06 | End: 2025-05-10 | Stop reason: HOSPADM

## 2025-05-05 RX ORDER — ACETAMINOPHEN 650 MG/1
650 SUPPOSITORY RECTAL EVERY 4 HOURS PRN
Status: DISCONTINUED | OUTPATIENT
Start: 2025-05-05 | End: 2025-05-10 | Stop reason: HOSPADM

## 2025-05-05 RX ORDER — AMOXICILLIN 250 MG
2 CAPSULE ORAL 2 TIMES DAILY PRN
Status: DISCONTINUED | OUTPATIENT
Start: 2025-05-05 | End: 2025-05-10 | Stop reason: HOSPADM

## 2025-05-05 RX ORDER — CHOLECALCIFEROL (VITAMIN D3) 25 MCG
1000 TABLET ORAL DAILY
Status: DISCONTINUED | OUTPATIENT
Start: 2025-05-06 | End: 2025-05-10 | Stop reason: HOSPADM

## 2025-05-05 RX ORDER — IPRATROPIUM BROMIDE AND ALBUTEROL SULFATE 2.5; .5 MG/3ML; MG/3ML
3 SOLUTION RESPIRATORY (INHALATION)
Status: DISCONTINUED | OUTPATIENT
Start: 2025-05-05 | End: 2025-05-05

## 2025-05-05 RX ORDER — ALBUTEROL SULFATE 0.83 MG/ML
2.5 SOLUTION RESPIRATORY (INHALATION) EVERY 6 HOURS PRN
Status: DISCONTINUED | OUTPATIENT
Start: 2025-05-05 | End: 2025-05-10 | Stop reason: HOSPADM

## 2025-05-05 RX ORDER — AZITHROMYCIN 250 MG/1
500 TABLET, FILM COATED ORAL
Status: COMPLETED | OUTPATIENT
Start: 2025-05-06 | End: 2025-05-08

## 2025-05-05 RX ORDER — BISACODYL 10 MG
10 SUPPOSITORY, RECTAL RECTAL DAILY PRN
Status: DISCONTINUED | OUTPATIENT
Start: 2025-05-05 | End: 2025-05-10 | Stop reason: HOSPADM

## 2025-05-05 RX ORDER — ACETAMINOPHEN 325 MG/1
650 TABLET ORAL EVERY 4 HOURS PRN
Status: DISCONTINUED | OUTPATIENT
Start: 2025-05-05 | End: 2025-05-10 | Stop reason: HOSPADM

## 2025-05-05 RX ORDER — DIPHENHYDRAMINE HCL 25 MG
50 CAPSULE ORAL NIGHTLY PRN
Status: DISCONTINUED | OUTPATIENT
Start: 2025-05-05 | End: 2025-05-05

## 2025-05-05 RX ORDER — BISACODYL 5 MG/1
5 TABLET, DELAYED RELEASE ORAL DAILY PRN
Status: DISCONTINUED | OUTPATIENT
Start: 2025-05-05 | End: 2025-05-10 | Stop reason: HOSPADM

## 2025-05-05 RX ORDER — BUDESONIDE 0.5 MG/2ML
0.5 INHALANT ORAL
Status: DISCONTINUED | OUTPATIENT
Start: 2025-05-06 | End: 2025-05-10 | Stop reason: HOSPADM

## 2025-05-05 RX ADMIN — BENZONATATE 200 MG: 100 CAPSULE ORAL at 20:18

## 2025-05-05 RX ADMIN — IPRATROPIUM BROMIDE AND ALBUTEROL SULFATE 3 ML: .5; 3 SOLUTION RESPIRATORY (INHALATION) at 20:57

## 2025-05-05 RX ADMIN — METHYLPREDNISOLONE SODIUM SUCCINATE 40 MG: 40 INJECTION, POWDER, FOR SOLUTION INTRAMUSCULAR; INTRAVENOUS at 18:27

## 2025-05-05 NOTE — PLAN OF CARE
"Goal Outcome Evaluation:              Outcome Evaluation: Pt direct admit to 4E from PCP. Pt c/o SOA x1 week after returning from trip to [place]. Pt reports using albuterol inhaler at home \"more than I should.\" Without improvement. Pt currently on 2L for comfort but was 92% on RA on assessment. Spouse at bedside. MD to see pt. No other complaints needs met at this time.                             "

## 2025-05-05 NOTE — TELEPHONE ENCOUNTER
Provider Tessy pritchett asked me to call the Radiology department and see if we could get the Ct scan that the patient had completed today read sooner rather than later due to symptoms patient was having. I called radiology and they are changing the imaging to stat.

## 2025-05-05 NOTE — PROGRESS NOTES
Subjective   Jennifer Shelton is a 69 y.o. female who presents today in follow up of hyperlipidemia with new medications with vitamin D deficiency, thrombocytosis, moods, COPD, pulmonary nodule, GERD, fibromyalgia, Cervical spine DDD, and history of squamous cell skin cancer.     Cough  Associated symptoms: wheezing    Wheezing   Associated symptoms include coughing.         Reports she did improve after last visit but she started again with cough about 1 week ago. She was in [place]. She took dogs out but otherwise stayed in. She has started coughing, tightness in chest, wheezing, increased SOA.   Oxygen got to 86-87%- she is now on oxygen all the time. When exerts self, oxygen decreases to high 80s%-requiring 3 L oxygen day and night.   When coughing, has production from head.   Yesterday, thought was getting better then worse again.   Had CT today- 2 - 2 1/2 hours.   Inhaler every hour now. Ran out of [employer]- has the Trelegy called in from Pulmonology but she does not have it. Has not used nebulizer.   From 4/7/2025- She started just over a week prior with cough and body aches, congestion, PND, increased coughing and loosened up. Diarrhea intermittent but GI issues so difficult to tell. She got better then worsened again the a couple days prior, she had worsening and was feeling poorly. 2 nights prior- had to use oxygen up to 2 L. She usually does not need oxygen.  When she was sitting down, she did not feel that bad but when she stood, she felt bad and had to sit.  She had intermittent pain in the right chest that was sharp-right lateral chest. She was taking allergy medication (tried Nyquil but was up all night) and used inhaler but not nebulizer.  She was taking allergy medication. She reported her  started to feel a little better 3 days ago but still coughing. He started a couple days prior.     COPD, asthma, eosinophilia-she is following with pulmonology, however, she is not using Trelegy or daily  maintenance inhaler and is not using nebulizer.  She is using albuterol every hour with recent worsening symptoms.  She has not contacted pulmonology for follow-up since last exacerbation in March and April.  She continued using inhalers. Only taking nebulizer as needed- was to take twice daily but she has taken only as needed.  She was taking her daily inhaler and Singulair. Medications- Qvar, Stiolto, Monteleukast, Flonase, Mucinex, and has duoneb  As needed.   Has not had follow up with pulmonology. She is overdue but has not completed sleep study.   stopped Qvar and was taking Stiolto. They changed to Trelegy 200 daily. She has noted significant improvement. She reports they advised more like asthma with elevated eosinophils.   She was using Trelegy and Singulair daily.  She had not picked up inhaler because from $40 to $130.   Pulmonary nodule-underwent CT chest today.  This was not ordered stat, as it was ordered as LDCT.  However, we did contact radiology and they should get this read within the hour.  Nodule on right lung- they were scanning and following with Dr Kelley.   Pulmonology- RES[facility]MA Taylor- missed appt for follow up of pulmonary nodule. Hospitalized 2/2/21 and 10/2021- she had been coughing for 2 months then went to doctor and was given steroid. She also had poison ivy and that helped too. Seen by someone in [place]- urgent care.  CT chest 11/8/2021- Bilateral apical pleural thickening, mild emphysema, calcified pulmonary nodules right lower lobe, other subpleural nodules and areas of scarring with nodularity-all reported without change.  Small hiatal hernia.  Per radiology, no suspicious pulmonary nodules.  Recheck in 1 year. Repeat 12/2022- stable. No need to continue to follow but per pulmonology, may lori annual screening.   She had follow up CT with thoracic surgery and no follow up needed.               From 4/7/2025-  Hyperlipidemia-I prescribed Livalo 1 mg 1/30/2025.  Advise she  follow-up in 1 month with recheck CMP and CK and in 3 months, fasting.  She sent a message 2/23/2025 and went to fill the Livalo and reported it was $244.  She wanted to go back on the rosuvastatin.  I reordered rosuvastatin.  She had taken Crestor in 6 months or longer. When quit taking, cramping and heartburn disappeared then started again without medication.   We changed to Crestor- she reported she ran out a few months prior and she has had less body aches and not as much trouble with stomach and heartburn. Still flairs occasionally but on medication, she was having daily. She was getting from cardiology and reports they did not fill, so she stopped it. She was taking in the morning. Still aching but has much less aching since stopping Crestor. Feet, ankles, and fingers were hurting all the time and now only occasionally an ache but not as severe and not as often. Also less headaches off Crestor.   She was previously taking Lipitor 20 mg nightly  Last medication 2016- Lipitor- did well on medication. She has not been on other medications in the past.   Stopped all medication years ago. Last year was full of specialists and let go of routine things.   Vitamin D deficiency-advise she take vitamin D 2000 IU daily 1/2025.  Taking as directed.  She stopped vitamin D because she is out and did not get more.   She was taking vitamin D 2000 IU daily.   Thrombocytosis- no signs of clotting or any concerns.       From 1/2025-  Moods- Prozac 40 mg twice daily changed to Prozac 80 mg daily. Did not change anything. Does not think she needs to change medication. Did not seem to click as much.    Moods had not improved. She continued with depression. She thought it may be time to see specialist.  Referred to psychiatry and was seen 8/2022 but did not keep appointment 4/9/2022.  Prozac in the past- she was up to 80 mg once daily. She had depression since childhood and started treatment in 30s. Increased depression following  her son's death. She stopped all medications following his death.   Son's suicide 3/11/2021- she has not been to the doctor for CT chest, scans, other testing and treatment. She has not been doing anything since her son's death.  She noticed improvement in moods with restarting Prozac 20 mg once daily but not as good as it was when she initially restarted. Decreased motivation, not wanting to get up and do anything. She has had mind racing and depressed feeling.   On Prozac 40 mg daily, she had no motivation. When she was out, she was ok but came back home and did not want to do anything.   Previous medications- Cymbalta- helped but Prozac helped more. Last Cymbalta 2016.  Taking benadryl to sleep but not helping much. Elavil- helped with depression but made her very tired. She also would forget things when she was taking it and talking to people.     Tick bite-alpha gal allergy and allergy to red meat-referred to allergist.    Fatigue- Sleep medicine ordered home sleep study. Has not picked up- did not perform. Still tired.    She is tired all the time- she will sleep a few hours at night wake up and go back to sleep. She is always tired and does not feel rested.    She was to have sleep study right after her son's death but cancelled. Has not followed up with pulmonology in some time.        GERD, hiatal hernia, IBS- Dr Oneal Raymond, Brianna Martinez, [facility]-  she was to have manometry test and follow up. She was not sure she wanted to do it and did not do the test. Not sure she wants to consider it. She has symptoms for weeks then has symptoms for weeks.   She was having worsening symptoms. Last seen by GI- they advised she let them know when she was ready to see surgeon for procedure. She may have to proceed because cannot live like this. She reports she gets up in the morning then has chest pain, vomits, and feels better- she feels heartburn/ indigestion. She wakes up in the middle of the night and has to take  medication. She takes Pepcid. She was unable to get Carafate in between medications and did not feel like it helped when taking it.   Taking Protonix 40 mg once daily and Pepcid 20 mg 2-3 x daily, and has carafate- did not help and has not been taking. Negative gastric emptying study. Will have colonoscopy 4/2023. They will consider Nissen.   She is still having heartburn symptoms- sometimes wakes up with it and sometimes has after taking the medication in the day. She is also having increased IBS symptoms. Patient with frequent, urgent BM. Has never taken anything for it but she has continued with symptoms. She would like to consider medication.   She had pain in her chest- if she does not take Pepcid, she has pain in her chest and cannot eat. When she takes the medication, she does ok. She passes blood in stool that is bright red at times. Seeing GI.   EGD 2/2022 with large hiatal hernia, esophagitis.    Still aching but has much less aching since stopping Crestor. Feet, ankles, and fingers were hurting all the time and now only occasionally an ache but not as severe and not as often. Also less headaches off Crestor.   Fibromyalgia- has been on no medication in a while.   Previously Cymbalta and Elavil.   Shoulder pain- she had MRI left shoulder with tendinopathy supraspinatous, biceps, glenohumeral joint effusion, chondromalacia- suggesting capsulitis and minimal DJD AC joint. Now right shoulder pain. Does not want to see orthopedist at this time.   DDD cervical spine- she had appt with neurosurgery 5/2023- cancelled because was nervous the answer was going to be surgery.   always hurting- having headaches. Thinks from neck pain. 1/2021 for cervicalgia, cervical radiculopathy, herniated C4-5, C5-6, left shoulder pain. Ordered MRI left shoulder. If no abnormality, recommended C4-5, C5-6 ACDF  Went to orthopedist for rotator cuff as well 3/2021- Xray, MRI 2/2021 with moderate supraspinatus tendonopathy, partial  thickness fraying, intra-articular long head biceps tendinopathy, small glenohummeral effusion, mild to moderate chondromalacia, synovial thickening, or intr-articular debris vs labral tissue in axillary recess- suggested capsulitis. DJD AC joint with chronic deformity of clavicle. Exercises for tendonitis given and advised to return for consideration of injection if no improvement    Squamous cell skin cancer- last appt 11-12/2023- follow up 1 year. Had to use chemo cream started Thanksgiving night. Went back and they said was ok. [facility] [place]  Patient had SCC 1/2021.    Mother and sister with pancreatic cancer. Mother with history of colon and breast cancer. MAunt and MGM with colon cancer.     No Hx abnormal PAP- last PAP 3 years ago.     Patient's Specialists:  Behavioral health-RESHMA Farias- last appointment 8/2022 for major depressive disorder with prolonged grief from death of son 3/2022 for suicide.  Advised change Prozac from 40 mg twice a day to 80 mg once daily in the morning.  If increased irritability, will change to 60 mg.  Advised to check to see if Trintellix or Viibryd would be approved.  Follow-up in 4 weeks.  Patient canceled follow-up.  She has not rescheduled.  Cardiology-Dr. David Oliveira-last appointment 12/2022 for chest pain, exertional dyspnea, diastolic dysfunction, coronary artery calcification on CT, hyperlipidemia.  Advised Lexiscan nuclear stress test, change in intensity to Crestor 20 mg nightly and advised aspirin 81 mg.  Mild diastolic dysfunction on echo.  Advised low-salt diet and monitor weight.  Advised follow-up 6 to 9 months.   Stress test normal.    Prior Dr Lb Boyer- last appt 4/2020 for preop exam, CP, SOA, abnormal EKG. Advised smoking cessation, follow up with pulmonology, take Lipitor, hold off on colonoscopy pending cardiac workup- stress test, echo, and holter monitor. Advised AAA screening. Negative cardiac testing. Advised follow up 1 year. She  did not return for follow up.   Endocrinology-RESHMA Candelaria-last appointment 3/2025 for osteopenia and vitamin D deficiency.  They discussed Reclast annual infusion as the best option.  She did not want treatment at that time.  Advised calcium 1200 mg daily and minimum vitamin D intake of 600 IU daily.  Advised to 150 minutes of moderate intensity aerobic activity and 2 days a week of muscle strength training activity.  Repeat DEXA 2 years.  Follow-up as needed.  GI- Dr Raymond, Brianna Martinez, RES[facility], [facility]MA Up- last appt 8/2024 for family history of colon cancer, breast cancer, pancreatic cancer, GERD, hiatal hernia, IBS with constipation.  She was previously referred and evaluated by Dr. Carl to consider Nissen and deferred.  She continues Protonix 40 mg daily and Pepcid 1-3 times daily depending on symptoms.  She feels her symptoms are overall improved and well-managed.  Refill sent to the pharmacy.  Bowel habits more regular and can use MiraLAX as needed for intermittent constipation.  Follow-up 1 year.  2023-advise gastric emptying study and hyoscyamine.  Colonoscopy for 2023.  Consider referral for Nissen.  Constipation not adequately relieved with Linzess 72 mcg and did not wish for trial of a stronger dose due to cost.  Advised MiraLAX and stool softeners as needed.  Sent prescription for Levsin for abdominal cramping.  Advised [facility] genetic testing.  Continue Protonix, Pepcid, and was given Carafate.  Advised she see surgery for hiatal hernia repair.  Follow-up 2/2024  Normal gastric emptying study.  10/2021-for blood in stool, IBS, and GERD. Advised Miralax, Protonix 40 mg once daily, and EGD, Colonoscopy. Endoscopy 2/2022 with large hitala hernia. No colonoscopy performed. Benign pathology.   General surgery- RESHMA Arias- last appt 3/2020 for hematuria. CT abd/pelvis, cystoscopy, and UA.   Neurosurgery- Dr Willi Ghotra- last appt 1/2021 for cervicalgia, cervical radiculopathy,  herniated C4-5, C5-6, left shoulder pain. Ordered MRI left shoulder. If no abnormality, recommended C4-5, C5-6 ACDF.  Appointment 3/30/2023 and 5/2023 canceled.  Orthopedic surgery- Dr Jet Salinas- last appt 3/2021 for left shoulder pain. Xray, MRI 2/2021 with moderate supraspinatus tendonopathy, partial thickness fraying, intra-articular long head biceps tendinopathy, small glenohummeral effusion, mild to moderate chondromalacia, synovial thickening, or intr-articular debris vs labral tissue in axillary recess- suggested capsulitis. DJD AC joint with chronic deformity of clavicle. Exercises for tendonitis given and advised to return for consideration of injection if no improvement.   Thoracic surgery-Dr. Carl-last appointment for 2024 for hiatal hernia with complaints of reflux.  Manometry required for comprehensive evaluation of esophageal function and CT will be required.  Patient canceled her follow-up with provider.  Pulmonology-, Dr. Sajan Renteria, RES[facility]MA- last appt 5/3/2025 for COPD with emphysema, URIEL, history of tobacco use, pulmonary nodules, diastolic CHF, hypersomnia, snoring.  Patient will need continued LDCT until 2035 with quitting smoking in 2020.  She was on Trelegy but stopped in January because became unaffordable and she was not feeling much worse.  She did not have worsening symptoms except she used her albuterol slightly more often.  On Singulair and has albuterol for as needed.  He did have eosinophilia 1/2023 ER visit.  She still has 17% reversibility-this is why started on ICS/LAMA/LABA combination.  She is followed by an allergist.  She was not interested in URIEL evaluation but changed her mind-ordered HST and advised follow-up by [facility] after.  Nodule stable over 2 years-no need to follow nodules but will continue LDCT until 2035-ordered by PCP.  Echo with EF 61 to 65%, RVSP normal less than 35, LV diastolic function consistent with grade 1 impaired relaxation 11/2022.  1/2023 for  COPD with emphysema, pulmonary nodules, history of tobacco abuse, URIEL.  Changed Stiolto to Trelegy 200.   CT stable over 2 years.-no need for follow-up testing.  May benefit for yearly screening and will address at next visit.  Need to evaluate for URIEL.  Follow-up 2 months with full PFT.   2/2021 in follow up of hospitalization for acute hypoxic respiratory failure requiring noninvasince positive pressure ventilation and COPD exacerbation. Hypoxia to 86% on 4 L O2. Transitioned to BIPAP with improvement. She did not need home oxygen. Treated with steroid, nebulizer. Advised Stiolto, Qvar, and Singulair. Ordered sleep study, started flutter valve, DuoNeb followed by sodium chloride nebulizer then flutter valve twice daily. Low dose CT 6/2021. Had CT abd/pelvis with pulm nodule. Continued Stiolto and PA Singulair. Changed Qvar to Flovent due to cost. Follow up in 2-3 months.   Urology- Dr Chelsea Lundy- last appt 3/2020 for cystoscopy for hematuria. Negative and negative CT abd/pelvis. Follow up in 1 year.     The following portions of the patient's history were reviewed and updated as appropriate: allergies, current medications, past family history, past medical history, past social history, past surgical history and problem list.    Review of Systems   Respiratory:  Positive for cough and wheezing.        Objective   Vitals:    05/05/25 1430   BP: 92/40   Pulse: 113   Resp: 22   Temp: 97.9 °F (36.6 °C)   SpO2: 92%       There is no height or weight on file to calculate BMI.    Physical Exam  Constitutional:       Appearance: She is well-developed.   HENT:      Head: Normocephalic and atraumatic.   Eyes:      General:         Right eye: No discharge.         Left eye: No discharge.   Pulmonary:      Effort: Tachypnea present. No respiratory distress.      Breath sounds: Decreased air movement present. Decreased breath sounds and wheezing present.   Skin:     General: Skin is dry.   Neurological:      Mental Status:  She is alert.   Psychiatric:         Attention and Perception: She is attentive.         Mood and Affect: Mood is depressed.         Speech: Speech normal.         Behavior: Behavior normal.         Assessment & Plan   Diagnoses and all orders for this visit:    1. Acute cough (Primary)  -     POCT SARS-CoV-2 Antigen GISEL + Flu    2. COPD with exacerbation    3. Dependence on supplemental oxygen    4. Hypoxia          Assessment and Plan  Patient will need hospital follow-up after discharge but will also need follow-up with me in June for 2-month follow-up from April appointment-fasting for follow-up of all conditions.  History of URI with bronchitis and likely COPD exacerbation- Patient was seen 3/31/2025 for URI by RESHMA Doan.  She thought she was improving then had sudden worsening.   was also sick at that time and he improved.  Patient was seen by me 4/7/2025 with worsening symptoms and was advised to use Breztri 2 puffs twice daily then rinse mouth, prednisone taper, and I gave doxycycline 100 mg twice daily for 10 days.  She was also advised to use her nebulizer every 4-6 hours and continue Flonase and Singulair.  Patient was advised to contact her pulmonologist if no resolution.  She had improvement in symptoms for a short time on treatment then had recurrence over a week ago.  He is now requiring 3 L of oxygen day and night and is using albuterol every hour.  She still has not picked up her Trelegy and is out of the Breztri sample she was given.  She has not used her nebulizer.  Patient is tachypneic in wheezing significantly with decreased air movement throughout in the office today.  She did have LDCT today-this was not ordered stat, as it was a screening test.  We did contact radiology to see if we can get this read ASAP.  I contacted her pulmonologist office-Dr. Moeller is in the hospital this week.  Per pulmonologist in the office, they recommended direct admission due to new oxygen requirement  and recent outpatient treatment without improvement currently.  I contacted A who will accept admission and pulmonology will be consulted.  I talked with patient and  and they are comfortable taking patient by private vehicle, however, she does not have oxygen in the vehicle.  Oxygen saturation currently is 92% and only desaturates to the 80s if she gets up and walks around.  We will take her by wheelchair to her car and she will request a wheelchair at the hospital.  They should call 911 if she has worsening, new or changing symptoms while on the way to the hospital.  COPD with exacerbation-  She will need close follow-up with pulmonology after discharge to ensure better control of her COPD and better compliance with medications as directed.    Pulmonary nodule- CT chest 11/2021 with bilateral apical pleural thickening, mild emphysema, calcified pulmonary nodules right lower lobe, other subpleural nodules and areas of scarring with nodularity-all reported without change.  Hiatal hernia.  Per radiology, no suspicious pulmonary nodules.  Recheck in 1 year.  Patient was seen for pulmonary nodules and no further follow-up is necessary, however, she is to continue LDCT annually until 2035.  We are ordering her LDCT per pulmonology.  GERD, hiatal hernia- Last EGD 2/2022 with large hiatal hernia and reflux esophagitis with benign pathology.  Per GI, continue Protonix 40 mg once daily and Pepcid 20 mg up to twice daily. She was seen by thoracic surgery but did not go for follow-up and did not elect for Nissen.       From 4/2025-  Hyperlipidemia- Lipids improved but LDL remained uncontrolled. She was taking Lipitor then she was taking Crestor from cardiology. She stopped the medications and reported she noticed improvement in side effects with stopping medication. I rechecked labs and advised she start Livalo.  She sent message back and reported it was too expensive and she wanted to change back to rosuvastatin.   Restarted rosuvastatin 10 mg daily and is tolerating without AE.  I recheck CMP and CK and make further recommendations.  She will need follow-up with me in June, fasting.  Vitamin D deficiency- Patient to ensure she is taking vitamin D 2000 IU daily as directed.  Will recheck at follow-up i in June.    From 1/2025-  Osteopenia- Patient had DEXA scan that met criteria for treatment. We will need to discuss medication vs specialist referral at follow up.    Thrombocytosis- I will continue to monitor and make further recommendations.   Moods- She has had longstanding depression that has worsened with her son's death. She previously did well on Prozac. I restarted Prozac 20 mg once daily with some improvement but continued with significant depressive symptoms. Increased Prozac to 40 mg once daily then twice daily with improvement but persistent symptoms. She has complicated mood symptoms with grief reaction. She was willing to see psychiatry, was referred, seen, and has not had follow-up.  I discussed she needed to consider psychiatry again in the future and should ensure regular counseling.  For now, she reports her moods are stable on Prozac 80 mg daily. She should be seen ASAP if worsening moods or 9-1-1 to ER if she develops SI/HI. No SI/HI today.    Fibromyalgia- Patient was previously on Cymbalta and Elavil for pain. However, moods were better controlled on Prozac. We will re-evaluated at follow up.     DDD cervical spine- Follow up with neurosurgery if persistent pain.   Squamous cell skin cancer- Patient to ensure she follows with dermatology regularly and was referred.    From [facility] 10/[facility].  She is up-to-date on colonoscopy.  She does have family history of breast and colon cancer as well as pancreatic cancer. She needs to ensure she stays up to date on colonoscopy and mammogram. She has been referred for mammogram and DEXA scan and needs to schedule. Consideration of genetics consultation to determine most  appropriate screening recommendations.  She was recommended for genetics testing per GI.  Patient to check with insurance to determine coverage and location of coverage for Tdap, Prevnar 20, and Shingrix.  She should also ensure annual flu shot and COVID-19 booster.    I spent 45 minutes caring for Jennifer Shelton on this date of service. This time includes time spent by me in the following activities as necessary: preparing for the visit, reviewing tests, specialists records and previous visits, obtaining and/or reviewing a separately obtained history, performing a medically appropriate exam and/or evaluation, counseling and educating the patient, family, caregiver, referring and/or communicating with other healthcare professionals, documenting information in the medical record, independently interpreting results and communicating that information with the patient, family, caregiver, and developing a medically appropriate treatment plan with consideration of other conditions, medications, and treatments.

## 2025-05-06 PROBLEM — Z91.014 ALPHA-GAL SYNDROME: Status: ACTIVE | Noted: 2025-05-06

## 2025-05-06 LAB
ANION GAP SERPL CALCULATED.3IONS-SCNC: 7.2 MMOL/L (ref 5–15)
B PARAPERT DNA SPEC QL NAA+PROBE: NOT DETECTED
B PERT DNA SPEC QL NAA+PROBE: NOT DETECTED
BUN SERPL-MCNC: 22 MG/DL (ref 8–23)
BUN/CREAT SERPL: 30.6 (ref 7–25)
C PNEUM DNA NPH QL NAA+NON-PROBE: NOT DETECTED
CALCIUM SPEC-SCNC: 9.4 MG/DL (ref 8.6–10.5)
CHLORIDE SERPL-SCNC: 102 MMOL/L (ref 98–107)
CO2 SERPL-SCNC: 25.8 MMOL/L (ref 22–29)
CREAT SERPL-MCNC: 0.72 MG/DL (ref 0.57–1)
DEPRECATED RDW RBC AUTO: 42.3 FL (ref 37–54)
EGFRCR SERPLBLD CKD-EPI 2021: 91.8 ML/MIN/1.73
ERYTHROCYTE [DISTWIDTH] IN BLOOD BY AUTOMATED COUNT: 12.8 % (ref 12.3–15.4)
FLUAV SUBTYP SPEC NAA+PROBE: NOT DETECTED
FLUBV RNA ISLT QL NAA+PROBE: NOT DETECTED
GLUCOSE SERPL-MCNC: 209 MG/DL (ref 65–99)
HADV DNA SPEC NAA+PROBE: NOT DETECTED
HCOV 229E RNA SPEC QL NAA+PROBE: NOT DETECTED
HCOV HKU1 RNA SPEC QL NAA+PROBE: NOT DETECTED
HCOV NL63 RNA SPEC QL NAA+PROBE: NOT DETECTED
HCOV OC43 RNA SPEC QL NAA+PROBE: NOT DETECTED
HCT VFR BLD AUTO: 39.1 % (ref 34–46.6)
HGB BLD-MCNC: 12.9 G/DL (ref 12–15.9)
HMPV RNA NPH QL NAA+NON-PROBE: NOT DETECTED
HPIV1 RNA ISLT QL NAA+PROBE: NOT DETECTED
HPIV2 RNA SPEC QL NAA+PROBE: NOT DETECTED
HPIV3 RNA NPH QL NAA+PROBE: NOT DETECTED
HPIV4 P GENE NPH QL NAA+PROBE: NOT DETECTED
M PNEUMO IGG SER IA-ACNC: NOT DETECTED
MCH RBC QN AUTO: 30 PG (ref 26.6–33)
MCHC RBC AUTO-ENTMCNC: 33 G/DL (ref 31.5–35.7)
MCV RBC AUTO: 90.9 FL (ref 79–97)
PLATELET # BLD AUTO: 394 10*3/MM3 (ref 140–450)
PMV BLD AUTO: 9.1 FL (ref 6–12)
POTASSIUM SERPL-SCNC: 4.7 MMOL/L (ref 3.5–5.2)
RBC # BLD AUTO: 4.3 10*6/MM3 (ref 3.77–5.28)
RHINOVIRUS RNA SPEC NAA+PROBE: NOT DETECTED
RSV RNA NPH QL NAA+NON-PROBE: NOT DETECTED
SARS-COV-2 RNA NPH QL NAA+NON-PROBE: NOT DETECTED
SODIUM SERPL-SCNC: 135 MMOL/L (ref 136–145)
WBC NRBC COR # BLD AUTO: 5.59 10*3/MM3 (ref 3.4–10.8)

## 2025-05-06 PROCEDURE — 94760 N-INVAS EAR/PLS OXIMETRY 1: CPT

## 2025-05-06 PROCEDURE — 80048 BASIC METABOLIC PNL TOTAL CA: CPT | Performed by: INTERNAL MEDICINE

## 2025-05-06 PROCEDURE — 94664 DEMO&/EVAL PT USE INHALER: CPT

## 2025-05-06 PROCEDURE — 63710000001 PREDNISONE PER 1 MG: Performed by: INTERNAL MEDICINE

## 2025-05-06 PROCEDURE — 96376 TX/PRO/DX INJ SAME DRUG ADON: CPT

## 2025-05-06 PROCEDURE — 94799 UNLISTED PULMONARY SVC/PX: CPT

## 2025-05-06 PROCEDURE — G0378 HOSPITAL OBSERVATION PER HR: HCPCS

## 2025-05-06 PROCEDURE — 85027 COMPLETE CBC AUTOMATED: CPT | Performed by: INTERNAL MEDICINE

## 2025-05-06 PROCEDURE — 94761 N-INVAS EAR/PLS OXIMETRY MLT: CPT

## 2025-05-06 PROCEDURE — 97165 OT EVAL LOW COMPLEX 30 MIN: CPT

## 2025-05-06 PROCEDURE — 25010000002 METHYLPREDNISOLONE PER 40 MG: Performed by: INTERNAL MEDICINE

## 2025-05-06 PROCEDURE — 0202U NFCT DS 22 TRGT SARS-COV-2: CPT

## 2025-05-06 RX ORDER — PREDNISONE 20 MG/1
20 TABLET ORAL 2 TIMES DAILY WITH MEALS
Status: COMPLETED | OUTPATIENT
Start: 2025-05-06 | End: 2025-05-08

## 2025-05-06 RX ADMIN — BENZONATATE 200 MG: 100 CAPSULE ORAL at 05:17

## 2025-05-06 RX ADMIN — IPRATROPIUM BROMIDE AND ALBUTEROL SULFATE 3 ML: .5; 3 SOLUTION RESPIRATORY (INHALATION) at 11:05

## 2025-05-06 RX ADMIN — FLUOXETINE 80 MG: 20 CAPSULE ORAL at 08:18

## 2025-05-06 RX ADMIN — BUDESONIDE 0.5 MG: 0.5 SUSPENSION RESPIRATORY (INHALATION) at 21:08

## 2025-05-06 RX ADMIN — IPRATROPIUM BROMIDE AND ALBUTEROL SULFATE 3 ML: .5; 3 SOLUTION RESPIRATORY (INHALATION) at 07:15

## 2025-05-06 RX ADMIN — IPRATROPIUM BROMIDE AND ALBUTEROL SULFATE 3 ML: .5; 3 SOLUTION RESPIRATORY (INHALATION) at 21:07

## 2025-05-06 RX ADMIN — BENZONATATE 200 MG: 100 CAPSULE ORAL at 21:23

## 2025-05-06 RX ADMIN — FLUTICASONE PROPIONATE 2 SPRAY: 50 SPRAY, METERED NASAL at 08:19

## 2025-05-06 RX ADMIN — ARFORMOTEROL TARTRATE 15 MCG: 15 SOLUTION RESPIRATORY (INHALATION) at 07:18

## 2025-05-06 RX ADMIN — MONTELUKAST 10 MG: 10 TABLET, FILM COATED ORAL at 01:34

## 2025-05-06 RX ADMIN — ARFORMOTEROL TARTRATE 15 MCG: 15 SOLUTION RESPIRATORY (INHALATION) at 21:08

## 2025-05-06 RX ADMIN — PREDNISONE 20 MG: 20 TABLET ORAL at 17:16

## 2025-05-06 RX ADMIN — Medication 1000 UNITS: at 08:19

## 2025-05-06 RX ADMIN — BUDESONIDE 0.5 MG: 0.5 SUSPENSION RESPIRATORY (INHALATION) at 07:17

## 2025-05-06 RX ADMIN — METHYLPREDNISOLONE SODIUM SUCCINATE 40 MG: 40 INJECTION, POWDER, FOR SOLUTION INTRAMUSCULAR; INTRAVENOUS at 10:12

## 2025-05-06 RX ADMIN — BUDESONIDE 0.5 MG: 0.5 SUSPENSION RESPIRATORY (INHALATION) at 00:02

## 2025-05-06 RX ADMIN — PANTOPRAZOLE SODIUM 40 MG: 40 TABLET, DELAYED RELEASE ORAL at 08:18

## 2025-05-06 RX ADMIN — ROSUVASTATIN CALCIUM 10 MG: 10 TABLET, FILM COATED ORAL at 08:19

## 2025-05-06 RX ADMIN — ARFORMOTEROL TARTRATE 15 MCG: 15 SOLUTION RESPIRATORY (INHALATION) at 00:01

## 2025-05-06 RX ADMIN — ACETAMINOPHEN 650 MG: 325 TABLET, FILM COATED ORAL at 08:19

## 2025-05-06 RX ADMIN — AZITHROMYCIN 500 MG: 250 TABLET, FILM COATED ORAL at 08:18

## 2025-05-06 RX ADMIN — IPRATROPIUM BROMIDE AND ALBUTEROL SULFATE 3 ML: .5; 3 SOLUTION RESPIRATORY (INHALATION) at 15:29

## 2025-05-06 RX ADMIN — METHYLPREDNISOLONE SODIUM SUCCINATE 40 MG: 40 INJECTION, POWDER, FOR SOLUTION INTRAMUSCULAR; INTRAVENOUS at 01:34

## 2025-05-06 RX ADMIN — BENZONATATE 200 MG: 100 CAPSULE ORAL at 12:07

## 2025-05-06 RX ADMIN — MONTELUKAST 10 MG: 10 TABLET, FILM COATED ORAL at 21:22

## 2025-05-06 NOTE — THERAPY EVALUATION
Patient Name: Jennifer Shelton  : 1955    MRN: [id number]                              Today's Date: 2025       Admit Date: 2025    Visit Dx: No diagnosis found.  Patient Active Problem List   Diagnosis    Accelerated junctional rhythm    Allergic to aspirin    Arthritis    Cervical spondylosis without myelopathy    Chest pain    Chronic obstructive pulmonary disease    Ectopic atrial rhythm    Hematuria    Hyperlipemia    Neck pain    Fibromyalgia    Degenerative disc disease, cervical    Cervical radiculopathy    Cervical spinal stenosis    Other dyspnea and respiratory abnormality    Rectal bleeding    Seasonal allergic rhinitis    Shortness of breath    Lung mass    Squamous cell skin cancer    Irritable bowel syndrome with constipation    Hematochezia    Gastroesophageal reflux disease    Hiatal hernia    Family history of colon cancer    Acute hypoxemic respiratory failure    Seizures    Sleep apnea    COPD exacerbation    Vitamin D deficiency    Thrombocytosis    Major depressive disorder, recurrent episode, moderate degree    Other fatigue    Eosinophilia    Pulmonary nodule    Other idiopathic scoliosis, cervical region    Acute pain of both shoulders    Thoracic spine pain    Scoliosis of thoracic spine    Osteoarthritis of thoracic spine with myelopathy    Chronic bilateral low back pain without sciatica    Degenerative disc disease, lumbar    Degenerative disc disease, thoracic    Spinal osteoarthritis    Osteopenia of multiple sites    Alpha-gal syndrome     Past Medical History:   Diagnosis Date    Allergic     Anesthesia complication     heart rate slowed    Anxiety     Arthritis     Asthma     Bronchitis, chronic     Cancer     skin cancer    Cervical spinal stenosis 2016    Cervical spondylosis without myelopathy     Cervicalgia 2015    Chronic pain disorder     Colon polyp     COPD (chronic obstructive pulmonary disease)     Depression     Emphysema of lung      Fibromyalgia     Fibromyalgia, primary 2014    Gastroesophageal reflux     GERD (gastroesophageal reflux disease)     Hematuria 2020    Hernia 2012?    Hiatal hernia    Herniated disc, cervical 2015    C4-5, C5-6, C6-7    Hiatal hernia     High cholesterol     Hyperlipemia     Irritable bowel syndrome 1986    Low back pain     Lung nodule 2020    Pneumonia     Rectal bleeding     Scoliosis     Seasonal allergies     Seizures     Shortness of breath     Sleep apnea     Vitamin D deficiency      Past Surgical History:   Procedure Laterality Date    ABDOMINAL SURGERY  ,     C section     SECTION      COLONOSCOPY      COLONOSCOPY N/A 2023    Procedure: COLONOSCOPY WITH ELEVIEW INJECTION, HOT SNARE, POLYPECTOMIES, CLIP APPLICATION X3;  Surgeon: Oneal Raymond MD;  Location: [facility] ENDOSCOPY;  Service: Gastroenterology;  Laterality: N/A;  COLON POLYPS    CYSTOSCOPY  2020    Cystoscopy w/ Dr. Lundy    ENDOSCOPY N/A 2022    Procedure: ESOPHAGOGASTRODUODENOSCOPY WITH BIOPSIES;  Surgeon: Oneal Raymond MD;  Location: [facility] ENDOSCOPY;  Service: Gastroenterology;  Laterality: N/A;  HIATAL HERNIA, ESOPHAGITIS    PILONIDAL CYSTECTOMY      SQUAMOUS CELL CARCINOMA EXCISION      chest    TUBAL ABDOMINAL LIGATION  1998    UPPER GASTROINTESTINAL ENDOSCOPY  2021      General Information       Row Name 25 1502          OT Time and Intention    Document Type evaluation  -KR     Mode of Treatment individual therapy;occupational therapy  -KR     Patient Effort good  -KR     Symptoms Noted During/After Treatment none  -KR       Row Name 25 1504          General Information    Patient Profile Reviewed yes  -KR     Prior Level of Function independent:;ADL's;driving;shopping;all household mobility;community mobility  -KR     Existing Precautions/Restrictions oxygen therapy device and L/min  -KR     Barriers to Rehab none identified  -KR       Row Name  05/06/25 1502          Living Environment    Current Living Arrangements home  -KR     People in Home spouse  -KR       Row Name 05/06/25 1502          Cognition    Orientation Status (Cognition) oriented x 3  -KR       Row Name 05/06/25 1502          Safety Issues/Impairments Affecting Functional Mobility    Impairments Affecting Function (Mobility) shortness of breath  -KR               User Key  (r) = Recorded By, (t) = Taken By, (c) = Cosigned By      Initials Name Provider Type    Kelle Zimmerman OT Occupational Therapist                     Mobility/ADL's       Row Name 05/06/25 1503          Transfers    Transfers sit-stand transfer;stand-sit transfer  -KR       Row Name 05/06/25 1503          Sit-Stand Transfer    Sit-Stand [place] (Transfers) modified independence  -KR       Row Name 05/06/25 1503          Stand-Sit Transfer    Stand-Sit [place] (Transfers) modified independence  -KR       Row Name 05/06/25 1503          Functional Mobility    Functional Mobility- Ind. Level conditional independence;supervision required  -KR     Functional Mobility- Safety Issues supplemental O2  -KR     Functional Mobility- Comment pt able to complete household distances in [place] with 2L NC and SBA. Pt expressing no fatige. pt has been up in room ad alan for ADLs.  -KR       Row Name 05/06/25 1503          Activities of Daily Living    BADL Assessment/Intervention other (see comments)  pt expressing she has been completing ADLs in room with no assistance at this time managing 02 line  -KR               User Key  (r) = Recorded By, (t) = Taken By, (c) = Cosigned By      Initials Name Provider Type    Kelle Zimmerman OT Occupational Therapist                   Obj/Interventions       Row Name 05/06/25 1506          Sensory Assessment (Somatosensory)    Sensory Assessment (Somatosensory) sensation intact  -KR       Row Name 05/06/25 1506          Vision Assessment/Intervention    Visual Impairment/Limitations WNL   -KR       Row Name 05/06/25 1506          Range of Motion Comprehensive    General Range of Motion no range of motion deficits identified  -KR       Row Name 05/06/25 1506          Strength Comprehensive (MMT)    General Manual Muscle Testing (MMT) Assessment no strength deficits identified  -KR               User Key  (r) = Recorded By, (t) = Taken By, (c) = Cosigned By      Initials Name Provider Type    Kelle Zimmerman OT Occupational Therapist                   Goals/Plan       Row Name 05/06/25 1507          Problem Specific Goal 1 (OT)    Problem Specific Goal 1 (OT) pt will verbalize understandinf of role of OT and benefit of OOB to promote functional IND  -KR     Time Frame (Problem Specific Goal 1, OT) short term goal (STG);1 day  -KR     Progress/Outcome (Problem Specific Goal 1, OT) goal met  -KR               User Key  (r) = Recorded By, (t) = Taken By, (c) = Cosigned By      Initials Name Provider Type    Kelle Zimmerman OT Occupational Therapist                   Clinical Impression       Row Name 05/06/25 1506          Pain Assessment    Pretreatment Pain Rating 0/10 - no pain  -KR     Posttreatment Pain Rating 0/10 - no pain  -KR       Row Name 05/06/25 1506          Plan of Care Review    Plan of Care Reviewed With patient  -KR     Outcome Evaluation Pt is a 69yof admitted 2/2 SOA, COPD exacerbation. PMH inlcudes URIEL, alpha-gal syndrome, fibromyalgia, lung mass, and RA. Pt lives with spouse and is IND at baseline. Pt has been up ad alan in room on 2LNC. Pt was able to complete household mobility in [place] with SBA and O2 >90% 2L NC. Pt currently expressing no concerns at this time and anticipated to be able to d/c home with no further OT needs. Due to pt's PMH of COPD pt would benefit from OP pulmonary rehab for pulmonary health and endurance for functional IND.  -KR       Row Name 05/06/25 1506          Therapy Assessment/Plan (OT)    Rehab Potential (OT) good  -KR     Criteria for Skilled  Therapeutic Interventions Met (OT) no;no problems identified which require skilled intervention  -KR     Therapy Frequency (OT) evaluation only  -KR       Row Name 05/06/25 1506          Therapy Plan Review/Discharge Plan (OT)    Anticipated Discharge Disposition (OT) home;home with supervision;home with outpatient therapy services  -KR       Row Name 05/06/25 1506          Vital Signs    Intratreatment Heart Rate (beats/min) 112  -KR     Pre SpO2 (%) 97  -KR     O2 Delivery Pre Treatment supplemental O2  -KR     Intra SpO2 (%) 93  -KR     O2 Delivery Intra Treatment supplemental O2  2L NC while completing functional mobility  -KR     O2 Delivery Post Treatment supplemental O2  -KR     Pre Patient Position Supine  -KR     Intra Patient Position Standing  -KR     Post Patient Position Supine  -KR       Row Name 05/06/25 1506          Positioning and Restraints    Pre-Treatment Position in bed  -KR     Post Treatment Position bed  -KR     In Bed notified nsg;sitting;call light within reach  -KR               User Key  (r) = Recorded By, (t) = Taken By, (c) = Cosigned By      Initials Name Provider Type    Kelle Zimmerman, CONCHITA Occupational Therapist                   Outcome Measures       Row Name 05/06/25 1508          How much help from another is currently needed...    Putting on and taking off regular lower body clothing? 4  -KR     Bathing (including washing, rinsing, and drying) 3  -KR     Toileting (which includes using toilet bed pan or urinal) 4  -KR     Putting on and taking off regular upper body clothing 4  -KR     Taking care of personal grooming (such as brushing teeth) 4  -KR     Eating meals 4  -KR     AM-PAC 6 Clicks Score (OT) 23  -KR       Row Name 05/06/25 0819          How much help from another person do you currently need...    Turning from your back to your side while in flat bed without using bedrails? 4  -AL     Moving from lying on back to sitting on the side of a flat bed without bedrails?  4  -AL     Moving to and from a bed to a chair (including a wheelchair)? 4  -AL     Standing up from a chair using your arms (e.g., wheelchair, bedside chair)? 4  -AL     Climbing 3-5 steps with a railing? 3  -AL     To walk in hospital room? 4  -AL     AM-PAC 6 Clicks Score (PT) 23  -AL       Row Name 05/06/25 1508          Functional Assessment    Outcome Measure Options AM-PAC 6 Clicks Daily Activity (OT)  -KR               User Key  (r) = Recorded By, (t) = Taken By, (c) = Cosigned By      Initials Name Provider Type    AL Deniz Moncada, RN Registered Nurse    Kelle Zimmerman OT Occupational Therapist                    Occupational Therapy Education       Title: PT OT SLP Therapies (In Progress)       Topic: Occupational Therapy (In Progress)       Point: ADL training (Done)       Learning Progress Summary            Patient Acceptance, E,TB, VU by KAMLA at 5/6/2025 1509    Comment: pt instructed on role of OT, benefit of OOB, and OP pulmonary rehab                      Point: Precautions (Done)       Learning Progress Summary            Patient Acceptance, E,TB, VU by KAMLA at 5/6/2025 1509    Comment: pt instructed on role of OT, benefit of OOB, and OP pulmonary rehab                                      User Key       Initials Effective Dates Name Provider Type Discipline     04/01/25 -  Kelle Duffy OT Occupational Therapist OT                  OT Recommendation and Plan  Therapy Frequency (OT): evaluation only  Plan of Care Review  Plan of Care Reviewed With: patient  Outcome Evaluation: Pt is a 69yof admitted 2/2 SOA, COPD exacerbation. PMH inlcudes URIEL, alpha-gal syndrome, fibromyalgia, lung mass, and RA. Pt lives with spouse and is IND at baseline. Pt has been up ad alan in room on 2LNC. Pt was able to complete household mobility in [place] with SBA and O2 >90% 2L NC. Pt currently expressing no concerns at this time and anticipated to be able to d/c home with no further OT needs. Due to pt's PMH of COPD  pt would benefit from OP pulmonary rehab for pulmonary health and endurance for functional IND.     Time Calculation:   Evaluation Complexity (OT)  Review Occupational Profile/Medical/Therapy History Complexity: brief/low complexity  Assessment, Occupational Performance/Identification of Deficit Complexity: 1-3 performance deficits  Clinical Decision Making Complexity (OT): problem focused assessment/low complexity  Overall Complexity of Evaluation (OT): low complexity     Time Calculation- OT       Row Name 05/06/25 1510             Time Calculation- OT    OT Start Time 1048  -KR      OT Stop Time 1100  -KR      OT Time Calculation (min) 12 min  -KR      OT Received On 05/06/25  -KR         Untimed Charges    OT Eval/Re-eval Minutes 12  -KR         Total Minutes    Untimed Charges Total Minutes 12  -KR       Total Minutes 12  -KR                User Key  (r) = Recorded By, (t) = Taken By, (c) = Cosigned By      Initials Name Provider Type    Kelle Zimmerman OT Occupational Therapist                  Therapy Charges for Today       Code Description Service Date Service Provider Modifiers Qty    [id number] HC OT EVAL LOW COMPLEXITY 2 5/6/2025 Kelle Duffy OT GO 1                 Kelle Duffy OT  5/6/2025

## 2025-05-06 NOTE — H&P
HISTORY AND PHYSICAL   [facility]        Date of Admission: 2025  Patient Identification:  Name: Jennifer Shelton  Age: 69 y.o.  Sex: female  :  1955  MRN: [id number]                     Primary Care Physician: Tessy Serrano PA    Chief Complaint:  69 year old female presented to the hospital from home; she has been ill for several days and was not improving; she has used oxygen around the clock for the last few days; she denies fever or chills; she was seen by her pcp who contacted her pulmonologist's office; direct admission was recommended; she denies sick contacts; she stopped smoking five years ago    History of Present Illness:   As above    Past Medical History:  Past Medical History:   Diagnosis Date    Allergic     Anesthesia complication     heart rate slowed    Anxiety     Arthritis     Asthma     Bronchitis, chronic     Cancer     skin cancer    Cervical spinal stenosis 2016    Cervical spondylosis without myelopathy     Cervicalgia 2015    Chronic pain disorder     Colon polyp     COPD (chronic obstructive pulmonary disease)     Depression     Emphysema of lung     Fibromyalgia     Fibromyalgia, primary     Gastroesophageal reflux     GERD (gastroesophageal reflux disease)     Hematuria 2020    Hernia 2012?    Hiatal hernia    Herniated disc, cervical 2015    C4-5, C5-6, C6-7    Hiatal hernia     High cholesterol     Hyperlipemia     Irritable bowel syndrome 1986    Low back pain     Lung nodule 2020    Pneumonia     Rectal bleeding     Scoliosis     Seasonal allergies     Seizures     Shortness of breath     Sleep apnea     Vitamin D deficiency      Past Surgical History:  Past Surgical History:   Procedure Laterality Date    ABDOMINAL SURGERY  ,     C section     SECTION      COLONOSCOPY      COLONOSCOPY N/A 2023    Procedure: COLONOSCOPY WITH ELEVIEW INJECTION, HOT SNARE, POLYPECTOMIES, CLIP APPLICATION X3;   Surgeon: Oneal aRymond MD;  Location: [facility] ENDOSCOPY;  Service: Gastroenterology;  Laterality: N/A;  COLON POLYPS    CYSTOSCOPY  03/13/2020    Cystoscopy w/ Dr. Lundy    ENDOSCOPY N/A 02/14/2022    Procedure: ESOPHAGOGASTRODUODENOSCOPY WITH BIOPSIES;  Surgeon: Oneal Raymond MD;  Location: [facility] ENDOSCOPY;  Service: Gastroenterology;  Laterality: N/A;  HIATAL HERNIA, ESOPHAGITIS    PILONIDAL CYSTECTOMY      SQUAMOUS CELL CARCINOMA EXCISION  2021    chest    TUBAL ABDOMINAL LIGATION  1998    UPPER GASTROINTESTINAL ENDOSCOPY  11/2021      Home Meds:  Medications Prior to Admission   Medication Sig Dispense Refill Last Dose/Taking    albuterol sulfate  (90 Base) MCG/ACT inhaler Inhale 2 puffs Every 4 (Four) Hours As Needed for Wheezing. 90 g 0 5/5/2025    Budeson-Glycopyrrol-Formoterol (Breztri Aerosphere) 160-9-4.8 MCG/ACT aerosol inhaler Inhale 2 puffs 2 (Two) Times a Day. 5.9 g 0 Past Month    Cholecalciferol (Vitamin D3) 50 MCG (2000 UT) tablet TAKE 1 TABLET BY MOUTH DAILY. NEEDS TO BE SEEN FOR MORE REFILLS. 90 tablet 1 5/4/2025    diphenhydrAMINE (BENADRYL) 25 mg capsule Take 3 capsules by mouth At Night As Needed for Sleep.   5/4/2025    famotidine (PEPCID) 20 MG tablet Take 1 tablet by mouth 2 (Two) Times a Day As Needed for Heartburn. 180 tablet 3 5/5/2025    FLUoxetine (PROzac) 40 MG capsule TAKE 2 CAPSULES BY MOUTH DAILY. NEED APPT FOR REFILLS. 180 capsule 0 5/4/2025    fluticasone (FLONASE) 50 MCG/ACT nasal spray INSTILL 2 SPRAYS IN EACH NOSTRIL DAILY. 16 g 2 5/5/2025    ipratropium-albuterol (DUO-NEB) 0.5-2.5 mg/3 ml nebulizer Take 3 mL by nebulization 4 (Four) Times a Day. 360 mL 1 Past Month    montelukast (SINGULAIR) 10 MG tablet TAKE 1 TABLET BY MOUTH EVERY NIGHT AT BEDTIME. NEED APPT FOR REFILLS. 90 tablet 0 5/5/2025    O2 (OXYGEN) Inhale 1 (One) Time. Use 2 LPM PRN if O2 if below 90   5/5/2025    pantoprazole (PROTONIX) 40 MG EC tablet Take 1 tablet by mouth Daily. 90  tablet 3 2025    rosuvastatin (Crestor) 10 MG tablet Take 1 tablet by mouth Daily. 90 tablet 0 2025    albuterol (ACCUNEB) 0.63 MG/3ML nebulizer solution    More than a month    doxycycline (VIBRAMYCIN) 100 MG capsule Take 1 capsule by mouth 2 (Two) Times a Day. (Patient not taking: Reported on 2025) 20 capsule 0 Not Taking    fluorouracil (EFUDEX) 5 % cream  (Patient not taking: Reported on 2025)   Not Taking    predniSONE (DELTASONE) 20 MG tablet Take 2 tablets daily for 3 days, 1 tablet daily for 3 days, half tablet daily for 3 days. (Patient not taking: Reported on 2025) 11 tablet 0 Not Taking    sodium chloride 3 % nebulizer solution inhale contents of 1 vial via nebulizer TWICE DAILY (Patient not taking: Reported on 2025)   Not Taking       Allergies:  Allergies   Allergen Reactions    Aspirin Hives and Arrhythmia     Chest pain    Cephalexin Hives    Nsaids Hives    Penicillins Hives    Alpha-Gal Unknown - High Severity    Contrast Dye (Echo Or Unknown Ct/Mr) Rash     Immunizations:  Immunization History   Administered Date(s) Administered    ABRYSVO (RSV, 60+ or pregnant women 32-36 wks) 2023    COVID-19 ([place]) 2021    COVID-19 ([facility]) Purple Cap Monovalent 2021, 2021, 2021    Fluzone (or Fluarix & Flulaval for VFC) >6mos 2022, 2022    Influenza Seasonal Injectable 2022    Influenza, Unspecified 01/15/2020, 10/12/2020, 2022, 2022    Pneumococcal Polysaccharide (PPSV23) 2020     Social History:   Social History     Social History Narrative    Not on file     Social History     Socioeconomic History    Marital status:    Tobacco Use    Smoking status: Former     Current packs/day: 0.00     Average packs/day: 1.5 packs/day for 40.3 years (60.5 ttl pk-yrs)     Types: Cigarettes     Start date: 1980     Quit date: 2020     Years since quittin.5     Passive exposure: Past    Smokeless tobacco: Never     Tobacco comments:     Quit 2020   Vaping Use    Vaping status: Never Used   Substance and Sexual Activity    Alcohol use: Never    Drug use: Never    Sexual activity: Yes     Partners: Male     Birth control/protection: Tubal ligation       Family History:  Family History   Problem Relation Age of Onset    Colon cancer Mother 60        Pancreatic, breast    Cancer Mother     Diabetes Mother     Breast cancer Mother         60s    Arthritis Mother     Pancreatic cancer Mother     Irritable bowel syndrome Mother     Heart disease Father     Heart attack Father     Arthritis Father     Ulcerative colitis Father     Depression Sister         Depression    Anxiety disorder Sister     Cancer Sister     Pancreatic cancer Sister     Irritable bowel syndrome Sister     Bipolar disorder Maternal Aunt     Colon cancer Maternal Aunt     Colon cancer Maternal Grandmother 70    Seizures Son     Depression Son     Suicide Attempts Son     Depression Son     ADD / ADHD Son     Colon cancer Other         60s    Depression Daughter     Malig Hyperthermia Neg Hx         Review of Systems  See history of present illness and past medical history.  Patient denies headache, dizziness, syncope, falls, trauma, change in vision, change in hearing, change in taste, changes in weight, changes in appetite, focal weakness, numbness, or paresthesia.  Patient denies chest pain, palpitations,  sinus pressure, rhinorrhea, epistaxis, hemoptysis, nausea, vomiting,hematemesis, diarrhea, constipation or hematochezia.  Denies cold or heat intolerance, polydipsia, polyuria, polyphagia. Denies hematuria, pyuria, dysuria, hesitancy, frequency or urgency. Denies consumption of raw and under cooked meats foods or change in water source.  Denies fever, chills, sweats, night sweats.      Objective:  T Max 24 hrs: Temp (24hrs), Av.5 °F (36.9 °C), Min:97.9 °F (36.6 °C), Max:99 °F (37.2 °C)    Vitals Ranges:   Temp:  [97.9 °F (36.6 °C)-99 °F (37.2 °C)] 99  "°F (37.2 °C)  Heart Rate:  [] 96  Resp:  [18-22] 20  BP: ()/(40-80) 121/65      Exam:  /65 (BP Location: Left arm, Patient Position: Lying)   Pulse 96   Temp 99 °F (37.2 °C) (Oral)   Resp 20   SpO2 98%     General Appearance:    Alert, cooperative, no distress, appears stated age   Head:    Normocephalic, without obvious abnormality, atraumatic   Eyes:    PERRL, conjunctivae/corneas clear, EOM's intact, both eyes   Ears:    Normal external ear canals, both ears   Nose:   Nares normal, septum midline, mucosa normal, no drainage    or sinus tenderness   Throat:   Lips, mucosa, and tongue normal   Neck:   Supple, symmetrical, trachea midline, no adenopathy;     thyroid:  no enlargement/tenderness/nodules; no carotid    bruit or JVD   Back:     Symmetric, no curvature, ROM normal, no CVA tenderness   Lungs:     Wheezes, decreased breath sounds bilaterally, respirations unlabored   Chest Wall:    No tenderness or deformity    Heart:    Regular rate and rhythm, S1 and S2 normal, no murmur, rub   or gallop   Abdomen:     Soft, nontender, bowel sounds active all four quadrants,     no masses, no hepatomegaly, no splenomegaly   Extremities:   Extremities normal, atraumatic, no cyanosis or edema                       .    Data Review:  Labs in chart were reviewed.  No results found for: \"WBC\", \"HGB\", \"HCT\", \"PLT\"  No results found for: \"NA\", \"K\", \"CL\", \"CO2\", \"BUN\", \"CREATININE\", \"GLUCOSE\"  No results found for: \"CALCIUM\", \"MG\", \"PHOS\"             Imaging Results (All)       None              Assessment:  Active Hospital Problems    Diagnosis  POA    **COPD exacerbation [J44.1]  Yes      Resolved Hospital Problems   No resolved problems to display.   Pulmonary nodule  Obesity   Acute hypoxic respiratory failure  Hyperlipidemia      Plan:  Ask pulmonary to see her  Steroids, mininebs  Ct noted  Monitor on telemetry  Wean oxygen as tolerated  Patient is full code and spouse is merna Kim Marisol, " MD  5/5/2025  23:16 EDT

## 2025-05-06 NOTE — CASE MANAGEMENT/SOCIAL WORK
Discharge Planning Assessment  [facility]     Patient Name: Jennifer Shelton  MRN: [id number]  Today's Date: 5/6/2025    Admit Date: 5/5/2025    Plan: Return home. Denies needs   Discharge Needs Assessment       Row Name 05/06/25 1028       Living Environment    People in Home spouse    Current Living Arrangements home    Potentially Unsafe Housing Conditions none    Primary Care Provided by self    Provides Primary Care For no one    Family Caregiver if Needed spouse    Quality of Family Relationships helpful    Able to Return to Prior Arrangements yes       Resource/Environmental Concerns    Resource/Environmental Concerns none    Transportation Concerns none       Transition Planning    Patient/Family Anticipates Transition to home with family    Patient/Family Anticipated Services at Transition none    Transportation Anticipated family or friend will provide       Discharge Needs Assessment    Readmission Within the Last 30 Days no previous admission in last 30 days    Equipment Currently Used at Home oxygen    Concerns to be Addressed denies needs/concerns at this time;no discharge needs identified    Anticipated Changes Related to Illness none    Equipment Needed After Discharge none                   Discharge Plan       Row Name 05/06/25 1029       Plan    Plan Return home. Denies needs    Patient/Family in Agreement with Plan yes    Plan Comments CCP met with patient at bedside. Introduced self and explained role. Patient lives with her . She drives and is IADL. She denies any regular DME use. CCP verified pharmacy and PCP. She denies any AD/LW documents. Patient states she does have home o2 from [facility] from a previous hospitalization a few years ago, but states she typically does not need it. She confirmed she has a concentrator and portable tanks if needed. At discharge she plans to return home. Her  can provide transportation and assistance if needed. She denies any discharge planning  needs. Jaqueline ALMANZA RN/CCP                    Expected Discharge Date and Time       Expected Discharge Date Expected Discharge Time    May 7, 2025            Demographic Summary       Row Name 05/06/25 1027       General Information    Admission Type inpatient    Arrived From home    Required Notices Provided Important Message from Medicare    Referral Source admission list    Reason for Consult discharge planning    Preferred Language English                   Functional Status       Row Name 05/06/25 1028       Functional Status    Usual Activity Tolerance good    Current Activity Tolerance moderate       Functional Status, IADL    Medications independent    Meal Preparation independent    Housekeeping independent    Laundry independent    Shopping independent                   Psychosocial    No documentation.                  Abuse/Neglect    No documentation.                  Legal    No documentation.                  Substance Abuse    No documentation.                  Patient Forms    No documentation.                     Jade Flowers

## 2025-05-06 NOTE — PLAN OF CARE
Goal Outcome Evaluation:  Plan of Care Reviewed With: patient           Outcome Evaluation: Pt is a 69yof admitted 2/2 SOA, COPD exacerbation. PMH inlcudes URIEL, alpha-gal syndrome, fibromyalgia, lung mass, and RA. Pt lives with spouse and is IND at baseline. Pt has been up ad alan in room on 2LNC. Pt was able to complete household mobility in [place] with SBA and O2 >90% 2L NC. Pt currently expressing no concerns at this time and anticipated to be able to d/c home with no further OT needs. Due to pt's PMH of COPD pt would benefit from OP pulmonary rehab for pulmonary health and endurance for functional IND.    Anticipated Discharge Disposition (OT): home, home with supervision, home with outpatient therapy services

## 2025-05-06 NOTE — CONSULTS
Group: [facility]         CONSULT NOTE    Patient Identification:  Jennifer Shelton  69 y.o.  female  1955  [id number]            Requesting physician: Dr. Damon    Reason for Consultation:  COPD    CC: Wheezing, coughing    History of Present Illness:  Jennifer Shelton is a 69-year-old female with a past medical history including: Chronic bronchitis, chronic pain, COPD/emphysema, GERD, suspected sleep apnea (untreated), diastolic heart failure.    She is followed by Dr. Moeller for her pulmonary issues including COPD, pulmonary nodules, history of tobacco abuse, and obstructive sleep apnea.  Pulmonary function testing from March 2023 shows FEV1/FVC 60%, FEV1 71% with 17% reversibility-patient is on ICS/LAMA/LABA combination.  She is followed by an allergist and remains on Singulair.  She has suspected obstructive sleep apnea, however patient declined evaluation.  History of tobacco abuse of about 40 pack years, quit 2020.  She also has noted pulmonary nodules, had been stable over the past 2 years with imaging, patient is on yearly LDCT scanning until 2025.    Patient was seen by her primary care provider on 5/5/2025 with complaints of chest tightness, wheezing, increased shortness of breath, cough.  Patient recently took a trip to [place] to visit family.  Additionally, patient recently ran out of her Breztri (January 2025-was unable to get filled due to it being cost prohibitive), Trelegy was called in however patient does not have it.  She has been using her rescue inhaler about every hour.  Patient was hypoxic during appointment with her primary care provider, and patient was direct admitted to hospital for suspected COPD exacerbation.    On  CONSTITUTIONAL:  Denies fevers or chills  EYE:  No new vision changes  EAR:  No change in hearing  CARDIAC:  No chest pain  PULMONARY:  + shortness of breath, persistent nonproductive cough, wheezing  GI:  No diarrhea, hematemesis or hematochezia  RENAL:   No dysuria or urinary frequency  MUSCULOSKELETAL:  No musculoskeletal complaints  ENDOCRINE:  No heat or cold intolerance  INTEGUMENTARY: No skin rashes  NEUROLOGICAL:  No dizziness or confusion.  No seizure activity  PSYCHIATRIC:  No new anxiety or depression  12 system review of systems performed and all else negative       Past Medical History:  Past Medical History:   Diagnosis Date    Allergic     Anesthesia complication     heart rate slowed    Anxiety     Arthritis     Asthma     Bronchitis, chronic     Cancer     skin cancer    Cervical spinal stenosis 2016    Cervical spondylosis without myelopathy     Cervicalgia 2015    Chronic pain disorder     Colon polyp     COPD (chronic obstructive pulmonary disease)     Depression     Emphysema of lung     Fibromyalgia     Fibromyalgia, primary     Gastroesophageal reflux     GERD (gastroesophageal reflux disease)     Hematuria 2020    Hernia 2012?    Hiatal hernia    Herniated disc, cervical 2015    C4-5, C5-6, C6-7    Hiatal hernia     High cholesterol     Hyperlipemia     Irritable bowel syndrome 1986    Low back pain     Lung nodule 2020    Pneumonia     Rectal bleeding     Scoliosis     Seasonal allergies     Seizures     Shortness of breath     Sleep apnea     Vitamin D deficiency        Past Surgical History:  Past Surgical History:   Procedure Laterality Date    ABDOMINAL SURGERY  ,     C section     SECTION      COLONOSCOPY      COLONOSCOPY N/A 2023    Procedure: COLONOSCOPY WITH ELEVIEW INJECTION, HOT SNARE, POLYPECTOMIES, CLIP APPLICATION X3;  Surgeon: Oneal Raymond MD;  Location: [facility];  Service: Gastroenterology;  Laterality: N/A;  COLON POLYPS    CYSTOSCOPY  2020    Cystoscopy w/ Dr. Lundy    ENDOSCOPY N/A 2022    Procedure: ESOPHAGOGASTRODUODENOSCOPY WITH BIOPSIES;  Surgeon: Oneal Raymond MD;  Location: [facility] END[facility];  Service: Gastroenterology;   Laterality: N/A;  HIATAL HERNIA, ESOPHAGITIS    PILONIDAL CYSTECTOMY      SQUAMOUS CELL CARCINOMA EXCISION  2021    chest    TUBAL ABDOMINAL LIGATION  1998    UPPER GASTROINTESTINAL ENDOSCOPY  11/2021        Home Meds:  Medications Prior to Admission   Medication Sig Dispense Refill Last Dose/Taking    albuterol sulfate  (90 Base) MCG/ACT inhaler Inhale 2 puffs Every 4 (Four) Hours As Needed for Wheezing. 90 g 0 5/5/2025    Budeson-Glycopyrrol-Formoterol (Breztri Aerosphere) 160-9-4.8 MCG/ACT aerosol inhaler Inhale 2 puffs 2 (Two) Times a Day. 5.9 g 0 Past Month    Cholecalciferol (Vitamin D3) 50 MCG (2000 UT) tablet TAKE 1 TABLET BY MOUTH DAILY. NEEDS TO BE SEEN FOR MORE REFILLS. 90 tablet 1 5/4/2025    diphenhydrAMINE (BENADRYL) 25 mg capsule Take 3 capsules by mouth At Night As Needed for Sleep.   5/4/2025    famotidine (PEPCID) 20 MG tablet Take 1 tablet by mouth 2 (Two) Times a Day As Needed for Heartburn. 180 tablet 3 5/5/2025    FLUoxetine (PROzac) 40 MG capsule TAKE 2 CAPSULES BY MOUTH DAILY. NEED APPT FOR REFILLS. 180 capsule 0 5/4/2025    fluticasone (FLONASE) 50 MCG/ACT nasal spray INSTILL 2 SPRAYS IN EACH NOSTRIL DAILY. 16 g 2 5/5/2025    ipratropium-albuterol (DUO-NEB) 0.5-2.5 mg/3 ml nebulizer Take 3 mL by nebulization 4 (Four) Times a Day. 360 mL 1 Past Month    montelukast (SINGULAIR) 10 MG tablet TAKE 1 TABLET BY MOUTH EVERY NIGHT AT BEDTIME. NEED APPT FOR REFILLS. 90 tablet 0 5/5/2025    O2 (OXYGEN) Inhale 1 (One) Time. Use 2 LPM PRN if O2 if below 90   5/5/2025    pantoprazole (PROTONIX) 40 MG EC tablet Take 1 tablet by mouth Daily. 90 tablet 3 5/5/2025    rosuvastatin (Crestor) 10 MG tablet Take 1 tablet by mouth Daily. 90 tablet 0 5/4/2025    albuterol (ACCUNEB) 0.63 MG/3ML nebulizer solution    More than a month    doxycycline (VIBRAMYCIN) 100 MG capsule Take 1 capsule by mouth 2 (Two) Times a Day. (Patient not taking: Reported on 5/5/2025) 20 capsule 0 Not Taking    fluorouracil  (EFUDEX) 5 % cream  (Patient not taking: Reported on 2025)   Not Taking    predniSONE (DELTASONE) 20 MG tablet Take 2 tablets daily for 3 days, 1 tablet daily for 3 days, half tablet daily for 3 days. (Patient not taking: Reported on 2025) 11 tablet 0 Not Taking    sodium chloride 3 % nebulizer solution inhale contents of 1 vial via nebulizer TWICE DAILY (Patient not taking: Reported on 2025)   Not Taking       Allergies:  Allergies   Allergen Reactions    Aspirin Hives and Arrhythmia     Chest pain    Cephalexin Hives    Nsaids Hives    Penicillins Hives    Alpha-Gal Unknown - High Severity    Contrast Dye (Echo Or Unknown Ct/Mr) Rash       Social History:   Social History     Socioeconomic History    Marital status:    Tobacco Use    Smoking status: Former     Current packs/day: 0.00     Average packs/day: 1.5 packs/day for 40.3 years (60.5 ttl pk-yrs)     Types: Cigarettes     Start date: 1980     Quit date: 2020     Years since quittin.5     Passive exposure: Past    Smokeless tobacco: Never    Tobacco comments:     Quit 2020   Vaping Use    Vaping status: Never Used   Substance and Sexual Activity    Alcohol use: Never    Drug use: Never    Sexual activity: Yes     Partners: Male     Birth control/protection: Tubal ligation       Family History:  Family History   Problem Relation Age of Onset    Colon cancer Mother 60        Pancreatic, breast    Cancer Mother     Diabetes Mother     Breast cancer Mother         60s    Arthritis Mother     Pancreatic cancer Mother     Irritable bowel syndrome Mother     Heart disease Father     Heart attack Father     Arthritis Father     Ulcerative colitis Father     Depression Sister         Depression    Anxiety disorder Sister     Cancer Sister     Pancreatic cancer Sister     Irritable bowel syndrome Sister     Bipolar disorder Maternal Aunt     Colon cancer Maternal Aunt     Colon cancer Maternal Grandmother 70    Seizures Son      Depression Son     Suicide Attempts Son     Depression Son     ADD / ADHD Son     Colon cancer Other         60s    Depression Daughter     Malig Hyperthermia Neg Hx        Physical Exam:  /65 (BP Location: Left arm, Patient Position: Lying)   Pulse 96   Temp 99 °F (37.2 °C) (Oral)   Resp 20   SpO2 98%  There is no height or weight on file to calculate BMI. 98%    Constitutional: Middle aged pt in bed, sitting up in bed, persistently coughing, able to carry on conversation, no accessory muscle use or evidence of respiratory distress  Head: - NCAT  Eyes: No pallor, Anicteric conjunctiva, EOMI.  ENMT:  Mallampati 3, no oral thrush. Dry MM.   NECK: Trachea midline, No thyromegaly, no palpable cervical LNpathy, no JVD  Heart: RRR, no murmur. No pedal edema   Lungs: CORNELIO +, Diminished, mild expiratory wheezing in all lung fields  Abdomen: Soft. No tenderness, guarding or rigidity. No palpable masses  Extremities: Extremities warm and well perfused. No cyanosis/ clubbing  Neuro: Conscious, answers appropriately, no gross focal neuro deficits  Psych: Mood and affect appropriate    PPE recommended per [facility] infectious disease Isolation protocol for the current clinical scenario(as mentioned below) was followed.      LABS:  COVID19   Date Value Ref Range Status   01/03/2023 Not Detected Not Detected - Ref. Range Final       Lab Results   Component Value Date    CALCIUM 8.9 04/07/2025       Lab Results   Component Value Date    CKTOTAL 85 04/07/2025    TROPONINT <0.010 01/03/2023                                 Lab Results   Component Value Date    TSH 1.800 01/30/2025     Estimated Creatinine Clearance: 104.7 mL/min (by C-G formula based on SCr of 0.61 mg/dL).      Microbiology Results (last 10 days)       ** No results found for the last 240 hours. **               Imaging: I personally visualized the images of scans/x-rays performed within last 3 days.      Assessment:  COPD, acute exacerbation  Acute  hypoxic respiratory failure  Pulmonary nodule  GERD  URIEL, suspected (untreated)  Diastolic dysfunction    Recommendations:  Continue scheduled bronchodilators and IV steroids for COPD exacerbation.  Check complete RVP.  Azithromycin for COPD exacerbation suppression.  Antitussive medications ordered.    Continue Singulair.    May require pharmacy consult prior to discharge determine best maintenance options for patient in regards to cost.      Patient was placed in face mask upon entering room and kept mask on throughout our encounter. I wore full protective equipment throughout this patient encounter including a face mask, gown and gloves. Hand hygiene was performed before donning protective equipment and after removal when leaving the room.    Kiersten Cervantes, APRN  5/5/2025  21:34 EDT      Much of this encounter note is an electronic transcription/translation of spoken language to printed text using Dragon Software.

## 2025-05-06 NOTE — PLAN OF CARE
Problem: Adult Inpatient Plan of Care  Goal: Plan of Care Review  Outcome: Progressing  Flowsheets (Taken 5/6/2025 0443)  Outcome Evaluation: Vitals stable, PO tessalon PRN given for coughing, IV steroids continued, no c/o pain, 2L oxygen to keep sat above 90%, no other complaints, plan of care on going.

## 2025-05-06 NOTE — PROGRESS NOTES
Name: Jennifer Shelton ADMIT: 2025   : 1955  PCP: Tessy Serrano PA    MRN: [id number] LOS: 1 days   AGE/SEX: 69 y.o. female  ROOM: [facility]     Subjective   Subjective   She feels better than yesterday. Still with some coughing. Appetite is okay.     Objective   Objective   Vital Signs  Temp:  [97.9 °F (36.6 °C)-99 °F (37.2 °C)] 97.9 °F (36.6 °C)  Heart Rate:  [] 102  Resp:  [18-22] 20  BP: ()/(40-80) 157/80  SpO2:  [92 %-99 %] 97 %  on  Flow (L/min) (Oxygen Therapy):  [2] 2;   Device (Oxygen Therapy): nasal cannula  Body mass index is 30.34 kg/m².    Physical Exam  Constitutional:       General: She is not in acute distress.     Appearance: She is not toxic-appearing.   HENT:      Head: Normocephalic and atraumatic.   Cardiovascular:      Rate and Rhythm: Normal rate and regular rhythm.   Pulmonary:      Effort: Pulmonary effort is normal. No respiratory distress.      Breath sounds: Decreased breath sounds and wheezing present.   Abdominal:      General: Bowel sounds are normal.      Palpations: Abdomen is soft.      Tenderness: There is no abdominal tenderness.   Musculoskeletal:         General: No swelling.      Right lower leg: No edema.      Left lower leg: No edema.   Skin:     General: Skin is warm and dry.   Neurological:      General: No focal deficit present.      Mental Status: She is alert and oriented to person, place, and time.   Psychiatric:         Mood and Affect: Mood normal.         Behavior: Behavior normal.     Results Review  I reviewed the patient's new clinical results.  Results from last 7 days   Lab Units 25  0340   WBC 10*3/mm3 5.59   HEMOGLOBIN g/dL 12.9   PLATELETS 10*3/mm3 394     Results from last 7 days   Lab Units 25  0340   SODIUM mmol/L 135*   POTASSIUM mmol/L 4.7   CHLORIDE mmol/L 102   CO2 mmol/L 25.8   BUN mg/dL 22   CREATININE mg/dL 0.72   GLUCOSE mg/dL 209*     Lab Results   Component Value Date    ANIONGAP 7.2 2025     Estimated  "Creatinine Clearance: 89.2 mL/min (by C-G formula based on SCr of 0.72 mg/dL).   Lab Results   Component Value Date    EGFR 91.8 05/06/2025         Results from last 7 days   Lab Units 05/06/25  0340   CALCIUM mg/dL 9.4       No results found for: \"HGBA1C\", \"POCGLU\"    CT Chest Low Dose Cancer Screening WO  Result Date: 5/5/2025  Impression: 1.Enlarging 8 mm by 7 mm nodule/nodular density in the base of the lingula may be due to an intrapulmonary lymph node or scarring. (previously 6 mm x 4 mm. Lung RADS category 4A (suspicious). Recommend 3-month follow-up CT given interval enlargement. 2.Previously seen cluster of nodules in the right lower lobe measuring up to 7 mm demonstrate increasing areas of central density suggesting progressive calcification. The overall appearance again suggests a healing granulomatous process. 3.Mild pulmonary emphysematous changes. 4.Additional findings as given above. Electronically Signed: Oneal Tran MD  5/5/2025 3:19 PM EDT  Workstation ID: [id number]      Scheduled Meds  arformoterol, 15 mcg, Nebulization, BID - RT   And  budesonide, 0.5 mg, Nebulization, BID - RT  azithromycin, 500 mg, Oral, Q24H  cholecalciferol, 1,000 Units, Oral, Daily  FLUoxetine, 80 mg, Oral, Daily  fluticasone, 2 spray, Each Nare, Daily  ipratropium-albuterol, 3 mL, Nebulization, 4x Daily - RT  methylPREDNISolone sodium succinate, 40 mg, Intravenous, Q8H  montelukast, 10 mg, Oral, Nightly  pantoprazole, 40 mg, Oral, Daily  rosuvastatin, 10 mg, Oral, Daily    Continuous Infusions   PRN Meds    acetaminophen **OR** acetaminophen **OR** acetaminophen    albuterol    benzonatate    senna-docusate sodium **AND** polyethylene glycol **AND** bisacodyl **AND** bisacodyl    famotidine    HYDROcodone Bit-Homatrop MBr    melatonin    ondansetron ODT **OR** ondansetron     Diet  Diet: Cardiac; Healthy Heart (2-3 Na+); Fluid Consistency: Thin (IDDSI 0)       Assessment/Plan     Active Hospital Problems    Diagnosis  POA "    **COPD exacerbation [J44.1]  Yes    Alpha-gal syndrome [Z91.014]  Unknown    Pulmonary nodule [R91.1]  Yes    Acute hypoxemic respiratory failure [J96.01]  Yes    Sleep apnea [G47.30]  Yes      Resolved Hospital Problems   No resolved problems to display.     Patient is a 69 y.o. female     Acute exacerbation of COPD  Acute hypoxic respiratory failure  Pulmonary nodule  Suspected URIEL  Improved today  Appreciate pulmonology  Bronchodilators, steroids, azithromycin, respiratory treatments  Discussed with patient pulmonary nodule-radiology recommends follow-up CT scanning in 3 months and she is aware  Tick exposure: No fever. No cytopenias. Check LFTs with labs tomorrow    History of diastolic dysfunction  Hyperlipidemia Lipitor  Vitamin D deficiency  Alpha-gal per patient recently diagnosed referred to allergist  GERD    DVT prophylaxis  SCDs    Discharge  1 to 2 days  Expected Discharge Date: 5/7/2025; Expected Discharge Time:     Discussed with patient, nursing staff, and care team on multidisciplinary rounds    Abhinav Crowell MD  [facility]  05/06/25 09:57 EDT

## 2025-05-06 NOTE — PLAN OF CARE
Goal Outcome Evaluation:  Plan of Care Reviewed With: patient        Progress: improving  Outcome Evaluation: VSS. Pt c/o headache this morning-PRN tylenol administered. Tesson pearls TID PRN for coughing. 2L NC maintained. Pt is up ad alan. POC continued.

## 2025-05-06 NOTE — PROGRESS NOTES
PROGRESS NOTE  Patient Name: Jennifer Shelton  Age/Sex: 69 y.o. female  : 1955  MRN: [id number]    Date of Admission: 2025  Date of Encounter Visit: 25   LOS: 1 day   Patient Care Team:  Tessy Serrano PA as PCP - General (Family Medicine)  Deja Chadwick APRN as Nurse Practitioner (Behavioral Health)  Oneal Raymond MD as Consulting Physician (Gastroenterology)  Stephanie Moran APRN as Nurse Practitioner (Gastroenterology)  David Oliveira MD as Consulting Physician (Interventional Cardiology)  Andrea Moeller MD as Consulting Physician (Pulmonary Disease)    Chief Complaint: COPD exacerbation, pulmonary nodule    Hospital course: Patient is doing better, still on oxygen, positive cough with irritated dry airways but no thick secretions         REVIEW OF SYSTEMS:   CONSTITUTIONAL: no fever or chills  CARDIOVASCULAR: No chest pain, chest pressure or chest discomfort. No palpitations or edema.   RESPIRATORY: Improving cough and shortness of breath compared to earlier  GASTROINTESTINAL: No anorexia, nausea, vomiting or diarrhea. No abdominal pain or blood.   HEMATOLOGIC: No bleeding or bruising.     Ventilator/Non-Invasive Ventilation Settings (From admission, onward)      None              Vital Signs  Temp:  [97.9 °F (36.6 °C)-99 °F (37.2 °C)] 98.1 °F (36.7 °C)  Heart Rate:  [] 107  Resp:  [18-22] 18  BP: (117-157)/(61-80) 117/61  SpO2:  [92 %-99 %] 98 %  on  Flow (L/min) (Oxygen Therapy):  [2] 2 Device (Oxygen Therapy): nasal cannula    Intake/Output Summary (Last 24 hours) at 2025 1447  Last data filed at 2025 1353  Gross per 24 hour   Intake 480 ml   Output --   Net 480 ml     Flowsheet Rows      Flowsheet Row First Filed Value   Admission Height --   Admission Weight 90.5 kg (199 lb 8 oz) Documented at 2025 0600          Body mass index is 30.34 kg/m².      25  0600   Weight: 90.5 kg (199 lb 8 oz)       Physical Exam:  GEN:  No acute distress, alert,  "cooperative, well developed, on 2 L nasal cannula oxygen  EYES:   Sclerae clear. No icterus. PERRL. Normal EOM  ENT:   External ears/nose normal, no oral lesions, no thrush, mucous membranes moist  NECK:  Supple, midline trachea, no JVD  LUNGS: Normal chest on inspection, positive for expiratory wheeze with diminished breath sounds bilaterally. Respirations regular, even and unlabored.   CV:  Regular rhythm and rate. Normal S1/S2. No murmurs, gallops, or rubs noted.  ABD:  Soft, nontender and nondistended. Normal bowel sounds. No guarding  EXT:  Moves all extremities well. No cyanosis. No redness. No edema.   Skin: Dry, intact, no bleeding    Results Review:        Results from last 7 days   Lab Units 05/06/25  0340   SODIUM mmol/L 135*   POTASSIUM mmol/L 4.7   CHLORIDE mmol/L 102   CO2 mmol/L 25.8   BUN mg/dL 22   CREATININE mg/dL 0.72   CALCIUM mg/dL 9.4   ANION GAP mmol/L 7.2                 Results from last 7 days   Lab Units 05/06/25  0340   WBC 10*3/mm3 5.59   HEMOGLOBIN g/dL 12.9   HEMATOCRIT % 39.1   PLATELETS 10*3/mm3 394   MCV fL 90.9                   Invalid input(s): \"LDLCALC\"          No results found for: \"POCGLU\"              Results from last 7 days   Lab Units 05/06/25  0726   COVID19  Not Detected   ADENOVIRUS DETECTION BY PCR  Not Detected   CORONAVIRUS 229E  Not Detected   CORONAVIRUS HKU1  Not Detected   CORONAVIRUS NL63  Not Detected   CORONAVIRUS OC43  Not Detected   HUMAN METAPNEUMOVIRUS  Not Detected   HUMAN RHINOVIRUS/ENTEROVIRUS  Not Detected   INFLUENZA B PCR  Not Detected   PARAINFLUENZA 1  Not Detected   PARAINFLUENZA VIRUS 2  Not Detected   PARAINFLUENZA VIRUS 3  Not Detected   PARAINFLUENZA VIRUS 4  Not Detected   BORDETELLA PERTUSSIS PCR  Not Detected   BORDETELLA PARAPERTUSSIS PCR  Not Detected   CHLAMYDOPHILA PNEUMONIAE PCR  Not Detected   MYCOPLAMA PNEUMO PCR  Not Detected   RSV, PCR  Not Detected               Imaging:   Imaging Results (All)       None            I reviewed " the patient's new clinical results.  I personally viewed and interpreted the patient's imaging results:        Medication Review:   arformoterol, 15 mcg, Nebulization, BID - RT   And  budesonide, 0.5 mg, Nebulization, BID - RT  azithromycin, 500 mg, Oral, Q24H  cholecalciferol, 1,000 Units, Oral, Daily  FLUoxetine, 80 mg, Oral, Daily  fluticasone, 2 spray, Each Nare, Daily  ipratropium-albuterol, 3 mL, Nebulization, 4x Daily - RT  methylPREDNISolone sodium succinate, 40 mg, Intravenous, Q8H  montelukast, 10 mg, Oral, Nightly  pantoprazole, 40 mg, Oral, Daily  rosuvastatin, 10 mg, Oral, Daily             ASSESSMENT:   COPD, acute exacerbation  Acute hypoxic respiratory failure  Enlarging pulmonary nodule with minor change from 6 x 4 up to 8 x 7, patient is acutely ill and that needs to be repeated in 3 months before further invasive workup could be considered  GERD  URIEL, suspected (untreated)  Diastolic dysfunction    PLAN:  Doing better, we will go ahead and start working on the steroid taper will transition to p.o. prednisone  Continue with Zithromax  Continue with the Singulair  Continue with current nebulizer regimen  She is doing better, hopefully we can taper further and talk about any potential discharge home in 1 or 2 days depending on her progress  She is to go back on her Trelegy after discharge  Need to arrange for the CT at the time of discharge and follow-up afterwards  Discussed with patient  Labs/Notes/films were independently reviewed and pertinent results are summarized above  The copied texts in this note were reviewed and they are accurate as of 05/06/25    Disposition: Home    Andrea Moeller MD  05/06/25  14:47 EDT           Dictated utilizing Dragon dictation

## 2025-05-07 LAB
ALBUMIN SERPL-MCNC: 3.9 G/DL (ref 3.5–5.2)
ALP SERPL-CCNC: 73 U/L (ref 39–117)
ALT SERPL W P-5'-P-CCNC: 7 U/L (ref 1–33)
ANION GAP SERPL CALCULATED.3IONS-SCNC: 8.3 MMOL/L (ref 5–15)
AST SERPL-CCNC: 14 U/L (ref 1–32)
BILIRUB CONJ SERPL-MCNC: <0.1 MG/DL (ref 0–0.3)
BILIRUB INDIRECT SERPL-MCNC: NORMAL MG/DL
BILIRUB SERPL-MCNC: 0.3 MG/DL (ref 0–1.2)
BUN SERPL-MCNC: 17 MG/DL (ref 8–23)
BUN/CREAT SERPL: 29.8 (ref 7–25)
CALCIUM SPEC-SCNC: 9.3 MG/DL (ref 8.6–10.5)
CHLORIDE SERPL-SCNC: 103 MMOL/L (ref 98–107)
CO2 SERPL-SCNC: 25.7 MMOL/L (ref 22–29)
CREAT SERPL-MCNC: 0.57 MG/DL (ref 0.57–1)
DEPRECATED RDW RBC AUTO: 43.7 FL (ref 37–54)
EGFRCR SERPLBLD CKD-EPI 2021: 99.7 ML/MIN/1.73
ERYTHROCYTE [DISTWIDTH] IN BLOOD BY AUTOMATED COUNT: 13.2 % (ref 12.3–15.4)
GLUCOSE SERPL-MCNC: 127 MG/DL (ref 65–99)
HCT VFR BLD AUTO: 37.5 % (ref 34–46.6)
HGB BLD-MCNC: 12.2 G/DL (ref 12–15.9)
MAGNESIUM SERPL-MCNC: 2.1 MG/DL (ref 1.6–2.4)
MCH RBC QN AUTO: 29.6 PG (ref 26.6–33)
MCHC RBC AUTO-ENTMCNC: 32.5 G/DL (ref 31.5–35.7)
MCV RBC AUTO: 91 FL (ref 79–97)
PHOSPHATE SERPL-MCNC: 3.3 MG/DL (ref 2.5–4.5)
PLATELET # BLD AUTO: 391 10*3/MM3 (ref 140–450)
PMV BLD AUTO: 9.1 FL (ref 6–12)
POTASSIUM SERPL-SCNC: 4.1 MMOL/L (ref 3.5–5.2)
PROT SERPL-MCNC: 7 G/DL (ref 6–8.5)
RBC # BLD AUTO: 4.12 10*6/MM3 (ref 3.77–5.28)
SODIUM SERPL-SCNC: 137 MMOL/L (ref 136–145)
WBC NRBC COR # BLD AUTO: 9.41 10*3/MM3 (ref 3.4–10.8)

## 2025-05-07 PROCEDURE — 84100 ASSAY OF PHOSPHORUS: CPT | Performed by: HOSPITALIST

## 2025-05-07 PROCEDURE — 63710000001 PREDNISONE PER 1 MG: Performed by: INTERNAL MEDICINE

## 2025-05-07 PROCEDURE — 94761 N-INVAS EAR/PLS OXIMETRY MLT: CPT

## 2025-05-07 PROCEDURE — 80076 HEPATIC FUNCTION PANEL: CPT | Performed by: HOSPITALIST

## 2025-05-07 PROCEDURE — 80048 BASIC METABOLIC PNL TOTAL CA: CPT | Performed by: HOSPITALIST

## 2025-05-07 PROCEDURE — 94664 DEMO&/EVAL PT USE INHALER: CPT

## 2025-05-07 PROCEDURE — 94799 UNLISTED PULMONARY SVC/PX: CPT

## 2025-05-07 PROCEDURE — 83735 ASSAY OF MAGNESIUM: CPT | Performed by: HOSPITALIST

## 2025-05-07 PROCEDURE — G0378 HOSPITAL OBSERVATION PER HR: HCPCS

## 2025-05-07 PROCEDURE — 85027 COMPLETE CBC AUTOMATED: CPT | Performed by: HOSPITALIST

## 2025-05-07 RX ADMIN — ARFORMOTEROL TARTRATE 15 MCG: 15 SOLUTION RESPIRATORY (INHALATION) at 07:01

## 2025-05-07 RX ADMIN — ACETAMINOPHEN 650 MG: 325 TABLET, FILM COATED ORAL at 04:36

## 2025-05-07 RX ADMIN — PREDNISONE 20 MG: 20 TABLET ORAL at 17:07

## 2025-05-07 RX ADMIN — IPRATROPIUM BROMIDE AND ALBUTEROL SULFATE 3 ML: .5; 3 SOLUTION RESPIRATORY (INHALATION) at 07:03

## 2025-05-07 RX ADMIN — PANTOPRAZOLE SODIUM 40 MG: 40 TABLET, DELAYED RELEASE ORAL at 08:34

## 2025-05-07 RX ADMIN — IPRATROPIUM BROMIDE AND ALBUTEROL SULFATE 3 ML: .5; 3 SOLUTION RESPIRATORY (INHALATION) at 10:51

## 2025-05-07 RX ADMIN — FLUTICASONE PROPIONATE 2 SPRAY: 50 SPRAY, METERED NASAL at 08:34

## 2025-05-07 RX ADMIN — IPRATROPIUM BROMIDE AND ALBUTEROL SULFATE 3 ML: .5; 3 SOLUTION RESPIRATORY (INHALATION) at 15:19

## 2025-05-07 RX ADMIN — MONTELUKAST 10 MG: 10 TABLET, FILM COATED ORAL at 20:24

## 2025-05-07 RX ADMIN — HYDROCODONE BITARTRATE AND HOMATROPINE METHYLBROMIDE ORAL SOLUTION 5 ML: 5; 1.5 LIQUID ORAL at 18:38

## 2025-05-07 RX ADMIN — ROSUVASTATIN CALCIUM 10 MG: 10 TABLET, FILM COATED ORAL at 08:34

## 2025-05-07 RX ADMIN — Medication 1000 UNITS: at 08:34

## 2025-05-07 RX ADMIN — AZITHROMYCIN 500 MG: 250 TABLET, FILM COATED ORAL at 08:34

## 2025-05-07 RX ADMIN — BUDESONIDE 0.5 MG: 0.5 SUSPENSION RESPIRATORY (INHALATION) at 19:45

## 2025-05-07 RX ADMIN — FLUOXETINE 80 MG: 20 CAPSULE ORAL at 08:34

## 2025-05-07 RX ADMIN — PREDNISONE 20 MG: 20 TABLET ORAL at 08:34

## 2025-05-07 RX ADMIN — BENZONATATE 200 MG: 100 CAPSULE ORAL at 08:34

## 2025-05-07 RX ADMIN — BUDESONIDE 0.5 MG: 0.5 SUSPENSION RESPIRATORY (INHALATION) at 07:04

## 2025-05-07 RX ADMIN — IPRATROPIUM BROMIDE AND ALBUTEROL SULFATE 3 ML: .5; 3 SOLUTION RESPIRATORY (INHALATION) at 19:35

## 2025-05-07 RX ADMIN — HYDROCODONE BITARTRATE AND HOMATROPINE METHYLBROMIDE ORAL SOLUTION 5 ML: 5; 1.5 LIQUID ORAL at 10:16

## 2025-05-07 RX ADMIN — ARFORMOTEROL TARTRATE 15 MCG: 15 SOLUTION RESPIRATORY (INHALATION) at 19:37

## 2025-05-07 NOTE — PLAN OF CARE
Goal Outcome Evaluation:  Plan of Care Reviewed With: patient        Progress: improving  Outcome Evaluation: Vital signs stable. Pt c/o cough and headache, managed with PRN Tessalon and tylenol. 2L nc humidified. Up ad alan. Safety maintained. Possible discharge home with family 5/7.

## 2025-05-07 NOTE — PLAN OF CARE
Problem: Adult Inpatient Plan of Care  Goal: Plan of Care Review  Outcome: Progressing   Goal Outcome Evaluation:      Vss.Up to chair, tolerated well.Prn hycodan given as ordered for cough.Remains on 02 @ 1l n/c. No c/o pain or n/v.

## 2025-05-07 NOTE — PROGRESS NOTES
PROGRESS NOTE  Patient Name: Jennifer Shelton  Age/Sex: 69 y.o. female  : 1955  MRN: [id number]    Date of Admission: 2025  Date of Encounter Visit: 25   LOS: 1 day   Patient Care Team:  Tessy Serrano PA as PCP - General (Family Medicine)  Deja Chadwick APRN as Nurse Practitioner (Behavioral Health)  Oneal Raymond MD as Consulting Physician (Gastroenterology)  Stephanie Moran APRN as Nurse Practitioner (Gastroenterology)  David Oliveira MD as Consulting Physician (Interventional Cardiology)  Andrea Moeller MD as Consulting Physician (Pulmonary Disease)    Chief Complaint: COPD exacerbation, pulmonary nodule    Hospital course: Patient is not any different compared to yesterday with ongoing wheezing, still oxygen dependent with cough         REVIEW OF SYSTEMS:   CONSTITUTIONAL: no fever or chills  CARDIOVASCULAR: No chest pain, chest pressure or chest discomfort. No palpitations or edema.   RESPIRATORY: Improved initially but no major changes compared to yesterday  GASTROINTESTINAL: No anorexia, nausea, vomiting or diarrhea. No abdominal pain or blood.   HEMATOLOGIC: No bleeding or bruising.     Ventilator/Non-Invasive Ventilation Settings (From admission, onward)      None              Vital Signs  Temp:  [98.1 °F (36.7 °C)-99 °F (37.2 °C)] 98.8 °F (37.1 °C)  Heart Rate:  [] 101  Resp:  [16-22] 16  BP: (117-143)/(61-79) 130/76  SpO2:  [92 %-100 %] 95 %  on  Flow (L/min) (Oxygen Therapy):  [1-2] 1 Device (Oxygen Therapy): nasal cannula    Intake/Output Summary (Last 24 hours) at 2025 1155  Last data filed at 2025 1729  Gross per 24 hour   Intake 480 ml   Output --   Net 480 ml     Flowsheet Rows      Flowsheet Row First Filed Value   Admission Height --   Admission Weight 90.5 kg (199 lb 8 oz) Documented at 2025 0600          Body mass index is 29.5 kg/m².      25  0600 25  0600   Weight: 90.5 kg (199 lb 8 oz) 88 kg (194 lb)       Physical  "Exam:  GEN:  No acute distress, alert, cooperative, well developed, on 2 L nasal cannula oxygen  EYES:   Sclerae clear. No icterus. PERRL. Normal EOM  ENT:   External ears/nose normal, no oral lesions, no thrush, mucous membranes moist  NECK:  Supple, midline trachea, no JVD  LUNGS: Normal chest on inspection, positive for expiratory wheeze with diminished breath sounds bilaterally. Respirations regular, even and unlabored.   CV:  Regular rhythm and rate. Normal S1/S2. No murmurs, gallops, or rubs noted.  ABD:  Soft, nontender and nondistended. Normal bowel sounds. No guarding  EXT:  Moves all extremities well. No cyanosis. No redness. No edema.   Skin: Dry, intact, no bleeding    Results Review:        Results from last 7 days   Lab Units 05/07/25  0329 05/06/25  0340   SODIUM mmol/L 137 135*   POTASSIUM mmol/L 4.1 4.7   CHLORIDE mmol/L 103 102   CO2 mmol/L 25.7 25.8   BUN mg/dL 17 22   CREATININE mg/dL 0.57 0.72   CALCIUM mg/dL 9.3 9.4   AST (SGOT) U/L 14  --    ALT (SGPT) U/L 7  --    ANION GAP mmol/L 8.3 7.2   ALBUMIN g/dL 3.9  --                  Results from last 7 days   Lab Units 05/07/25  0329 05/06/25  0340   WBC 10*3/mm3 9.41 5.59   HEMOGLOBIN g/dL 12.2 12.9   HEMATOCRIT % 37.5 39.1   PLATELETS 10*3/mm3 391 394   MCV fL 91.0 90.9         Results from last 7 days   Lab Units 05/07/25  0329   MAGNESIUM mg/dL 2.1           Invalid input(s): \"LDLCALC\"          No results found for: \"POCGLU\"              Results from last 7 days   Lab Units 05/06/25  0726   COVID19  Not Detected   ADENOVIRUS DETECTION BY PCR  Not Detected   CORONAVIRUS 229E  Not Detected   CORONAVIRUS HKU1  Not Detected   CORONAVIRUS NL63  Not Detected   CORONAVIRUS OC43  Not Detected   HUMAN METAPNEUMOVIRUS  Not Detected   HUMAN RHINOVIRUS/ENTEROVIRUS  Not Detected   INFLUENZA B PCR  Not Detected   PARAINFLUENZA 1  Not Detected   PARAINFLUENZA VIRUS 2  Not Detected   PARAINFLUENZA VIRUS 3  Not Detected   PARAINFLUENZA VIRUS 4  Not Detected "   BORDETELLA PERTUSSIS PCR  Not Detected   BORDETELLA PARAPERTUSSIS PCR  Not Detected   CHLAMYDOPHILA PNEUMONIAE PCR  Not Detected   MYCOPLAMA PNEUMO PCR  Not Detected   RSV, PCR  Not Detected               Imaging:   Imaging Results (All)       None            I reviewed the patient's new clinical results.  I personally viewed and interpreted the patient's imaging results:        Medication Review:   arformoterol, 15 mcg, Nebulization, BID - RT   And  budesonide, 0.5 mg, Nebulization, BID - RT  azithromycin, 500 mg, Oral, Q24H  cholecalciferol, 1,000 Units, Oral, Daily  FLUoxetine, 80 mg, Oral, Daily  fluticasone, 2 spray, Each Nare, Daily  ipratropium-albuterol, 3 mL, Nebulization, 4x Daily - RT  montelukast, 10 mg, Oral, Nightly  pantoprazole, 40 mg, Oral, Daily  predniSONE, 20 mg, Oral, BID With Meals  rosuvastatin, 10 mg, Oral, Daily             ASSESSMENT:   COPD, acute exacerbation  Acute hypoxic respiratory failure  Enlarging pulmonary nodule with minor change from 6 x 4 up to 8 x 7, patient is acutely ill and that needs to be repeated in 3 months before further invasive workup could be considered  GERD  URIEL, suspected (untreated)  Diastolic dysfunction    PLAN:  Doing the same, I will hold on any taper on the prednisone today and will readdress that again in a.m.  Continue Zithromax  On independent review of the CAT scan finding, I recommend to continue with the passive follow-up by repeating the CAT scan in 3 months and we will arrange for that at the time of discharge, meanwhile we will continue to treat her acute COPD exacerbation   Discussed with patient  Labs/Notes/films were independently reviewed and pertinent results are summarized above  The copied texts in this note were reviewed and they are accurate as of 05/07/25    Disposition: Home    Andrea Moeller MD  05/07/25  11:55 EDT           Dictated utilizing Dragon dictation

## 2025-05-07 NOTE — PROGRESS NOTES
Name: Jennifer Shelton ADMIT: 2025   : 1955  PCP: Tessy Serrano PA    MRN: [id number] LOS: 1 days   AGE/SEX: 69 y.o. female  ROOM: [facility]     Subjective   Subjective   Sitting up in chair. Feels about the same may be a little more short of breath thinks may be switching from IV to p.o. steroids did it. Still with a lot of coughing. She has been taking Tessalon Perles not really helping, has not taken any Hycodan.     Objective   Objective   Vital Signs  Temp:  [98.1 °F (36.7 °C)-99 °F (37.2 °C)] 98.8 °F (37.1 °C)  Heart Rate:  [] 86  Resp:  [16-22] 16  BP: (117-143)/(61-79) 130/76  SpO2:  [92 %-100 %] 100 %  on  Flow (L/min) (Oxygen Therapy):  [1-2] 1;   Device (Oxygen Therapy): nasal cannula  Body mass index is 29.5 kg/m².    Physical Exam  Constitutional:       General: She is not in acute distress.     Appearance: She is not toxic-appearing.   HENT:      Head: Normocephalic and atraumatic.   Cardiovascular:      Rate and Rhythm: Normal rate and regular rhythm.   Pulmonary:      Effort: Pulmonary effort is normal. No respiratory distress.      Breath sounds: Decreased breath sounds and wheezing (expiratory anteriorly, end expiratory posteriorly) present.   Abdominal:      General: Bowel sounds are normal.      Palpations: Abdomen is soft.      Tenderness: There is no abdominal tenderness. There is no guarding or rebound.   Musculoskeletal:         General: No swelling.      Right lower leg: No edema.      Left lower leg: No edema.   Skin:     General: Skin is warm and dry.   Neurological:      General: No focal deficit present.      Mental Status: She is alert and oriented to person, place, and time.   Psychiatric:         Mood and Affect: Mood normal.         Behavior: Behavior normal.     Results Review  I reviewed the patient's new clinical results.  Results from last 7 days   Lab Units 25  0329 25  0340   WBC 10*3/mm3 9.41 5.59   HEMOGLOBIN g/dL 12.2 12.9   PLATELETS 10*3/mm3  "391 394     Results from last 7 days   Lab Units 05/07/25  0329 05/06/25  0340   SODIUM mmol/L 137 135*   POTASSIUM mmol/L 4.1 4.7   CHLORIDE mmol/L 103 102   CO2 mmol/L 25.7 25.8   BUN mg/dL 17 22   CREATININE mg/dL 0.57 0.72   GLUCOSE mg/dL 127* 209*     Lab Results   Component Value Date    ANIONGAP 8.3 05/07/2025     Estimated Creatinine Clearance: 111.1 mL/min (by C-G formula based on SCr of 0.57 mg/dL).   Lab Results   Component Value Date    EGFR 99.7 05/07/2025     Results from last 7 days   Lab Units 05/07/25  0329   ALBUMIN g/dL 3.9   BILIRUBIN mg/dL 0.3   ALK PHOS U/L 73   AST (SGOT) U/L 14   ALT (SGPT) U/L 7     Results from last 7 days   Lab Units 05/07/25  0329 05/06/25  0340   CALCIUM mg/dL 9.3 9.4   ALBUMIN g/dL 3.9  --    MAGNESIUM mg/dL 2.1  --    PHOSPHORUS mg/dL 3.3  --        No results found for: \"HGBA1C\", \"POCGLU\"    CT Chest Low Dose Cancer Screening WO  Result Date: 5/5/2025  Impression: 1.Enlarging 8 mm by 7 mm nodule/nodular density in the base of the lingula may be due to an intrapulmonary lymph node or scarring. (previously 6 mm x 4 mm. Lung RADS category 4A (suspicious). Recommend 3-month follow-up CT given interval enlargement. 2.Previously seen cluster of nodules in the right lower lobe measuring up to 7 mm demonstrate increasing areas of central density suggesting progressive calcification. The overall appearance again suggests a healing granulomatous process. 3.Mild pulmonary emphysematous changes. 4.Additional findings as given above. Electronically Signed: Oneal Tran MD  5/5/2025 3:19 PM EDT  Workstation ID: [id number]      Scheduled Meds  arformoterol, 15 mcg, Nebulization, BID - RT   And  budesonide, 0.5 mg, Nebulization, BID - RT  azithromycin, 500 mg, Oral, Q24H  cholecalciferol, 1,000 Units, Oral, Daily  FLUoxetine, 80 mg, Oral, Daily  fluticasone, 2 spray, Each Nare, Daily  ipratropium-albuterol, 3 mL, Nebulization, 4x Daily - RT  montelukast, 10 mg, Oral, " Nightly  pantoprazole, 40 mg, Oral, Daily  predniSONE, 20 mg, Oral, BID With Meals  rosuvastatin, 10 mg, Oral, Daily    Continuous Infusions   PRN Meds    acetaminophen **OR** acetaminophen **OR** acetaminophen    albuterol    benzonatate    senna-docusate sodium **AND** polyethylene glycol **AND** bisacodyl **AND** bisacodyl    famotidine    HYDROcodone Bit-Homatrop MBr    melatonin    ondansetron ODT **OR** ondansetron     Diet  Diet: Cardiac; Healthy Heart (2-3 Na+); Fluid Consistency: Thin (IDDSI 0)       Assessment/Plan     Active Hospital Problems    Diagnosis  POA    **COPD exacerbation [J44.1]  Yes    Alpha-gal syndrome [Z91.014]  Unknown    Pulmonary nodule [R91.1]  Yes    Acute hypoxemic respiratory failure [J96.01]  Yes    Sleep apnea [G47.30]  Yes      Resolved Hospital Problems   No resolved problems to display.     Patient is a 69 y.o. female     Acute exacerbation of COPD  Acute hypoxic respiratory failure  Pulmonary nodule  Suspected URIEL  Appreciate pulmonology  Bronchodilators, steroids per pulm, azithromycin, respiratory treatments  Antitussive medication  Discussed with patient pulmonary nodule-radiology recommends follow-up CT scanning in 3 months and she is aware  Tick exposure: No fever. No cytopenias. LFTs OK    History of diastolic dysfunction  Hyperlipidemia Lipitor  Vitamin D deficiency  Alpha-gal per patient recently diagnosed referred to allergist  GERD    DVT prophylaxis  SCDs    Discharge  1 to 2 days  Expected Discharge Date: 5/7/2025; Expected Discharge Time:     Discussed with patient, nursing staff, and care team on multidisciplinary rounds and Dr. Sajan Crowell MD  [facility]  05/07/25 10:03 EDT

## 2025-05-07 NOTE — SIGNIFICANT NOTE
05/07/25 1411   OTHER   Discipline physical therapist   Rehab Time/Intention   Session Not Performed other (see comments)  (noted per charting pt up ad alan in room. spoke with pt who denies mobility concerns at this time. Acute PT will s/o)   Therapy Assessment/Plan (PT)   Criteria for Skilled Interventions Met (PT) no;no problems identified which require skilled intervention

## 2025-05-08 LAB
ANION GAP SERPL CALCULATED.3IONS-SCNC: 9.7 MMOL/L (ref 5–15)
BUN SERPL-MCNC: 18 MG/DL (ref 8–23)
BUN/CREAT SERPL: 26.5 (ref 7–25)
CALCIUM SPEC-SCNC: 9 MG/DL (ref 8.6–10.5)
CHLORIDE SERPL-SCNC: 101 MMOL/L (ref 98–107)
CO2 SERPL-SCNC: 25.3 MMOL/L (ref 22–29)
CREAT SERPL-MCNC: 0.68 MG/DL (ref 0.57–1)
DEPRECATED RDW RBC AUTO: 41.8 FL (ref 37–54)
EGFRCR SERPLBLD CKD-EPI 2021: 95.6 ML/MIN/1.73
ERYTHROCYTE [DISTWIDTH] IN BLOOD BY AUTOMATED COUNT: 12.9 % (ref 12.3–15.4)
GLUCOSE SERPL-MCNC: 120 MG/DL (ref 65–99)
HCT VFR BLD AUTO: 36 % (ref 34–46.6)
HGB BLD-MCNC: 11.7 G/DL (ref 12–15.9)
MAGNESIUM SERPL-MCNC: 2.1 MG/DL (ref 1.6–2.4)
MCH RBC QN AUTO: 28.9 PG (ref 26.6–33)
MCHC RBC AUTO-ENTMCNC: 32.5 G/DL (ref 31.5–35.7)
MCV RBC AUTO: 88.9 FL (ref 79–97)
PHOSPHATE SERPL-MCNC: 3.6 MG/DL (ref 2.5–4.5)
PLATELET # BLD AUTO: 425 10*3/MM3 (ref 140–450)
PMV BLD AUTO: 8.9 FL (ref 6–12)
POTASSIUM SERPL-SCNC: 4.1 MMOL/L (ref 3.5–5.2)
RBC # BLD AUTO: 4.05 10*6/MM3 (ref 3.77–5.28)
SODIUM SERPL-SCNC: 136 MMOL/L (ref 136–145)
WBC NRBC COR # BLD AUTO: 7.18 10*3/MM3 (ref 3.4–10.8)

## 2025-05-08 PROCEDURE — 94760 N-INVAS EAR/PLS OXIMETRY 1: CPT

## 2025-05-08 PROCEDURE — G0378 HOSPITAL OBSERVATION PER HR: HCPCS

## 2025-05-08 PROCEDURE — 84100 ASSAY OF PHOSPHORUS: CPT | Performed by: HOSPITALIST

## 2025-05-08 PROCEDURE — 94664 DEMO&/EVAL PT USE INHALER: CPT

## 2025-05-08 PROCEDURE — 80048 BASIC METABOLIC PNL TOTAL CA: CPT | Performed by: HOSPITALIST

## 2025-05-08 PROCEDURE — 83735 ASSAY OF MAGNESIUM: CPT | Performed by: HOSPITALIST

## 2025-05-08 PROCEDURE — 85027 COMPLETE CBC AUTOMATED: CPT | Performed by: HOSPITALIST

## 2025-05-08 PROCEDURE — 94799 UNLISTED PULMONARY SVC/PX: CPT

## 2025-05-08 PROCEDURE — 94761 N-INVAS EAR/PLS OXIMETRY MLT: CPT

## 2025-05-08 PROCEDURE — 63710000001 PREDNISONE PER 1 MG: Performed by: INTERNAL MEDICINE

## 2025-05-08 RX ORDER — PREDNISONE 20 MG/1
20 TABLET ORAL
Status: DISCONTINUED | OUTPATIENT
Start: 2025-05-09 | End: 2025-05-10 | Stop reason: HOSPADM

## 2025-05-08 RX ADMIN — Medication 1000 UNITS: at 09:38

## 2025-05-08 RX ADMIN — IPRATROPIUM BROMIDE AND ALBUTEROL SULFATE 3 ML: .5; 3 SOLUTION RESPIRATORY (INHALATION) at 19:17

## 2025-05-08 RX ADMIN — HYDROCODONE BITARTRATE AND HOMATROPINE METHYLBROMIDE ORAL SOLUTION 5 ML: 5; 1.5 LIQUID ORAL at 19:43

## 2025-05-08 RX ADMIN — BUDESONIDE 0.5 MG: 0.5 SUSPENSION RESPIRATORY (INHALATION) at 07:47

## 2025-05-08 RX ADMIN — FLUOXETINE 80 MG: 20 CAPSULE ORAL at 09:38

## 2025-05-08 RX ADMIN — PREDNISONE 20 MG: 20 TABLET ORAL at 17:13

## 2025-05-08 RX ADMIN — ROSUVASTATIN CALCIUM 10 MG: 10 TABLET, FILM COATED ORAL at 09:38

## 2025-05-08 RX ADMIN — PANTOPRAZOLE SODIUM 40 MG: 40 TABLET, DELAYED RELEASE ORAL at 09:38

## 2025-05-08 RX ADMIN — PREDNISONE 20 MG: 20 TABLET ORAL at 09:38

## 2025-05-08 RX ADMIN — FLUTICASONE PROPIONATE 2 SPRAY: 50 SPRAY, METERED NASAL at 09:38

## 2025-05-08 RX ADMIN — IPRATROPIUM BROMIDE AND ALBUTEROL SULFATE 3 ML: .5; 3 SOLUTION RESPIRATORY (INHALATION) at 07:46

## 2025-05-08 RX ADMIN — ARFORMOTEROL TARTRATE 15 MCG: 15 SOLUTION RESPIRATORY (INHALATION) at 19:22

## 2025-05-08 RX ADMIN — BUDESONIDE 0.5 MG: 0.5 SUSPENSION RESPIRATORY (INHALATION) at 19:19

## 2025-05-08 RX ADMIN — MONTELUKAST 10 MG: 10 TABLET, FILM COATED ORAL at 19:44

## 2025-05-08 RX ADMIN — IPRATROPIUM BROMIDE AND ALBUTEROL SULFATE 3 ML: .5; 3 SOLUTION RESPIRATORY (INHALATION) at 15:10

## 2025-05-08 RX ADMIN — AZITHROMYCIN 500 MG: 250 TABLET, FILM COATED ORAL at 09:41

## 2025-05-08 RX ADMIN — ARFORMOTEROL TARTRATE 15 MCG: 15 SOLUTION RESPIRATORY (INHALATION) at 07:47

## 2025-05-08 RX ADMIN — ACETAMINOPHEN 650 MG: 325 TABLET, FILM COATED ORAL at 09:37

## 2025-05-08 RX ADMIN — ACETAMINOPHEN 650 MG: 325 TABLET, FILM COATED ORAL at 17:17

## 2025-05-08 RX ADMIN — IPRATROPIUM BROMIDE AND ALBUTEROL SULFATE 3 ML: .5; 3 SOLUTION RESPIRATORY (INHALATION) at 11:01

## 2025-05-08 RX ADMIN — HYDROCODONE BITARTRATE AND HOMATROPINE METHYLBROMIDE ORAL SOLUTION 5 ML: 5; 1.5 LIQUID ORAL at 09:37

## 2025-05-08 NOTE — CASE MANAGEMENT/SOCIAL WORK
Continued Stay Note  [facility]     Patient Name: Jennifer Shelton  MRN: [id number]  Today's Date: 5/8/2025    Admit Date: 5/5/2025    Plan: Home with spouse. Has home O2 through [facility] from prior hospitalization, she does not use and never returned to [facility]. Denies any needs. Family transport.   Discharge Plan       Row Name 05/08/25 1545       Plan    Plan Home with spouse. Has home O2 through [facility] from prior hospitalization, she does not use and never returned to [facility]. Denies any needs. Family transport.    Patient/Family in Agreement with Plan yes    Plan Comments Plan for D/C 1-2 days. Pt has home O2 through [facility] from previous admission but does not use and never returned to [facility]. Currently on O2@1LNC. Denies any needs. Will continue to follow. Mikey aSntiago RN-BC                   Discharge Codes    No documentation.                 Expected Discharge Date and Time       Expected Discharge Date Expected Discharge Time    May 9, 2025               Mikey Santiago RN

## 2025-05-08 NOTE — PROGRESS NOTES
Name: Jennifer Shelton ADMIT: 2025   : 1955  PCP: Tessy Serrano PA    MRN: [id number] LOS: 1 days   AGE/SEX: 69 y.o. female  ROOM: [facility]     Subjective   Subjective   She feels about the same. Cough is better on Hycodan instead of Tessalon Perles.     Objective   Objective   Vital Signs  Temp:  [98.2 °F (36.8 °C)-98.4 °F (36.9 °C)] 98.2 °F (36.8 °C)  Heart Rate:  [] 91  Resp:  [16-20] 20  BP: (125-145)/(63-82) 145/82  SpO2:  [95 %-98 %] 98 %  on  Flow (L/min) (Oxygen Therapy):  [1] 1;   Device (Oxygen Therapy): room air  Body mass index is 29.5 kg/m².    Physical Exam  Constitutional:       General: She is not in acute distress.     Appearance: She is not toxic-appearing.   HENT:      Head: Normocephalic and atraumatic.   Cardiovascular:      Rate and Rhythm: Normal rate and regular rhythm.   Pulmonary:      Effort: Pulmonary effort is normal. No respiratory distress.      Breath sounds: Wheezing (is less) present.   Abdominal:      General: Bowel sounds are normal.      Palpations: Abdomen is soft.      Tenderness: There is no abdominal tenderness. There is no guarding or rebound.   Musculoskeletal:         General: No swelling.      Right lower leg: No edema.      Left lower leg: No edema.   Skin:     General: Skin is warm and dry.   Neurological:      General: No focal deficit present.      Mental Status: She is alert and oriented to person, place, and time.   Psychiatric:         Mood and Affect: Mood normal.         Behavior: Behavior normal.     Results Review  I reviewed the patient's new clinical results.  Results from last 7 days   Lab Units 25  0331 25  0329 25  0340   WBC 10*3/mm3 7.18 9.41 5.59   HEMOGLOBIN g/dL 11.7* 12.2 12.9   PLATELETS 10*3/mm3 425 391 394     Results from last 7 days   Lab Units 25  0331 25  0329 25  0340   SODIUM mmol/L 136 137 135*   POTASSIUM mmol/L 4.1 4.1 4.7   CHLORIDE mmol/L 101 103 102   CO2 mmol/L 25.3 25.7 25.8  "  BUN mg/dL 18 17 22   CREATININE mg/dL 0.68 0.57 0.72   GLUCOSE mg/dL 120* 127* 209*     Lab Results   Component Value Date    ANIONGAP 9.7 05/08/2025     Estimated Creatinine Clearance: 93.2 mL/min (by C-G formula based on SCr of 0.68 mg/dL).   Lab Results   Component Value Date    EGFR 95.6 05/08/2025     Results from last 7 days   Lab Units 05/07/25  0329   ALBUMIN g/dL 3.9   BILIRUBIN mg/dL 0.3   ALK PHOS U/L 73   AST (SGOT) U/L 14   ALT (SGPT) U/L 7     Results from last 7 days   Lab Units 05/08/25  0331 05/07/25  0329 05/06/25  0340   CALCIUM mg/dL 9.0 9.3 9.4   ALBUMIN g/dL  --  3.9  --    MAGNESIUM mg/dL 2.1 2.1  --    PHOSPHORUS mg/dL 3.6 3.3  --        No results found for: \"HGBA1C\", \"POCGLU\"    No radiology results for the last day      Scheduled Meds  arformoterol, 15 mcg, Nebulization, BID - RT   And  budesonide, 0.5 mg, Nebulization, BID - RT  cholecalciferol, 1,000 Units, Oral, Daily  FLUoxetine, 80 mg, Oral, Daily  fluticasone, 2 spray, Each Nare, Daily  ipratropium-albuterol, 3 mL, Nebulization, 4x Daily - RT  montelukast, 10 mg, Oral, Nightly  pantoprazole, 40 mg, Oral, Daily  predniSONE, 20 mg, Oral, BID With Meals  rosuvastatin, 10 mg, Oral, Daily    Continuous Infusions   PRN Meds    acetaminophen **OR** acetaminophen **OR** acetaminophen    albuterol    senna-docusate sodium **AND** polyethylene glycol **AND** bisacodyl **AND** bisacodyl    famotidine    HYDROcodone Bit-Homatrop MBr    melatonin    ondansetron ODT **OR** ondansetron     Diet  Diet: Cardiac; Healthy Heart (2-3 Na+); Fluid Consistency: Thin (IDDSI 0)       Assessment/Plan     Active Hospital Problems    Diagnosis  POA    **COPD exacerbation [J44.1]  Yes    Alpha-gal syndrome [Z91.014]  Unknown    Pulmonary nodule [R91.1]  Yes    Acute hypoxemic respiratory failure [J96.01]  Yes    Sleep apnea [G47.30]  Yes      Resolved Hospital Problems   No resolved problems to display.     Patient is a 69 y.o. female     Acute exacerbation of " COPD  Acute hypoxic respiratory failure  Pulmonary nodule  Suspected URIEL  Appreciate pulmonology  Bronchodilators, steroids per pulm (prednisone 20 twice daily currently), azithromycin, respiratory treatments  Antitussive medication Hycodan working better than Tessalon  Discussed with patient pulmonary nodule-radiology recommends follow-up CT scanning in 3 months and she is aware  Tick exposure: No fever. No cytopenias. LFTs OK    History of diastolic dysfunction  Hyperlipidemia Lipitor  Vitamin D deficiency  Alpha-gal per patient recently diagnosed referred to allergist  GERD    DVT prophylaxis  SCDs    Discharge  1 to 2 days. She has been up ad alan. PT signed off  Expected Discharge Date: 5/8/2025; Expected Discharge Time:     Discussed with patient, nursing staff, and care team on multidisciplinary rounds     Abhinav Crowell MD  [facility]  05/08/25 10:21 EDT

## 2025-05-08 NOTE — PLAN OF CARE
Goal Outcome Evaluation:  Plan of Care Reviewed With: patient        Progress: improving  Outcome Evaluation: Vital signs stable. Pt c/o cough. Breathing tx continued. 2L nc. Up ad alan. Pt safety maintained. Plan of care ongoing.

## 2025-05-08 NOTE — PLAN OF CARE
Goal Outcome Evaluation:           Progress: improving  Outcome Evaluation: VSS. Pt on 1L of O2. Pt c/o cough and wheezes. Hycodan and tyelnol given for cough and headache. Possible d/c tomrrow.

## 2025-05-08 NOTE — PROGRESS NOTES
PROGRESS NOTE  Patient Name: Jennifer Shelton  Age/Sex: 69 y.o. female  : 1955  MRN: [id number]    Date of Admission: 2025  Date of Encounter Visit: 25   LOS: 1 day   Patient Care Team:  Tessy Serrano PA as PCP - General (Family Medicine)  Deja Chadwick APRN as Nurse Practitioner (Behavioral Health)  Oneal Raymond MD as Consulting Physician (Gastroenterology)  Stephanie Moran APRN as Nurse Practitioner (Gastroenterology)  David Oliveira MD as Consulting Physician (Interventional Cardiology)  Andrea Moeller MD as Consulting Physician (Pulmonary Disease)    Chief Complaint: COPD exacerbation, pulmonary nodule    Hospital course: Patient is slightly better  Still wheezing, on 40 mg of daily prednisone.,  Still requiring oxygen but down to 1 L/min         REVIEW OF SYSTEMS:   CONSTITUTIONAL: no fever or chills  CARDIOVASCULAR: No chest pain, chest pressure or chest discomfort. No palpitations or edema.   RESPIRATORY: Still wheezing feeling better  GASTROINTESTINAL: No anorexia, nausea, vomiting or diarrhea. No abdominal pain or blood.   HEMATOLOGIC: No bleeding or bruising.     Ventilator/Non-Invasive Ventilation Settings (From admission, onward)      None              Vital Signs  Temp:  [98.2 °F (36.8 °C)-98.4 °F (36.9 °C)] 98.2 °F (36.8 °C)  Heart Rate:  [] 87  Resp:  [16-20] 16  BP: (125-145)/(63-82) 145/82  SpO2:  [95 %-98 %] 97 %  on  Flow (L/min) (Oxygen Therapy):  [1] 1 Device (Oxygen Therapy): nasal cannula    Intake/Output Summary (Last 24 hours) at 2025 1118  Last data filed at 2025 1719  Gross per 24 hour   Intake 480 ml   Output --   Net 480 ml     Flowsheet Rows      Flowsheet Row First Filed Value   Admission Height --   Admission Weight 90.5 kg (199 lb 8 oz) Documented at 2025 0600          Body mass index is 29.5 kg/m².      25  0600 25  0600   Weight: 90.5 kg (199 lb 8 oz) 88 kg (194 lb)       Physical Exam:  GEN:  No acute  "distress, alert, cooperative, well developed, on 2 L nasal cannula oxygen  EYES:   Sclerae clear. No icterus. PERRL. Normal EOM  ENT:   External ears/nose normal, no oral lesions, no thrush, mucous membranes moist  NECK:  Supple, midline trachea, no JVD  LUNGS: Normal chest on inspection, expiratory wheeze, no prolongation of the expiratory phase, moving better air compared to yesterday. Respirations regular, even and unlabored.   CV:  Regular rhythm and rate. Normal S1/S2. No murmurs, gallops, or rubs noted.  ABD:  Soft, nontender and nondistended. Normal bowel sounds. No guarding  EXT:  Moves all extremities well. No cyanosis. No redness. No edema.   Skin: Dry, intact, no bleeding    Results Review:        Results from last 7 days   Lab Units 05/08/25  0331 05/07/25  0329 05/06/25  0340   SODIUM mmol/L 136 137 135*   POTASSIUM mmol/L 4.1 4.1 4.7   CHLORIDE mmol/L 101 103 102   CO2 mmol/L 25.3 25.7 25.8   BUN mg/dL 18 17 22   CREATININE mg/dL 0.68 0.57 0.72   CALCIUM mg/dL 9.0 9.3 9.4   AST (SGOT) U/L  --  14  --    ALT (SGPT) U/L  --  7  --    ANION GAP mmol/L 9.7 8.3 7.2   ALBUMIN g/dL  --  3.9  --                  Results from last 7 days   Lab Units 05/08/25  0331 05/07/25  0329 05/06/25  0340   WBC 10*3/mm3 7.18 9.41 5.59   HEMOGLOBIN g/dL 11.7* 12.2 12.9   HEMATOCRIT % 36.0 37.5 39.1   PLATELETS 10*3/mm3 425 391 394   MCV fL 88.9 91.0 90.9         Results from last 7 days   Lab Units 05/08/25  0331 05/07/25  0329   MAGNESIUM mg/dL 2.1 2.1           Invalid input(s): \"LDLCALC\"          No results found for: \"POCGLU\"              Results from last 7 days   Lab Units 05/06/25  0726   COVID19  Not Detected   ADENOVIRUS DETECTION BY PCR  Not Detected   CORONAVIRUS 229E  Not Detected   CORONAVIRUS HKU1  Not Detected   CORONAVIRUS NL63  Not Detected   CORONAVIRUS OC43  Not Detected   HUMAN METAPNEUMOVIRUS  Not Detected   HUMAN RHINOVIRUS/ENTEROVIRUS  Not Detected   INFLUENZA B PCR  Not Detected   PARAINFLUENZA 1  " Not Detected   PARAINFLUENZA VIRUS 2  Not Detected   PARAINFLUENZA VIRUS 3  Not Detected   PARAINFLUENZA VIRUS 4  Not Detected   BORDETELLA PERTUSSIS PCR  Not Detected   BORDETELLA PARAPERTUSSIS PCR  Not Detected   CHLAMYDOPHILA PNEUMONIAE PCR  Not Detected   MYCOPLAMA PNEUMO PCR  Not Detected   RSV, PCR  Not Detected               Imaging:   Imaging Results (All)       None            I reviewed the patient's new clinical results.  I personally viewed and interpreted the patient's imaging results:        Medication Review:   arformoterol, 15 mcg, Nebulization, BID - RT   And  budesonide, 0.5 mg, Nebulization, BID - RT  cholecalciferol, 1,000 Units, Oral, Daily  FLUoxetine, 80 mg, Oral, Daily  fluticasone, 2 spray, Each Nare, Daily  ipratropium-albuterol, 3 mL, Nebulization, 4x Daily - RT  montelukast, 10 mg, Oral, Nightly  pantoprazole, 40 mg, Oral, Daily  predniSONE, 20 mg, Oral, BID With Meals  rosuvastatin, 10 mg, Oral, Daily             ASSESSMENT:   COPD, acute exacerbation  Acute hypoxic respiratory failure  Enlarging pulmonary nodule with minor change from 6 x 4 up to 8 x 7, patient is acutely ill and that needs to be repeated in 3 months before further invasive workup could be considered  GERD  URIEL, suspected (untreated)  Diastolic dysfunction    PLAN:  Is better, she still wheezing, I will give total of 40 mg of prednisone today with the plan to taper down to 20 mg tomorrow and hopefully home tomorrow if she continues to improve  Finish the course of Zithromax as ordered  Continue with current bronchodilators  Labs/Notes/films were independently reviewed and pertinent results are summarized above  The copied texts in this note were reviewed and they are accurate as of 05/08/25    Disposition: Home    Andrea Moeller MD  05/08/25  11:18 EDT           Dictated utilizing Dragon dictation

## 2025-05-09 LAB
ANION GAP SERPL CALCULATED.3IONS-SCNC: 9.1 MMOL/L (ref 5–15)
BUN SERPL-MCNC: 16 MG/DL (ref 8–23)
BUN/CREAT SERPL: 29.6 (ref 7–25)
CALCIUM SPEC-SCNC: 9.2 MG/DL (ref 8.6–10.5)
CHLORIDE SERPL-SCNC: 101 MMOL/L (ref 98–107)
CO2 SERPL-SCNC: 29.9 MMOL/L (ref 22–29)
CREAT SERPL-MCNC: 0.54 MG/DL (ref 0.57–1)
DEPRECATED RDW RBC AUTO: 42.1 FL (ref 37–54)
EGFRCR SERPLBLD CKD-EPI 2021: 101.1 ML/MIN/1.73
ERYTHROCYTE [DISTWIDTH] IN BLOOD BY AUTOMATED COUNT: 13 % (ref 12.3–15.4)
GLUCOSE SERPL-MCNC: 92 MG/DL (ref 65–99)
HCT VFR BLD AUTO: 36.1 % (ref 34–46.6)
HGB BLD-MCNC: 12 G/DL (ref 12–15.9)
MAGNESIUM SERPL-MCNC: 2.2 MG/DL (ref 1.6–2.4)
MCH RBC QN AUTO: 29.6 PG (ref 26.6–33)
MCHC RBC AUTO-ENTMCNC: 33.2 G/DL (ref 31.5–35.7)
MCV RBC AUTO: 88.9 FL (ref 79–97)
PHOSPHATE SERPL-MCNC: 3.5 MG/DL (ref 2.5–4.5)
PLATELET # BLD AUTO: 435 10*3/MM3 (ref 140–450)
PMV BLD AUTO: 8.9 FL (ref 6–12)
POTASSIUM SERPL-SCNC: 3.8 MMOL/L (ref 3.5–5.2)
RBC # BLD AUTO: 4.06 10*6/MM3 (ref 3.77–5.28)
SODIUM SERPL-SCNC: 140 MMOL/L (ref 136–145)
WBC NRBC COR # BLD AUTO: 7.46 10*3/MM3 (ref 3.4–10.8)

## 2025-05-09 PROCEDURE — 63710000001 PREDNISONE PER 1 MG: Performed by: INTERNAL MEDICINE

## 2025-05-09 PROCEDURE — 94799 UNLISTED PULMONARY SVC/PX: CPT

## 2025-05-09 PROCEDURE — 84100 ASSAY OF PHOSPHORUS: CPT | Performed by: HOSPITALIST

## 2025-05-09 PROCEDURE — G0378 HOSPITAL OBSERVATION PER HR: HCPCS

## 2025-05-09 PROCEDURE — 94761 N-INVAS EAR/PLS OXIMETRY MLT: CPT

## 2025-05-09 PROCEDURE — 85027 COMPLETE CBC AUTOMATED: CPT | Performed by: HOSPITALIST

## 2025-05-09 PROCEDURE — 80048 BASIC METABOLIC PNL TOTAL CA: CPT | Performed by: HOSPITALIST

## 2025-05-09 PROCEDURE — 83735 ASSAY OF MAGNESIUM: CPT | Performed by: HOSPITALIST

## 2025-05-09 PROCEDURE — 94664 DEMO&/EVAL PT USE INHALER: CPT

## 2025-05-09 RX ADMIN — HYDROCODONE BITARTRATE AND HOMATROPINE METHYLBROMIDE ORAL SOLUTION 5 ML: 5; 1.5 LIQUID ORAL at 21:01

## 2025-05-09 RX ADMIN — ROSUVASTATIN CALCIUM 10 MG: 10 TABLET, FILM COATED ORAL at 08:09

## 2025-05-09 RX ADMIN — MONTELUKAST 10 MG: 10 TABLET, FILM COATED ORAL at 21:00

## 2025-05-09 RX ADMIN — PANTOPRAZOLE SODIUM 40 MG: 40 TABLET, DELAYED RELEASE ORAL at 08:09

## 2025-05-09 RX ADMIN — ARFORMOTEROL TARTRATE 15 MCG: 15 SOLUTION RESPIRATORY (INHALATION) at 08:32

## 2025-05-09 RX ADMIN — PREDNISONE 20 MG: 20 TABLET ORAL at 08:09

## 2025-05-09 RX ADMIN — IPRATROPIUM BROMIDE AND ALBUTEROL SULFATE 3 ML: .5; 3 SOLUTION RESPIRATORY (INHALATION) at 19:18

## 2025-05-09 RX ADMIN — ARFORMOTEROL TARTRATE 15 MCG: 15 SOLUTION RESPIRATORY (INHALATION) at 19:20

## 2025-05-09 RX ADMIN — HYDROCODONE BITARTRATE AND HOMATROPINE METHYLBROMIDE ORAL SOLUTION 5 ML: 5; 1.5 LIQUID ORAL at 13:00

## 2025-05-09 RX ADMIN — IPRATROPIUM BROMIDE AND ALBUTEROL SULFATE 3 ML: .5; 3 SOLUTION RESPIRATORY (INHALATION) at 08:32

## 2025-05-09 RX ADMIN — IPRATROPIUM BROMIDE AND ALBUTEROL SULFATE 3 ML: .5; 3 SOLUTION RESPIRATORY (INHALATION) at 15:27

## 2025-05-09 RX ADMIN — BUDESONIDE 0.5 MG: 0.5 SUSPENSION RESPIRATORY (INHALATION) at 08:32

## 2025-05-09 RX ADMIN — IPRATROPIUM BROMIDE AND ALBUTEROL SULFATE 3 ML: .5; 3 SOLUTION RESPIRATORY (INHALATION) at 11:43

## 2025-05-09 RX ADMIN — FLUOXETINE 80 MG: 20 CAPSULE ORAL at 08:08

## 2025-05-09 RX ADMIN — Medication 1000 UNITS: at 08:08

## 2025-05-09 RX ADMIN — BUDESONIDE 0.5 MG: 0.5 SUSPENSION RESPIRATORY (INHALATION) at 19:19

## 2025-05-09 RX ADMIN — HYDROCODONE BITARTRATE AND HOMATROPINE METHYLBROMIDE ORAL SOLUTION 5 ML: 5; 1.5 LIQUID ORAL at 08:12

## 2025-05-09 RX ADMIN — FLUTICASONE PROPIONATE 2 SPRAY: 50 SPRAY, METERED NASAL at 08:09

## 2025-05-09 NOTE — PLAN OF CARE
Goal Outcome Evaluation:  Plan of Care Reviewed With: patient        Progress: improving  Outcome Evaluation: Vital signs stable. Pt c/o cough, managed with PRN hycodan requested and given x1. 1L nc during sleep. Up ad alan. Safety maintained. Possible discharge home 5/9.

## 2025-05-09 NOTE — CASE MANAGEMENT/SOCIAL WORK
Continued Stay Note  [facility]     Patient Name: Jennifer Shelton  MRN: [id number]  Today's Date: 5/9/2025    Admit Date: 5/5/2025    Plan: Home with spouse. Has home O2 through [facility] from prior hospitalization, she does not use and never returned to [facility]. Denies any needs. Family transport.   Discharge Plan       Row Name 05/09/25 1625       Plan    Plan Home with spouse. Has home O2 through [facility] from prior hospitalization, she does not use and never returned to [facility]. Denies any needs. Family transport.    Patient/Family in Agreement with Plan yes    Plan Comments Plan for possible D/C tomorrow. Denies any needs. Mikey Santiago RN-BC                   Discharge Codes    No documentation.                 Expected Discharge Date and Time       Expected Discharge Date Expected Discharge Time    May 10, 2025               Mikey Santiago RN

## 2025-05-09 NOTE — PROGRESS NOTES
PROGRESS NOTE  Patient Name: Jennifer Shelton  Age/Sex: 69 y.o. female  : 1955  MRN: [id number]    Date of Admission: 2025  Date of Encounter Visit: 25   LOS: 1 day   Patient Care Team:  Tessy Serrano PA as PCP - General (Family Medicine)  Deja Chadwick APRN as Nurse Practitioner (Behavioral Health)  Oneal Raymond MD as Consulting Physician (Gastroenterology)  Stephanie Moran APRN as Nurse Practitioner (Gastroenterology)  David Oliveira MD as Consulting Physician (Interventional Cardiology)  Andrea Moeller MD as Consulting Physician (Pulmonary Disease)    Chief Complaint: COPD exacerbation, pulmonary nodule    Hospital course: Patient is slightly better  Still wheezing, on a milligram of Pred is on, she is on room air         REVIEW OF SYSTEMS:   CONSTITUTIONAL: no fever or chills  CARDIOVASCULAR: No chest pain, chest pressure or chest discomfort. No palpitations or edema.   RESPIRATORY: Still wheezing feeling better  GASTROINTESTINAL: No anorexia, nausea, vomiting or diarrhea. No abdominal pain or blood.   HEMATOLOGIC: No bleeding or bruising.     Ventilator/Non-Invasive Ventilation Settings (From admission, onward)      None              Vital Signs  Temp:  [98.2 °F (36.8 °C)-98.6 °F (37 °C)] 98.6 °F (37 °C)  Heart Rate:  [] 93  Resp:  [16-18] 18  BP: (126-159)/(61-89) 143/89  SpO2:  [92 %-100 %] 96 %  on  Flow (L/min) (Oxygen Therapy):  [1] 1 Device (Oxygen Therapy): room air    Intake/Output Summary (Last 24 hours) at 2025 1212  Last data filed at 2025 0734  Gross per 24 hour   Intake 120 ml   Output --   Net 120 ml     Flowsheet Rows      Flowsheet Row First Filed Value   Admission Height --   Admission Weight 90.5 kg (199 lb 8 oz) Documented at 2025 0600          Body mass index is 30.44 kg/m².      25  0600 25  0600 25  0547   Weight: 90.5 kg (199 lb 8 oz) 88 kg (194 lb) 90.8 kg (200 lb 2.8 oz)       Physical Exam:  GEN:  No acute  "distress, alert, cooperative, well developed, on room air  EYES:   Sclerae clear. No icterus. PERRL. Normal EOM  ENT:   External ears/nose normal, no oral lesions, no thrush, mucous membranes moist  NECK:  Supple, midline trachea, no JVD  LUNGS: Normal chest on inspection, nearly resolved wheezing, no prolongation of the expiratory phase, moving decent breath sounds bilaterally, no crackles. Respirations regular, even and unlabored.   CV:  Regular rhythm and rate. Normal S1/S2. No murmurs, gallops, or rubs noted.  ABD:  Soft, nontender and nondistended. Normal bowel sounds. No guarding  EXT:  Moves all extremities well. No cyanosis. No redness. No edema.   Skin: Dry, intact, no bleeding    Results Review:        Results from last 7 days   Lab Units 05/09/25 0523 05/08/25 0331 05/07/25 0329 05/06/25  0340   SODIUM mmol/L 140 136 137 135*   POTASSIUM mmol/L 3.8 4.1 4.1 4.7   CHLORIDE mmol/L 101 101 103 102   CO2 mmol/L 29.9* 25.3 25.7 25.8   BUN mg/dL 16 18 17 22   CREATININE mg/dL 0.54* 0.68 0.57 0.72   CALCIUM mg/dL 9.2 9.0 9.3 9.4   AST (SGOT) U/L  --   --  14  --    ALT (SGPT) U/L  --   --  7  --    ANION GAP mmol/L 9.1 9.7 8.3 7.2   ALBUMIN g/dL  --   --  3.9  --                  Results from last 7 days   Lab Units 05/09/25 0523 05/08/25 0331 05/07/25 0329 05/06/25  0340   WBC 10*3/mm3 7.46 7.18 9.41 5.59   HEMOGLOBIN g/dL 12.0 11.7* 12.2 12.9   HEMATOCRIT % 36.1 36.0 37.5 39.1   PLATELETS 10*3/mm3 435 425 391 394   MCV fL 88.9 88.9 91.0 90.9         Results from last 7 days   Lab Units 05/09/25 0523 05/08/25 0331 05/07/25  0329   MAGNESIUM mg/dL 2.2 2.1 2.1           Invalid input(s): \"LDLCALC\"          No results found for: \"POCGLU\"              Results from last 7 days   Lab Units 05/06/25  0726   COVID19  Not Detected   ADENOVIRUS DETECTION BY PCR  Not Detected   CORONAVIRUS 229E  Not Detected   CORONAVIRUS HKU1  Not Detected   CORONAVIRUS NL63  Not Detected   CORONAVIRUS OC43  Not Detected   HUMAN " METAPNEUMOVIRUS  Not Detected   HUMAN RHINOVIRUS/ENTEROVIRUS  Not Detected   INFLUENZA B PCR  Not Detected   PARAINFLUENZA 1  Not Detected   PARAINFLUENZA VIRUS 2  Not Detected   PARAINFLUENZA VIRUS 3  Not Detected   PARAINFLUENZA VIRUS 4  Not Detected   BORDETELLA PERTUSSIS PCR  Not Detected   BORDETELLA PARAPERTUSSIS PCR  Not Detected   CHLAMYDOPHILA PNEUMONIAE PCR  Not Detected   MYCOPLAMA PNEUMO PCR  Not Detected   RSV, PCR  Not Detected               Imaging:   Imaging Results (All)       None            I reviewed the patient's new clinical results.  I personally viewed and interpreted the patient's imaging results:        Medication Review:   arformoterol, 15 mcg, Nebulization, BID - RT   And  budesonide, 0.5 mg, Nebulization, BID - RT  cholecalciferol, 1,000 Units, Oral, Daily  FLUoxetine, 80 mg, Oral, Daily  fluticasone, 2 spray, Each Nare, Daily  ipratropium-albuterol, 3 mL, Nebulization, 4x Daily - RT  montelukast, 10 mg, Oral, Nightly  pantoprazole, 40 mg, Oral, Daily  predniSONE, 20 mg, Oral, Daily With Breakfast  rosuvastatin, 10 mg, Oral, Daily             ASSESSMENT:   COPD, acute exacerbation  Acute hypoxic respiratory failure  Enlarging pulmonary nodule with minor change from 6 x 4 up to 8 x 7, patient is acutely ill and that needs to be repeated in 3 months before further invasive workup could be considered  GERD  URIEL, suspected (untreated)  Diastolic dysfunction    PLAN:  Patient is doing better  She finished her course of Zithromax  Okay to go home on her home inhalation regimen (Breztri with as needed rescue inhaler) and on 3 more days of 20 mg daily prednisone  Labs/Notes/films were independently reviewed and pertinent results are summarized above  The copied texts in this note were reviewed and they are accurate as of 05/09/25    Disposition: Home    Andrea Moeller MD  05/09/25  12:12 EDT           Dictated utilizing Dragon dictation

## 2025-05-09 NOTE — PROGRESS NOTES
Name: Jennifer Shelton ADMIT: 2025   : 1955  PCP: Tessy Serrano PA    MRN: [id number] LOS: 1 days   AGE/SEX: 69 y.o. female  ROOM: [facility]     Subjective   Subjective   She is feeling better today though still feels like she is wheezing a little. Cough is improved. Sitting up in chair. Family at bedside x 1     Objective   Objective   Vital Signs  Temp:  [98.2 °F (36.8 °C)-98.6 °F (37 °C)] 98.6 °F (37 °C)  Heart Rate:  [] 93  Resp:  [16-18] 18  BP: (126-159)/(61-89) 143/89  SpO2:  [92 %-100 %] 96 %  on  Flow (L/min) (Oxygen Therapy):  [1] 1;   Device (Oxygen Therapy): room air  Body mass index is 30.44 kg/m².    Physical Exam  Constitutional:       General: She is not in acute distress.     Appearance: She is not toxic-appearing.   HENT:      Head: Normocephalic and atraumatic.   Cardiovascular:      Rate and Rhythm: Normal rate and regular rhythm.   Pulmonary:      Effort: Pulmonary effort is normal. No respiratory distress.      Breath sounds: Wheezing present.   Abdominal:      General: Bowel sounds are normal.      Palpations: Abdomen is soft.      Tenderness: There is no abdominal tenderness. There is no guarding or rebound.   Musculoskeletal:         General: No swelling.      Right lower leg: No edema.      Left lower leg: No edema.   Skin:     General: Skin is warm and dry.   Neurological:      General: No focal deficit present.      Mental Status: She is alert and oriented to person, place, and time.   Psychiatric:         Mood and Affect: Mood normal.         Behavior: Behavior normal.     Results Review  I reviewed the patient's new clinical results.  Results from last 7 days   Lab Units 25  0525  03325  03225  0340   WBC 10*3/mm3 7.46 7.18 9.41 5.59   HEMOGLOBIN g/dL 12.0 11.7* 12.2 12.9   PLATELETS 10*3/mm3 435 425 391 394     Results from last 7 days   Lab Units 25  0525  0331 25  0329 25  0340   SODIUM mmol/L 140 136 137  "135*   POTASSIUM mmol/L 3.8 4.1 4.1 4.7   CHLORIDE mmol/L 101 101 103 102   CO2 mmol/L 29.9* 25.3 25.7 25.8   BUN mg/dL 16 18 17 22   CREATININE mg/dL 0.54* 0.68 0.57 0.72   GLUCOSE mg/dL 92 120* 127* 209*     Lab Results   Component Value Date    ANIONGAP 9.1 05/09/2025     Estimated Creatinine Clearance: 119.2 mL/min (A) (by C-G formula based on SCr of 0.54 mg/dL (L)).   Lab Results   Component Value Date    EGFR 101.1 05/09/2025     Results from last 7 days   Lab Units 05/07/25  0329   ALBUMIN g/dL 3.9   BILIRUBIN mg/dL 0.3   ALK PHOS U/L 73   AST (SGOT) U/L 14   ALT (SGPT) U/L 7     Results from last 7 days   Lab Units 05/09/25  0523 05/08/25  0331 05/07/25  0329 05/06/25  0340   CALCIUM mg/dL 9.2 9.0 9.3 9.4   ALBUMIN g/dL  --   --  3.9  --    MAGNESIUM mg/dL 2.2 2.1 2.1  --    PHOSPHORUS mg/dL 3.5 3.6 3.3  --        No results found for: \"HGBA1C\", \"POCGLU\"    No radiology results for the last day      Scheduled Meds  arformoterol, 15 mcg, Nebulization, BID - RT   And  budesonide, 0.5 mg, Nebulization, BID - RT  cholecalciferol, 1,000 Units, Oral, Daily  FLUoxetine, 80 mg, Oral, Daily  fluticasone, 2 spray, Each Nare, Daily  ipratropium-albuterol, 3 mL, Nebulization, 4x Daily - RT  montelukast, 10 mg, Oral, Nightly  pantoprazole, 40 mg, Oral, Daily  predniSONE, 20 mg, Oral, Daily With Breakfast  rosuvastatin, 10 mg, Oral, Daily    Continuous Infusions   PRN Meds    acetaminophen **OR** acetaminophen **OR** acetaminophen    albuterol    senna-docusate sodium **AND** polyethylene glycol **AND** bisacodyl **AND** bisacodyl    famotidine    HYDROcodone Bit-Homatrop MBr    melatonin    ondansetron ODT **OR** ondansetron     Diet  Diet: Cardiac; Healthy Heart (2-3 Na+); Fluid Consistency: Thin (IDDSI 0)       Assessment/Plan     Active Hospital Problems    Diagnosis  POA    **COPD exacerbation [J44.1]  Yes    Alpha-gal syndrome [Z91.014]  Unknown    Pulmonary nodule [R91.1]  Yes    Acute hypoxemic respiratory failure " [J96.01]  Yes    Sleep apnea [G47.30]  Yes      Resolved Hospital Problems   No resolved problems to display.     Patient is a 69 y.o. female     Acute exacerbation of COPD  Acute hypoxic respiratory failure  Pulmonary nodule  Suspected URIEL  Appreciate pulmonology  Bronchodilators, steroids per pulm (prednisone 20 twice daily currently), azithromycin, respiratory treatments  Antitussive medication Hycodan working better than Tessalon  Discussed with patient lung nodule-radiology recommends follow-up CT scanning in 3 months and she is aware  Tick exposure: No fever. No cytopenias. LFTs OK  Steadily improving    History of diastolic dysfunction  Hyperlipidemia Lipitor  Vitamin D deficiency  Alpha-gal per patient recently diagnosed referred to allergist  GERD    DVT prophylaxis  SCDs    Discharge  1 to 2 days Home  Expected Discharge Date: 5/9/2025; Expected Discharge Time:     Discussed with patient, family, nursing staff, and care team on multidisciplinary rounds     Abhinav Crowell MD  [facility]  05/09/25 11:49 EDT

## 2025-05-09 NOTE — PLAN OF CARE
Problem: Adult Inpatient Plan of Care  Goal: Plan of Care Review  Outcome: Progressing   Goal Outcome Evaluation:      Vss.Up to chair, remained on room air throughout the day.Prn hycodan given as ordered for cough.Possible d/c in am. No c/o n/v.

## 2025-05-10 ENCOUNTER — READMISSION MANAGEMENT (OUTPATIENT)
Dept: CALL CENTER | Facility: HOSPITAL | Age: 70
End: 2025-05-10
Payer: MEDICARE

## 2025-05-10 VITALS
OXYGEN SATURATION: 100 % | TEMPERATURE: 97.9 F | DIASTOLIC BLOOD PRESSURE: 68 MMHG | HEART RATE: 89 BPM | WEIGHT: 199.74 LBS | RESPIRATION RATE: 16 BRPM | SYSTOLIC BLOOD PRESSURE: 117 MMHG | BODY MASS INDEX: 30.38 KG/M2

## 2025-05-10 LAB
ANION GAP SERPL CALCULATED.3IONS-SCNC: 9 MMOL/L (ref 5–15)
BUN SERPL-MCNC: 14 MG/DL (ref 8–23)
BUN/CREAT SERPL: 20.6 (ref 7–25)
CALCIUM SPEC-SCNC: 8.8 MG/DL (ref 8.6–10.5)
CHLORIDE SERPL-SCNC: 100 MMOL/L (ref 98–107)
CO2 SERPL-SCNC: 28 MMOL/L (ref 22–29)
CREAT SERPL-MCNC: 0.68 MG/DL (ref 0.57–1)
DEPRECATED RDW RBC AUTO: 42.8 FL (ref 37–54)
EGFRCR SERPLBLD CKD-EPI 2021: 95.6 ML/MIN/1.73
ERYTHROCYTE [DISTWIDTH] IN BLOOD BY AUTOMATED COUNT: 13 % (ref 12.3–15.4)
GLUCOSE SERPL-MCNC: 93 MG/DL (ref 65–99)
HCT VFR BLD AUTO: 37.5 % (ref 34–46.6)
HGB BLD-MCNC: 12.2 G/DL (ref 12–15.9)
MAGNESIUM SERPL-MCNC: 2.2 MG/DL (ref 1.6–2.4)
MCH RBC QN AUTO: 29.8 PG (ref 26.6–33)
MCHC RBC AUTO-ENTMCNC: 32.5 G/DL (ref 31.5–35.7)
MCV RBC AUTO: 91.5 FL (ref 79–97)
PHOSPHATE SERPL-MCNC: 4 MG/DL (ref 2.5–4.5)
PLATELET # BLD AUTO: 401 10*3/MM3 (ref 140–450)
PMV BLD AUTO: 8.9 FL (ref 6–12)
POTASSIUM SERPL-SCNC: 3.8 MMOL/L (ref 3.5–5.2)
RBC # BLD AUTO: 4.1 10*6/MM3 (ref 3.77–5.28)
SODIUM SERPL-SCNC: 137 MMOL/L (ref 136–145)
WBC NRBC COR # BLD AUTO: 7.75 10*3/MM3 (ref 3.4–10.8)

## 2025-05-10 PROCEDURE — 83735 ASSAY OF MAGNESIUM: CPT | Performed by: HOSPITALIST

## 2025-05-10 PROCEDURE — 94799 UNLISTED PULMONARY SVC/PX: CPT

## 2025-05-10 PROCEDURE — 80048 BASIC METABOLIC PNL TOTAL CA: CPT | Performed by: HOSPITALIST

## 2025-05-10 PROCEDURE — 84100 ASSAY OF PHOSPHORUS: CPT | Performed by: HOSPITALIST

## 2025-05-10 PROCEDURE — 85027 COMPLETE CBC AUTOMATED: CPT | Performed by: HOSPITALIST

## 2025-05-10 PROCEDURE — 94761 N-INVAS EAR/PLS OXIMETRY MLT: CPT

## 2025-05-10 PROCEDURE — 94760 N-INVAS EAR/PLS OXIMETRY 1: CPT

## 2025-05-10 PROCEDURE — 63710000001 PREDNISONE PER 1 MG: Performed by: INTERNAL MEDICINE

## 2025-05-10 PROCEDURE — G0378 HOSPITAL OBSERVATION PER HR: HCPCS

## 2025-05-10 PROCEDURE — 94664 DEMO&/EVAL PT USE INHALER: CPT

## 2025-05-10 RX ORDER — PREDNISONE 20 MG/1
20 TABLET ORAL
Qty: 3 TABLET | Refills: 0 | Status: SHIPPED | OUTPATIENT
Start: 2025-05-11 | End: 2025-05-14

## 2025-05-10 RX ADMIN — BUDESONIDE 0.5 MG: 0.5 SUSPENSION RESPIRATORY (INHALATION) at 07:08

## 2025-05-10 RX ADMIN — FLUOXETINE 80 MG: 20 CAPSULE ORAL at 08:51

## 2025-05-10 RX ADMIN — ROSUVASTATIN CALCIUM 10 MG: 10 TABLET, FILM COATED ORAL at 08:51

## 2025-05-10 RX ADMIN — FLUTICASONE PROPIONATE 2 SPRAY: 50 SPRAY, METERED NASAL at 08:51

## 2025-05-10 RX ADMIN — PANTOPRAZOLE SODIUM 40 MG: 40 TABLET, DELAYED RELEASE ORAL at 08:51

## 2025-05-10 RX ADMIN — ARFORMOTEROL TARTRATE 15 MCG: 15 SOLUTION RESPIRATORY (INHALATION) at 07:05

## 2025-05-10 RX ADMIN — PREDNISONE 20 MG: 20 TABLET ORAL at 08:51

## 2025-05-10 RX ADMIN — Medication 1000 UNITS: at 08:51

## 2025-05-10 RX ADMIN — IPRATROPIUM BROMIDE AND ALBUTEROL SULFATE 3 ML: .5; 3 SOLUTION RESPIRATORY (INHALATION) at 07:07

## 2025-05-10 RX ADMIN — IPRATROPIUM BROMIDE AND ALBUTEROL SULFATE 3 ML: .5; 3 SOLUTION RESPIRATORY (INHALATION) at 10:33

## 2025-05-10 NOTE — OUTREACH NOTE
Prep Survey      Flowsheet Row Responses   [facility] patient discharged from? [facility]   Is LACE score < 7 ? Yes   Eligibility [facility]   Date of Admission 05/05/25   Date of Discharge 05/10/25   Discharge Disposition Home or Self Care   Discharge diagnosis COPD exacerbation   Does the patient have one of the following disease processes/diagnoses(primary or secondary)? COPD   Does the patient have Home health ordered? No   Is there a DME ordered? No   Medication alerts for this patient see AVS   Prep survey completed? Yes            Saima ALMANZA - Registered Nurse              Saima ALMANZA - Registered Nurse

## 2025-05-10 NOTE — PLAN OF CARE
No new acute issues this shift, pt AAO x4, pt denies any complaints of chest pain, shortness of breath or difficulty breathing.  Posterior lung sounds diminished this shift.  Pt a potential discharge home for today.  Will continue to monitor and treat accordingly.      Goal Outcome Evaluation:           Progress: improving      Problem: Adult Inpatient Plan of Care  Goal: Plan of Care Review  Flowsheets  Taken 5/10/2025 0653 by Godwin Fisher, RN  Progress: improving  Taken 5/9/2025 0316 by Clarice Woodward, RN  Plan of Care Reviewed With: patient     Problem: Comorbidity Management  Goal: Maintenance of COPD Symptom Control  Outcome: Progressing     Problem: Fall Injury Risk  Goal: Absence of Fall and Fall-Related Injury  Outcome: Progressing

## 2025-05-10 NOTE — DISCHARGE SUMMARY
Patient Name: Jennifer Shelton  : 1955  MRN: [id number]    Date of Admission: 2025  Date of Discharge:  5/10/2025  Primary Care Physician: Tessy Serrano PA      Chief Complaint:   No chief complaint on file.      Discharge Diagnoses     Active Hospital Problems    Diagnosis  POA    **COPD exacerbation [J44.1]  Yes    Alpha-gal syndrome [Z91.014]  Unknown    Pulmonary nodule [R91.1]  Yes    Acute hypoxemic respiratory failure [J96.01]  Yes    Sleep apnea [G47.30]  Yes      Resolved Hospital Problems   No resolved problems to display.        Hospital Course     Ms. Shelton is a 69 y.o. female with a history of COPD, diastolic heart failure, GERD, and hyperlipidemia who presented to [facility] initially complaining of shortness of breath.  Please see the admitting history and physical for further details.  She was found to have COPD exacerbation and was admitted to the hospital for further evaluation and treatment.      CT chest showed nodular density but no obvious infiltrate or consolidation.  Viral respiratory panel was negative as well.  Pulmonology was consulted.  The patient was started on bronchodilators and steroids.  She did require oxygen supplementation intermittently.  By day of discharge, she was stable on room air.  She had improvement in wheezing and felt at her baseline.  Pulmonology was agreeable with discharge home.  CT chest did show lung nodule.  The patient will need repeat CT scan in 3 months to follow-up.  She completed Zithromax prior to discharge home.  She will be going home on her home inhaler regimen and 3 additional days of prednisone.  The patient was counseled on reasons to return to the hospital and expressed understanding.  Recommend PCP follow-up within 1 week.  Patient is being discharged in satisfactory condition.  Patient did confirm she had all of her home inhalers prior to discharge home.    Day of Discharge     Subjective:  No acute events  overnight.  Patient states she is feeling well today.  She denies any chest pain or shortness of breath.  She is on room air.  She is very eager for discharge home.    Physical Exam:  Temp:  [97.9 °F (36.6 °C)-98.2 °F (36.8 °C)] 97.9 °F (36.6 °C)  Heart Rate:  [] 89  Resp:  [16-20] 16  BP: (117-125)/(67-76) 117/68  Body mass index is 30.38 kg/m².  Physical Exam  General: Alert, no acute distress.  Sitting up in chair at bedside.  ENT: No conjunctival injection or scleral icterus. Moist mucous membranes.   Neuro: Eyes open and moving in all directions, strength grossly normal in all extremities, no focal deficits.   Lungs: Good aeration, occasional end expiratory wheeze, no crackles, no distress.  On room air.  Heart: RRR, no murmurs. No edema.  Abdomen: Soft, non-tender, non-distended. Normal bowel sounds.   Ext: Warm and well-perfused. No edema.   Skin: No rashes or lesions. IV site without swelling or erythema.     Consultants     Consult Orders (all) (From admission, onward)       Start     Ordered    05/05/25 1843  Inpatient Pulmonology Consult  Once        Specialty:  Pulmonary Disease  Provider:  Bryant Chase MD    05/05/25 1842                  Procedures     * Surgery not found *    Imaging Results (All)       None            Results for orders placed during the hospital encounter of 11/29/22    Adult Transthoracic Echo Complete W/ Cont if Necessary Per Protocol    Interpretation Summary    Left ventricular systolic function is normal. Calculated left ventricular EF = 67.3% Left ventricular ejection fraction appears to be 61 - 65%.    Left ventricular diastolic function is consistent with (grade I) impaired relaxation.    Normal valvular structure and function    Pertinent Labs     Results from last 7 days   Lab Units 05/10/25  0332 05/09/25  0523 05/08/25  0331 05/07/25  0329   WBC 10*3/mm3 7.75 7.46 7.18 9.41   HEMOGLOBIN g/dL 12.2 12.0 11.7* 12.2   PLATELETS 10*3/mm3 401 435 425 391     Results  "from last 7 days   Lab Units 05/10/25  0332 05/09/25  0523 05/08/25  0331 05/07/25  0329   SODIUM mmol/L 137 140 136 137   POTASSIUM mmol/L 3.8 3.8 4.1 4.1   CHLORIDE mmol/L 100 101 101 103   CO2 mmol/L 28.0 29.9* 25.3 25.7   BUN mg/dL 14 16 18 17   CREATININE mg/dL 0.68 0.54* 0.68 0.57   GLUCOSE mg/dL 93 92 120* 127*   EGFR mL/min/1.73 95.6 101.1 95.6 99.7     Results from last 7 days   Lab Units 05/07/25  0329   ALBUMIN g/dL 3.9   BILIRUBIN mg/dL 0.3   ALK PHOS U/L 73   AST (SGOT) U/L 14   ALT (SGPT) U/L 7     Results from last 7 days   Lab Units 05/10/25  0332 05/09/25  0523 05/08/25  0331 05/07/25  0329   CALCIUM mg/dL 8.8 9.2 9.0 9.3   ALBUMIN g/dL  --   --   --  3.9   MAGNESIUM mg/dL 2.2 2.2 2.1 2.1   PHOSPHORUS mg/dL 4.0 3.5 3.6 3.3               Invalid input(s): \"LDLCALC\"      Results from last 7 days   Lab Units 05/06/25  0726   COVID19  Not Detected       Test Results Pending at Discharge     Pending Results       None          Discharge Details        Discharge Medications        Changes to Medications        Instructions Start Date   albuterol sulfate  (90 Base) MCG/ACT inhaler  Commonly known as: PROVENTIL HFA;VENTOLIN HFA;PROAIR HFA  What changed: Another medication with the same name was removed. Continue taking this medication, and follow the directions you see here.   2 puffs, Inhalation, Every 4 Hours PRN      predniSONE 20 MG tablet  Commonly known as: DELTASONE  What changed:   how much to take  how to take this  when to take this  additional instructions   20 mg, Oral, Daily With Breakfast   Start Date: May 11, 2025            Continue These Medications        Instructions Start Date   Breztri Aerosphere 160-9-4.8 MCG/ACT aerosol inhaler  Generic drug: Budeson-Glycopyrrol-Formoterol   2 puffs, Inhalation, 2 Times Daily      famotidine 20 MG tablet  Commonly known as: PEPCID   20 mg, Oral, 2 Times Daily PRN      FLUoxetine 40 MG capsule  Commonly known as: PROzac   TAKE 2 CAPSULES BY " MOUTH DAILY. NEED APPT FOR REFILLS.      fluticasone 50 MCG/ACT nasal spray  Commonly known as: FLONASE   2 sprays, Each Nare, Daily      ipratropium-albuterol 0.5-2.5 mg/3 ml nebulizer  Commonly known as: DUO-NEB   3 mL, Nebulization, 4 Times Daily - RT      montelukast 10 MG tablet  Commonly known as: SINGULAIR   TAKE 1 TABLET BY MOUTH EVERY NIGHT AT BEDTIME. NEED APPT FOR REFILLS.      O2  Commonly known as: OXYGEN   Once      pantoprazole 40 MG EC tablet  Commonly known as: PROTONIX   40 mg, Oral, Daily      rosuvastatin 10 MG tablet  Commonly known as: Crestor   10 mg, Oral, Daily      Vitamin D3 50 MCG (2000 UT) tablet   TAKE 1 TABLET BY MOUTH DAILY. NEEDS TO BE SEEN FOR MORE REFILLS.             Stop These Medications      diphenhydrAMINE 25 mg capsule  Commonly known as: BENADRYL     doxycycline 100 MG capsule  Commonly known as: VIBRAMYCIN     fluorouracil 5 % cream  Commonly known as: EFUDEX     sodium chloride 3 % nebulizer solution              Allergies   Allergen Reactions    Aspirin Hives and Arrhythmia     Chest pain    Cephalexin Hives    Nsaids Hives    Penicillins Hives    Alpha-Gal Nausea And Vomiting    Contrast Dye (Echo Or Unknown Ct/Mr) Rash       Discharge Disposition:  Home or Self Care      Discharge Diet:  Diet Order   Procedures    Diet: Cardiac; Healthy Heart (2-3 Na+); Fluid Consistency: Thin (IDDSI 0)       Discharge Activity: Activity as tolerated      CODE STATUS:    Code Status and Medical Interventions: CPR (Attempt to Resuscitate); Full   Ordered at: 05/05/25 1701     Code Status (Patient has no pulse and is not breathing):    CPR (Attempt to Resuscitate)     Medical Interventions (Patient has pulse or is breathing):    Full       Future Appointments   Date Time Provider Department Center   8/26/2025  1:15 PM Stephanie Moran APRN [facility]      Follow-up Information       Tessy Serrano PA .    Specialty: Family Medicine  Contact information:  [address] [place] [address]  [place]  [facility] [address]  [phone]                             Time Spent on Discharge:  Greater than 30 minutes      Jade Suero MD  [facility]  05/10/25  13:29 EDT

## 2025-05-10 NOTE — CASE MANAGEMENT/SOCIAL WORK
Case Management Discharge Note      Final Note: dc home         Selected Continued Care - Discharged on 5/10/2025 Admission date: 5/5/2025 - Discharge disposition: Home or Self Care      Destination    No services have been selected for the patient.                Durable Medical Equipment    No services have been selected for the patient.                Dialysis/Infusion    No services have been selected for the patient.                Home Medical Care    No services have been selected for the patient.                Therapy    No services have been selected for the patient.                Community Resources    No services have been selected for the patient.                Community & DME    No services have been selected for the patient.                    Transportation Services  Private: Car    Final Discharge Disposition Code: 01 - home or self-care

## 2025-05-12 ENCOUNTER — TRANSITIONAL CARE MANAGEMENT TELEPHONE ENCOUNTER (OUTPATIENT)
Dept: CALL CENTER | Facility: HOSPITAL | Age: 70
End: 2025-05-12
Payer: MEDICARE

## 2025-05-12 NOTE — OUTREACH NOTE
Call Center TCM Note      Flowsheet Row Responses   [facility] patient discharged from? [place]   Does the patient have one of the following disease processes/diagnoses(primary or secondary)? COPD   TCM attempt successful? No   Unsuccessful attempts Attempt 1  [ PCP verbal release for spouse--Jaime Cat.]            Saima ALMANZA - Registered Nurse    5/12/2025, 14:19 EDT            Saima ALMANZA - Registered Nurse

## 2025-05-12 NOTE — OUTREACH NOTE
Call Center TCM Note      Flowsheet Row Responses   [facility] patient discharged from? [place]   Does the patient have one of the following disease processes/diagnoses(primary or secondary)? COPD   TCM attempt successful? No   Unsuccessful attempts Attempt 2            Saima ALMANZA - Registered Nurse    5/12/2025, 16:22 EDT            Saima ALMANZA - Registered Nurse

## 2025-05-13 ENCOUNTER — TRANSITIONAL CARE MANAGEMENT TELEPHONE ENCOUNTER (OUTPATIENT)
Dept: CALL CENTER | Facility: HOSPITAL | Age: 70
End: 2025-05-13
Payer: MEDICARE

## 2025-05-13 NOTE — OUTREACH NOTE
Call Center TCM Note      Flowsheet Row Responses   [facility] patient discharged from? [place]   Does the patient have one of the following disease processes/diagnoses(primary or secondary)? COPD   TCM attempt successful? Yes   Call start time 1057   Call end time 1100   Discharge diagnosis COPD exacerbation   Person spoke with today (if not patient) and relationship Patient   Meds reviewed with patient/caregiver? Yes   Is the patient having any side effects they believe may be caused by any medication additions or changes? No   Does the patient have all medications ordered at discharge? Yes   Is the patient taking all medications as directed (includes completed medication regime)? Yes   Does the patient have an appointment with their PCP within 7-14 days of discharge? No   Nursing Interventions Patient declined scheduling/rescheduling appointment at this time   Psychosocial issues? No   Did the patient receive a copy of their discharge instructions? Yes   Nursing interventions Reviewed instructions with patient   What is the patient's perception of their health status since discharge? Improving   Nursing Interventions Nurse provided patient education   Are the patient's immunizations up to date?  Yes   If the patient is a current smoker, are they able to teach back resources for cessation? Not a smoker   Is the patient/caregiver able to teach back the hierarchy of who to call/visit for symptoms/problems? PCP, Specialist, Home health nurse, Urgent Care, ED, 911 Yes   Patient reports what zone on this call? Green Zone   Green Zone Reports doing well, Breathing without shortness of breath   TCM call completed? Yes   Wrap up additional comments Patient reports she is doing well. No needs at this time.   Call end time 1100   Would this patient benefit from a Referral to Amb Social Work? No   Is the patient interested in additional calls from an ambulatory ? No            Micah NEAL - Registered  Nurse    5/13/2025, 11:00 EDT

## 2025-05-19 ENCOUNTER — OFFICE VISIT (OUTPATIENT)
Dept: FAMILY MEDICINE CLINIC | Facility: CLINIC | Age: 70
End: 2025-05-19
Payer: MEDICARE

## 2025-05-19 VITALS
DIASTOLIC BLOOD PRESSURE: 58 MMHG | TEMPERATURE: 98 F | HEIGHT: 68 IN | WEIGHT: 196 LBS | OXYGEN SATURATION: 93 % | RESPIRATION RATE: 22 BRPM | SYSTOLIC BLOOD PRESSURE: 96 MMHG | BODY MASS INDEX: 29.7 KG/M2 | HEART RATE: 96 BPM

## 2025-05-19 DIAGNOSIS — M41.9 SCOLIOSIS OF THORACIC SPINE, UNSPECIFIED SCOLIOSIS TYPE: ICD-10-CM

## 2025-05-19 DIAGNOSIS — M54.6 THORACIC SPINE PAIN: ICD-10-CM

## 2025-05-19 DIAGNOSIS — M48.02 CERVICAL SPINAL STENOSIS: ICD-10-CM

## 2025-05-19 DIAGNOSIS — R53.83 OTHER FATIGUE: ICD-10-CM

## 2025-05-19 DIAGNOSIS — I25.10 CORONARY ARTERY CALCIFICATION SEEN ON CT SCAN: ICD-10-CM

## 2025-05-19 DIAGNOSIS — I35.9 AORTIC VALVE CALCIFICATION: ICD-10-CM

## 2025-05-19 DIAGNOSIS — Z91.018 ALLERGY TO ALPHA-GAL: ICD-10-CM

## 2025-05-19 DIAGNOSIS — M85.89 OSTEOPENIA OF MULTIPLE SITES: ICD-10-CM

## 2025-05-19 DIAGNOSIS — K21.00 GASTROESOPHAGEAL REFLUX DISEASE WITH ESOPHAGITIS WITHOUT HEMORRHAGE: ICD-10-CM

## 2025-05-19 DIAGNOSIS — M54.2 NECK PAIN: ICD-10-CM

## 2025-05-19 DIAGNOSIS — E78.5 HYPERLIPIDEMIA, UNSPECIFIED HYPERLIPIDEMIA TYPE: ICD-10-CM

## 2025-05-19 DIAGNOSIS — D72.10 EOSINOPHILIA, UNSPECIFIED TYPE: ICD-10-CM

## 2025-05-19 DIAGNOSIS — K58.1 IRRITABLE BOWEL SYNDROME WITH CONSTIPATION: ICD-10-CM

## 2025-05-19 DIAGNOSIS — R91.1 PULMONARY NODULE: ICD-10-CM

## 2025-05-19 DIAGNOSIS — E55.9 VITAMIN D DEFICIENCY: ICD-10-CM

## 2025-05-19 DIAGNOSIS — J44.1 COPD WITH EXACERBATION: Primary | ICD-10-CM

## 2025-05-19 DIAGNOSIS — K62.5 RECTAL BLEEDING: ICD-10-CM

## 2025-05-19 DIAGNOSIS — R06.02 SHORTNESS OF BREATH: ICD-10-CM

## 2025-05-19 DIAGNOSIS — K44.9 HIATAL HERNIA: ICD-10-CM

## 2025-05-19 DIAGNOSIS — M47.14 OSTEOARTHRITIS OF THORACIC SPINE WITH MYELOPATHY: ICD-10-CM

## 2025-05-19 DIAGNOSIS — K92.1 HEMATOCHEZIA: ICD-10-CM

## 2025-05-19 DIAGNOSIS — Z99.81 DEPENDENCE ON SUPPLEMENTAL OXYGEN: ICD-10-CM

## 2025-05-19 DIAGNOSIS — D75.839 THROMBOCYTOSIS: ICD-10-CM

## 2025-05-19 DIAGNOSIS — M47.812 CERVICAL SPONDYLOSIS WITHOUT MYELOPATHY: ICD-10-CM

## 2025-05-19 DIAGNOSIS — R09.02 HYPOXIA: ICD-10-CM

## 2025-05-19 DIAGNOSIS — G47.30 SLEEP APNEA, UNSPECIFIED TYPE: ICD-10-CM

## 2025-05-19 DIAGNOSIS — M41.22 OTHER IDIOPATHIC SCOLIOSIS, CERVICAL REGION: ICD-10-CM

## 2025-05-19 DIAGNOSIS — M50.30 DEGENERATIVE DISC DISEASE, CERVICAL: ICD-10-CM

## 2025-05-19 DIAGNOSIS — F33.1 MAJOR DEPRESSIVE DISORDER, RECURRENT EPISODE, MODERATE DEGREE: ICD-10-CM

## 2025-05-19 DIAGNOSIS — M79.7 FIBROMYALGIA: ICD-10-CM

## 2025-05-19 DIAGNOSIS — M54.12 CERVICAL RADICULOPATHY: ICD-10-CM

## 2025-05-19 RX ORDER — FLUTICASONE FUROATE, UMECLIDINIUM BROMIDE AND VILANTEROL TRIFENATATE 100; 62.5; 25 UG/1; UG/1; UG/1
1 POWDER RESPIRATORY (INHALATION)
COMMUNITY

## 2025-05-19 NOTE — PROGRESS NOTES
Subjective   Jennifer Shelton is a 69 y.o. female who presents today in follow up of hospitalization for COPD exacerbation as well as follow-up of hyperlipidemia, vitamin D deficiency, thrombocytosis, moods, COPD, pulmonary nodule, GERD, fibromyalgia, Cervical spine DDD, and history of squamous cell skin cancer.     Cough  Associated symptoms: wheezing    Wheezing   Associated symptoms include coughing.         Reports she did improve after last visit but she started again with cough about 1 week ago. She was in [place]. She took dogs out but otherwise stayed in. She has started coughing, tightness in chest, wheezing, increased SOA.   Oxygen got to 86-87%- she is now on oxygen all the time. When exerts self, oxygen decreases to high 80s%-requiring 3 L oxygen day and night.   When coughing, has production from head.   Yesterday, thought was getting better then worse again.   Had CT today- 2 - 2 1/2 hours.   Inhaler every hour now. Ran out of [employer]- has the Trelegy called in from Pulmonology but she does not have it. Has not used nebulizer.   From 4/7/2025- She started just over a week prior with cough and body aches, congestion, PND, increased coughing and loosened up. Diarrhea intermittent but GI issues so difficult to tell. She got better then worsened again the a couple days prior, she had worsening and was feeling poorly. 2 nights prior- had to use oxygen up to 2 L. She usually does not need oxygen.  When she was sitting down, she did not feel that bad but when she stood, she felt bad and had to sit.  She had intermittent pain in the right chest that was sharp-right lateral chest. She was taking allergy medication (tried Nyquil but was up all night) and used inhaler but not nebulizer.  She was taking allergy medication. She reported her  started to feel a little better 3 days ago but still coughing. He started a couple days prior.     Patient was hospitalized 5/5/2025 through 5/10/2025 for COPD  exacerbation, alpha gal syndrome, pulmonary nodule, acute hypoxic respiratory failure, and sleep apnea.    I have reviewed and discussed with patient hospital notes, including H&P, labs, imaging, consult notes, and discharge summary. Per discharge summary, she was admitted to the hospital directly.  CT with nodular density but no obvious infiltrate or consolidation.  Viral panel was negative.  Pulmonology consulted and started on bronchodilators and steroid.  She did require oxygen supplementation intermittently.  By day of discharge, she was stable on room air and had improvement in wheezing and felt she was back to her baseline.  Pulmonology was agreeable with discharge home.  CT with lung nodule and needs repeat CT in 3 months.  She completed Zithromax prior to discharge and went home on home inhaler regimen and 3 additional days of prednisone.  She was counseled on reasons to return to the hospital.  PCP follow-up in 1 week.  Labs-glucose 209, sodium 135 5/6/2025.  CBC negative on admission.  Negative respiratory panel.  Normal labs at discharge.  CT chest 5/5/2025 with mild upper lung predominantly pulmonary emphysematous changes, enlarging 8 mm nodule/nodular density at the base of the lingula may be due to intrapulmonary lymph node or scarring.  Previous cluster of nodules in the right lower lobe measuring up to 7 mm demonstrates increased area of central density suggesting calcification.  Overall appearance suggest healing granulomatous process.  Additional more densely calcified granulomatous change on adjacent right lower lobe.  Stable 4 mm noncalcified right lower lobe fissural nodule.  Calcified lymph node in the right hilum and subcarinal region and right paratracheal region.  No adenopathy identified within the thorax based on CT size criteria.  Coronary and vascular calcification noted.  Partial aortic valvular calcification noted.  Small to moderate hiatal hernia.  Multilevel degenerative change of  the spine.  Otherwise negative.  Advised repeat CT 3 months given interval enlargement of nodular density at the lingula.    Within 48 business hours after discharge, Jaime Chatman was contacted via telephone to coordinate care and needs.   Current outpatient and discharge medications have been reconciled for the patient.  Risk for Readmission (LACE) Score: 6 (5/10/2025  6:00 AM)    COPD, asthma, eosinophilia-reports she was last seen in March by pulmonology and they did not schedule follow-up after hospitalization.  She reports she is now taking home medications as directed.  She is following with pulmonology, however, she was not using Trelegy or daily maintenance inhaler and is not using nebulizer.  She is using albuterol every hour with recent worsening symptoms.  She has not contacted pulmonology for follow-up since last exacerbation in March and April.  She continued using inhalers. Only taking nebulizer as needed- was to take twice daily but she has taken only as needed.  She was taking her daily inhaler and Singulair. Medications- Qvar, Stiolto, Monteleukast, Flonase, Mucinex, and has duoneb  As needed.   Has not had follow up with pulmonology. She is overdue but has not completed sleep study.   stopped Qvar and was taking Stiolto. They changed to Trelegy 200 daily. She has noted significant improvement. She reports they advised more like asthma with elevated eosinophils.   She was using Trelegy and Singulair daily.  She had not picked up inhaler because from $40 to $130.   Pulmonary nodule- Patient underwent CT chest with enlarging nodule and abnormal area-radiology recommends repeat in 3 months.  Nodule on right lung- they were scanning and following with Dr Kelley.   Pulmonology- RESHMA Taylor- missed appt for follow up of pulmonary nodule. Hospitalized 2/2/21 and 10/2021- she had been coughing for 2 months then went to doctor and was given steroid. She also had poison ivy and that helped  too. Seen by someone in [place]- urgent care.  CT chest 11/8/2021- Bilateral apical pleural thickening, mild emphysema, calcified pulmonary nodules right lower lobe, other subpleural nodules and areas of scarring with nodularity-all reported without change.  Small hiatal hernia.  Per radiology, no suspicious pulmonary nodules.  Recheck in 1 year. Repeat 12/2022- stable. No need to continue to follow but per pulmonology, may lori annual screening.   She had follow up CT with thoracic surgery and no follow up needed.     Using no salt at home- only regular salt is at home.   Hyperlipidemia-I prescribed Livalo 1 mg 1/30/2025.  Advise she follow-up in 1 month with recheck CMP and CK and in 3 months, fasting.  She sent a message 2/23/2025 and went to fill the Livalo and reported it was $244.  She wanted to go back on the rosuvastatin.  I reordered rosuvastatin. Doing ok on that.   She had taken Crestor in 6 months or longer. When quit taking, cramping and heartburn disappeared then started again without medication.   We changed to Crestor- she reported she ran out a few months prior and she has had less body aches and not as much trouble with stomach and heartburn. Still flairs occasionally but on medication, she was having daily. She was getting from cardiology and reports they did not fill, so she stopped it. She was taking in the morning. Still aching but has much less aching since stopping Crestor. Feet, ankles, and fingers were hurting all the time and now only occasionally an ache but not as severe and not as often. Also less headaches off Crestor.   She was previously taking Lipitor 20 mg nightly  Last medication 2016- Lipitor- did well on medication. She has not been on other medications in the past.   Stopped all medication years ago. Last year was full of specialists and let go of routine things.   Vitamin D deficiency-advise she take vitamin D 2000 IU daily 1/2025.  Taking as directed.  She stopped vitamin  D because she is out and did not get more.   She was taking vitamin D 2000 IU daily.   Thrombocytosis- no signs of clotting or any concerns.     Moods- Prozac 40 mg twice daily changed to Prozac 80 mg daily. Did not change anything. Does not think she needs to change medication. Did not seem to click as much.  She reports moods are stable on current medication.  Moods had not improved. She continued with depression. She thought it may be time to see specialist.  Referred to psychiatry and was seen 8/2022 but did not keep appointment 4/9/2022.  Prozac in the past- she was up to 80 mg once daily. She had depression since childhood and started treatment in 30s. Increased depression following her son's death. She stopped all medications following his death.   Son's suicide 3/11/2021- she has not been to the doctor for CT chest, scans, other testing and treatment. She has not been doing anything since her son's death.  She noticed improvement in moods with restarting Prozac 20 mg once daily but not as good as it was when she initially restarted. Decreased motivation, not wanting to get up and do anything. She has had mind racing and depressed feeling.   On Prozac 40 mg daily, she had no motivation. When she was out, she was ok but came back home and did not want to do anything.   Previous medications- Cymbalta- helped but Prozac helped more. Last Cymbalta 2016.  Taking benadryl to sleep but not helping much. Elavil- helped with depression but made her very tired. She also would forget things when she was taking it and talking to people.     Tick bite-alpha gal allergy and allergy to red meat-referred to allergist.    Fatigue- Sleep medicine ordered home sleep study.  She did not - did not perform. Still tired.    She is tired all the time- she will sleep a few hours at night wake up and go back to sleep. She is always tired and does not feel rested.    She was to have sleep study right after her son's death but  cancelled. Has not followed up with pulmonology in some time.      GERD, hiatal hernia, IBS- Dr Oneal Raymond, [facility] Sharma-  comes and goes but not regular symptoms. Does still go to allergist. Follow up 6 months and will retest.   She was to have manometry test and follow up. She was not sure she wanted to do it and did not do the test. Not sure she wants to consider it. She has symptoms for weeks then has symptoms for weeks.   She was having worsening symptoms. Last seen by GI- they advised she let them know when she was ready to see surgeon for procedure. She may have to proceed because cannot live like this. She reports she gets up in the morning then has chest pain, vomits, and feels better- she feels heartburn/ indigestion. She wakes up in the middle of the night and has to take medication. She takes Pepcid. She was unable to get Carafate in between medications and did not feel like it helped when taking it.   Taking Protonix 40 mg once daily and Pepcid 20 mg 2-3 x daily, and has carafate- did not help and has not been taking. Negative gastric emptying study. Will have colonoscopy 4/2023. They will consider Nissen.   She is still having heartburn symptoms- sometimes wakes up with it and sometimes has after taking the medication in the day. She is also having increased IBS symptoms. Patient with frequent, urgent BM. Has never taken anything for it but she has continued with symptoms. She would like to consider medication.   She had pain in her chest- if she does not take Pepcid, she has pain in her chest and cannot eat. When she takes the medication, she does ok. She passes blood in stool that is bright red at times. Seeing GI.   EGD 2/2022 with large hiatal hernia, esophagitis.    Still aching but reported much less aching since stopping Crestor. Feet, ankles, and fingers were hurting all the time and now only occasionally an ache but not as severe and not as often. Also less headaches off  Crestor-however patient wanted to restart rosuvastatin due to cost of Livalo.  Reports she is tolerating okay.  Fibromyalgia- has been on no medication in a while.   Previously Cymbalta and Elavil.   Shoulder pain- she had MRI left shoulder with tendinopathy supraspinatous, biceps, glenohumeral joint effusion, chondromalacia- suggesting capsulitis and minimal DJD AC joint. Now right shoulder pain. Does not want to see orthopedist at this time.   DDD cervical spine- she had appt with neurosurgery 5/2023- cancelled because was nervous the answer was going to be surgery.   always hurting- having headaches. Thinks from neck pain. 1/2021 for cervicalgia, cervical radiculopathy, herniated C4-5, C5-6, left shoulder pain. Ordered MRI left shoulder. If no abnormality, recommended C4-5, C5-6 ACDF  Went to orthopedist for rotator cuff as well 3/2021- Xray, MRI 2/2021 with moderate supraspinatus tendonopathy, partial thickness fraying, intra-articular long head biceps tendinopathy, small glenohummeral effusion, mild to moderate chondromalacia, synovial thickening, or intr-articular debris vs labral tissue in axillary recess- suggested capsulitis. DJD AC joint with chronic deformity of clavicle. Exercises for tendonitis given and advised to return for consideration of injection if no improvement    Squamous cell skin cancer- had appt 12/2024 and had to reschedule. Does not need referral.   Last appt 11-12/2023- follow up 1 year. Had to use chemo cream started Thanksgiving night. Went back and they said was ok. [facility] [place]  Patient had SCC 1/2021.    Mother and sister with pancreatic cancer. Mother with history of colon and breast cancer. MAunt and MGM with colon cancer.     No Hx abnormal PAP- last PAP 3 years ago.     Patient's Specialists:  Behavioral health-RESHMA Farias- last appointment 8/2022 for major depressive disorder with prolonged grief from death of son 3/2022 for suicide.  Advised change Prozac  from 40 mg twice a day to 80 mg once daily in the morning.  If increased irritability, will change to 60 mg.  Advised to check to see if Trintellix or Viibryd would be approved.  Follow-up in 4 weeks.  Patient canceled follow-up.  She has not rescheduled.  Cardiology-Dr. David Oliveira-last appointment 12/2022 for chest pain, exertional dyspnea, diastolic dysfunction, coronary artery calcification on CT, hyperlipidemia.  Advised Lexiscan nuclear stress test, change in intensity to Crestor 20 mg nightly and advised aspirin 81 mg.  Mild diastolic dysfunction on echo.  Advised low-salt diet and monitor weight.  Advised follow-up 6 to 9 months.   Stress test normal.    Prior Dr Lb Boyer- last appt 4/2020 for preop exam, CP, SOA, abnormal EKG. Advised smoking cessation, follow up with pulmonology, take Lipitor, hold off on colonoscopy pending cardiac workup- stress test, echo, and holter monitor. Advised AAA screening. Negative cardiac testing. Advised follow up 1 year. She did not return for follow up.   Endocrinology-RESHMA Candelaria-last appointment 3/2025 for osteopenia and vitamin D deficiency.  They discussed Reclast annual infusion as the best option.  She did not want treatment at that time.  Advised calcium 1200 mg daily and minimum vitamin D intake of 600 IU daily.  Advised to 150 minutes of moderate intensity aerobic activity and 2 days a week of muscle strength training activity.  Repeat DEXA 2 years.  Follow-up as needed.  GI- Dr Raymond, [facility] Sharma, [facility] Up- last appt 8/2024 for family history of colon cancer, breast cancer, pancreatic cancer, GERD, hiatal hernia, IBS with constipation.  She was previously referred and evaluated by Dr. Carl to consider Nissen and deferred.  She continues Protonix 40 mg daily and Pepcid 1-3 times daily depending on symptoms.  She feels her symptoms are overall improved and well-managed.  Refill sent to the pharmacy.  Bowel habits more regular and  can use MiraLAX as needed for intermittent constipation.  Follow-up 1 year.  2023-advise gastric emptying study and hyoscyamine.  Colonoscopy for 2023.  Consider referral for Nissen.  Constipation not adequately relieved with Linzess 72 mcg and did not wish for trial of a stronger dose due to cost.  Advised MiraLAX and stool softeners as needed.  Sent prescription for Levsin for abdominal cramping.  Advised Ambry genetic testing.  Continue Protonix, Pepcid, and was given Carafate.  Advised she see surgery for hiatal hernia repair.  Follow-up 2/2024  Normal gastric emptying study.  10/2021-for blood in stool, IBS, and GERD. Advised Miralax, Protonix 40 mg once daily, and EGD, Colonoscopy. Endoscopy 2/2022 with large hitala hernia. No colonoscopy performed. Benign pathology.   General surgery- RESHMA Arias- last appt 3/2020 for hematuria. CT abd/pelvis, cystoscopy, and UA.   Neurosurgery- Dr Willi Ghotra- last appt 1/2021 for cervicalgia, cervical radiculopathy, herniated C4-5, C5-6, left shoulder pain. Ordered MRI left shoulder. If no abnormality, recommended C4-5, C5-6 ACDF.  Appointment 3/30/2023 and 5/2023 canceled.  Orthopedic surgery- Dr Jet Salinas- last appt 3/2021 for left shoulder pain. Xray, MRI 2/2021 with moderate supraspinatus tendonopathy, partial thickness fraying, intra-articular long head biceps tendinopathy, small glenohummeral effusion, mild to moderate chondromalacia, synovial thickening, or intr-articular debris vs labral tissue in axillary recess- suggested capsulitis. DJD AC joint with chronic deformity of clavicle. Exercises for tendonitis given and advised to return for consideration of injection if no improvement.   Thoracic surgery-Dr. Carl-last appointment for 2024 for hiatal hernia with complaints of reflux.  Manometry required for comprehensive evaluation of esophageal function and CT will be required.  Patient canceled her follow-up with provider.  Pulmonology-, Dr. Sajan Campos  RESHMA Renteria- last appt 5/3/2025 for COPD with emphysema, URIEL, history of tobacco use, pulmonary nodules, diastolic CHF, hypersomnia, snoring.  Patient will need continued LDCT until 2035 with quitting smoking in 2020.  She was on Trelegy but stopped in January because became unaffordable and she was not feeling much worse.  She did not have worsening symptoms except she used her albuterol slightly more often.  On Singulair and has albuterol for as needed.  He did have eosinophilia 1/2023 ER visit.  She still has 17% reversibility-this is why started on ICS/LAMA/LABA combination.  She is followed by an allergist.  She was not interested in URIEL evaluation but changed her mind-ordered HST and advised follow-up by [facility] after.  Nodule stable over 2 years-no need to follow nodules but will continue LDCT until 2035-ordered by PCP.  Echo with EF 61 to 65%, RVSP normal less than 35, LV diastolic function consistent with grade 1 impaired relaxation 11/2022.  1/2023 for COPD with emphysema, pulmonary nodules, history of tobacco abuse, URIEL.  Changed Stiolto to Trelegy 200.   CT stable over 2 years.-no need for follow-up testing.  May benefit for yearly screening and will address at next visit.  Need to evaluate for URIEL.  Follow-up 2 months with full PFT.   2/2021 in follow up of hospitalization for acute hypoxic respiratory failure requiring noninvasince positive pressure ventilation and COPD exacerbation. Hypoxia to 86% on 4 L O2. Transitioned to BIPAP with improvement. She did not need home oxygen. Treated with steroid, nebulizer. Advised Stiolto, Qvar, and Singulair. Ordered sleep study, started flutter valve, DuoNeb followed by sodium chloride nebulizer then flutter valve twice daily. Low dose CT 6/2021. Had CT abd/pelvis with pulm nodule. Continued Stiolto and PA Singulair. Changed Qvar to Flovent due to cost. Follow up in 2-3 months.   Urology- Dr Chelsea Lundy- last appt 3/2020 for cystoscopy for hematuria.  Negative and negative CT abd/pelvis. Follow up in 1 year.     The following portions of the patient's history were reviewed and updated as appropriate: allergies, current medications, past family history, past medical history, past social history, past surgical history and problem list.    Review of Systems   Respiratory:  Positive for cough and wheezing.        Objective   Vitals:    05/19/25 0939   BP: 96/58   Pulse: 96   Resp: 22   Temp: 98 °F (36.7 °C)   SpO2: 93%         Body mass index is 29.81 kg/m².    Physical Exam  Vitals and nursing note reviewed.   Constitutional:       Appearance: She is well-developed.   HENT:      Head: Normocephalic and atraumatic.      Right Ear: External ear normal.      Left Ear: External ear normal.      Nose: Nose normal.   Eyes:      General: Lids are normal.      Conjunctiva/sclera: Conjunctivae normal.   Neck:      Vascular: No carotid bruit.   Cardiovascular:      Rate and Rhythm: Normal rate and regular rhythm.      Heart sounds: Normal heart sounds. No murmur heard.     No friction rub. No gallop.   Pulmonary:      Effort: Pulmonary effort is normal. No respiratory distress.      Breath sounds: Normal breath sounds. No wheezing, rhonchi or rales.   Musculoskeletal:         General: No deformity.      Cervical back: Neck supple.   Skin:     General: Skin is warm and dry.   Neurological:      Mental Status: She is alert and oriented to person, place, and time.      Gait: Gait normal.   Psychiatric:         Speech: Speech normal.         Behavior: Behavior normal.         Thought Content: Thought content normal.         Assessment & Plan   Diagnoses and all orders for this visit:    1. COPD with exacerbation (Primary)    2. Dependence on supplemental oxygen    3. Hypoxia    4. Eosinophilia, unspecified type  -     CBC & Differential  -     UA / M With / Rflx Culture([facility] ONLY) - Urine, Clean Catch    5. Pulmonary nodule    6. Shortness of breath    7. Sleep apnea,  unspecified type    8. Hyperlipidemia, unspecified hyperlipidemia type  -     Comprehensive Metabolic Panel  -     CK  -     Lipid Panel With LDL / HDL Ratio    9. Vitamin D deficiency  -     Comprehensive Metabolic Panel  -     Vitamin D,25-Hydroxy    10. Thrombocytosis  -     CBC & Differential  -     UA / M With / Rflx Culture([facility] ONLY) - Urine, Clean Catch    11. Gastroesophageal reflux disease with esophagitis without hemorrhage    12. Hiatal hernia    13. Irritable bowel syndrome with constipation    14. Rectal bleeding    15. Hematochezia    16. Other fatigue    17. Fibromyalgia    18. Neck pain    19. Osteopenia of multiple sites    20. Other idiopathic scoliosis, cervical region    21. Scoliosis of thoracic spine, unspecified scoliosis type    22. Thoracic spine pain    23. Cervical radiculopathy    24. Cervical spinal stenosis    25. Cervical spondylosis without myelopathy    26. Degenerative disc disease, cervical    27. Osteoarthritis of thoracic spine with myelopathy    28. Allergy to alpha-gal    29. Major depressive disorder, recurrent episode, moderate degree  -     TSH    30. Coronary artery calcification seen on CT scan  -     Ambulatory Referral to Cardiology    31. Aortic valve calcification  -     Ambulatory Referral to Cardiology            Assessment and Plan  Patient will have fasting labs. Call if no results in 1 week. Stability of conditions, plan, follow up, and further recommendations pending labs.  Follow-up in no more than 3 months if labs are stable.    COPD exacerbation- Patient was seen 3/31/2025 for URI by RESHMA Doan.  She thought she was improving then had sudden worsening.   was also sick at that time and he improved.  Patient was seen by me 4/7/2025 with worsening symptoms and was advised to use Breztri 2 puffs twice daily then rinse mouth, prednisone taper, and I gave doxycycline 100 mg twice daily for 10 days.  She was also advised to use her nebulizer every  4-6 hours and continue Flonase and Singulair.  Patient was advised to contact her pulmonologist if no resolution.  She had improvement in symptoms for a short time on treatment then had recurrence and was requiring 3 L of oxygen day and night and was using albuterol every hour.  She still had had not picked up her Trelegy and was out of the Breztri sample she was given.  She has not used her nebulizer.  Patient was tachypneic and wheezing significantly with decreased air movement throughout in the office today. I contacted her pulmonologist office-Dr. Moeller is in the hospital this week.  Per pulmonologist in the office, they recommended direct admission due to new oxygen requirement and recent outpatient treatment without improvement currently.  I contacted [facility] who will accept admission and pulmonology was consulted.  She had improvement with hospitalization and treatment for COPD exacerbation.  Patient does need to contact her pulmonologist to determine follow-up needed.  COPD with exacerbation-  She will need close follow-up with pulmonology to ensure better control of her COPD and better compliance with medications as directed.    Pulmonary nodule- CT chest 11/2021 with bilateral apical pleural thickening, mild emphysema, calcified pulmonary nodules right lower lobe, other subpleural nodules and areas of scarring with nodularity-all reported without change.  Hiatal hernia.  Per radiology, no suspicious pulmonary nodules.  Recheck in 1 year.  Patient was seen for pulmonary nodules and no further follow-up is necessary, however, she is to continue LDCT annually until 2035.  Worsening/enlarging area on CT.  Per pulmonology, agreed with radiologist to recheck in 3 months.  Patient will need to follow-up with pulmonology and have CT chest as directed by them.  I would prefer pulmonology follow this.  GERD, hiatal hernia- Last EGD 2/2022 with large hiatal hernia and reflux esophagitis with benign pathology.  Per GI,  continue Protonix 40 mg once daily and Pepcid 20 mg up to twice daily. She was seen by thoracic surgery but did not go for follow-up and did not elect for Nissen.   Coronary artery calcification, aortic valve calcification- She is overdue for follow-up with cardiology.  I will refer her back today and for evaluation of coronary artery calcifications and aortic valve calcification on CT chest.  Hyperlipidemia- Lipids improved but LDL remained uncontrolled. She was taking Lipitor then she was taking Crestor from cardiology. She stopped the medications and reported she noticed improvement in side effects with stopping medication. I rechecked labs and advised she start Livalo.  She sent message back and reported it was too expensive and she wanted to change back to rosuvastatin.  Restarted rosuvastatin 10 mg daily and is tolerating without AE.  I we will recheck and make further recommendations for dosing.  Vitamin D deficiency- Patient to ensure she is taking vitamin D 2000 IU daily as directed.  Will recheck at follow-up i in June.  Osteopenia- Patient had DEXA scan that met criteria for treatment. We will need to discuss medication vs specialist referral at follow up.    Thrombocytosis- I will continue to monitor and make further recommendations.   Moods- She has had longstanding depression that has worsened with her son's death. She previously did well on Prozac. I restarted Prozac 20 mg once daily with some improvement but continued with significant depressive symptoms. Increased Prozac to 40 mg once daily then twice daily with improvement but persistent symptoms. She has complicated mood symptoms with grief reaction. She was willing to see psychiatry, was referred, seen, and has not had follow-up.  I discussed she needed to consider psychiatry again in the future and should ensure regular counseling.  For now, she reports her moods are stable on Prozac 80 mg daily. She should be seen ASAP if worsening moods or  9-1-1 to ER if she develops SI/HI. No SI/HI today.  Fibromyalgia- Patient was previously on Cymbalta and Elavil for pain. However, moods were better controlled on Prozac. We will re-evaluated at follow up.     DDD cervical spine- Follow up with neurosurgery if persistent pain.   Squamous cell skin cancer- Patient to ensure she follows with dermatology regularly and was referred.    From [facility] 10/2023.  She is up-to-date on colonoscopy.  She does have family history of breast and colon cancer as well as pancreatic cancer. She needs to ensure she stays up to date on colonoscopy and mammogram. She has been referred for mammogram and DEXA scan and needs to schedule. Consideration of genetics consultation to determine most appropriate screening recommendations.  She was recommended for genetics testing per GI.  Patient to check with insurance to determine coverage and location of coverage for Tdap, Prevnar 20, and Shingrix.  She should also ensure annual flu shot and COVID-19 booster.    I spent 35 minutes caring for Jennifer Shelton on this date of service. This time includes time spent by me in the following activities as necessary: preparing for the visit, reviewing tests, specialists records and previous visits, obtaining and/or reviewing a separately obtained history, performing a medically appropriate exam and/or evaluation, counseling and educating the patient, family, caregiver, referring and/or communicating with other healthcare professionals, documenting information in the medical record, independently interpreting results and communicating that information with the patient, family, caregiver, and developing a medically appropriate treatment plan with consideration of other conditions, medications, and treatments.

## 2025-05-28 DIAGNOSIS — J30.2 SEASONAL ALLERGIC RHINITIS, UNSPECIFIED TRIGGER: ICD-10-CM

## 2025-05-28 DIAGNOSIS — J44.9 CHRONIC OBSTRUCTIVE PULMONARY DISEASE, UNSPECIFIED COPD TYPE: ICD-10-CM

## 2025-05-28 NOTE — TELEPHONE ENCOUNTER
Caller: Jennifer Shelton    Relationship: Self    Best call back number: [phone]     Requested Prescriptions:   Requested Prescriptions     Pending Prescriptions Disp Refills    montelukast (SINGULAIR) 10 MG tablet 90 tablet 0        [facility] [address] - [phone] PH - [phone] FX     Last office visit with prescribing clinician: 5/19/2025   Last telemedicine visit with prescribing clinician: Visit date not found   Next office visit with prescribing clinician: Visit date not found     Additional details provided by patient: PATIENT HAS BEEN OUT OF THIS MEDICATION FOR A WEEK    Does the patient have less than a 3 day supply:  [x] Yes  [] No    Jeramy Browne Rep   05/28/25 14:31 EDT

## 2025-05-29 LAB
25(OH)D3+25(OH)D2 SERPL-MCNC: 32.8 NG/ML (ref 30–100)
ALBUMIN SERPL-MCNC: 4 G/DL (ref 3.5–5.2)
ALBUMIN/GLOB SERPL: 1.7 G/DL
ALP SERPL-CCNC: 79 U/L (ref 39–117)
ALT SERPL-CCNC: 9 U/L (ref 1–33)
APPEARANCE UR: CLEAR
AST SERPL-CCNC: 16 U/L (ref 1–32)
BACTERIA #/AREA URNS HPF: ABNORMAL /[HPF]
BACTERIA UR CULT: NORMAL
BACTERIA UR CULT: NORMAL
BASOPHILS # BLD AUTO: 0.05 10*3/MM3 (ref 0–0.2)
BASOPHILS NFR BLD AUTO: 1 % (ref 0–1.5)
BILIRUB SERPL-MCNC: 0.4 MG/DL (ref 0–1.2)
BILIRUB UR QL STRIP: NEGATIVE
BUN SERPL-MCNC: 17 MG/DL (ref 8–23)
BUN/CREAT SERPL: 24.3 (ref 7–25)
CALCIUM SERPL-MCNC: 9 MG/DL (ref 8.6–10.5)
CASTS URNS QL MICRO: ABNORMAL /LPF
CHLORIDE SERPL-SCNC: 104 MMOL/L (ref 98–107)
CHOLEST SERPL-MCNC: 196 MG/DL (ref 0–200)
CK SERPL-CCNC: 60 U/L (ref 20–180)
CO2 SERPL-SCNC: 26 MMOL/L (ref 22–29)
COLOR UR: YELLOW
CREAT SERPL-MCNC: 0.7 MG/DL (ref 0.57–1)
EGFRCR SERPLBLD CKD-EPI 2021: 94.3 ML/MIN/1.73
EOSINOPHIL # BLD AUTO: 0.34 10*3/MM3 (ref 0–0.4)
EOSINOPHIL NFR BLD AUTO: 6.8 % (ref 0.3–6.2)
EPI CELLS #/AREA URNS HPF: >10 /HPF (ref 0–10)
ERYTHROCYTE [DISTWIDTH] IN BLOOD BY AUTOMATED COUNT: 12.9 % (ref 12.3–15.4)
GLOBULIN SER CALC-MCNC: 2.4 GM/DL
GLUCOSE SERPL-MCNC: 90 MG/DL (ref 65–99)
GLUCOSE UR QL STRIP: NEGATIVE
HCT VFR BLD AUTO: 39.2 % (ref 34–46.6)
HDLC SERPL-MCNC: 65 MG/DL (ref 40–60)
HGB BLD-MCNC: 12.7 G/DL (ref 12–15.9)
HGB UR QL STRIP: NEGATIVE
IMM GRANULOCYTES # BLD AUTO: 0.01 10*3/MM3 (ref 0–0.05)
IMM GRANULOCYTES NFR BLD AUTO: 0.2 % (ref 0–0.5)
KETONES UR QL STRIP: ABNORMAL
LDLC SERPL CALC-MCNC: 119 MG/DL (ref 0–100)
LDLC/HDLC SERPL: 1.81 {RATIO}
LEUKOCYTE ESTERASE UR QL STRIP: ABNORMAL
LYMPHOCYTES # BLD AUTO: 1.34 10*3/MM3 (ref 0.7–3.1)
LYMPHOCYTES NFR BLD AUTO: 26.9 % (ref 19.6–45.3)
MCH RBC QN AUTO: 29.4 PG (ref 26.6–33)
MCHC RBC AUTO-ENTMCNC: 32.4 G/DL (ref 31.5–35.7)
MCV RBC AUTO: 90.7 FL (ref 79–97)
MICRO URNS: ABNORMAL
MONOCYTES # BLD AUTO: 0.45 10*3/MM3 (ref 0.1–0.9)
MONOCYTES NFR BLD AUTO: 9 % (ref 5–12)
MUCOUS THREADS URNS QL MICRO: PRESENT
NEUTROPHILS # BLD AUTO: 2.79 10*3/MM3 (ref 1.7–7)
NEUTROPHILS NFR BLD AUTO: 56.1 % (ref 42.7–76)
NITRITE UR QL STRIP: NEGATIVE
NRBC BLD AUTO-RTO: 0 /100 WBC (ref 0–0.2)
PH UR STRIP: 7 [PH] (ref 5–7.5)
PLATELET # BLD AUTO: 341 10*3/MM3 (ref 140–450)
POTASSIUM SERPL-SCNC: 4.1 MMOL/L (ref 3.5–5.2)
PROT SERPL-MCNC: 6.4 G/DL (ref 6–8.5)
PROT UR QL STRIP: ABNORMAL
RBC # BLD AUTO: 4.32 10*6/MM3 (ref 3.77–5.28)
RBC #/AREA URNS HPF: ABNORMAL /HPF (ref 0–2)
SODIUM SERPL-SCNC: 141 MMOL/L (ref 136–145)
SP GR UR STRIP: 1.02 (ref 1–1.03)
TRIGL SERPL-MCNC: 67 MG/DL (ref 0–150)
TSH SERPL DL<=0.005 MIU/L-ACNC: 1.31 UIU/ML (ref 0.27–4.2)
URINALYSIS REFLEX: ABNORMAL
UROBILINOGEN UR STRIP-MCNC: 1 MG/DL (ref 0.2–1)
VLDLC SERPL CALC-MCNC: 12 MG/DL (ref 5–40)
WBC # BLD AUTO: 4.98 10*3/MM3 (ref 3.4–10.8)
WBC #/AREA URNS HPF: ABNORMAL /HPF (ref 0–5)

## 2025-05-30 RX ORDER — MONTELUKAST SODIUM 10 MG/1
10 TABLET ORAL NIGHTLY
Qty: 90 TABLET | Refills: 0 | Status: SHIPPED | OUTPATIENT
Start: 2025-05-30

## 2025-05-30 NOTE — TELEPHONE ENCOUNTER
Caller: Jennifer Shelton    Relationship to Patient: Self    Phone Number: [phone]     Reason for Call: PATIENT CALLING TO FOLLOW UP ON THE MEDICATION REFILL SHE HAS BEEN OUT OF MEDICATION FOR A WEEK

## 2025-06-04 RX ORDER — ROSUVASTATIN CALCIUM 10 MG/1
10 TABLET, COATED ORAL DAILY
Qty: 90 TABLET | Refills: 1 | Status: SHIPPED | OUTPATIENT
Start: 2025-06-04

## 2025-07-16 DIAGNOSIS — F33.1 MAJOR DEPRESSIVE DISORDER, RECURRENT EPISODE, MODERATE DEGREE: ICD-10-CM

## 2025-07-16 RX ORDER — FLUOXETINE HYDROCHLORIDE 40 MG/1
CAPSULE ORAL
Qty: 180 CAPSULE | Refills: 0 | Status: SHIPPED | OUTPATIENT
Start: 2025-07-16

## 2025-07-16 NOTE — TELEPHONE ENCOUNTER
See result note.  Please schedule the patient for follow-up with me the end of October or the beginning of November.

## 2025-07-18 ENCOUNTER — TELEPHONE (OUTPATIENT)
Dept: GASTROENTEROLOGY | Facility: CLINIC | Age: 70
End: 2025-07-18
Payer: MEDICARE

## 2025-07-18 NOTE — TELEPHONE ENCOUNTER
Attempted to contact patient in regard to 8/26/25 appointment w/ RESHMA Gary.   Stephanie is no longer seeing patients @ [place] location[facility]   Left message with my direct line to call back and reschedule.

## 2025-07-22 NOTE — PROGRESS NOTES
RM:________    Referral Provider: Tessy Serrano PA Thomas, Melissa, PA    NEW PATIENT/ CONSULT  PREVIOUS CARDIOLOGIST: ______________________________    CARDIAC TESTING: __________________________________________________    : 1955   AGE: 70 y.o.    2025  REASON FOR VISIT/  CC:      WT: ____________ BP: __________L __________R/ HR_______________    ALLERGIES:  Aspirin, Cephalexin, Nsaids, Penicillins, Alpha-gal, and Contrast dye (echo or unknown ct/mr)  SMOKING HISTORY  Social History     Tobacco Use    Smoking status: Former     Current packs/day: 0.00     Average packs/day: 1.5 packs/day for 40.3 years (60.5 ttl pk-yrs)     Types: Cigarettes     Start date: 1980     Quit date: 2020     Years since quittin.7     Passive exposure: Past    Smokeless tobacco: Never    Tobacco comments:     Quit 2020   Vaping Use    Vaping status: Never Used   Substance Use Topics    Alcohol use: Never    Drug use: Never          H/O: MI_____   STROKE________   GOUT_____   ANEMIA______     CAROTID________ HIV____ CAD_______ HYPERCHOL _____    H/O: CHF _____   RF____ DM___ HTN_______PVD____THYROID DISEASE_______    PMH: GI ____   HEPATITIS ___ KIDNEY DISEASE ___ LUNG DISEASE _______     SLEEP APNEA ____ BLOOD CLOTS ____ DVT ____ VEIN STRIPPING ___________      STOP BANG _________ (CARDIO ONCOLOGY ONLY)    CANCER _________________________________ CHEMO/ RADIATION__________

## 2025-07-29 ENCOUNTER — OFFICE VISIT (OUTPATIENT)
Age: 70
End: 2025-07-29
Payer: MEDICARE

## 2025-07-29 VITALS
BODY MASS INDEX: 29.4 KG/M2 | WEIGHT: 194 LBS | SYSTOLIC BLOOD PRESSURE: 116 MMHG | DIASTOLIC BLOOD PRESSURE: 76 MMHG | HEIGHT: 68 IN | HEART RATE: 79 BPM

## 2025-07-29 DIAGNOSIS — I25.10 CORONARY ARTERY CALCIFICATION: ICD-10-CM

## 2025-07-29 DIAGNOSIS — R06.09 DOE (DYSPNEA ON EXERTION): Primary | ICD-10-CM

## 2025-07-29 DIAGNOSIS — R01.1 HEART MURMUR: ICD-10-CM

## 2025-07-29 DIAGNOSIS — I10 PRIMARY HYPERTENSION: ICD-10-CM

## 2025-07-29 NOTE — PROGRESS NOTES
"      CARDIOLOGY    Morenita Moncada MD    ENCOUNTER DATE:  07/29/2025    Jennifer Shelton / 70 y.o. / female        CHIEF COMPLAINT / REASON FOR OFFICE VISIT     Abnormal Imaging (CT CALCIUM SCORE )      HISTORY OF PRESENT ILLNESS       HPI    Jennifer Shelton is a 70 y.o. female     This is a lady who had an echocardiogram in 11/2022 which showed normal LV systolic function, ejection fraction 67% with grade 1 diastolic dysfunction.  PET stress test in 01/2023 showed no evidence of ischemia.  She has routine CT scans for nodules and smoking history.  The most recent was in 05/2025.  I reviewed the images myself.  She has a lot of calcification of her aortic arch.  I see mostly valvular calcification and not too much coronary calcification, but it is not a gated study and there is quite a bit of motion artifact and there is some coronary calcification noted.      She complains of a lot of shortness of breath with exertion.  She does have lung disease and follows with Dr. Moeller.  She has aches in her chest.  That seems to be pretty constant.  No exacerbating or relieving factors.  She has chronic heartburn.          VITAL SIGNS     Visit Vitals  /76 (BP Location: Left arm)   Pulse 79   Ht 172.7 cm (67.99\")   Wt 88 kg (194 lb)   BMI 29.51 kg/m²         Wt Readings from Last 3 Encounters:   07/29/25 88 kg (194 lb)   05/19/25 88.9 kg (196 lb)   05/10/25 90.6 kg (199 lb 11.8 oz)     Body mass index is 29.51 kg/m².      PHYSICAL EXAMINATION     Constitutional:       General: Not in acute distress.  Neck:      Vascular: No carotid bruit or JVD.   Pulmonary:      Effort: Pulmonary effort is normal.      Breath sounds: Normal breath sounds.   Cardiovascular:      Normal rate. Regular rhythm.      Murmurs: There is a grade 2/6 systolic murmur.   Psychiatric:         Mood and Affect: Mood and affect normal.           REVIEWED DATA       ECG 12 Lead    Date/Time: 7/29/2025 1:27 PM  Performed by: Morenita Moncada" MD    Authorized by: Morenita Moncada MD  Comparison: compared with previous ECG from 1/27/2025  Similar to previous ECG  Rhythm: sinus rhythm  BPM: 79  Conduction: conduction normal  ST Segments: ST segments normal  T Waves: T waves normal    Clinical impression: normal ECG            Lipid Panel          1/30/2025    09:39 5/27/2025    09:12   Lipid Panel   Total Cholesterol 291  196    Triglycerides 177  67    HDL Cholesterol 53  65    VLDL Cholesterol 33  12    LDL Cholesterol  205  119    LDL/HDL Ratio 3.9  1.81        Lab Results   Component Value Date    GLUCOSE 90 05/27/2025    BUN 17.0 05/27/2025    CREATININE 0.70 05/27/2025     05/27/2025    K 4.1 05/27/2025     05/27/2025    CALCIUM 9.0 05/27/2025    PROTEINTOT 6.4 05/27/2025    ALBUMIN 4.0 05/27/2025    ALT 9 05/27/2025    AST 16 05/27/2025    ALKPHOS 79 05/27/2025    BILITOT 0.4 05/27/2025    GLOB 2.4 05/27/2025    AGRATIO 1.7 05/27/2025    BCR 24.3 05/27/2025    ANIONGAP 9.0 05/10/2025    EGFR 94.3 05/27/2025       ASSESSMENT & PLAN      Diagnosis Plan   1. SCHULZ (dyspnea on exertion)  Adult Transthoracic Echo Complete W/ Cont if Necessary Per Protocol    Stress Test With Pet Myocardial Perfusion      2. Coronary artery calcification  Adult Transthoracic Echo Complete W/ Cont if Necessary Per Protocol    Stress Test With Pet Myocardial Perfusion      3. Primary hypertension  Adult Transthoracic Echo Complete W/ Cont if Necessary Per Protocol    Stress Test With Pet Myocardial Perfusion      4. Heart murmur  Adult Transthoracic Echo Complete W/ Cont if Necessary Per Protocol    Stress Test With Pet Myocardial Perfusion          Chest pain.  This sounds atypical for cardiac chest pain.   Dyspnea on exertion.  This is definitely concerning for possible anginal equivalent.  I recommend checking echocardiogram to evaluate her cardiac valves and LV diastolic and systolic functions, as well as a nuclear stress test to evaluate for any coronary  [FreeTextEntry1] :  Patient presents today c/o left ear tinnitus - ringing in ear. She states her symptoms are unchanged and she is still getting ringing in the ears. She feels clogged and as if her left ear is worse.  disease.    Mitral annular calcification noted on CT.  Check echocardiogram.   Aortic root and valve calcification noted on CT.  Check echocardiogram.  Hypertension, well controlled.    COPD.  Followed by Dr. Moeller.    Calcified plaque in the aortic arch.  She cannot take aspirin because of history of allergy.  She has vascular screening pending.  If her vascular screening is abnormal, I would consider clopidogrel 75 mg a day.    Hyperlipidemia.  She is on 10 mg of rosuvastatin a day.  I reviewed her most recent lipid panel.      One of my nurses or I will go over the results of her echocardiogram and stress test when it becomes available and I will determine followup based upon this test result.              Orders Placed This Encounter   Procedures    Stress Test With Pet Myocardial Perfusion     Standing Status:   Future     Expected Date:   8/3/2025     Expiration Date:   10/29/2026     Reason for exam?:   Chest Pain     Reason for exam?:   Angina     Release to patient:   Routine Release [[id number]]    ECG 12 Lead     This order was created via procedure documentation     Release to patient:   Routine Release [[id number]]    Adult Transthoracic Echo Complete W/ Cont if Necessary Per Protocol     Standing Status:   Future     Expected Date:   8/5/2025     Expiration Date:   7/29/2026     Reason for exam?:   Dyspnea     Reason for exam?:   Chest Pain     Release to patient:   Routine Release [[id number]]           MEDICATIONS         Discharge Medications            Accurate as of July 29, 2025  1:28 PM. If you have any questions, ask your nurse or doctor.                Continue These Medications        Instructions Start Date   albuterol sulfate  (90 Base) MCG/ACT inhaler  Commonly known as: PROVENTIL HFA;VENTOLIN HFA;PROAIR HFA   2 puffs, Inhalation, Every 4 Hours PRN      famotidine 20 MG tablet  Commonly known as: PEPCID   20 mg, Oral, 2 Times Daily PRN      FLUoxetine 40 MG capsule  Commonly known  as: PROzac   TAKE 2 CAPSULES BY MOUTH DAILY. NEED APPT FOR REFILLS.      fluticasone 50 MCG/ACT nasal spray  Commonly known as: FLONASE   2 sprays, Each Nare, Daily      ipratropium-albuterol 0.5-2.5 mg/3 ml nebulizer  Commonly known as: DUO-NEB   3 mL, Nebulization, 4 Times Daily - RT      montelukast 10 MG tablet  Commonly known as: SINGULAIR   10 mg, Oral, Nightly      O2  Commonly known as: OXYGEN   Once      pantoprazole 40 MG EC tablet  Commonly known as: PROTONIX   40 mg, Oral, Daily      rosuvastatin 10 MG tablet  Commonly known as: Crestor   10 mg, Oral, Daily      Trelegy Ellipta 100-62.5-25 MCG/ACT inhaler  Generic drug: Fluticasone-Umeclidin-Vilant   1 puff, Daily - RT      Vitamin D3 50 MCG (2000 UT) tablet   TAKE 1 TABLET BY MOUTH DAILY. NEEDS TO BE SEEN FOR MORE REFILLS.                 Morenita Moncada MD  07/29/25  13:28 EDT    Part of this note may be an electronic transcription/translation of spoken language to printed text using the Dragon dictation system.

## 2025-08-01 ENCOUNTER — TRANSCRIBE ORDERS (OUTPATIENT)
Dept: ADMINISTRATIVE | Facility: HOSPITAL | Age: 70
End: 2025-08-01
Payer: MEDICARE

## 2025-08-01 DIAGNOSIS — R91.1 PULMONARY NODULE: Primary | ICD-10-CM

## 2025-08-06 ENCOUNTER — HOSPITAL ENCOUNTER (OUTPATIENT)
Dept: CT IMAGING | Facility: HOSPITAL | Age: 70
Discharge: HOME | End: 2025-08-06
Admitting: INTERNAL MEDICINE
Payer: MEDICARE

## 2025-08-06 DIAGNOSIS — R91.1 PULMONARY NODULE: ICD-10-CM

## 2025-08-06 PROCEDURE — 71250 CT THORAX DX C-: CPT

## 2025-08-11 ENCOUNTER — TELEPHONE (OUTPATIENT)
Dept: CARDIOLOGY | Age: 70
End: 2025-08-11
Payer: MEDICARE

## 2025-08-12 ENCOUNTER — HOSPITAL ENCOUNTER (OUTPATIENT)
Dept: CARDIOLOGY | Facility: HOSPITAL | Age: 70
Discharge: HOME | End: 2025-08-12
Payer: MEDICARE

## 2025-08-12 VITALS
WEIGHT: 194 LBS | DIASTOLIC BLOOD PRESSURE: 80 MMHG | HEART RATE: 78 BPM | BODY MASS INDEX: 29.4 KG/M2 | SYSTOLIC BLOOD PRESSURE: 120 MMHG | HEIGHT: 68 IN

## 2025-08-12 DIAGNOSIS — I25.10 CORONARY ARTERY CALCIFICATION: ICD-10-CM

## 2025-08-12 DIAGNOSIS — I10 PRIMARY HYPERTENSION: ICD-10-CM

## 2025-08-12 DIAGNOSIS — R06.09 DOE (DYSPNEA ON EXERTION): ICD-10-CM

## 2025-08-12 DIAGNOSIS — R01.1 HEART MURMUR: ICD-10-CM

## 2025-08-12 LAB
AORTIC ARCH: 2.4 CM
AORTIC DIMENSIONLESS INDEX: 0.78 (DI)
ASCENDING AORTA: 2.9 CM
AV MEAN PRESS GRAD SYS DOP V1V2: 4 MMHG
AV VMAX SYS DOP: 129 CM/SEC
BH CV ECHO LEFT VENTRICLE GLOBAL LONGITUDINAL STRAIN: -21.2 %
BH CV ECHO MEAS - ACS: 1.65 CM
BH CV ECHO MEAS - AO MAX PG: 6.7 MMHG
BH CV ECHO MEAS - AO ROOT DIAM: 3.3 CM
BH CV ECHO MEAS - AO V2 VTI: 24.5 CM
BH CV ECHO MEAS - AVA(I,D): 2.5 CM2
BH CV ECHO MEAS - EDV(CUBED): 50.7 ML
BH CV ECHO MEAS - EDV(MOD-SP2): 51 ML
BH CV ECHO MEAS - EDV(MOD-SP4): 45 ML
BH CV ECHO MEAS - EF(MOD-SP2): 64.7 %
BH CV ECHO MEAS - EF(MOD-SP4): 68.9 %
BH CV ECHO MEAS - ESV(CUBED): 14 ML
BH CV ECHO MEAS - ESV(MOD-SP2): 18 ML
BH CV ECHO MEAS - ESV(MOD-SP4): 14 ML
BH CV ECHO MEAS - FS: 34.8 %
BH CV ECHO MEAS - IVS/LVPW: 1.33 CM
BH CV ECHO MEAS - IVSD: 1.2 CM
BH CV ECHO MEAS - LAT PEAK E' VEL: 6.5 CM/SEC
BH CV ECHO MEAS - LV DIASTOLIC VOL/BSA (35-75): 22.3 CM2
BH CV ECHO MEAS - LV MASS(C)D: 120.8 GRAMS
BH CV ECHO MEAS - LV MAX PG: 4.2 MMHG
BH CV ECHO MEAS - LV MEAN PG: 2 MMHG
BH CV ECHO MEAS - LV SYSTOLIC VOL/BSA (12-30): 6.9 CM2
BH CV ECHO MEAS - LV V1 MAX: 102 CM/SEC
BH CV ECHO MEAS - LV V1 VTI: 19.2 CM
BH CV ECHO MEAS - LVIDD: 3.7 CM
BH CV ECHO MEAS - LVIDS: 2.41 CM
BH CV ECHO MEAS - LVOT AREA: 3.2 CM2
BH CV ECHO MEAS - LVOT DIAM: 2.02 CM
BH CV ECHO MEAS - LVPWD: 0.9 CM
BH CV ECHO MEAS - MED PEAK E' VEL: 7.1 CM/SEC
BH CV ECHO MEAS - MV A DUR: 0.11 SEC
BH CV ECHO MEAS - MV A MAX VEL: 83.6 CM/SEC
BH CV ECHO MEAS - MV DEC SLOPE: 319.6 CM/SEC2
BH CV ECHO MEAS - MV DEC TIME: 0.33 SEC
BH CV ECHO MEAS - MV E MAX VEL: 62.6 CM/SEC
BH CV ECHO MEAS - MV E/A: 0.75
BH CV ECHO MEAS - MV MAX PG: 3.9 MMHG
BH CV ECHO MEAS - MV MEAN PG: 1.58 MMHG
BH CV ECHO MEAS - MV P1/2T: 82.8 MSEC
BH CV ECHO MEAS - MV V2 VTI: 27.1 CM
BH CV ECHO MEAS - MVA(P1/2T): 2.7 CM2
BH CV ECHO MEAS - MVA(VTI): 2.28 CM2
BH CV ECHO MEAS - PA ACC TIME: 0.09 SEC
BH CV ECHO MEAS - PA V2 MAX: 96.5 CM/SEC
BH CV ECHO MEAS - PULM A REVS DUR: 0.12 SEC
BH CV ECHO MEAS - PULM A REVS VEL: 54.8 CM/SEC
BH CV ECHO MEAS - PULM DIAS VEL: 26.6 CM/SEC
BH CV ECHO MEAS - PULM S/D: 1.77
BH CV ECHO MEAS - PULM SYS VEL: 47.1 CM/SEC
BH CV ECHO MEAS - QP/QS: 0.54
BH CV ECHO MEAS - RAP SYSTOLE: 3 MMHG
BH CV ECHO MEAS - RV MAX PG: 2.38 MMHG
BH CV ECHO MEAS - RV V1 MAX: 77.1 CM/SEC
BH CV ECHO MEAS - RV V1 VTI: 14.7 CM
BH CV ECHO MEAS - RVOT DIAM: 1.7 CM
BH CV ECHO MEAS - RVSP: 18 MMHG
BH CV ECHO MEAS - SUP REN AO DIAM: 2.1 CM
BH CV ECHO MEAS - SV(LVOT): 61.8 ML
BH CV ECHO MEAS - SV(MOD-SP2): 33 ML
BH CV ECHO MEAS - SV(MOD-SP4): 31 ML
BH CV ECHO MEAS - SV(RVOT): 33.4 ML
BH CV ECHO MEAS - SVI(LVOT): 30.6 ML/M2
BH CV ECHO MEAS - SVI(MOD-SP2): 16.4 ML/M2
BH CV ECHO MEAS - SVI(MOD-SP4): 15.4 ML/M2
BH CV ECHO MEAS - TAPSE (>1.6): 1.99 CM
BH CV ECHO MEAS - TR MAX PG: 14.7 MMHG
BH CV ECHO MEAS - TR MAX VEL: 192 CM/SEC
BH CV ECHO MEASUREMENTS AVERAGE E/E' RATIO: 9.21
BH CV NUCLEAR PRIOR STUDY: 1
BH CV REST NUCLEAR ISOTOPE DOSE: 23.4 MCI
BH CV STRESS BP STAGE 1: NORMAL
BH CV STRESS COMMENTS STAGE 1: NORMAL
BH CV STRESS DOSE REGADENOSON STAGE 1: 0.4
BH CV STRESS DURATION MIN STAGE 1: 0
BH CV STRESS DURATION SEC STAGE 1: 10
BH CV STRESS HR STAGE 1: 98
BH CV STRESS NUCLEAR ISOTOPE DOSE: 23.4 MCI
BH CV STRESS PROTOCOL 1: NORMAL
BH CV STRESS RECOVERY BP: NORMAL MMHG
BH CV STRESS RECOVERY HR: 79 BPM
BH CV STRESS STAGE 1: 1
BH CV XLRA - RV BASE: 2.8 CM
BH CV XLRA - RV LENGTH: 5.2 CM
BH CV XLRA - RV MID: 2.17 CM
BH CV XLRA - TDI S': 15.6 CM/SEC
LEFT ATRIUM VOLUME INDEX: 10.4 ML/M2
LV EF BIPLANE MOD: 67.9 %
MAXIMAL PREDICTED HEART RATE: 152 BPM
PERCENT MAX PREDICTED HR: 64.47 %
SINUS: 2.8 CM
SPECT HRT GATED+EF W RNC IV: 78 %
STJ: 2.6 CM
STRESS BASELINE BP: NORMAL MMHG
STRESS BASELINE HR: 75 BPM
STRESS O2 SAT REST: 95 %
STRESS PERCENT HR: 76 %
STRESS POST EXERCISE DUR SEC: 10 SEC
STRESS POST O2 SAT PEAK: 99 %
STRESS POST PEAK BP: NORMAL MMHG
STRESS POST PEAK HR: 98 BPM
STRESS TARGET HR: 129 BPM

## 2025-08-12 PROCEDURE — 93016 CV STRESS TEST SUPVJ ONLY: CPT | Performed by: INTERNAL MEDICINE

## 2025-08-12 PROCEDURE — 93017 CV STRESS TEST TRACING ONLY: CPT

## 2025-08-12 PROCEDURE — 78492 MYOCRD IMG PET MLT RST&STRS: CPT | Performed by: INTERNAL MEDICINE

## 2025-08-12 PROCEDURE — 34310000005 RUBIDIUM CHLORIDE: Performed by: INTERNAL MEDICINE

## 2025-08-12 PROCEDURE — 93356 MYOCRD STRAIN IMG SPCKL TRCK: CPT

## 2025-08-12 PROCEDURE — A9555 RB82 RUBIDIUM: HCPCS | Performed by: INTERNAL MEDICINE

## 2025-08-12 PROCEDURE — 78492 MYOCRD IMG PET MLT RST&STRS: CPT

## 2025-08-12 PROCEDURE — 93306 TTE W/DOPPLER COMPLETE: CPT

## 2025-08-12 PROCEDURE — 93018 CV STRESS TEST I&R ONLY: CPT | Performed by: INTERNAL MEDICINE

## 2025-08-12 PROCEDURE — 25010000002 REGADENOSON 0.4 MG/5ML SOLUTION: Performed by: INTERNAL MEDICINE

## 2025-08-12 RX ORDER — REGADENOSON 0.08 MG/ML
0.4 INJECTION, SOLUTION INTRAVENOUS
Status: COMPLETED | OUTPATIENT
Start: 2025-08-12 | End: 2025-08-12

## 2025-08-12 RX ADMIN — REGADENOSON 0.4 MG: 0.08 INJECTION, SOLUTION INTRAVENOUS at 14:10

## (undated) DEVICE — Device: Brand: DEFENDO AIR/WATER/SUCTION AND BIOPSY VALVE

## (undated) DEVICE — SOL IRRG H2O PL/BG 1000ML STRL

## (undated) DEVICE — SOLIDIFIER LIQLOC PLS 1500CC BT

## (undated) DEVICE — THE SINGLE USE ETRAP – POLYP TRAP IS USED FOR SUCTION RETRIEVAL OF ENDOSCOPICALLY REMOVED POLYPS.: Brand: ETRAP

## (undated) DEVICE — Device: Brand: DISPOSABLE ELECTROSURGICAL SNARE

## (undated) DEVICE — SINGLE-USE BIOPSY FORCEPS: Brand: RADIAL JAW 4

## (undated) DEVICE — EGD OR ERCP KIT: Brand: MEDLINE INDUSTRIES, INC.

## (undated) DEVICE — LINER SURG CANSTR SXN S/RIGD 1500CC

## (undated) DEVICE — SNAR POLYP CAPTIFLEX XS/OVL 11X2.4MM 240CM 1P/U

## (undated) DEVICE — Device

## (undated) DEVICE — PAD GRND REM POLYHESIVE A/ DISP

## (undated) DEVICE — SNAR E/S POLYP SNAREMASTER OVL/10MM 2.8X2300MM YEL

## (undated) DEVICE — CONN JET HYDRA H20 AUXILIARY DISP

## (undated) DEVICE — SOL SUBMUCUS RTU 10ML

## (undated) DEVICE — Device: Brand: SINGLE USE INJECTOR NM600/610